# Patient Record
Sex: FEMALE | Race: OTHER
[De-identification: names, ages, dates, MRNs, and addresses within clinical notes are randomized per-mention and may not be internally consistent; named-entity substitution may affect disease eponyms.]

---

## 2019-03-02 ENCOUNTER — HOSPITAL ENCOUNTER (INPATIENT)
Dept: HOSPITAL 72 - EMR | Age: 74
LOS: 7 days | Discharge: HOME | DRG: 871 | End: 2019-03-09
Payer: MEDICARE

## 2019-03-02 VITALS — SYSTOLIC BLOOD PRESSURE: 94 MMHG | DIASTOLIC BLOOD PRESSURE: 65 MMHG

## 2019-03-02 VITALS — DIASTOLIC BLOOD PRESSURE: 89 MMHG | SYSTOLIC BLOOD PRESSURE: 111 MMHG

## 2019-03-02 VITALS — BODY MASS INDEX: 16.58 KG/M2 | WEIGHT: 90.13 LBS | HEIGHT: 62 IN

## 2019-03-02 VITALS — DIASTOLIC BLOOD PRESSURE: 48 MMHG | SYSTOLIC BLOOD PRESSURE: 95 MMHG

## 2019-03-02 VITALS — SYSTOLIC BLOOD PRESSURE: 94 MMHG | DIASTOLIC BLOOD PRESSURE: 46 MMHG

## 2019-03-02 VITALS — SYSTOLIC BLOOD PRESSURE: 138 MMHG | DIASTOLIC BLOOD PRESSURE: 80 MMHG

## 2019-03-02 DIAGNOSIS — G62.9: ICD-10-CM

## 2019-03-02 DIAGNOSIS — J11.00: ICD-10-CM

## 2019-03-02 DIAGNOSIS — J96.00: ICD-10-CM

## 2019-03-02 DIAGNOSIS — E46: ICD-10-CM

## 2019-03-02 DIAGNOSIS — Z79.82: ICD-10-CM

## 2019-03-02 DIAGNOSIS — A41.9: Primary | ICD-10-CM

## 2019-03-02 DIAGNOSIS — M81.0: ICD-10-CM

## 2019-03-02 DIAGNOSIS — M06.9: ICD-10-CM

## 2019-03-02 DIAGNOSIS — N39.0: ICD-10-CM

## 2019-03-02 DIAGNOSIS — D63.8: ICD-10-CM

## 2019-03-02 LAB
ADD MANUAL DIFF: NO
ALBUMIN SERPL-MCNC: 2.8 G/DL (ref 3.4–5)
ALBUMIN/GLOB SERPL: 0.4 {RATIO} (ref 1–2.7)
ALP SERPL-CCNC: 82 U/L (ref 46–116)
ALT SERPL-CCNC: 15 U/L (ref 12–78)
ANION GAP SERPL CALC-SCNC: 10 MMOL/L (ref 5–15)
APPEARANCE UR: CLEAR
APTT PPP: (no result) S
AST SERPL-CCNC: 40 U/L (ref 15–37)
BASOPHILS NFR BLD AUTO: 0.4 % (ref 0–2)
BILIRUB SERPL-MCNC: 0.3 MG/DL (ref 0.2–1)
BUN SERPL-MCNC: 29 MG/DL (ref 7–18)
CALCIUM SERPL-MCNC: 8.5 MG/DL (ref 8.5–10.1)
CHLORIDE SERPL-SCNC: 100 MMOL/L (ref 98–107)
CK MB SERPL-MCNC: 1.6 NG/ML (ref 0–3.6)
CK SERPL-CCNC: 106 U/L (ref 26–308)
CO2 SERPL-SCNC: 22 MMOL/L (ref 21–32)
CREAT SERPL-MCNC: 1.4 MG/DL (ref 0.55–1.3)
EOSINOPHIL NFR BLD AUTO: 0.1 % (ref 0–3)
ERYTHROCYTE [DISTWIDTH] IN BLOOD BY AUTOMATED COUNT: 13 % (ref 11.6–14.8)
GLOBULIN SER-MCNC: 7 G/DL
GLUCOSE UR STRIP-MCNC: NEGATIVE MG/DL
HCT VFR BLD CALC: 35.2 % (ref 37–47)
HGB BLD-MCNC: 11.4 G/DL (ref 12–16)
KETONES UR QL STRIP: NEGATIVE
LEUKOCYTE ESTERASE UR QL STRIP: (no result)
LYMPHOCYTES NFR BLD AUTO: 17.4 % (ref 20–45)
MCV RBC AUTO: 94 FL (ref 80–99)
MONOCYTES NFR BLD AUTO: 5.8 % (ref 1–10)
NEUTROPHILS NFR BLD AUTO: 76.3 % (ref 45–75)
NITRITE UR QL STRIP: NEGATIVE
PH UR STRIP: 6 [PH] (ref 4.5–8)
PHOSPHATE SERPL-MCNC: 2.7 MG/DL (ref 2.5–4.9)
PLATELET # BLD: 189 K/UL (ref 150–450)
POTASSIUM SERPL-SCNC: 4.4 MMOL/L (ref 3.5–5.1)
PROT UR QL STRIP: (no result)
RBC # BLD AUTO: 3.75 M/UL (ref 4.2–5.4)
SODIUM SERPL-SCNC: 131 MMOL/L (ref 136–145)
SP GR UR STRIP: 1.01 (ref 1–1.03)
UROBILINOGEN UR-MCNC: NORMAL MG/DL (ref 0–1)
WBC # BLD AUTO: 5.2 K/UL (ref 4.8–10.8)

## 2019-03-02 PROCEDURE — 93970 EXTREMITY STUDY: CPT

## 2019-03-02 PROCEDURE — 96365 THER/PROPH/DIAG IV INF INIT: CPT

## 2019-03-02 PROCEDURE — 87070 CULTURE OTHR SPECIMN AEROBIC: CPT

## 2019-03-02 PROCEDURE — 83540 ASSAY OF IRON: CPT

## 2019-03-02 PROCEDURE — 81003 URINALYSIS AUTO W/O SCOPE: CPT

## 2019-03-02 PROCEDURE — 85730 THROMBOPLASTIN TIME PARTIAL: CPT

## 2019-03-02 PROCEDURE — 83550 IRON BINDING TEST: CPT

## 2019-03-02 PROCEDURE — 86710 INFLUENZA VIRUS ANTIBODY: CPT

## 2019-03-02 PROCEDURE — 80053 COMPREHEN METABOLIC PANEL: CPT

## 2019-03-02 PROCEDURE — 80202 ASSAY OF VANCOMYCIN: CPT

## 2019-03-02 PROCEDURE — 84100 ASSAY OF PHOSPHORUS: CPT

## 2019-03-02 PROCEDURE — 85610 PROTHROMBIN TIME: CPT

## 2019-03-02 PROCEDURE — 83690 ASSAY OF LIPASE: CPT

## 2019-03-02 PROCEDURE — 82728 ASSAY OF FERRITIN: CPT

## 2019-03-02 PROCEDURE — 94640 AIRWAY INHALATION TREATMENT: CPT

## 2019-03-02 PROCEDURE — 83735 ASSAY OF MAGNESIUM: CPT

## 2019-03-02 PROCEDURE — 80069 RENAL FUNCTION PANEL: CPT

## 2019-03-02 PROCEDURE — 84484 ASSAY OF TROPONIN QUANT: CPT

## 2019-03-02 PROCEDURE — 87040 BLOOD CULTURE FOR BACTERIA: CPT

## 2019-03-02 PROCEDURE — 83615 LACTATE (LD) (LDH) ENZYME: CPT

## 2019-03-02 PROCEDURE — 36415 COLL VENOUS BLD VENIPUNCTURE: CPT

## 2019-03-02 PROCEDURE — 82550 ASSAY OF CK (CPK): CPT

## 2019-03-02 PROCEDURE — 82270 OCCULT BLOOD FECES: CPT

## 2019-03-02 PROCEDURE — 82044 UR ALBUMIN SEMIQUANTITATIVE: CPT

## 2019-03-02 PROCEDURE — 85044 MANUAL RETICULOCYTE COUNT: CPT

## 2019-03-02 PROCEDURE — 82607 VITAMIN B-12: CPT

## 2019-03-02 PROCEDURE — 84165 PROTEIN E-PHORESIS SERUM: CPT

## 2019-03-02 PROCEDURE — 82784 ASSAY IGA/IGD/IGG/IGM EACH: CPT

## 2019-03-02 PROCEDURE — 85025 COMPLETE CBC W/AUTO DIFF WBC: CPT

## 2019-03-02 PROCEDURE — 85060 BLOOD SMEAR INTERPRETATION: CPT

## 2019-03-02 PROCEDURE — 80048 BASIC METABOLIC PNL TOTAL CA: CPT

## 2019-03-02 PROCEDURE — 86334 IMMUNOFIX E-PHORESIS SERUM: CPT

## 2019-03-02 PROCEDURE — 87205 SMEAR GRAM STAIN: CPT

## 2019-03-02 PROCEDURE — 99285 EMERGENCY DEPT VISIT HI MDM: CPT

## 2019-03-02 PROCEDURE — 83605 ASSAY OF LACTIC ACID: CPT

## 2019-03-02 PROCEDURE — 82378 CARCINOEMBRYONIC ANTIGEN: CPT

## 2019-03-02 PROCEDURE — 82746 ASSAY OF FOLIC ACID SERUM: CPT

## 2019-03-02 PROCEDURE — 82164 ANGIOTENSIN I ENZYME TEST: CPT

## 2019-03-02 PROCEDURE — 93005 ELECTROCARDIOGRAM TRACING: CPT

## 2019-03-02 PROCEDURE — 83880 ASSAY OF NATRIURETIC PEPTIDE: CPT

## 2019-03-02 PROCEDURE — 82553 CREATINE MB FRACTION: CPT

## 2019-03-02 PROCEDURE — 85651 RBC SED RATE NONAUTOMATED: CPT

## 2019-03-02 PROCEDURE — 94664 DEMO&/EVAL PT USE INHALER: CPT

## 2019-03-02 PROCEDURE — 96367 TX/PROPH/DG ADDL SEQ IV INF: CPT

## 2019-03-02 PROCEDURE — 85007 BL SMEAR W/DIFF WBC COUNT: CPT

## 2019-03-02 PROCEDURE — 71045 X-RAY EXAM CHEST 1 VIEW: CPT

## 2019-03-02 RX ADMIN — HEPARIN SODIUM SCH UNITS: 5000 INJECTION INTRAVENOUS; SUBCUTANEOUS at 21:58

## 2019-03-02 NOTE — NUR
ED Nurse Note:



BROUGHT IN BY PT' SON FROM HOME DUE TO SOB AND WEAKNESS X 3 DAY. ORAL TEMP OF 
100.1F. A/OX4. DENIES CP. PER PT' SON, PT BECAME WEAK AFTER GETTING 
CHILLS/COUGH COUPLE WEEKS AGO.

## 2019-03-02 NOTE — DIAGNOSTIC IMAGING REPORT
EXAM:

  XR Chest, 1 View

 

CLINICAL HISTORY:

  Shortness of breath

 

TECHNIQUE:

  Frontal view of the chest.

 

COMPARISON:

  Chest x-rays dated 3/29/17

 

FINDINGS:

  Lungs:  Diffuse bilateral patchy interstitial and alveolar opacities.

  Pleural space:  Unremarkable.  The costophrenic angles are sharp.  No 

visible pneumothorax.

  Heart:  Unremarkable.  No cardiomegaly.

  Mediastinum:  Unremarkable.

  Bones/joints:  Unremarkable.

  Vasculature:  Atherosclerotic calcifications within the aortic arch.

 

IMPRESSION:     

  Diffuse bilateral patchy interstitial and alveolar opacities, which may 

represent pneumonia versus pulmonary edema.

## 2019-03-02 NOTE — NUR
ED Nurse Note:



PT IS CALM RESTING IN BED, VITAL SIGNS ARE STABLE AT THE MOMENT, ERMD IS AWARE 
PT BP IS LOW. PER PT THAT IS HER NORMAL BP. PT DENIES PAIN. PT SON IS AT 
[place], PT IS AWAITING TO BE TRANSFERRED. PT SKIN INTACT, ON ROOM AIR, PT IS 
AOX4. PT IS [place] SPEAKING ONLY.

## 2019-03-02 NOTE — EMERGENCY ROOM REPORT
History of Present Illness


General


Chief Complaint:  Dyspnea/Respdistress


Source:  Patient, Family Member





Present Illness


HPI


Patient is a 73-year-old female presented after increased fever and congestion 

and difficulty breathing.  Patient gradual onset of symptoms over the past 2 

days.  She reports having subjective fever as well as nonproductive cough.  

Patient did not get a flu vaccine this year.  She is followed at the [facility].  Patient had increased generalized weakness.  Patient had been 

maintaining her weight.  She is never been a smoker.


Allergies:  


Coded Allergies:  


     No Known Allergies (Unverified , 16)





Patient History


Past Medical History:  see triage record


:  5


Reviewed Nursing Documentation:  PMH: Agreed; PSxH: Agreed





Nursing Documentation-PMH


Hx Cardiac Problems:  No


Hx Cancer:  No


Hx Gastrointestinal Problems:  No


Hx Neurological Problems:  Yes - OSTEOPOROSIS LOWER BACK, RA





Review of Systems


All Other Systems:  negative except mentioned in HPI





Physical Exam





Vital Signs








  Date Time  Temp Pulse Resp B/P (MAP) Pulse Ox O2 Delivery O2 Flow Rate FiO2


 


3/2/19 14:18 100.0 94 26 120/64 89 Room Air  








Sp02 EP Interpretation:  reviewed, normal


General Appearance:  normal inspection, alert, GCS 15, mild distress, 

Chronically Ill


Head:  atraumatic


Eyes:  bilateral eye PERRL


ENT:  normal ENT inspection, hearing grossly normal, normal voice


Neck:  normal inspection, supple, no bony tend, limited range of motion


Respiratory:  normal inspection, no retraction, wheezing


Cardiovascular #1:  regular rate, rhythm, no edema


Gastrointestinal:  normal inspection, normal bowel sounds, non tender, soft, no 

guarding, no hernia


Genitourinary:  no CVA tenderness


Musculoskeletal:  normal inspection, back normal, normal range of motion


Neurologic:  normal inspection, alert, oriented x3, responsive, CNs III-XII nml 

as tested, speech normal


Psychiatric:  normal inspection, judgement/insight normal, mood/affect normal


Skin:  normal inspection, normal color, no rash





Medical Decision Making


Diagnostic Impression:  


 Primary Impression:  


 Pneumonitis


 Additional Impression:  


 Rheumatoid arthritis


ER Course


Patient presented for shortness of breath and generalized weakness.  

Differential diagnosis include was not limited to pneumonia, influenza, 

electrolyte abnormality, anemia among others.  Because of complexity of patient'

s case laboratory testing and imaging studies were ordered.Chest x-ray 1 view 

read by radiology showed bilateral patchy infiltrates with normal cardiac size.

  Patient was given IV fluids as well as IV antibiotics after blood cultures 

were obtained.  Patient was noted to have a normal white blood count.  Patient 

stated she felt better after some breathing treatments as well as IV 

fluids.Have some continued tachypnea.Dr. Flaco Stephens was contacted for 

inpatient management due to panel physician.





Labs








Test


  3/2/19


14:40


 


White Blood Count


  5.2 K/UL


(4.8-10.8)


 


Red Blood Count


  3.75 M/UL


(4.20-5.40)


 


Hemoglobin


  11.4 G/DL


(12.0-16.0)


 


Hematocrit


  35.2 %


(37.0-47.0)


 


Mean Corpuscular Volume 94 FL (80-99) 


 


Mean Corpuscular Hemoglobin


  30.5 PG


(27.0-31.0)


 


Mean Corpuscular Hemoglobin


Concent 32.4 G/DL


(32.0-36.0)


 


Red Cell Distribution Width


  13.0 %


(11.6-14.8)


 


Platelet Count


  189 K/UL


(150-450)


 


Mean Platelet Volume


  6.5 FL


(6.5-10.1)


 


Neutrophils (%) (Auto)


  76.3 %


(45.0-75.0)


 


Lymphocytes (%) (Auto)


  17.4 %


(20.0-45.0)


 


Monocytes (%) (Auto)


  5.8 %


(1.0-10.0)


 


Eosinophils (%) (Auto)


  0.1 %


(0.0-3.0)


 


Basophils (%) (Auto)


  0.4 %


(0.0-2.0)


 


Urine Color Pale yellow 


 


Urine Appearance Clear 


 


Urine pH 6 (4.5-8.0) 


 


Urine Specific Gravity


  1.015


(1.005-1.035)


 


Urine Protein 3+ (NEGATIVE) 


 


Urine Glucose (UA)


  Negative


(NEGATIVE)


 


Urine Ketones


  Negative


(NEGATIVE)


 


Urine Blood 5+ (NEGATIVE) 


 


Urine Nitrite


  Negative


(NEGATIVE)


 


Urine Bilirubin


  Negative


(NEGATIVE)


 


Urine Urobilinogen


  Normal MG/DL


(0.0-1.0)


 


Urine Leukocyte Esterase 1+ (NEGATIVE) 


 


Urine RBC


  30-40 /HPF (0


- 2)


 


Urine WBC


  0-2 /HPF (0 -


2)


 


Urine Squamous Epithelial


Cells Occasional


/LPF


 


Urine Bacteria


  Occasional


/HPF (NONE)


 


Sodium Level


  131 MMOL/L


(136-145)


 


Potassium Level


  4.4 MMOL/L


(3.5-5.1)


 


Chloride Level


  100 MMOL/L


()


 


Carbon Dioxide Level


  22 MMOL/L


(21-32)


 


Anion Gap


  10 mmol/L


(5-15)


 


Blood Urea Nitrogen


  29 mg/dL


(7-18)


 


Creatinine


  1.4 MG/DL


(0.55-1.30)


 


Estimat Glomerular Filtration


Rate  mL/min (>60) 


 


 


Glucose Level


  111 MG/DL


()


 


Lactic Acid Level


  1.50 mmol/L


(0.4-2.0)


 


Calcium Level


  8.5 MG/DL


(8.5-10.1)


 


Phosphorus Level


  2.7 MG/DL


(2.5-4.9)


 


Magnesium Level


  1.9 MG/DL


(1.8-2.4)


 


Total Bilirubin


  0.3 MG/DL


(0.2-1.0)


 


Aspartate Amino Transf


(AST/SGOT) 40 U/L (15-37) 


 


 


Alanine Aminotransferase


(ALT/SGPT) 15 U/L (12-78) 


 


 


Alkaline Phosphatase


  82 U/L


()


 


Total Creatine Kinase


  106 U/L


()


 


Creatine Kinase MB


  1.6 NG/ML


(0.0-3.6)


 


Creatine Kinase MB Relative


Index 1.5 


 


 


Troponin I


  0.000 ng/mL


(0.000-0.056)


 


Total Protein


  9.8 G/DL


(6.4-8.2)


 


Albumin


  2.8 G/DL


(3.4-5.0)


 


Globulin 7.0 g/dL 


 


Albumin/Globulin Ratio 0.4 (1.0-2.7) 


 


Lipase


  197 U/L


()








EKG Diagnostic Results


Rate:  normal - 89


Rhythm:  NSR


ST Segments:  no acute changes





Rhythm Strip Diag. Results


EP Interpretation:  yes


Rhythm:  NSR - 82, no PVC's, no ectopy





Last Vital Signs








  Date Time  Temp Pulse Resp B/P (MAP) Pulse Ox O2 Delivery O2 Flow Rate FiO2


 


3/2/19 14:18 100.0 94 26 120/64 89 Room Air  








Status:  improved


Disposition:  ADMITTED AS INPATIENT


Condition:  Stable











Tian Saenz MD Mar 2, 2019 14:34

## 2019-03-02 NOTE — NUR
NURSE NOTES:

pt transferred from ER to telemetry unit pt arrived to unit in stable condition. no s/s of 
sob, no acute distress during transfer, pt not c/o pain at this time. will continue to 
monitor

## 2019-03-02 NOTE — NUR
HAND-OFF: 

Report given to LANA Gayle. Pt remain stable. Pt's son at the bedside. No s/s 
of distress.

## 2019-03-02 NOTE — NUR
ED Nurse Note:



pt was transferred to TELE, with Nalini RN and Kenan EMT. pt wa accompanied 
with cardiac monitor pt vital signs are stable, pt is on room air, no signs of 
distress, pt denies pain, pt skin is intact. All belongings have been given to 
patient. Son: Kike took pt wallet home.

## 2019-03-03 VITALS — DIASTOLIC BLOOD PRESSURE: 61 MMHG | SYSTOLIC BLOOD PRESSURE: 113 MMHG

## 2019-03-03 VITALS — DIASTOLIC BLOOD PRESSURE: 71 MMHG | SYSTOLIC BLOOD PRESSURE: 134 MMHG

## 2019-03-03 VITALS — SYSTOLIC BLOOD PRESSURE: 106 MMHG | DIASTOLIC BLOOD PRESSURE: 58 MMHG

## 2019-03-03 VITALS — SYSTOLIC BLOOD PRESSURE: 97 MMHG | DIASTOLIC BLOOD PRESSURE: 67 MMHG

## 2019-03-03 VITALS — DIASTOLIC BLOOD PRESSURE: 57 MMHG | SYSTOLIC BLOOD PRESSURE: 110 MMHG

## 2019-03-03 VITALS — DIASTOLIC BLOOD PRESSURE: 66 MMHG | SYSTOLIC BLOOD PRESSURE: 113 MMHG

## 2019-03-03 VITALS — SYSTOLIC BLOOD PRESSURE: 99 MMHG | DIASTOLIC BLOOD PRESSURE: 62 MMHG

## 2019-03-03 LAB
ADD MANUAL DIFF: YES
ALBUMIN SERPL-MCNC: 2.1 G/DL (ref 3.4–5)
ANION GAP SERPL CALC-SCNC: 7 MMOL/L (ref 5–15)
BUN SERPL-MCNC: 20 MG/DL (ref 7–18)
CALCIUM SERPL-MCNC: 8.1 MG/DL (ref 8.5–10.1)
CHLORIDE SERPL-SCNC: 105 MMOL/L (ref 98–107)
CO2 SERPL-SCNC: 23 MMOL/L (ref 21–32)
CREAT SERPL-MCNC: 1.2 MG/DL (ref 0.55–1.3)
ERYTHROCYTE [DISTWIDTH] IN BLOOD BY AUTOMATED COUNT: 13.1 % (ref 11.6–14.8)
HCT VFR BLD CALC: 29.2 % (ref 37–47)
HGB BLD-MCNC: 9.6 G/DL (ref 12–16)
MCV RBC AUTO: 94 FL (ref 80–99)
PHOSPHATE SERPL-MCNC: 2.9 MG/DL (ref 2.5–4.9)
PLATELET # BLD: 173 K/UL (ref 150–450)
POTASSIUM SERPL-SCNC: 4.3 MMOL/L (ref 3.5–5.1)
RBC # BLD AUTO: 3.11 M/UL (ref 4.2–5.4)
SODIUM SERPL-SCNC: 135 MMOL/L (ref 136–145)
WBC # BLD AUTO: 2.8 K/UL (ref 4.8–10.8)

## 2019-03-03 RX ADMIN — HEPARIN SODIUM SCH UNITS: 5000 INJECTION INTRAVENOUS; SUBCUTANEOUS at 20:43

## 2019-03-03 RX ADMIN — THEOPHYLLINE ANHYDROUS SCH MG: 100 CAPSULE, EXTENDED RELEASE ORAL at 20:42

## 2019-03-03 RX ADMIN — HEPARIN SODIUM SCH UNITS: 5000 INJECTION INTRAVENOUS; SUBCUTANEOUS at 08:34

## 2019-03-03 NOTE — NUR
NURSE NOTES:

Received Patient from [place] from tele. Patient is alert and oriented x4. Denies any pain or 
discomfort @ this time. V/S stable. Temp is 99.3 oral.Skin intact, IV on right forearm 
intact, no s/s of infiltration. Belonging was checked.All belongings accounted for. 
Re-orientation given about the unit. Call light within reach, bed is in lowest position and 
locked. Will continue plan of care.

## 2019-03-03 NOTE — NUR
NURSE NOTES:

Received patient on bed awake, no s/s of any distress, denies any pain, IV line patent and 
intact, bed in low position and locked, call light within reach, will continue to monitor.

## 2019-03-03 NOTE — NUR
NURSE NOTES:



Called Dr. Mustafa regarding diet and possible venous duplex order for patient. Awaiting 
callback.

## 2019-03-03 NOTE — NUR
NURSE NOTES:

pt awake and talking with the son, no acute distress during my shift, all needs met during 
my shift will endorse pt to incoming nurse

## 2019-03-03 NOTE — NUR
NURSE NOTES:

Received report from LANA Cevallos. Patient in bed resting, AOx4, patient on oxygen 2L via NC. 
No active s/s cardiac, respiratory distress noticed at this time, denies pain at this time. 
Endorsed need of sputum culture, fever of 100.6 and one dose of Tylenol given. Temperature 
98.2 at this time. IV on Left FA 20G, asymptomatic, patent, intact. Awaiting for Dr. Mustafa 
for diet order. Bed in lowest position, side rails upx2, call light within reach. Will 
continue to monitor.

## 2019-03-03 NOTE — NUR
TRANSFER TO FLOOR:

Patient transferred to 4E , per Dr. Mustafa.   

Report given to LANA Johnson.  

Belongings and medications given to patient. 

Family and or S/O informed of transfer.

## 2019-03-03 NOTE — NUR
CASE MANAGEMENT: INITIAL REVIEW 



74 YO F PRESENTED TO OUR ED  FROM HOME 



CC: DYSPNEA 



PMHx: OSTEOPOROSIS LOWER BACK. 



SI:PNEUMONITIS/RA

T 100 HR 94 RR 26 B/P 120/64 SATS 89% ON RA 

 BUN 29 CR 1.4  AST 40 



IS: NS BOLUS X2

DUONEB HHN X1 

TYLENOL PO X1

AZITHROMYCIN IV X1 





*****PATIENT ADMITTED TO TELE 3/2/2019 @ 2350*****



DCP: PATIENT TO BE DISCHARGED TO HOME ONCE MEDICALLY CLEARED. 



PLAN OF CARE: 

ID CONSULT 

SPUTUM CX 


-------------------------------------------------------------------------------

Addendum: 03/04/19 at 1744 by Merissa Chery CM

-------------------------------------------------------------------------------

INTERQUAL MET

## 2019-03-03 NOTE — CONSULTATION
History of Present Illness


General


Date patient seen:  Mar 3, 2019


Chief Complaint:  Dyspnea/Respdistress





Present Illness


HPI


73-year-old female with hx of Rheumatic arthritis  presented  to ER with CC of  

fever and congestion and difficulty breathing with gradual onset of symptoms 

over the past 2 days.  She reports having subjective fever as well as 

nonproductive cough.    She was febrile in ER and admitted for further 

management.


Allergies:  


Coded Allergies:  


     No Known Allergies (Unverified , 2/14/16)





Medication History


Scheduled


Alendronate Sodium* (Fosamax*), Unknown Dose ORAL DAILY, (Reported)


Aspirin* (Aspir 81*), 81 MG ORAL DAILY, (Reported)





Miscellaneous Medications


Calcium Carbonate (Calcium), 500 MG PO, (Reported)


Ibuprofen* (Advil*), Unknown Dose ORAL, (Reported)





Discontinued Medications


Alendronate Sodium* (Fosamax*), 10 MG ORAL DAILY, (Reported)


   Discontinued Reason: Pt stopped taking med


Prednisone* (Prednisone*), 20 MG ORAL DAILY, (Reported)


   Discontinued Reason: Pt stopped taking med





Patient History


Healthcare decision maker





Resuscitation status


Full Code


Advanced Directive on File


No





Past Medical/Surgical History


Past Medical/Surgical History:  


(1) Rheumatoid arthritis





Review of Systems


Respiratory:  Reports: cough, shortness of breath


All Other Systems:  negative except mentioned in HPI





Physical Exam


General Appearance:  WD/WN


Lines, tubes and drains:  peripheral


HEENT:  normocephalic, atraumatic


Neck:  non-tender, normal alignment


Respiratory/Chest:  chest wall non-tender, lungs clear


Breasts:  no masses


Cardiovascular/Chest:  normal peripheral pulses


Abdomen:  normal bowel sounds


Genitourinary/Rectal:  normal genital exam





Last 24 Hour Vital Signs








  Date Time  Temp Pulse Resp B/P (MAP) Pulse Ox O2 Delivery O2 Flow Rate FiO2


 


3/3/19 09:00      Nasal Cannula 2.0 





      Nasal Cannula 2.0 


 


3/3/19 08:00  75      


 


3/3/19 08:00 98.2 71 18 113/61 (78) 98   


 


3/3/19 06:51  82 16   Nasal Cannula 2.0 28


 


3/3/19 04:00 98.4 85 20 97/67 (77) 98   


 


3/3/19 04:00  75      


 


3/3/19 02:21 99.3       


 


3/3/19 00:00  94      


 


3/3/19 00:00 100.6 97 20 110/57 (74) 99   


 


3/2/19 23:37      Nasal Cannula 2.0 





      Nasal Cannula 2.0 


 


3/2/19 22:04  94 18  99 Nasal Cannula 2.0 28


 


3/2/19 21:54  78 18  98 Nasal Cannula 2.0 28


 


3/2/19 21:54  78 18   Nasal Cannula 2.0 28


 


3/2/19 21:41  84      


 


3/2/19 21:00 98.9 78 21 94/46 97 Room Air  21


 


3/2/19 20:45 98.9 78 21 94/46 97 Room Air  


 


3/2/19 19:05 99.5 77 20 95/48 95 Room Air  


 


3/2/19 18:22  80 24 111/89 94 Room Air  


 


3/2/19 16:10 99.5 98 22 94/65 94 Room Air  21


 


3/2/19 15:19 99.5       


 


3/2/19 14:55  92 23  99 Room Air  21


 


3/2/19 14:47        21


 


3/2/19 14:47  90 25  99 Room Air  21


 


3/2/19 14:47  90 25   Room Air  21


 


3/2/19 14:25  96 25   Room Air  


 


3/2/19 14:25 100.1 96 25 138/80 100 Room Air  


 


3/2/19 14:18 100.0 94 26 120/64 89 Room Air  

















Intake and Output  


 


 3/2/19 3/3/19





 19:00 07:00


 


Intake Total 1275 ml 495 ml


 


Balance 1275 ml 495 ml


 


  


 


IV Total 1275 ml 495 ml


 


# Voids 1 2











Laboratory Tests








Test


  3/2/19


14:40 3/3/19


05:10


 


White Blood Count


  5.2 K/UL


(4.8-10.8) 2.8 K/UL


(4.8-10.8)  L


 


Red Blood Count


  3.75 M/UL


(4.20-5.40)  L 3.11 M/UL


(4.20-5.40)  L


 


Hemoglobin


  11.4 G/DL


(12.0-16.0)  L 9.6 G/DL


(12.0-16.0)  L


 


Hematocrit


  35.2 %


(37.0-47.0)  L 29.2 %


(37.0-47.0)  L


 


Mean Corpuscular Volume 94 FL (80-99)   94 FL (80-99)  


 


Mean Corpuscular Hemoglobin


  30.5 PG


(27.0-31.0) 30.8 PG


(27.0-31.0)


 


Mean Corpuscular Hemoglobin


Concent 32.4 G/DL


(32.0-36.0) 32.8 G/DL


(32.0-36.0)


 


Red Cell Distribution Width


  13.0 %


(11.6-14.8) 13.1 %


(11.6-14.8)


 


Platelet Count


  189 K/UL


(150-450) 173 K/UL


(150-450)


 


Mean Platelet Volume


  6.5 FL


(6.5-10.1) 6.1 FL


(6.5-10.1)  L


 


Neutrophils (%) (Auto)


  76.3 %


(45.0-75.0)  H % (45.0-75.0)


 


 


Lymphocytes (%) (Auto)


  17.4 %


(20.0-45.0)  L % (20.0-45.0)


 


 


Monocytes (%) (Auto)


  5.8 %


(1.0-10.0)  % (1.0-10.0)  


 


 


Eosinophils (%) (Auto)


  0.1 %


(0.0-3.0)  % (0.0-3.0)  


 


 


Basophils (%) (Auto)


  0.4 %


(0.0-2.0)  % (0.0-2.0)  


 


 


Urine Color Pale yellow   


 


Urine Appearance Clear   


 


Urine pH 6 (4.5-8.0)   


 


Urine Specific Gravity


  1.015


(1.005-1.035) 


 


 


Urine Protein


  3+ (NEGATIVE)


H 


 


 


Urine Glucose (UA)


  Negative


(NEGATIVE) 


 


 


Urine Ketones


  Negative


(NEGATIVE) 


 


 


Urine Blood


  5+ (NEGATIVE)


H 


 


 


Urine Nitrite


  Negative


(NEGATIVE) 


 


 


Urine Bilirubin


  Negative


(NEGATIVE) 


 


 


Urine Urobilinogen


  Normal MG/DL


(0.0-1.0) 


 


 


Urine Leukocyte Esterase


  1+ (NEGATIVE)


H 


 


 


Urine RBC


  30-40 /HPF (0


- 2)  H 


 


 


Urine WBC


  0-2 /HPF (0 -


2) 


 


 


Urine Squamous Epithelial


Cells Occasional


/LPF 


 


 


Urine Bacteria


  Occasional


/HPF (NONE) 


 


 


Sodium Level


  131 MMOL/L


(136-145)  L 135 MMOL/L


(136-145)  L


 


Potassium Level


  4.4 MMOL/L


(3.5-5.1) 4.3 MMOL/L


(3.5-5.1)


 


Chloride Level


  100 MMOL/L


() 105 MMOL/L


()


 


Carbon Dioxide Level


  22 MMOL/L


(21-32) 23 MMOL/L


(21-32)


 


Anion Gap


  10 mmol/L


(5-15) 7 mmol/L


(5-15)


 


Blood Urea Nitrogen


  29 mg/dL


(7-18)  H 20 mg/dL


(7-18)  H


 


Creatinine


  1.4 MG/DL


(0.55-1.30)  H 1.2 MG/DL


(0.55-1.30)


 


Estimat Glomerular Filtration


Rate  mL/min (>60)  


   mL/min (>60)  


 


 


Glucose Level


  111 MG/DL


()  H 90 MG/DL


()


 


Lactic Acid Level


  1.50 mmol/L


(0.4-2.0) 


 


 


Calcium Level


  8.5 MG/DL


(8.5-10.1) 8.1 MG/DL


(8.5-10.1)  L


 


Phosphorus Level


  2.7 MG/DL


(2.5-4.9) 2.9 MG/DL


(2.5-4.9)


 


Magnesium Level


  1.9 MG/DL


(1.8-2.4) 


 


 


Total Bilirubin


  0.3 MG/DL


(0.2-1.0) 


 


 


Aspartate Amino Transf


(AST/SGOT) 40 U/L (15-37)


H 


 


 


Alanine Aminotransferase


(ALT/SGPT) 15 U/L (12-78)


  


 


 


Alkaline Phosphatase


  82 U/L


() 


 


 


Total Creatine Kinase


  106 U/L


() 


 


 


Creatine Kinase MB


  1.6 NG/ML


(0.0-3.6) 


 


 


Creatine Kinase MB Relative


Index 1.5  


  


 


 


Troponin I


  0.000 ng/mL


(0.000-0.056) 


 


 


Total Protein


  9.8 G/DL


(6.4-8.2)  H 


 


 


Albumin


  2.8 G/DL


(3.4-5.0)  L 2.1 G/DL


(3.4-5.0)  L


 


Globulin 7.0 g/dL   


 


Albumin/Globulin Ratio


  0.4 (1.0-2.7)


L 


 


 


Lipase


  197 U/L


() 


 


 


Differential Total Cells


Counted 


  100  


 


 


Neutrophils % (Manual)  73 % (45-75)  


 


Lymphocytes % (Manual)  22 % (20-45)  


 


Monocytes % (Manual)  5 % (1-10)  


 


Eosinophils % (Manual)  0 % (0-3)  


 


Basophils % (Manual)  0 % (0-2)  


 


Band Neutrophils  0 % (0-8)  


 


Platelet Estimate  Adequate  


 


Platelet Morphology  Normal  


 


Red Blood Cell Morphology  Normal  











Microbiology








 Date/Time


Source Procedure


Growth Status


 


 


 3/2/19 15:00


Nasal Nares Influenza Types A,B Antigen (SINDHU) - Final Complete








Height (Feet):  5


Height (Inches):  2.00


Weight (Pounds):  93


Medications





Current Medications








 Medications


  (Trade)  Dose


 Ordered  Sig/Naman


 Route


 PRN Reason  Start Time


 Stop Time Status Last Admin


Dose Admin


 


 Acetaminophen


  (Tylenol)  650 mg  Q4H  PRN


 ORAL


 FEVER  3/2/19 18:45


 4/1/19 18:44  3/2/19 23:54


 


 


 Albuterol/


 Ipratropium


  (Albuterol/


 Ipratropium)  3 ml  Q4H  PRN


 HHN


 Shortness of Breath  3/2/19 18:45


 3/7/19 18:44  3/2/19 21:54


 


 


 Cefepime HCl 1 gm/


 Dextrose  55 ml @ 


 110 mls/hr  Q24H


 IVPB


   3/3/19 10:00


 3/10/19 09:59  3/3/19 10:44


 


 


 Dextrose


  (Dextrose 50%)  25 ml  Q30M  PRN


 IV


 Hypoglycemia  3/2/19 18:45


 4/1/19 18:44   


 


 


 Dextrose


  (Dextrose 50%)  50 ml  Q30M  PRN


 IV


 Hypoglycemia  3/2/19 18:45


 4/1/19 18:44   


 


 


 Heparin Sodium


  (Porcine)


  (Heparin 5000


 units/ml)  5,000 units  EVERY 12  HOURS


 SUBQ


   3/2/19 21:00


 4/1/19 20:59  3/3/19 08:34


 


 


 Morphine Sulfate


  (Morphine


 Sulfate)  2 mg  Q4H  PRN


 IVP


 Severe Pain (Pain Scale 7-10)  3/2/19 18:45


 3/9/19 18:44   


 


 


 Ondansetron HCl


  (Zofran)  4 mg  Q6H  PRN


 IVP


 Nausea & Vomiting  3/2/19 18:45


 4/1/19 18:44   


 


 


 Polyethylene


 Glycol


  (Miralax)  17 gm  DAILYPRN  PRN


 ORAL


 Constipation  3/2/19 18:45


 4/1/19 18:44   


 


 


 Vancomycin HCl


  (Vanco rx to


 dose)  1 ea  DAILY  PRN


 MISC


 Per rx protocol  3/2/19 21:15


 4/1/19 21:14   


 











Assessment/Plan


Problem List:  


(1) Acute respiratory failure


ICD Codes:  J96.00 - Acute respiratory failure, unspecified whether with 

hypoxia or hypercapnia


SNOMED:  31681438


(2) Pneumonia


ICD Codes:  J18.9 - Pneumonia, unspecified organism


SNOMED:  496204196


(3) Severe anemia


ICD Codes:  D64.9 - Anemia, unspecified


SNOMED:  585638211


(4) Rheumatoid arthritis


ICD Codes:  M06.9 - Rheumatoid arthritis, unspecified


SNOMED:  53733284


Assessment/Plan


check sputum


iv abx


respiratory treatment


anemia w/u


chest pt


dvt prophylaxis.











Geovany Mustafa MD Mar 3, 2019 12:55

## 2019-03-03 NOTE — NUR
NURSE NOTES:

Given tylenol 650mg due to elevated temp 100.6 Cooling measure done. Will continue to 
monitor. Offered liquid.

## 2019-03-04 VITALS — DIASTOLIC BLOOD PRESSURE: 69 MMHG | SYSTOLIC BLOOD PRESSURE: 111 MMHG

## 2019-03-04 VITALS — SYSTOLIC BLOOD PRESSURE: 118 MMHG | DIASTOLIC BLOOD PRESSURE: 67 MMHG

## 2019-03-04 VITALS — SYSTOLIC BLOOD PRESSURE: 106 MMHG | DIASTOLIC BLOOD PRESSURE: 65 MMHG

## 2019-03-04 VITALS — DIASTOLIC BLOOD PRESSURE: 68 MMHG | SYSTOLIC BLOOD PRESSURE: 114 MMHG

## 2019-03-04 VITALS — DIASTOLIC BLOOD PRESSURE: 51 MMHG | SYSTOLIC BLOOD PRESSURE: 101 MMHG

## 2019-03-04 VITALS — SYSTOLIC BLOOD PRESSURE: 93 MMHG | DIASTOLIC BLOOD PRESSURE: 52 MMHG

## 2019-03-04 LAB
% IRON SATURATION: 10 % (ref 15–50)
ADD MANUAL DIFF: NO
ANION GAP SERPL CALC-SCNC: 9 MMOL/L (ref 5–15)
APTT BLD: 38 SEC (ref 23–33)
BASOPHILS NFR BLD AUTO: 0.4 % (ref 0–2)
BUN SERPL-MCNC: 16 MG/DL (ref 7–18)
CALCIUM SERPL-MCNC: 8.9 MG/DL (ref 8.5–10.1)
CHLORIDE SERPL-SCNC: 106 MMOL/L (ref 98–107)
CO2 SERPL-SCNC: 24 MMOL/L (ref 21–32)
CREAT SERPL-MCNC: 1.2 MG/DL (ref 0.55–1.3)
EOSINOPHIL NFR BLD AUTO: 1 % (ref 0–3)
ERYTHROCYTE [DISTWIDTH] IN BLOOD BY AUTOMATED COUNT: 13.2 % (ref 11.6–14.8)
HCT VFR BLD CALC: 31 % (ref 37–47)
HGB BLD-MCNC: 10.2 G/DL (ref 12–16)
INR PPP: 1 (ref 0.9–1.1)
IRON SERPL-MCNC: 19 UG/DL (ref 50–175)
LDH SERPL L TO P-CCNC: 244 U/L (ref 81–234)
LYMPHOCYTES NFR BLD AUTO: 41.9 % (ref 20–45)
MCV RBC AUTO: 93 FL (ref 80–99)
MONOCYTES NFR BLD AUTO: 16.2 % (ref 1–10)
NEUTROPHILS NFR BLD AUTO: 40.5 % (ref 45–75)
PLATELET # BLD: 214 K/UL (ref 150–450)
POTASSIUM SERPL-SCNC: 3.9 MMOL/L (ref 3.5–5.1)
RBC # BLD AUTO: 3.34 M/UL (ref 4.2–5.4)
SODIUM SERPL-SCNC: 138 MMOL/L (ref 136–145)
TIBC SERPL-MCNC: 192 UG/DL (ref 250–450)
UNSATURATED IRON BINDING: 173 UG/DL (ref 112–346)
WBC # BLD AUTO: 3.7 K/UL (ref 4.8–10.8)

## 2019-03-04 RX ADMIN — HEPARIN SODIUM SCH UNITS: 5000 INJECTION INTRAVENOUS; SUBCUTANEOUS at 21:20

## 2019-03-04 RX ADMIN — VANCOMYCIN HYDROCHLORIDE SCH MLS/HR: 500 INJECTION, POWDER, LYOPHILIZED, FOR SOLUTION INTRAVENOUS at 09:11

## 2019-03-04 RX ADMIN — THEOPHYLLINE ANHYDROUS SCH MG: 100 CAPSULE, EXTENDED RELEASE ORAL at 09:11

## 2019-03-04 RX ADMIN — HEPARIN SODIUM SCH UNITS: 5000 INJECTION INTRAVENOUS; SUBCUTANEOUS at 09:13

## 2019-03-04 RX ADMIN — SODIUM CHLORIDE SCH MLS/HR: 0.9 INJECTION INTRAVENOUS at 10:35

## 2019-03-04 RX ADMIN — THEOPHYLLINE ANHYDROUS SCH MG: 100 CAPSULE, EXTENDED RELEASE ORAL at 21:20

## 2019-03-04 RX ADMIN — AZITHROMYCIN DIHYDRATE SCH MG: 250 TABLET, FILM COATED ORAL at 14:30

## 2019-03-04 NOTE — PULMONOLOGY PROGRESS NOTE
Assessment/Plan


Problems:  


(1) Acute respiratory failure


(2) Pneumonia


(3) Severe anemia


(4) Rheumatoid arthritis


Assessment/Plan


respiratory treatment


check sputum


iv abx


chest pt


check electrolytes


titrate fio2 to sat of 92%





Subjective


ROS Limited/Unobtainable:  No


Interval Events:  still coughing


Constitutional:  Reports: no symptoms


HEENT:  Repors: no symptoms


Respiratory:  Reports: no symptoms


Allergies:  


Coded Allergies:  


     No Known Allergies (Unverified , 2/14/16)





Objective





Last 24 Hour Vital Signs








  Date Time  Temp Pulse Resp B/P (MAP) Pulse Ox O2 Delivery O2 Flow Rate FiO2


 


3/4/19 08:00 98.1 97 18 93/52 (66) 93   


 


3/4/19 07:22  77 16   Room Air  21


 


3/4/19 04:00 98.0 80 18 101/51 (68) 96   


 


3/4/19 00:00 98.7 84 18 106/65 (79) 95   


 


3/3/19 21:13  84 18   Room Air  21


 


3/3/19 21:00      Nasal Cannula 2.0 





      Nasal Cannula 2.0 


 


3/3/19 20:00 99.1 89 18 134/71 (92) 94   


 


3/3/19 17:27 100.4       


 


3/3/19 16:00 100.6 86 18 106/58 (74) 92   

















Intake and Output  


 


 3/3/19 3/4/19





 18:59 06:59


 


Intake Total 730 ml 


 


Balance 730 ml 


 


  


 


Intake Oral 730 ml 


 


# Voids 2 








General Appearance:  cachetic


HEENT:  normocephalic, atraumatic


Respiratory/Chest:  chest wall non-tender, lungs clear


Breasts:  no masses


Cardiovascular:  normal peripheral pulses, normal rate


Abdomen:  normal bowel sounds, soft, non tender


Genitourinary:  normal external genitalia


Extremities:  no cyanosis


Neurologic/Psychiatric:  CNs II-XII grossly normal


Lymphatic:  no neck adenopathy





Microbiology








 Date/Time


Source Procedure


Growth Status


 


 


 3/2/19 14:40


Blood Blood Culture - Preliminary


NO GROWTH AFTER 24 HOURS Resulted


 


 3/2/19 14:30


Blood Blood Culture - Preliminary


NO GROWTH AFTER 24 HOURS Resulted





 3/3/19 14:30


Sputum Gram Stain - Final Resulted


 


 3/3/19 14:30


Sputum Sputum Culture


Pending Resulted


 


 3/2/19 15:00


Nasal Nares Influenza Types A,B Antigen (SINDHU) - Final Complete








Laboratory Tests


3/3/19 14:30: 


Urine Random Total Protein 49H, Urine Total Protein [Pending], Urine Albumin (%

) [Pending], Urine Alpha-1-Globulins (%) [Pending], Urine Alpha-2-Globulins (%) 

[Pending], Urine Beta-Globulin (%) [Pending], Urine Gamma Globulin (%) [Pending]

, Ur Protein Electrophoresis M-Fidel [Pending], Urine Protein Electrophoresis 

Intrp [Pending]


3/4/19 06:20: 


White Blood Count 3.7L, Red Blood Count 3.34L, Hemoglobin 10.2L, Hematocrit 

31.0L, Mean Corpuscular Volume 93, Mean Corpuscular Hemoglobin 30.6, Mean 

Corpuscular Hemoglobin Concent 33.0, Red Cell Distribution Width 13.2, Platelet 

Count 214, Mean Platelet Volume 6.0L, Neutrophils (%) (Auto) 40.5L, Lymphocytes 

(%) (Auto) 41.9, Monocytes (%) (Auto) 16.2H, Eosinophils (%) (Auto) 1.0, 

Basophils (%) (Auto) 0.4, Differential Total Cells Counted 100, Neutrophils % (

Manual) 34L, Lymphocytes % (Manual) 52H, Monocytes % (Manual) 8, Eosinophils % (

Manual) 1, Basophils % (Manual) 0, Band Neutrophils 5, Reactive Lymphocytes 

Occasional, Platelet Estimate Adequate, Platelet Morphology Normal, Red Blood 

Cell Morphology Normal, Rouleau , Erythrocyte Sedimentation Rate 117H, 

Reticulocyte Count 0.4, Prothrombin Time 10.8, Prothromb Time International 

Ratio 1.0, Activated Partial Thromboplast Time 38H, Sodium Level 138, Potassium 

Level 3.9, Chloride Level 106, Carbon Dioxide Level 24, Anion Gap 9, Blood Urea 

Nitrogen 16, Creatinine 1.2, Estimat Glomerular Filtration Rate , Glucose Level 

98, Calcium Level 8.9, Iron Level 19L, Total Iron Binding Capacity 192L, 

Percent Iron Saturation 10L, Unsaturated Iron Binding 173, Lactate 

Dehydrogenase 244H, Carcinoembryonic Antigen [Pending], Vitamin B12 Level 716, 

Folate 12.6, Random Vancomycin Level 4.4


3/4/19 06:55: Stool Occult Blood Negative





Current Medications








 Medications


  (Trade)  Dose


 Ordered  Sig/Naman


 Route


 PRN Reason  Start Time


 Stop Time Status Last Admin


Dose Admin


 


 Acetaminophen


  (Tylenol)  650 mg  Q4H  PRN


 ORAL


 T>100.5  3/3/19 14:45


 4/1/19 18:44  3/3/19 16:57


 


 


 Albuterol/


 Ipratropium


  (Albuterol/


 Ipratropium)  3 ml  Q4H  PRN


 HHN


 Shortness of Breath  3/3/19 14:45


 3/7/19 18:44   


 


 


 Azithromycin


  (Zithromax)  500 mg  DAILY


 ORAL


   3/4/19 13:00


 3/11/19 12:59   


 


 


 Cefepime HCl 1 gm/


 Dextrose  55 ml @ 


 110 mls/hr  Q24H


 IVPB


   3/4/19 10:00


 3/10/19 09:59  3/4/19 10:35


 


 


 Dextrose


  (Dextrose 50%)  25 ml  Q30M  PRN


 IV


 Hypoglycemia  3/3/19 14:45


 4/1/19 18:44   


 


 


 Dextrose


  (Dextrose 50%)  50 ml  Q30M  PRN


 IV


 Hypoglycemia  3/3/19 14:45


 4/1/19 18:44   


 


 


 Heparin Sodium


  (Porcine)


  (Heparin 5000


 units/ml)  5,000 units  EVERY 12  HOURS


 SUBQ


   3/3/19 21:00


 4/1/19 20:59  3/4/19 09:13


 


 


 Morphine Sulfate


  (Morphine


 Sulfate)  2 mg  Q4H  PRN


 IVP


 Severe Pain (Pain Scale 7-10)  3/3/19 14:45


 3/9/19 18:44   


 


 


 Ondansetron HCl


  (Zofran)  4 mg  Q6H  PRN


 IVP


 Nausea & Vomiting  3/3/19 14:30


 4/1/19 14:29   


 


 


 Oseltamivir


 Phosphate


  (Tamiflu)  30 mg  DAILY


 ORAL


   3/4/19 13:00


 3/9/19 12:59   


 


 


 Polyethylene


 Glycol


  (Miralax)  17 gm  DAILYPRN  PRN


 ORAL


 Constipation  3/3/19 14:30


 4/1/19 14:29   


 


 


 Promethazine HCl/


 Codeine


  (Phenergan with


 Codeine)  5 ml  Q4H  PRN


 ORAL


 For Cough  3/3/19 14:30


 4/2/19 14:29   


 


 


 Theophylline


  (Booker-Dur)  100 mg  EVERY 12  HOURS


 ORAL


   3/3/19 21:00


 4/2/19 20:59  3/4/19 09:11


 


 


 Vancomycin HCl


  (Vanco rx to


 dose)  1 ea  DAILY  PRN


 MISC


 Per rx protocol  3/3/19 09:00


 4/2/19 08:59   


 


 


 Vancomycin HCl


 500 mg/Dextrose  110 ml @ 


 110 mls/hr  DAILY


 IVPB


   3/4/19 09:00


 3/9/19 08:59  3/4/19 09:11


 

















Geovany Mustafa MD Mar 4, 2019 14:27

## 2019-03-04 NOTE — NUR
RD ASSESSMENT & RECOMMENDATIONS

SEE CARE ACTIVITY FOR COMPLETE ASSESSMENT



DAILY ESTIMATED NEEDS:

Needs based on Underweightl/ 42kg 

30-35  kcals/kg 

9143-5107  total kcals

1-1.2  g protein/kg

42-50  g total protein 

25-30  mL/kg

4570-9193  total fluid mLs



NUTRITION DIAGNOSIS:

(1) Increased kcal/pro needs R/T underweight status as evidenced by

pt @ 93% IBW, w/ moderate temporal wasting, low BMI per guidelines.





CURRENT DIET:REGULAR, soft easy chew    





PO DIET RECOMMENDATIONS:

REGULAR/ texture as tolerated 



 



ADDITIONAL RECOMMENDATIONS:

* Calibrated bedscale wt, weekly weights given underweight status 

* Monitor PO intake closely 

* Snacks BID

## 2019-03-04 NOTE — GENERAL PROGRESS NOTE
Assessment/Plan


Problem List:  


(1) Malnutrition


ICD Codes:  E46 - Unspecified protein-calorie malnutrition


SNOMED:  97155043


(2) Fever


ICD Codes:  R50.9 - Fever, unspecified


SNOMED:  831070200


(3) SOB (shortness of breath)


ICD Codes:  R06.02 - Shortness of breath


SNOMED:  236609898


(4) Anemia


ICD Codes:  D64.9 - Anemia, unspecified


SNOMED:  451522009


(5) Neuropathy


ICD Codes:  G62.9 - Polyneuropathy, unspecified


SNOMED:  820720815


(6) Rheumatoid arthritis


ICD Codes:  M06.9 - Rheumatoid arthritis, unspecified


SNOMED:  41180792


(7) Pneumonia


ICD Codes:  J18.9 - Pneumonia, unspecified organism


SNOMED:  195028149


Status:  unchanged


Assessment/Plan


o2 pulm tx abx pt diet cbc bmp am





Subjective


Constitutional:  Reports: weakness


Allergies:  


Coded Allergies:  


     No Known Allergies (Unverified , 2/14/16)


All Systems:  reviewed and negative except above


Subjective


o2nc calm sl sob





Objective





Last 24 Hour Vital Signs








  Date Time  Temp Pulse Resp B/P (MAP) Pulse Ox O2 Delivery O2 Flow Rate FiO2


 


3/4/19 08:00 98.1 97 18 93/52 (66) 93   


 


3/4/19 07:22  77 16   Room Air  21


 


3/4/19 04:00 98.0 80 18 101/51 (68) 96   


 


3/4/19 00:00 98.7 84 18 106/65 (79) 95   


 


3/3/19 21:13  84 18   Room Air  21


 


3/3/19 21:00      Nasal Cannula 2.0 





      Nasal Cannula 2.0 


 


3/3/19 20:00 99.1 89 18 134/71 (92) 94   


 


3/3/19 17:27 100.4       


 


3/3/19 16:00 100.6 86 18 106/58 (74) 92   


 


3/3/19 14:20 99.3 88 18 113/66 (82) 97   

















Intake and Output  


 


 3/3/19 3/4/19





 18:59 06:59


 


Intake Total 730 ml 


 


Balance 730 ml 


 


  


 


Intake Oral 730 ml 


 


# Voids 2 








Laboratory Tests


3/3/19 14:30: 


Urine Random Total Protein 49H, Urine Total Protein [Pending], Urine Albumin (%

) [Pending], Urine Alpha-1-Globulins (%) [Pending], Urine Alpha-2-Globulins (%) 

[Pending], Urine Beta-Globulin (%) [Pending], Urine Gamma Globulin (%) [Pending]

, Ur Protein Electrophoresis M-Fidel [Pending], Urine Protein Electrophoresis 

Intrp [Pending]


3/4/19 06:20: 


White Blood Count 3.7L, Red Blood Count 3.34L, Hemoglobin 10.2L, Hematocrit 

31.0L, Mean Corpuscular Volume 93, Mean Corpuscular Hemoglobin 30.6, Mean 

Corpuscular Hemoglobin Concent 33.0, Red Cell Distribution Width 13.2, Platelet 

Count 214, Mean Platelet Volume 6.0L, Neutrophils (%) (Auto) 40.5L, Lymphocytes 

(%) (Auto) 41.9, Monocytes (%) (Auto) 16.2H, Eosinophils (%) (Auto) 1.0, 

Basophils (%) (Auto) 0.4, Differential Total Cells Counted 100, Neutrophils % (

Manual) 34L, Lymphocytes % (Manual) 52H, Monocytes % (Manual) 8, Eosinophils % (

Manual) 1, Basophils % (Manual) 0, Band Neutrophils 5, Reactive Lymphocytes 

Occasional, Platelet Estimate Adequate, Platelet Morphology Normal, Red Blood 

Cell Morphology Normal, Rouleau , Erythrocyte Sedimentation Rate 117H, 

Reticulocyte Count [Pending], Prothrombin Time 10.8, Prothromb Time 

International Ratio 1.0, Activated Partial Thromboplast Time 38H, Sodium Level 

138, Potassium Level 3.9, Chloride Level 106, Carbon Dioxide Level 24, Anion 

Gap 9, Blood Urea Nitrogen 16, Creatinine 1.2, Estimat Glomerular Filtration 

Rate , Glucose Level 98, Calcium Level 8.9, Iron Level 19L, Total Iron Binding 

Capacity 192L, Percent Iron Saturation 10L, Unsaturated Iron Binding 173, 

Lactate Dehydrogenase 244H, Carcinoembryonic Antigen [Pending], Vitamin B12 

Level 716, Folate 12.6, Random Vancomycin Level 4.4


3/4/19 06:55: Stool Occult Blood Negative


Height (Feet):  5


Height (Inches):  2.00


Weight (Pounds):  93


General Appearance:  lethargic


EENT:  normal ENT inspection


Neck:  normal alignment


Cardiovascular:  normal peripheral pulses, normal rate, regular rhythm


Respiratory/Chest:  chest wall non-tender, decreased breath sounds


Abdomen:  normal bowel sounds, non tender, soft


Extremities:  normal inspection


Edema:  no edema noted Arm (L), no edema noted Arm (R), no edema noted Leg (L), 

no edema noted Leg (R), no edema noted Pedal (L), no edema noted Pedal (R), no 

edema noted Generalized


Neurologic:  motor weakness


Skin:  normal pigmentation, warm/dry











Flaco Stephens DO Mar 4, 2019 13:15

## 2019-03-04 NOTE — NUR
NURSE NOTES:

Patient is alert and oriented,respirations are unlabored,patient ate breakfast,no complaints 
at this time,call light within reach.

## 2019-03-04 NOTE — NUR
NURSE NOTES:

Patient in bed awake, alert, verbally responsive. No s/s of any distress, denies any pain, 
IV access in place. Bed in low position and locked, call light within reach, will continue 
to monitor.

## 2019-03-04 NOTE — CONSULTATION
History of Present Illness


General


Date patient seen:  Mar 4, 2019


Chief Complaint:  Dyspnea/Respdistress





Present Illness


HPI


73 y /o F with hx of RA, osteoporosis presents to ED on 3/2 with fever, 

congestion, generalized weakness and gradual onset of difficulty breathing over 

2 days. +Subjective fevers as well as non productive cough. Did not got Flu 

vaccine this year.





Denied CP, SOB, n/v/d


Allergies:  


Coded Allergies:  


     No Known Allergies (Unverified , 2/14/16)





Medication History


Scheduled


Alendronate Sodium* (Fosamax*), Unknown Dose ORAL DAILY, (Reported)


Aspirin* (Aspir 81*), 81 MG ORAL DAILY, (Reported)





Miscellaneous Medications


Calcium Carbonate (Calcium), 500 MG PO, (Reported)


Ibuprofen* (Advil*), Unknown Dose ORAL, (Reported)





Discontinued Medications


Alendronate Sodium* (Fosamax*), 10 MG ORAL DAILY, (Reported)


   Discontinued Reason: Pt stopped taking med


Prednisone* (Prednisone*), 20 MG ORAL DAILY, (Reported)


   Discontinued Reason: Pt stopped taking med





Patient History


Healthcare decision maker





Resuscitation status


Full Code


Advanced Directive on File


No


Patient History Narrative


Pmhx: as above





Shx: No smoking.  No alcohol.  No intravenous drug


abuse.





Fhx: non contributory





Review of Systems


All Other Systems:  negative except mentioned in HPI





Physical Exam


Physical Exam Narrative








GENERAL:  Calm in bed, oriented x3, slight short of breath.


CARDIOVASCULAR:  No murmur.


LUNGS:  Poor air exchange.


ABDOMEN:  Bowel sounds distant.


EXTREMITIES:  No cyanosis or edema.


NEUROLOGIC:  The patient moves all extremities, slightly weak.





Last 24 Hour Vital Signs








  Date Time  Temp Pulse Resp B/P (MAP) Pulse Ox O2 Delivery O2 Flow Rate FiO2


 


3/4/19 08:00 98.1 97 18 93/52 (66) 93   


 


3/4/19 07:22  77 16   Room Air  21


 


3/4/19 04:00 98.0 80 18 101/51 (68) 96   


 


3/4/19 00:00 98.7 84 18 106/65 (79) 95   


 


3/3/19 21:13  84 18   Room Air  21


 


3/3/19 21:00      Nasal Cannula 2.0 





      Nasal Cannula 2.0 


 


3/3/19 20:00 99.1 89 18 134/71 (92) 94   


 


3/3/19 17:27 100.4       


 


3/3/19 16:00 100.6 86 18 106/58 (74) 92   


 


3/3/19 14:20 99.3 88 18 113/66 (82) 97   


 


3/3/19 12:00 98.3 83 18 99/62 (74) 99   


 


3/3/19 12:00  83      

















Intake and Output  


 


 3/3/19 3/4/19





 18:59 06:59


 


Intake Total 730 ml 


 


Balance 730 ml 


 


  


 


Intake Oral 730 ml 


 


# Voids 2 











Laboratory Tests








Test


  3/3/19


14:30 3/4/19


06:20 3/4/19


06:55


 


Urine Random Total Protein


  49 MG/DL (<


11.9)  H 


  


 


 


Urine Total Protein Pending    


 


Urine Albumin (%) Pending    


 


Urine Alpha-1-Globulins (%) Pending    


 


Urine Alpha-2-Globulins (%) Pending    


 


Urine Beta-Globulin (%) Pending    


 


Urine Gamma Globulin (%) Pending    


 


Ur Protein Electrophoresis


M-Fidel Pending  


  


  


 


 


Urine Protein Electrophoresis


Intrp Pending  


  


  


 


 


White Blood Count


  


  3.7 K/UL


(4.8-10.8)  L 


 


 


Red Blood Count


  


  3.34 M/UL


(4.20-5.40)  L 


 


 


Hemoglobin


  


  10.2 G/DL


(12.0-16.0)  L 


 


 


Hematocrit


  


  31.0 %


(37.0-47.0)  L 


 


 


Mean Corpuscular Volume  93 FL (80-99)   


 


Mean Corpuscular Hemoglobin


  


  30.6 PG


(27.0-31.0) 


 


 


Mean Corpuscular Hemoglobin


Concent 


  33.0 G/DL


(32.0-36.0) 


 


 


Red Cell Distribution Width


  


  13.2 %


(11.6-14.8) 


 


 


Platelet Count


  


  214 K/UL


(150-450) 


 


 


Mean Platelet Volume


  


  6.0 FL


(6.5-10.1)  L 


 


 


Neutrophils (%) (Auto)


  


  40.5 %


(45.0-75.0)  L 


 


 


Lymphocytes (%) (Auto)


  


  41.9 %


(20.0-45.0) 


 


 


Monocytes (%) (Auto)


  


  16.2 %


(1.0-10.0)  H 


 


 


Eosinophils (%) (Auto)


  


  1.0 %


(0.0-3.0) 


 


 


Basophils (%) (Auto)


  


  0.4 %


(0.0-2.0) 


 


 


Differential Total Cells


Counted 


  100  


  


 


 


Neutrophils % (Manual)  34 % (45-75)  L 


 


Lymphocytes % (Manual)  52 % (20-45)  H 


 


Monocytes % (Manual)  8 % (1-10)   


 


Eosinophils % (Manual)  1 % (0-3)   


 


Basophils % (Manual)  0 % (0-2)   


 


Band Neutrophils  5 % (0-8)   


 


Reactive Lymphocytes  Occasional   


 


Platelet Estimate  Adequate   


 


Platelet Morphology  Normal   


 


Red Blood Cell Morphology  Normal   


 


Rouleau     


 


Erythrocyte Sedimentation Rate


  


  117 MM/HR


(0-30)  H 


 


 


Reticulocyte Count  Pending   


 


Prothrombin Time


  


  10.8 SEC


(9.30-11.50) 


 


 


Prothromb Time International


Ratio 


  1.0 (0.9-1.1)  


  


 


 


Activated Partial


Thromboplast Time 


  38 SEC (23-33)


H 


 


 


Sodium Level


  


  138 MMOL/L


(136-145) 


 


 


Potassium Level


  


  3.9 MMOL/L


(3.5-5.1) 


 


 


Chloride Level


  


  106 MMOL/L


() 


 


 


Carbon Dioxide Level


  


  24 MMOL/L


(21-32) 


 


 


Anion Gap


  


  9 mmol/L


(5-15) 


 


 


Blood Urea Nitrogen


  


  16 mg/dL


(7-18) 


 


 


Creatinine


  


  1.2 MG/DL


(0.55-1.30) 


 


 


Estimat Glomerular Filtration


Rate 


   mL/min (>60)  


  


 


 


Glucose Level


  


  98 MG/DL


() 


 


 


Calcium Level


  


  8.9 MG/DL


(8.5-10.1) 


 


 


Iron Level


  


  19 ug/dL


()  L 


 


 


Total Iron Binding Capacity


  


  192 ug/dL


(250-450)  L 


 


 


Percent Iron Saturation  10 % (15-50)  L 


 


Unsaturated Iron Binding


  


  173 ug/dL


(112-346) 


 


 


Lactate Dehydrogenase


  


  244 U/L


()  H 


 


 


Carcinoembryonic Antigen  Pending   


 


Vitamin B12 Level


  


  716 PG/ML


(193-986) 


 


 


Folate


  


  12.6 NG/ML


(8.6-58.9) 


 


 


Random Vancomycin Level  4.4 ug/mL   


 


Stool Occult Blood


  


  


  Negative


(NEGATIVE)








Height (Feet):  5


Height (Inches):  2.00


Weight (Pounds):  93


Medications





Current Medications








 Medications


  (Trade)  Dose


 Ordered  Sig/Naman


 Route


 PRN Reason  Start Time


 Stop Time Status Last Admin


Dose Admin


 


 Acetaminophen


  (Tylenol)  650 mg  Q4H  PRN


 ORAL


 T>100.5  3/3/19 14:45


 4/1/19 18:44  3/3/19 16:57


 


 


 Albuterol/


 Ipratropium


  (Albuterol/


 Ipratropium)  3 ml  Q4H  PRN


 HHN


 Shortness of Breath  3/3/19 14:45


 3/7/19 18:44   


 


 


 Cefepime HCl 1 gm/


 Dextrose  55 ml @ 


 110 mls/hr  Q24H


 IVPB


   3/4/19 10:00


 3/10/19 09:59  3/4/19 10:35


 


 


 Dextrose


  (Dextrose 50%)  25 ml  Q30M  PRN


 IV


 Hypoglycemia  3/3/19 14:45


 4/1/19 18:44   


 


 


 Dextrose


  (Dextrose 50%)  50 ml  Q30M  PRN


 IV


 Hypoglycemia  3/3/19 14:45


 4/1/19 18:44   


 


 


 Heparin Sodium


  (Porcine)


  (Heparin 5000


 units/ml)  5,000 units  EVERY 12  HOURS


 SUBQ


   3/3/19 21:00


 4/1/19 20:59  3/4/19 09:13


 


 


 Morphine Sulfate


  (Morphine


 Sulfate)  2 mg  Q4H  PRN


 IVP


 Severe Pain (Pain Scale 7-10)  3/3/19 14:45


 3/9/19 18:44   


 


 


 Ondansetron HCl


  (Zofran)  4 mg  Q6H  PRN


 IVP


 Nausea & Vomiting  3/3/19 14:30


 4/1/19 14:29   


 


 


 Polyethylene


 Glycol


  (Miralax)  17 gm  DAILYPRN  PRN


 ORAL


 Constipation  3/3/19 14:30


 4/1/19 14:29   


 


 


 Promethazine HCl/


 Codeine


  (Phenergan with


 Codeine)  5 ml  Q4H  PRN


 ORAL


 For Cough  3/3/19 14:30


 4/2/19 14:29   


 


 


 Theophylline


  (Booker-Dur)  100 mg  EVERY 12  HOURS


 ORAL


   3/3/19 21:00


 4/2/19 20:59  3/4/19 09:11


 


 


 Vancomycin HCl


  (Vanco rx to


 dose)  1 ea  DAILY  PRN


 MISC


 Per rx protocol  3/3/19 09:00


 4/2/19 08:59   


 


 


 Vancomycin HCl


 500 mg/Dextrose  110 ml @ 


 110 mls/hr  DAILY


 IVPB


   3/4/19 09:00


 3/9/19 08:59  3/4/19 09:11


 











Assessment/Plan


Assessment/Plan





Abx:


Ceftriaxone x1 3/2


Azithromycin x1 3/2


IV Vancomycin 3/2-


Cefepime 3/2-





Assessment:


Sepsis


PNA, probable Flu (despite neg screen test)


  -CXR:   Diffuse bilateral patchy interstitial and alveolar opacities, which 

may represent pneumonia versus pulmonary edema.


  -inlfuenza sc neg


  -sp cx p





Low grade fever


Leukopenia





Anemia








 RA


osteoporosis





Plan:


-Continue empiric IV Vancomycin and Cefepime #3 for PNA pending cultures


-Add Tamiflu and Azithromycin empiric for Flu and aytpicals, respectively


-f/u cx


-Monitor CBC/CMP, temperatures


-legionella ag urine


-aspiration precautions





Thank you for this consultation. Will continue to follow along with  you.





Discussed with LANA.











Prachi Díaz M.D. Mar 4, 2019 12:04

## 2019-03-05 VITALS — SYSTOLIC BLOOD PRESSURE: 112 MMHG | DIASTOLIC BLOOD PRESSURE: 68 MMHG

## 2019-03-05 VITALS — DIASTOLIC BLOOD PRESSURE: 69 MMHG | SYSTOLIC BLOOD PRESSURE: 117 MMHG

## 2019-03-05 VITALS — SYSTOLIC BLOOD PRESSURE: 121 MMHG | DIASTOLIC BLOOD PRESSURE: 69 MMHG

## 2019-03-05 VITALS — DIASTOLIC BLOOD PRESSURE: 57 MMHG | SYSTOLIC BLOOD PRESSURE: 92 MMHG

## 2019-03-05 VITALS — DIASTOLIC BLOOD PRESSURE: 60 MMHG | SYSTOLIC BLOOD PRESSURE: 108 MMHG

## 2019-03-05 VITALS — SYSTOLIC BLOOD PRESSURE: 104 MMHG | DIASTOLIC BLOOD PRESSURE: 58 MMHG

## 2019-03-05 LAB
ADD MANUAL DIFF: NO
ANION GAP SERPL CALC-SCNC: 7 MMOL/L (ref 5–15)
BASOPHILS NFR BLD AUTO: 0.4 % (ref 0–2)
BUN SERPL-MCNC: 16 MG/DL (ref 7–18)
CALCIUM SERPL-MCNC: 8.8 MG/DL (ref 8.5–10.1)
CHLORIDE SERPL-SCNC: 105 MMOL/L (ref 98–107)
CO2 SERPL-SCNC: 26 MMOL/L (ref 21–32)
CREAT SERPL-MCNC: 1.2 MG/DL (ref 0.55–1.3)
EOSINOPHIL NFR BLD AUTO: 1.4 % (ref 0–3)
ERYTHROCYTE [DISTWIDTH] IN BLOOD BY AUTOMATED COUNT: 13.1 % (ref 11.6–14.8)
HCT VFR BLD CALC: 29.7 % (ref 37–47)
HGB BLD-MCNC: 9.7 G/DL (ref 12–16)
LYMPHOCYTES NFR BLD AUTO: 39.3 % (ref 20–45)
MCV RBC AUTO: 93 FL (ref 80–99)
MONOCYTES NFR BLD AUTO: 19.2 % (ref 1–10)
NEUTROPHILS NFR BLD AUTO: 39.6 % (ref 45–75)
PLATELET # BLD: 239 K/UL (ref 150–450)
POTASSIUM SERPL-SCNC: 3.8 MMOL/L (ref 3.5–5.1)
RBC # BLD AUTO: 3.18 M/UL (ref 4.2–5.4)
SODIUM SERPL-SCNC: 138 MMOL/L (ref 136–145)
WBC # BLD AUTO: 3.9 K/UL (ref 4.8–10.8)

## 2019-03-05 RX ADMIN — AZITHROMYCIN DIHYDRATE SCH MG: 250 TABLET, FILM COATED ORAL at 09:30

## 2019-03-05 RX ADMIN — SODIUM CHLORIDE SCH MLS/HR: 0.9 INJECTION INTRAVENOUS at 14:41

## 2019-03-05 RX ADMIN — HEPARIN SODIUM SCH UNITS: 5000 INJECTION INTRAVENOUS; SUBCUTANEOUS at 09:33

## 2019-03-05 RX ADMIN — THEOPHYLLINE ANHYDROUS SCH MG: 100 CAPSULE, EXTENDED RELEASE ORAL at 22:20

## 2019-03-05 RX ADMIN — VANCOMYCIN HYDROCHLORIDE SCH MLS/HR: 500 INJECTION, POWDER, LYOPHILIZED, FOR SOLUTION INTRAVENOUS at 09:30

## 2019-03-05 RX ADMIN — SODIUM CHLORIDE SCH MLS/HR: 0.9 INJECTION INTRAVENOUS at 10:36

## 2019-03-05 RX ADMIN — THEOPHYLLINE ANHYDROUS SCH MG: 100 CAPSULE, EXTENDED RELEASE ORAL at 09:30

## 2019-03-05 RX ADMIN — HEPARIN SODIUM SCH UNITS: 5000 INJECTION INTRAVENOUS; SUBCUTANEOUS at 22:21

## 2019-03-05 NOTE — NUR
NURSE NOTES:

Received a report from LANA Ferrara. Pt is in stable condition. AAOX4. Able to make needs 
known. No respiratory distress. On room air. No c/o pain/discomfort. IV site is patent and 
intact. Bed in lowest position. Call light within reach. Will continue to monitor.

## 2019-03-05 NOTE — NUR
NURSE NOTES:

Patient is awake and alert,respirations  unlabored,patient ate breakfast,no complaints at 
this time,call light within reach.

## 2019-03-05 NOTE — PULMONOLOGY PROGRESS NOTE
Assessment/Plan


Problems:  


(1) Pneumonia


(2) Acute respiratory failure


(3) Severe anemia


(4) Rheumatoid arthritis


Assessment/Plan


improving slowly


respiratory treatment


check sputum


iv abx


chest pt


check electrolytes


titrate fio2 to sat of 92%





Subjective


ROS Limited/Unobtainable:  No


Constitutional:  Reports: no symptoms


HEENT:  Repors: no symptoms


Respiratory:  Reports: no symptoms


Allergies:  


Coded Allergies:  


     No Known Allergies (Unverified , 2/14/16)





Objective





Last 24 Hour Vital Signs








  Date Time  Temp Pulse Resp B/P (MAP) Pulse Ox O2 Delivery O2 Flow Rate FiO2


 


3/5/19 12:00 98.1 86 18 104/58 (73) 96   


 


3/5/19 09:16 98.2 95 18 92/57 (69) 95   


 


3/5/19 09:15 98.2 95 18 92/57 (69) 94   


 


3/5/19 09:00      Nasal Cannula 2.0 


 


3/5/19 07:06  86 16   Room Air  21


 


3/5/19 04:00 98.6 71 18 121/69 (86) 95   


 


3/5/19 00:00 98.5 76 18 108/61 (77) 98   


 


3/4/19 23:46      Nasal Cannula 2.0 


 


3/4/19 20:00 99.7 76 19 118/67 (84) 93   


 


3/4/19 19:35  86 16   Room Air  21


 


3/4/19 16:00 97.7 75 20 114/68 (83) 94   

















Intake and Output  


 


 3/4/19 3/5/19





 18:59 06:59


 


Intake Total  540 ml


 


Output Total  300 ml


 


Balance  240 ml


 


  


 


Intake Oral  540 ml


 


Output Urine Total  300 ml


 


# Voids 3 2








General Appearance:  cachetic


HEENT:  normocephalic, atraumatic


Respiratory/Chest:  lungs clear, normal breath sounds


Breasts:  no masses


Cardiovascular:  regularly irregular


Abdomen:  normal bowel sounds, soft, non tender





Microbiology








 Date/Time


Source Procedure


Growth Status


 


 


 3/3/19 14:30


Sputum Gram Stain - Final Complete


 


 3/3/19 14:30


Sputum Sputum Culture - Final


NORMAL UPPER RESPIRATORY MARSHALL PRESENT Complete


 


 3/2/19 15:00


Nasal Nares Influenza Types A,B Antigen (SINDHU) - Final Complete








Laboratory Tests


3/4/19 23:05: Urine Legionella Antigen [Pending]


3/5/19 05:40: 


White Blood Count 3.9L, Red Blood Count 3.18L, Hemoglobin 9.7L, Hematocrit 29.7L

, Mean Corpuscular Volume 93, Mean Corpuscular Hemoglobin 30.4, Mean 

Corpuscular Hemoglobin Concent 32.6, Red Cell Distribution Width 13.1, Platelet 

Count 239, Mean Platelet Volume 6.0L, Neutrophils (%) (Auto) 39.6L, Lymphocytes 

(%) (Auto) 39.3, Monocytes (%) (Auto) 19.2H, Eosinophils (%) (Auto) 1.4, 

Basophils (%) (Auto) 0.4, Sodium Level 138, Potassium Level 3.8, Chloride Level 

105, Carbon Dioxide Level 26, Anion Gap 7, Blood Urea Nitrogen 16, Creatinine 

1.2, Estimat Glomerular Filtration Rate , Glucose Level 86, Calcium Level 8.8





Current Medications








 Medications


  (Trade)  Dose


 Ordered  Sig/Naman


 Route


 PRN Reason  Start Time


 Stop Time Status Last Admin


Dose Admin


 


 Acetaminophen


  (Tylenol)  650 mg  Q4H  PRN


 ORAL


 T>100.5  3/3/19 14:45


 4/1/19 18:44  3/3/19 16:57


 


 


 Albuterol/


 Ipratropium


  (Albuterol/


 Ipratropium)  3 ml  Q4H  PRN


 HHN


 Shortness of Breath  3/3/19 14:45


 3/7/19 18:44   


 


 


 Azithromycin


  (Zithromax)  500 mg  DAILY


 ORAL


   3/4/19 13:00


 3/11/19 12:59  3/5/19 09:30


 


 


 Ceftriaxone


 Sodium 1 gm/


 Dextrose  55 ml @ 


 110 mls/hr  Q24H


 IVPB


   3/5/19 14:00


 3/12/19 13:59   


 


 


 Dextrose


  (Dextrose 50%)  25 ml  Q30M  PRN


 IV


 Hypoglycemia  3/3/19 14:45


 4/1/19 18:44   


 


 


 Dextrose


  (Dextrose 50%)  50 ml  Q30M  PRN


 IV


 Hypoglycemia  3/3/19 14:45


 4/1/19 18:44   


 


 


 Heparin Sodium


  (Porcine)


  (Heparin 5000


 units/ml)  5,000 units  EVERY 12  HOURS


 SUBQ


   3/3/19 21:00


 4/1/19 20:59  3/5/19 09:33


 


 


 Magnesium


 Hydroxide


  (Mom)  15 ml  Q6H  PRN


 ORAL


 Constipation  3/5/19 13:30


 4/4/19 13:29   


 


 


 Morphine Sulfate


  (Morphine


 Sulfate)  2 mg  Q4H  PRN


 IVP


 Severe Pain (Pain Scale 7-10)  3/3/19 14:45


 3/9/19 18:44   


 


 


 Ondansetron HCl


  (Zofran)  4 mg  Q6H  PRN


 IVP


 Nausea & Vomiting  3/3/19 14:30


 4/1/19 14:29   


 


 


 Oseltamivir


 Phosphate


  (Tamiflu)  30 mg  DAILY


 ORAL


   3/4/19 13:00


 3/9/19 12:59  3/5/19 09:30


 


 


 Polyethylene


 Glycol


  (Miralax)  17 gm  DAILYPRN  PRN


 ORAL


 Constipation  3/5/19 13:30


 4/1/19 14:29   


 


 


 Promethazine HCl/


 Codeine


  (Phenergan with


 Codeine)  5 ml  Q4H  PRN


 ORAL


 For Cough  3/3/19 14:30


 4/2/19 14:29   


 


 


 Theophylline


  (Booker-Dur)  100 mg  EVERY 12  HOURS


 ORAL


   3/3/19 21:00


 4/2/19 20:59  3/5/19 09:30


 

















Geovany Mustafa MD Mar 5, 2019 14:41

## 2019-03-05 NOTE — INFECTIOUS DISEASES PROG NOTE
Assessment/Plan


Assessment/Plan





Assessment:


Sepsis;improving


PNA, probable Flu (despite neg screen test)


  -CXR:   Diffuse bilateral patchy interstitial and alveolar opacities, which 

may represent pneumonia versus pulmonary edema.


  -inlfuenza sc neg


  -sp cx normal fresp he





Low grade fever; improving


Leukopenia; improving





Anemia








 RA


osteoporosis





Plan:


-D/c empiric IV Vancomycin #4 


-Switch Cefepime #4/7 to Ceftriaxone for PNA given no isolation of resistant 

organisms


-Continue Tamiflu #2/5 and Azithromycin #2/4 empiric for Flu and aytpicals, 

respectively


    -3/2 SP Ceftriaxone and azithromycin x1





-f/u cx


-Monitor CBC/CMP, temperatures


-f/u legionella ag urine


-aspiration precautions





Thank you for this consultation. Will continue to follow along with  you.





Discussed with RN.





Subjective


Allergies:  


Coded Allergies:  


     No Known Allergies (Unverified , 2/14/16)


Subjective


afebrile in >36hrs


leukopenia improving


sp cx normal resp he


at 2l NC





Objective


Vital Signs





Last 24 Hour Vital Signs








  Date Time  Temp Pulse Resp B/P (MAP) Pulse Ox O2 Delivery O2 Flow Rate FiO2


 


3/5/19 09:16 98.2 95 18 92/57 (69) 95   


 


3/5/19 09:15 98.2 95 18 92/57 (69) 94   


 


3/5/19 09:00      Nasal Cannula 2.0 


 


3/5/19 07:06  86 16   Room Air  21


 


3/5/19 04:00 98.6 71 18 121/69 (86) 95   


 


3/5/19 00:00 98.5 76 18 108/61 (77) 98   


 


3/4/19 23:46      Nasal Cannula 2.0 


 


3/4/19 20:00 99.7 76 19 118/67 (84) 93   


 


3/4/19 19:35  86 16   Room Air  21


 


3/4/19 16:00 97.7 75 20 114/68 (83) 94   


 


3/4/19 12:00 98.8 84 20 111/69 (83) 98   








Height (Feet):  5


Height (Inches):  2.00


Weight (Pounds):  93


Objective


GENERAL:  Calm in bed, oriented x3, slight short of breath.


CARDIOVASCULAR:  No murmur.


LUNGS:  Poor air exchange.


ABDOMEN:  Bowel sounds distant.


EXTREMITIES:  No cyanosis or edema.


NEUROLOGIC:  The patient moves all extremities, slightly weak.





Microbiology








 Date/Time


Source Procedure


Growth Status


 


 


 3/2/19 14:40


Blood Blood Culture - Preliminary


NO GROWTH AFTER 48 HOURS Resulted


 


 3/2/19 14:30


Blood Blood Culture - Preliminary


NO GROWTH AFTER 48 HOURS Resulted





 3/3/19 14:30


Sputum Gram Stain - Final Complete


 


 3/3/19 14:30


Sputum Sputum Culture - Final


NORMAL UPPER RESPIRATORY HE PRESENT Complete


 


 3/2/19 15:00


Nasal Nares Influenza Types A,B Antigen (SINDHU) - Final Complete











Laboratory Tests








Test


  3/4/19


23:05 3/5/19


05:40


 


Urine Legionella Antigen Pending   


 


White Blood Count


  


  3.9 K/UL


(4.8-10.8)  L


 


Red Blood Count


  


  3.18 M/UL


(4.20-5.40)  L


 


Hemoglobin


  


  9.7 G/DL


(12.0-16.0)  L


 


Hematocrit


  


  29.7 %


(37.0-47.0)  L


 


Mean Corpuscular Volume  93 FL (80-99)  


 


Mean Corpuscular Hemoglobin


  


  30.4 PG


(27.0-31.0)


 


Mean Corpuscular Hemoglobin


Concent 


  32.6 G/DL


(32.0-36.0)


 


Red Cell Distribution Width


  


  13.1 %


(11.6-14.8)


 


Platelet Count


  


  239 K/UL


(150-450)


 


Mean Platelet Volume


  


  6.0 FL


(6.5-10.1)  L


 


Neutrophils (%) (Auto)


  


  39.6 %


(45.0-75.0)  L


 


Lymphocytes (%) (Auto)


  


  39.3 %


(20.0-45.0)


 


Monocytes (%) (Auto)


  


  19.2 %


(1.0-10.0)  H


 


Eosinophils (%) (Auto)


  


  1.4 %


(0.0-3.0)


 


Basophils (%) (Auto)


  


  0.4 %


(0.0-2.0)


 


Sodium Level


  


  138 MMOL/L


(136-145)


 


Potassium Level


  


  3.8 MMOL/L


(3.5-5.1)


 


Chloride Level


  


  105 MMOL/L


()


 


Carbon Dioxide Level


  


  26 MMOL/L


(21-32)


 


Anion Gap


  


  7 mmol/L


(5-15)


 


Blood Urea Nitrogen


  


  16 mg/dL


(7-18)


 


Creatinine


  


  1.2 MG/DL


(0.55-1.30)


 


Estimat Glomerular Filtration


Rate 


   mL/min (>60)  


 


 


Glucose Level


  


  86 MG/DL


()


 


Calcium Level


  


  8.8 MG/DL


(8.5-10.1)











Current Medications








 Medications


  (Trade)  Dose


 Ordered  Sig/Naman


 Route


 PRN Reason  Start Time


 Stop Time Status Last Admin


Dose Admin


 


 Acetaminophen


  (Tylenol)  650 mg  Q4H  PRN


 ORAL


 T>100.5  3/3/19 14:45


 4/1/19 18:44  3/3/19 16:57


 


 


 Albuterol/


 Ipratropium


  (Albuterol/


 Ipratropium)  3 ml  Q4H  PRN


 HHN


 Shortness of Breath  3/3/19 14:45


 3/7/19 18:44   


 


 


 Azithromycin


  (Zithromax)  500 mg  DAILY


 ORAL


   3/4/19 13:00


 3/11/19 12:59  3/5/19 09:30


 


 


 Cefepime HCl 1 gm/


 Dextrose  55 ml @ 


 110 mls/hr  Q24H


 IVPB


   3/4/19 10:00


 3/10/19 09:59  3/5/19 10:36


 


 


 Dextrose


  (Dextrose 50%)  25 ml  Q30M  PRN


 IV


 Hypoglycemia  3/3/19 14:45


 4/1/19 18:44   


 


 


 Dextrose


  (Dextrose 50%)  50 ml  Q30M  PRN


 IV


 Hypoglycemia  3/3/19 14:45


 4/1/19 18:44   


 


 


 Heparin Sodium


  (Porcine)


  (Heparin 5000


 units/ml)  5,000 units  EVERY 12  HOURS


 SUBQ


   3/3/19 21:00


 4/1/19 20:59  3/5/19 09:33


 


 


 Morphine Sulfate


  (Morphine


 Sulfate)  2 mg  Q4H  PRN


 IVP


 Severe Pain (Pain Scale 7-10)  3/3/19 14:45


 3/9/19 18:44   


 


 


 Ondansetron HCl


  (Zofran)  4 mg  Q6H  PRN


 IVP


 Nausea & Vomiting  3/3/19 14:30


 4/1/19 14:29   


 


 


 Oseltamivir


 Phosphate


  (Tamiflu)  30 mg  DAILY


 ORAL


   3/4/19 13:00


 3/9/19 12:59  3/5/19 09:30


 


 


 Polyethylene


 Glycol


  (Miralax)  17 gm  DAILYPRN  PRN


 ORAL


 Constipation  3/3/19 14:30


 4/1/19 14:29  3/4/19 21:21


 


 


 Promethazine HCl/


 Codeine


  (Phenergan with


 Codeine)  5 ml  Q4H  PRN


 ORAL


 For Cough  3/3/19 14:30


 4/2/19 14:29   


 


 


 Theophylline


  (Booker-Dur)  100 mg  EVERY 12  HOURS


 ORAL


   3/3/19 21:00


 4/2/19 20:59  3/5/19 09:30


 


 


 Vancomycin HCl


  (Vanco rx to


 dose)  1 ea  DAILY  PRN


 MISC


 Per rx protocol  3/3/19 09:00


 4/2/19 08:59   


 


 


 Vancomycin HCl


 500 mg/Dextrose  110 ml @ 


 110 mls/hr  DAILY


 IVPB


   3/4/19 09:00


 3/9/19 08:59  3/5/19 09:30


 

















Prachi Díaz M.D. Mar 5, 2019 11:15

## 2019-03-05 NOTE — GENERAL PROGRESS NOTE
Assessment/Plan


Problem List:  


(1) Malnutrition


ICD Codes:  E46 - Unspecified protein-calorie malnutrition


SNOMED:  50730671


(2) Fever


ICD Codes:  R50.9 - Fever, unspecified


SNOMED:  624018065


(3) SOB (shortness of breath)


ICD Codes:  R06.02 - Shortness of breath


SNOMED:  408832475


(4) Anemia


ICD Codes:  D64.9 - Anemia, unspecified


SNOMED:  141451450


(5) Neuropathy


ICD Codes:  G62.9 - Polyneuropathy, unspecified


SNOMED:  611122523


(6) Rheumatoid arthritis


ICD Codes:  M06.9 - Rheumatoid arthritis, unspecified


SNOMED:  41044249


(7) Pneumonia


ICD Codes:  J18.9 - Pneumonia, unspecified organism


SNOMED:  812594843


Status:  unchanged


Assessment/Plan


o2 pulm tx abx pt diet cbc bmp am dc plan





Subjective


Constitutional:  Reports: weakness


Allergies:  


Coded Allergies:  


     No Known Allergies (Unverified , 2/14/16)


All Systems:  reviewed and negative except above


Subjective


 calm sl sob





Objective





Last 24 Hour Vital Signs








  Date Time  Temp Pulse Resp B/P (MAP) Pulse Ox O2 Delivery O2 Flow Rate FiO2


 


3/5/19 12:00 98.1 86 18 104/58 (73) 96   


 


3/5/19 09:16 98.2 95 18 92/57 (69) 95   


 


3/5/19 09:15 98.2 95 18 92/57 (69) 94   


 


3/5/19 09:00      Nasal Cannula 2.0 


 


3/5/19 07:06  86 16   Room Air  21


 


3/5/19 04:00 98.6 71 18 121/69 (86) 95   


 


3/5/19 00:00 98.5 76 18 108/61 (77) 98   


 


3/4/19 23:46      Nasal Cannula 2.0 


 


3/4/19 20:00 99.7 76 19 118/67 (84) 93   


 


3/4/19 19:35  86 16   Room Air  21


 


3/4/19 16:00 97.7 75 20 114/68 (83) 94   

















Intake and Output  


 


 3/4/19 3/5/19





 18:59 06:59


 


Intake Total  540 ml


 


Output Total  300 ml


 


Balance  240 ml


 


  


 


Intake Oral  540 ml


 


Output Urine Total  300 ml


 


# Voids 3 2








Laboratory Tests


3/4/19 23:05: Urine Legionella Antigen [Pending]


3/5/19 05:40: 


White Blood Count 3.9L, Red Blood Count 3.18L, Hemoglobin 9.7L, Hematocrit 29.7L

, Mean Corpuscular Volume 93, Mean Corpuscular Hemoglobin 30.4, Mean 

Corpuscular Hemoglobin Concent 32.6, Red Cell Distribution Width 13.1, Platelet 

Count 239, Mean Platelet Volume 6.0L, Neutrophils (%) (Auto) 39.6L, Lymphocytes 

(%) (Auto) 39.3, Monocytes (%) (Auto) 19.2H, Eosinophils (%) (Auto) 1.4, 

Basophils (%) (Auto) 0.4, Sodium Level 138, Potassium Level 3.8, Chloride Level 

105, Carbon Dioxide Level 26, Anion Gap 7, Blood Urea Nitrogen 16, Creatinine 

1.2, Estimat Glomerular Filtration Rate , Glucose Level 86, Calcium Level 8.8


Height (Feet):  5


Height (Inches):  2.00


Weight (Pounds):  93


General Appearance:  lethargic


EENT:  normal ENT inspection


Neck:  normal alignment


Cardiovascular:  normal peripheral pulses, normal rate, regular rhythm


Respiratory/Chest:  chest wall non-tender, lungs clear, normal breath sounds


Abdomen:  normal bowel sounds, non tender, soft


Extremities:  normal inspection


Edema:  no edema noted Arm (L), no edema noted Arm (R), no edema noted Leg (L), 

no edema noted Leg (R), no edema noted Pedal (L), no edema noted Pedal (R), no 

edema noted Generalized


Neurologic:  motor weakness


Skin:  normal pigmentation, warm/dry











Flaco Stephens DO Mar 5, 2019 13:53

## 2019-03-06 VITALS — DIASTOLIC BLOOD PRESSURE: 67 MMHG | SYSTOLIC BLOOD PRESSURE: 107 MMHG

## 2019-03-06 VITALS — DIASTOLIC BLOOD PRESSURE: 63 MMHG | SYSTOLIC BLOOD PRESSURE: 103 MMHG

## 2019-03-06 VITALS — SYSTOLIC BLOOD PRESSURE: 112 MMHG | DIASTOLIC BLOOD PRESSURE: 75 MMHG

## 2019-03-06 VITALS — DIASTOLIC BLOOD PRESSURE: 67 MMHG | SYSTOLIC BLOOD PRESSURE: 117 MMHG

## 2019-03-06 VITALS — SYSTOLIC BLOOD PRESSURE: 125 MMHG | DIASTOLIC BLOOD PRESSURE: 80 MMHG

## 2019-03-06 VITALS — SYSTOLIC BLOOD PRESSURE: 114 MMHG | DIASTOLIC BLOOD PRESSURE: 67 MMHG

## 2019-03-06 LAB
ADD MANUAL DIFF: YES
ALBUMIN SERPL-MCNC: 2.4 G/DL (ref 3.4–5)
ALBUMIN/GLOB SERPL: 0.4 {RATIO} (ref 1–2.7)
ALP SERPL-CCNC: 75 U/L (ref 46–116)
ALT SERPL-CCNC: 29 U/L (ref 12–78)
ANION GAP SERPL CALC-SCNC: 6 MMOL/L (ref 5–15)
AST SERPL-CCNC: 56 U/L (ref 15–37)
BILIRUB SERPL-MCNC: 0.2 MG/DL (ref 0.2–1)
BUN SERPL-MCNC: 18 MG/DL (ref 7–18)
CALCIUM SERPL-MCNC: 8.9 MG/DL (ref 8.5–10.1)
CHLORIDE SERPL-SCNC: 104 MMOL/L (ref 98–107)
CO2 SERPL-SCNC: 27 MMOL/L (ref 21–32)
CREAT SERPL-MCNC: 1.2 MG/DL (ref 0.55–1.3)
ERYTHROCYTE [DISTWIDTH] IN BLOOD BY AUTOMATED COUNT: 13.1 % (ref 11.6–14.8)
GLOBULIN SER-MCNC: 6.1 G/DL
HCT VFR BLD CALC: 28.8 % (ref 37–47)
HGB BLD-MCNC: 9.4 G/DL (ref 12–16)
MCV RBC AUTO: 93 FL (ref 80–99)
PLATELET # BLD: 253 K/UL (ref 150–450)
POTASSIUM SERPL-SCNC: 4.3 MMOL/L (ref 3.5–5.1)
RBC # BLD AUTO: 3.08 M/UL (ref 4.2–5.4)
SODIUM SERPL-SCNC: 137 MMOL/L (ref 136–145)
WBC # BLD AUTO: 3.1 K/UL (ref 4.8–10.8)

## 2019-03-06 RX ADMIN — AZITHROMYCIN DIHYDRATE SCH MG: 250 TABLET, FILM COATED ORAL at 09:07

## 2019-03-06 RX ADMIN — HEPARIN SODIUM SCH UNITS: 5000 INJECTION INTRAVENOUS; SUBCUTANEOUS at 20:52

## 2019-03-06 RX ADMIN — THEOPHYLLINE ANHYDROUS SCH MG: 100 CAPSULE, EXTENDED RELEASE ORAL at 20:51

## 2019-03-06 RX ADMIN — METHYLPREDNISOLONE SODIUM SUCCINATE SCH MG: 125 INJECTION, POWDER, FOR SOLUTION INTRAMUSCULAR; INTRAVENOUS at 18:06

## 2019-03-06 RX ADMIN — THEOPHYLLINE ANHYDROUS SCH MG: 100 CAPSULE, EXTENDED RELEASE ORAL at 09:07

## 2019-03-06 RX ADMIN — HEPARIN SODIUM SCH UNITS: 5000 INJECTION INTRAVENOUS; SUBCUTANEOUS at 09:11

## 2019-03-06 RX ADMIN — SODIUM CHLORIDE SCH MLS/HR: 0.9 INJECTION INTRAVENOUS at 14:03

## 2019-03-06 NOTE — GENERAL PROGRESS NOTE
Assessment/Plan


Problem List:  


(1) Malnutrition


ICD Codes:  E46 - Unspecified protein-calorie malnutrition


SNOMED:  72072374


(2) Fever


ICD Codes:  R50.9 - Fever, unspecified


SNOMED:  718246827


(3) SOB (shortness of breath)


ICD Codes:  R06.02 - Shortness of breath


SNOMED:  098607373


(4) Anemia


ICD Codes:  D64.9 - Anemia, unspecified


SNOMED:  495578856


(5) Neuropathy


ICD Codes:  G62.9 - Polyneuropathy, unspecified


SNOMED:  920149693


(6) Rheumatoid arthritis


ICD Codes:  M06.9 - Rheumatoid arthritis, unspecified


SNOMED:  58039941


(7) Pneumonia


ICD Codes:  J18.9 - Pneumonia, unspecified organism


SNOMED:  176986723


Status:  unchanged


Assessment/Plan


o2 pulm tx abx pt diet cbc bmp am heme eval dc plan





Subjective


Constitutional:  Reports: weakness


Allergies:  


Coded Allergies:  


     No Known Allergies (Unverified , 2/14/16)


All Systems:  reviewed and negative except above


Subjective


 calm sl sob





Objective





Last 24 Hour Vital Signs








  Date Time  Temp Pulse Resp B/P (MAP) Pulse Ox O2 Delivery O2 Flow Rate FiO2


 


3/6/19 09:00      Room Air  


 


3/6/19 08:00 98.2 96 20 117/67 (84) 98   


 


3/6/19 07:45  95 18   Room Air  21


 


3/6/19 04:00 97.3 72 18 114/67 (83) 96   


 


3/6/19 00:00 97.3 74 17 125/80 (95) 95   


 


3/5/19 21:00      Room Air  


 


3/5/19 20:35  97 18   Room Air  21


 


3/5/19 20:00 98.0 83 18 112/68 (83) 95   


 


3/5/19 16:41 98.8  18 117/69 (85)    

















Intake and Output  


 


 3/5/19 3/6/19





 18:59 06:59


 


Intake Total 1005 ml 120 ml


 


Balance 1005 ml 120 ml


 


  


 


Intake Oral 840 ml 120 ml


 


IV Total 165 ml 


 


# Voids 3 3








Laboratory Tests


3/6/19 06:00: 


White Blood Count 3.1L, Red Blood Count 3.08L, Hemoglobin 9.4L, Hematocrit 28.8L

, Mean Corpuscular Volume 93, Mean Corpuscular Hemoglobin 30.6, Mean 

Corpuscular Hemoglobin Concent 32.7, Red Cell Distribution Width 13.1, Platelet 

Count 253, Mean Platelet Volume 5.6L, Neutrophils (%) (Auto) , Lymphocytes (%) (

Auto) , Monocytes (%) (Auto) , Eosinophils (%) (Auto) , Basophils (%) (Auto) , 

Differential Total Cells Counted 100, Neutrophils % (Manual) 47, Lymphocytes % (

Manual) 43, Monocytes % (Manual) 10, Eosinophils % (Manual) 0, Basophils % (

Manual) 0, Band Neutrophils 0, Platelet Estimate Adequate, Platelet Morphology 

Normal, Red Blood Cell Morphology Normal, Sodium Level 137, Potassium Level 4.3

, Chloride Level 104, Carbon Dioxide Level 27, Anion Gap 6, Blood Urea Nitrogen 

18, Creatinine 1.2, Estimat Glomerular Filtration Rate , Glucose Level 86, 

Calcium Level 8.9, Total Bilirubin 0.2, Aspartate Amino Transf (AST/SGOT) 56H, 

Alanine Aminotransferase (ALT/SGPT) 29, Alkaline Phosphatase 75, Pro-B-Type 

Natriuretic Peptide 457H, Total Protein 8.5H, Albumin 2.4L, Globulin 6.1, 

Albumin/Globulin Ratio 0.4L


Height (Feet):  5


Height (Inches):  2.00


Weight (Pounds):  90


General Appearance:  lethargic


EENT:  normal ENT inspection


Neck:  normal alignment


Cardiovascular:  normal peripheral pulses, normal rate, regular rhythm


Respiratory/Chest:  chest wall non-tender, lungs clear, normal breath sounds


Abdomen:  normal bowel sounds, non tender, soft


Extremities:  normal inspection


Edema:  no edema noted Arm (L), no edema noted Arm (R), no edema noted Leg (L), 

no edema noted Leg (R), no edema noted Pedal (L), no edema noted Pedal (R), no 

edema noted Generalized


Neurologic:  motor weakness


Skin:  normal pigmentation, warm/dry











Flaco Stephens DO Mar 6, 2019 15:13

## 2019-03-06 NOTE — NUR
NURSE NOTES:

Pt Azeri speaker able to verbalize known needs. Call light well in reach , current plan  
of care will be followed

## 2019-03-06 NOTE — NUR
*-* DISCHARGE PLANNED *-*



PATIENT HAS BEEN REFERRED TO:



[facility]

[email or web address]

[phone]   Work

[phone]   Work Fax

[phone]   Work Fax

-------------------------------------------------------------------------------

Addendum: 03/06/19 at 1511 by SONIA SCHMITT CM

-------------------------------------------------------------------------------

SPOKE TO [facility] AND THEY HAVE AGREED TO FOLLOW PATIENT.

## 2019-03-06 NOTE — PULMONOLOGY PROGRESS NOTE
Assessment/Plan


Problems:  


(1) Pneumonia


(2) Acute respiratory failure


(3) Severe anemia


(4) Rheumatoid arthritis


Assessment/Plan


improving slowly


respiratory treatment


check sputum


iv abx


chest pt


check electrolytes


titrate fio2 to sat of 92%


add short course of solumedrol





Subjective


ROS Limited/Unobtainable:  No


Interval Events:  less cough


Allergies:  


Coded Allergies:  


     No Known Allergies (Unverified , 2/14/16)





Objective





Last 24 Hour Vital Signs








  Date Time  Temp Pulse Resp B/P (MAP) Pulse Ox O2 Delivery O2 Flow Rate FiO2


 


3/6/19 09:00      Room Air  


 


3/6/19 08:00 98.2 96 20 117/67 (84) 98   


 


3/6/19 07:45  95 18   Room Air  21


 


3/6/19 04:00 97.3 72 18 114/67 (83) 96   


 


3/6/19 00:00 97.3 74 17 125/80 (95) 95   


 


3/5/19 21:00      Room Air  


 


3/5/19 20:35  97 18   Room Air  21


 


3/5/19 20:00 98.0 83 18 112/68 (83) 95   


 


3/5/19 16:41 98.8  18 117/69 (85)    

















Intake and Output  


 


 3/5/19 3/6/19





 19:00 07:00


 


Intake Total 1005 ml 120 ml


 


Balance 1005 ml 120 ml


 


  


 


Intake Oral 840 ml 120 ml


 


IV Total 165 ml 


 


# Voids 3 3








HEENT:  normocephalic, atraumatic


Respiratory/Chest:  chest wall non-tender, normal breath sounds


Cardiovascular:  normal peripheral pulses, normal rate


Abdomen:  soft, non tender


Genitourinary:  normal external genitalia


Extremities:  no clubbing


Skin:  no rash





Microbiology








 Date/Time


Source Procedure


Growth Status


 


 


 3/3/19 14:30


Sputum Gram Stain - Final Complete


 


 3/3/19 14:30


Sputum Sputum Culture - Final


NORMAL UPPER RESPIRATORY MARSHALL PRESENT Complete








Laboratory Tests


3/6/19 06:00: 


White Blood Count 3.1L, Red Blood Count 3.08L, Hemoglobin 9.4L, Hematocrit 28.8L

, Mean Corpuscular Volume 93, Mean Corpuscular Hemoglobin 30.6, Mean 

Corpuscular Hemoglobin Concent 32.7, Red Cell Distribution Width 13.1, Platelet 

Count 253, Mean Platelet Volume 5.6L, Neutrophils (%) (Auto) , Lymphocytes (%) (

Auto) , Monocytes (%) (Auto) , Eosinophils (%) (Auto) , Basophils (%) (Auto) , 

Differential Total Cells Counted 100, Neutrophils % (Manual) 47, Lymphocytes % (

Manual) 43, Monocytes % (Manual) 10, Eosinophils % (Manual) 0, Basophils % (

Manual) 0, Band Neutrophils 0, Platelet Estimate Adequate, Platelet Morphology 

Normal, Red Blood Cell Morphology Normal, Sodium Level 137, Potassium Level 4.3

, Chloride Level 104, Carbon Dioxide Level 27, Anion Gap 6, Blood Urea Nitrogen 

18, Creatinine 1.2, Estimat Glomerular Filtration Rate , Glucose Level 86, 

Calcium Level 8.9, Total Bilirubin 0.2, Aspartate Amino Transf (AST/SGOT) 56H, 

Alanine Aminotransferase (ALT/SGPT) 29, Alkaline Phosphatase 75, Pro-B-Type 

Natriuretic Peptide 457H, Total Protein 8.5H, Albumin 2.4L, Globulin 6.1, 

Albumin/Globulin Ratio 0.4L





Current Medications








 Medications


  (Trade)  Dose


 Ordered  Sig/Naman


 Route


 PRN Reason  Start Time


 Stop Time Status Last Admin


Dose Admin


 


 Acetaminophen


  (Tylenol)  650 mg  Q4H  PRN


 ORAL


 T>100.5  3/3/19 14:45


 4/1/19 18:44  3/3/19 16:57


 


 


 Albuterol/


 Ipratropium


  (Albuterol/


 Ipratropium)  3 ml  Q4H  PRN


 HHN


 Shortness of Breath  3/3/19 14:45


 3/7/19 18:44   


 


 


 Azithromycin


  (Zithromax)  500 mg  DAILY


 ORAL


   3/4/19 13:00


 3/11/19 12:59  3/6/19 09:07


 


 


 Ceftriaxone


 Sodium 1 gm/


 Dextrose  55 ml @ 


 110 mls/hr  Q24H


 IVPB


   3/5/19 14:00


 3/12/19 13:59  3/5/19 14:41


 


 


 Dextrose


  (Dextrose 50%)  25 ml  Q30M  PRN


 IV


 Hypoglycemia  3/3/19 14:45


 4/1/19 18:44   


 


 


 Dextrose


  (Dextrose 50%)  50 ml  Q30M  PRN


 IV


 Hypoglycemia  3/3/19 14:45


 4/1/19 18:44   


 


 


 Heparin Sodium


  (Porcine)


  (Heparin 5000


 units/ml)  5,000 units  EVERY 12  HOURS


 SUBQ


   3/3/19 21:00


 4/1/19 20:59  3/6/19 09:11


 


 


 Magnesium


 Hydroxide


  (Mom)  15 ml  Q6H  PRN


 ORAL


 Constipation  3/5/19 13:30


 4/4/19 13:29  3/5/19 14:40


 


 


 Morphine Sulfate


  (Morphine


 Sulfate)  2 mg  Q4H  PRN


 IVP


 Severe Pain (Pain Scale 7-10)  3/3/19 14:45


 3/9/19 18:44   


 


 


 Ondansetron HCl


  (Zofran)  4 mg  Q6H  PRN


 IVP


 Nausea & Vomiting  3/3/19 14:30


 4/1/19 14:29   


 


 


 Oseltamivir


 Phosphate


  (Tamiflu)  30 mg  DAILY


 ORAL


   3/4/19 13:00


 3/9/19 12:59  3/6/19 09:09


 


 


 Polyethylene


 Glycol


  (Miralax)  17 gm  DAILYPRN  PRN


 ORAL


 Constipation  3/5/19 13:30


 4/1/19 14:29   


 


 


 Promethazine HCl/


 Codeine


  (Phenergan with


 Codeine)  5 ml  Q4H  PRN


 ORAL


 For Cough  3/3/19 14:30


 4/2/19 14:29   


 


 


 Theophylline


  (Booker-Dur)  100 mg  EVERY 12  HOURS


 ORAL


   3/3/19 21:00


 4/2/19 20:59  3/6/19 09:07


 

















Geovany Mustafa MD Mar 6, 2019 13:58

## 2019-03-06 NOTE — INFECTIOUS DISEASES PROG NOTE
Assessment/Plan


Assessment/Plan





Assessment:


Sepsis;improving


PNA, probable Flu (despite neg screen test)


  -CXR:   Diffuse bilateral patchy interstitial and alveolar opacities, which 

may represent pneumonia versus pulmonary edema.


  -inlfuenza sc neg


  -sp cx normal fresp he





Low grade fever; improving


Leukopenia; improving





Anemia








 RA


osteoporosis





Plan:


-Continue Ceftriaxone #2 (abx d#5/7) for PNA given no isolation of resistant 

organisms


-Continue Tamiflu #3/5 and Azithromycin #3/4 empiric for Flu and aytpicals, 

respectively


     -upon discharge can be transition to PO Levaquin and Tamiflu for 2 more 

days








    -3/5 SP  IV Vancomycin #4 , Cefepime #4


    -3/2 SP Ceftriaxone and azithromycin x1





-f/u cx


-Monitor CBC/CMP, temperatures


-f/u legionella ag urine


-aspiration precautions





Thank you for this consultation. Will continue to follow along with  you.





Discussed with RN.





Subjective


Allergies:  


Coded Allergies:  


     No Known Allergies (Unverified , 2/14/16)


Subjective


afebrile in >48hrs


leukopenia 


at RA


feeling better





Objective


Vital Signs





Last 24 Hour Vital Signs








  Date Time  Temp Pulse Resp B/P (MAP) Pulse Ox O2 Delivery O2 Flow Rate FiO2


 


3/6/19 09:00      Room Air  


 


3/6/19 08:00 98.2 96 20 117/67 (84) 98   


 


3/6/19 07:45  95 18   Room Air  21


 


3/6/19 04:00 97.3 72 18 114/67 (83) 96   


 


3/6/19 00:00 97.3 74 17 125/80 (95) 95   


 


3/5/19 21:00      Room Air  


 


3/5/19 20:35  97 18   Room Air  21


 


3/5/19 20:00 98.0 83 18 112/68 (83) 95   


 


3/5/19 16:41 98.8  18 117/69 (85)    








Height (Feet):  5


Height (Inches):  2.00


Weight (Pounds):  90


Objective


GENERAL:  Calm in bed, oriented x3, slight short of breath.


CARDIOVASCULAR:  No murmur.


LUNGS:  Poor air exchange.


ABDOMEN:  Bowel sounds distant.


EXTREMITIES:  No cyanosis or edema.


NEUROLOGIC:  The patient moves all extremities, slightly weak.





Microbiology








 Date/Time


Source Procedure


Growth Status


 


 


 3/3/19 14:30


Sputum Gram Stain - Final Complete


 


 3/3/19 14:30


Sputum Sputum Culture - Final


NORMAL UPPER RESPIRATORY HE PRESENT Complete











Laboratory Tests








Test


  3/6/19


06:00


 


White Blood Count


  3.1 K/UL


(4.8-10.8)  L


 


Red Blood Count


  3.08 M/UL


(4.20-5.40)  L


 


Hemoglobin


  9.4 G/DL


(12.0-16.0)  L


 


Hematocrit


  28.8 %


(37.0-47.0)  L


 


Mean Corpuscular Volume 93 FL (80-99)  


 


Mean Corpuscular Hemoglobin


  30.6 PG


(27.0-31.0)


 


Mean Corpuscular Hemoglobin


Concent 32.7 G/DL


(32.0-36.0)


 


Red Cell Distribution Width


  13.1 %


(11.6-14.8)


 


Platelet Count


  253 K/UL


(150-450)


 


Mean Platelet Volume


  5.6 FL


(6.5-10.1)  L


 


Neutrophils (%) (Auto)


  % (45.0-75.0)


 


 


Lymphocytes (%) (Auto)


  % (20.0-45.0)


 


 


Monocytes (%) (Auto)  % (1.0-10.0)  


 


Eosinophils (%) (Auto)  % (0.0-3.0)  


 


Basophils (%) (Auto)  % (0.0-2.0)  


 


Differential Total Cells


Counted 100  


 


 


Neutrophils % (Manual) 47 % (45-75)  


 


Lymphocytes % (Manual) 43 % (20-45)  


 


Monocytes % (Manual) 10 % (1-10)  


 


Eosinophils % (Manual) 0 % (0-3)  


 


Basophils % (Manual) 0 % (0-2)  


 


Band Neutrophils 0 % (0-8)  


 


Platelet Estimate Adequate  


 


Platelet Morphology Normal  


 


Red Blood Cell Morphology Normal  


 


Sodium Level


  137 MMOL/L


(136-145)


 


Potassium Level


  4.3 MMOL/L


(3.5-5.1)


 


Chloride Level


  104 MMOL/L


()


 


Carbon Dioxide Level


  27 MMOL/L


(21-32)


 


Anion Gap


  6 mmol/L


(5-15)


 


Blood Urea Nitrogen


  18 mg/dL


(7-18)


 


Creatinine


  1.2 MG/DL


(0.55-1.30)


 


Estimat Glomerular Filtration


Rate  mL/min (>60)  


 


 


Glucose Level


  86 MG/DL


()


 


Calcium Level


  8.9 MG/DL


(8.5-10.1)


 


Total Bilirubin


  0.2 MG/DL


(0.2-1.0)


 


Aspartate Amino Transf


(AST/SGOT) 56 U/L (15-37)


H


 


Alanine Aminotransferase


(ALT/SGPT) 29 U/L (12-78)


 


 


Alkaline Phosphatase


  75 U/L


()


 


Pro-B-Type Natriuretic Peptide


  457 pg/mL


(0-125)  H


 


Total Protein


  8.5 G/DL


(6.4-8.2)  H


 


Albumin


  2.4 G/DL


(3.4-5.0)  L


 


Globulin 6.1 g/dL  


 


Albumin/Globulin Ratio


  0.4 (1.0-2.7)


L











Current Medications








 Medications


  (Trade)  Dose


 Ordered  Sig/Naman


 Route


 PRN Reason  Start Time


 Stop Time Status Last Admin


Dose Admin


 


 Acetaminophen


  (Tylenol)  650 mg  Q4H  PRN


 ORAL


 T>100.5  3/3/19 14:45


 4/1/19 18:44  3/3/19 16:57


 


 


 Albuterol/


 Ipratropium


  (Albuterol/


 Ipratropium)  3 ml  Q4H  PRN


 HHN


 Shortness of Breath  3/3/19 14:45


 3/7/19 18:44   


 


 


 Azithromycin


  (Zithromax)  500 mg  DAILY


 ORAL


   3/4/19 13:00


 3/11/19 12:59  3/6/19 09:07


 


 


 Ceftriaxone


 Sodium 1 gm/


 Dextrose  55 ml @ 


 110 mls/hr  Q24H


 IVPB


   3/5/19 14:00


 3/12/19 13:59  3/5/19 14:41


 


 


 Dextrose


  (Dextrose 50%)  25 ml  Q30M  PRN


 IV


 Hypoglycemia  3/3/19 14:45


 4/1/19 18:44   


 


 


 Dextrose


  (Dextrose 50%)  50 ml  Q30M  PRN


 IV


 Hypoglycemia  3/3/19 14:45


 4/1/19 18:44   


 


 


 Heparin Sodium


  (Porcine)


  (Heparin 5000


 units/ml)  5,000 units  EVERY 12  HOURS


 SUBQ


   3/3/19 21:00


 4/1/19 20:59  3/6/19 09:11


 


 


 Magnesium


 Hydroxide


  (Mom)  15 ml  Q6H  PRN


 ORAL


 Constipation  3/5/19 13:30


 4/4/19 13:29  3/5/19 14:40


 


 


 Morphine Sulfate


  (Morphine


 Sulfate)  2 mg  Q4H  PRN


 IVP


 Severe Pain (Pain Scale 7-10)  3/3/19 14:45


 3/9/19 18:44   


 


 


 Ondansetron HCl


  (Zofran)  4 mg  Q6H  PRN


 IVP


 Nausea & Vomiting  3/3/19 14:30


 4/1/19 14:29   


 


 


 Oseltamivir


 Phosphate


  (Tamiflu)  30 mg  DAILY


 ORAL


   3/4/19 13:00


 3/9/19 12:59  3/6/19 09:09


 


 


 Polyethylene


 Glycol


  (Miralax)  17 gm  DAILYPRN  PRN


 ORAL


 Constipation  3/5/19 13:30


 4/1/19 14:29   


 


 


 Promethazine HCl/


 Codeine


  (Phenergan with


 Codeine)  5 ml  Q4H  PRN


 ORAL


 For Cough  3/3/19 14:30


 4/2/19 14:29   


 


 


 Theophylline


  (Booker-Dur)  100 mg  EVERY 12  HOURS


 ORAL


   3/3/19 21:00


 4/2/19 20:59  3/6/19 09:07


 

















Prachi Díaz M.D. Mar 6, 2019 13:14

## 2019-03-06 NOTE — DIAGNOSTIC IMAGING REPORT
Indication: Shortness of breath

 

Technique: One view of the chest

 

Comparison: 3/2/2019

 

Findings: Bilateral diffuse interstitial and airspace (predominantly the former)

disease persists, unchanged. Pleural spaces remain clear. Heart size is upper limits

normal

 

Impression: Bilateral infiltrates versus edema, unchanged over 4 days

## 2019-03-06 NOTE — NUR
NURSE NOTES:

Received patient awake in bed, no s/s of acute distress. Family at the bedside. IV site 
asymptomatic, on saline lock. Bed on lowest position, 2 side rails up, call light and 
belongings within reach.

## 2019-03-06 NOTE — NUR
NURSE NOTES:

Pt able to verbalize known needs. Provided with assistance for adls, Only set up required 
for meal time. Cooperated with medication regimen. Denies n/v possible side effects of 
antibiotic use. Pt denies pain . Educated on safety measures gait is steady ambulating to 
restroom .

## 2019-03-06 NOTE — CARDIOLOGY REPORT
--------------- APPROVED REPORT --------------





EKG Measurement

Heart [id number]

NM [id number]

[id number]

[id number]

[id number]





Normal sinus rhythm

Normal ECG

## 2019-03-06 NOTE — NUR
NURSE NOTES:

Pt currently sleeping, provided with iv antibiotics informed of possible side effects to 
report to writer. Pt hydrates self . Current plan of care will be followed . Call light is 
in reach

## 2019-03-06 NOTE — NUR
CASE MANAGEMENT: REVIEW





SI: PNA 

T 98.3 HR 85 RR 16 /75 SAT 94% ROOM AIR

WBC 3.1 H/H 9.4/28.8  





IS: SOLU MEDROL IV Q6HR

CEFTRIAXONE IV Q24HR

TAMIFLU PO QD

THEOPHYLLINE PO Q12HR





***MED/SURG STATUS***

DCP: PATIENT IS FROM HOME

## 2019-03-07 VITALS — SYSTOLIC BLOOD PRESSURE: 119 MMHG | DIASTOLIC BLOOD PRESSURE: 70 MMHG

## 2019-03-07 VITALS — SYSTOLIC BLOOD PRESSURE: 112 MMHG | DIASTOLIC BLOOD PRESSURE: 64 MMHG

## 2019-03-07 VITALS — DIASTOLIC BLOOD PRESSURE: 67 MMHG | SYSTOLIC BLOOD PRESSURE: 115 MMHG

## 2019-03-07 VITALS — SYSTOLIC BLOOD PRESSURE: 114 MMHG | DIASTOLIC BLOOD PRESSURE: 68 MMHG

## 2019-03-07 VITALS — DIASTOLIC BLOOD PRESSURE: 76 MMHG | SYSTOLIC BLOOD PRESSURE: 111 MMHG

## 2019-03-07 VITALS — SYSTOLIC BLOOD PRESSURE: 106 MMHG | DIASTOLIC BLOOD PRESSURE: 62 MMHG

## 2019-03-07 LAB
ADD MANUAL DIFF: YES
ANION GAP SERPL CALC-SCNC: 7 MMOL/L (ref 5–15)
BUN SERPL-MCNC: 22 MG/DL (ref 7–18)
CALCIUM SERPL-MCNC: 9 MG/DL (ref 8.5–10.1)
CHLORIDE SERPL-SCNC: 101 MMOL/L (ref 98–107)
CO2 SERPL-SCNC: 25 MMOL/L (ref 21–32)
CREAT SERPL-MCNC: 1.2 MG/DL (ref 0.55–1.3)
ERYTHROCYTE [DISTWIDTH] IN BLOOD BY AUTOMATED COUNT: 13 % (ref 11.6–14.8)
HCT VFR BLD CALC: 32 % (ref 37–47)
HGB BLD-MCNC: 10.4 G/DL (ref 12–16)
MCV RBC AUTO: 94 FL (ref 80–99)
PLATELET # BLD: 324 K/UL (ref 150–450)
POTASSIUM SERPL-SCNC: 4 MMOL/L (ref 3.5–5.1)
RBC # BLD AUTO: 3.41 M/UL (ref 4.2–5.4)
SODIUM SERPL-SCNC: 133 MMOL/L (ref 136–145)
WBC # BLD AUTO: 2.5 K/UL (ref 4.8–10.8)

## 2019-03-07 RX ADMIN — AZITHROMYCIN DIHYDRATE SCH MG: 250 TABLET, FILM COATED ORAL at 09:39

## 2019-03-07 RX ADMIN — SODIUM CHLORIDE SCH MLS/HR: 0.9 INJECTION INTRAVENOUS at 16:42

## 2019-03-07 RX ADMIN — HEPARIN SODIUM SCH UNITS: 5000 INJECTION INTRAVENOUS; SUBCUTANEOUS at 20:59

## 2019-03-07 RX ADMIN — METHYLPREDNISOLONE SODIUM SUCCINATE SCH MG: 125 INJECTION, POWDER, FOR SOLUTION INTRAMUSCULAR; INTRAVENOUS at 00:27

## 2019-03-07 RX ADMIN — THEOPHYLLINE ANHYDROUS SCH MG: 100 CAPSULE, EXTENDED RELEASE ORAL at 20:56

## 2019-03-07 RX ADMIN — METHYLPREDNISOLONE SODIUM SUCCINATE SCH MG: 125 INJECTION, POWDER, FOR SOLUTION INTRAMUSCULAR; INTRAVENOUS at 13:29

## 2019-03-07 RX ADMIN — METHYLPREDNISOLONE SODIUM SUCCINATE SCH MG: 125 INJECTION, POWDER, FOR SOLUTION INTRAMUSCULAR; INTRAVENOUS at 00:00

## 2019-03-07 RX ADMIN — METHYLPREDNISOLONE SODIUM SUCCINATE SCH MG: 125 INJECTION, POWDER, FOR SOLUTION INTRAMUSCULAR; INTRAVENOUS at 05:34

## 2019-03-07 RX ADMIN — METHYLPREDNISOLONE SODIUM SUCCINATE SCH MG: 125 INJECTION, POWDER, FOR SOLUTION INTRAMUSCULAR; INTRAVENOUS at 23:28

## 2019-03-07 RX ADMIN — METHYLPREDNISOLONE SODIUM SUCCINATE SCH MG: 125 INJECTION, POWDER, FOR SOLUTION INTRAMUSCULAR; INTRAVENOUS at 18:09

## 2019-03-07 RX ADMIN — THEOPHYLLINE ANHYDROUS SCH MG: 100 CAPSULE, EXTENDED RELEASE ORAL at 09:38

## 2019-03-07 RX ADMIN — HEPARIN SODIUM SCH UNITS: 5000 INJECTION INTRAVENOUS; SUBCUTANEOUS at 09:44

## 2019-03-07 NOTE — DIAGNOSTIC IMAGING REPORT
--------------- APPROVED REPORT --------------





CPT Code: 00371



Present Symptoms

Shortness of breath 





BILATERAL: Imaging reveals a patent deep venous system bilaterally. There is no evidence 

of thrombus within the common femoral, superficial femoral, popliteal or tibial segments. 

The greater saphenous veins are within normal limits. Doppler indicates normal 

spontaneous flow within these segments.

## 2019-03-07 NOTE — INFECTIOUS DISEASES PROG NOTE
Assessment/Plan


Assessment/Plan





Assessment:


Sepsis;improving


PNA, probable Flu (despite neg screen test)


  -CXR:   Diffuse bilateral patchy interstitial and alveolar opacities, which 

may represent pneumonia versus pulmonary edema.


  -inlfuenza sc neg


  -sp cx normal fresp he





Low grade fever; SP


Leukopenia; improving





Anemia








 RA


osteoporosis





Plan:


-Continue Ceftriaxone #3 (abx d#6/7) for PNA given no isolation of resistant 

organisms


-Continue Tamiflu #4/5 and Azithromycin #4/4 empiric for Flu and aytpicals, 

respectively


     -upon discharge can be transition to PO Levaquin and Tamiflu for 1 more 

days








    -3/5 SP  IV Vancomycin #4 , Cefepime #4


    -3/2 SP Ceftriaxone and azithromycin x1





-f/u cx


-Monitor CBC/CMP, temperatures


-f/u legionella ag urine


-aspiration precautions





Thank you for this consultation. Will continue to follow along with  you.





Discussed with RN.





Subjective


Allergies:  


Coded Allergies:  


     No Known Allergies (Unverified , 2/14/16)


Subjective


afebrile in >72hrs


leukopenia 


at RA


feeling better





Objective


Vital Signs





Last 24 Hour Vital Signs








  Date Time  Temp Pulse Resp B/P (MAP) Pulse Ox O2 Delivery O2 Flow Rate FiO2


 


3/7/19 07:25  71 19   Room Air  21


 


3/7/19 04:00 97.0 62 16 106/62 (77) 97   


 


3/7/19 00:00 97.9 69 18 119/70 (86) 98   


 


3/6/19 21:00      Room Air  


 


3/6/19 20:28  88 18   Room Air  21


 


3/6/19 20:00 97.8 79 17 107/67 (80) 98   


 


3/6/19 16:00 98.3 85 16 112/75 (87) 97   








Height (Feet):  5


Height (Inches):  2.00


Weight (Pounds):  90


Objective


GENERAL:  Calm in bed, oriented x3, slight short of breath.


CARDIOVASCULAR:  No murmur.


LUNGS:  Poor air exchange.


ABDOMEN:  Bowel sounds distant.


EXTREMITIES:  No cyanosis or edema.


NEUROLOGIC:  The patient moves all extremities, slightly weak.





Laboratory Tests








Test


  3/7/19


06:39


 


White Blood Count


  2.5 K/UL


(4.8-10.8)  L


 


Red Blood Count


  3.41 M/UL


(4.20-5.40)  L


 


Hemoglobin


  10.4 G/DL


(12.0-16.0)  L


 


Hematocrit


  32.0 %


(37.0-47.0)  L


 


Mean Corpuscular Volume 94 FL (80-99)  


 


Mean Corpuscular Hemoglobin


  30.5 PG


(27.0-31.0)


 


Mean Corpuscular Hemoglobin


Concent 32.5 G/DL


(32.0-36.0)


 


Red Cell Distribution Width


  13.0 %


(11.6-14.8)


 


Platelet Count


  324 K/UL


(150-450)


 


Mean Platelet Volume


  5.3 FL


(6.5-10.1)  L


 


Neutrophils (%) (Auto)


  % (45.0-75.0)


 


 


Lymphocytes (%) (Auto)


  % (20.0-45.0)


 


 


Monocytes (%) (Auto)  % (1.0-10.0)  


 


Eosinophils (%) (Auto)  % (0.0-3.0)  


 


Basophils (%) (Auto)  % (0.0-2.0)  


 


Differential Total Cells


Counted 100  


 


 


Neutrophils % (Manual) 73 % (45-75)  


 


Lymphocytes % (Manual) 26 % (20-45)  


 


Monocytes % (Manual) 1 % (1-10)  


 


Eosinophils % (Manual) 0 % (0-3)  


 


Basophils % (Manual) 0 % (0-2)  


 


Band Neutrophils 0 % (0-8)  


 


Platelet Estimate Adequate  


 


Platelet Morphology Normal  


 


Red Blood Cell Morphology Normal  


 


Sodium Level


  133 MMOL/L


(136-145)  L


 


Potassium Level


  4.0 MMOL/L


(3.5-5.1)


 


Chloride Level


  101 MMOL/L


()


 


Carbon Dioxide Level


  25 MMOL/L


(21-32)


 


Anion Gap


  7 mmol/L


(5-15)


 


Blood Urea Nitrogen


  22 mg/dL


(7-18)  H


 


Creatinine


  1.2 MG/DL


(0.55-1.30)


 


Estimat Glomerular Filtration


Rate  mL/min (>60)  


 


 


Glucose Level


  143 MG/DL


()  H


 


Calcium Level


  9.0 MG/DL


(8.5-10.1)











Current Medications








 Medications


  (Trade)  Dose


 Ordered  Sig/Naman


 Route


 PRN Reason  Start Time


 Stop Time Status Last Admin


Dose Admin


 


 Acetaminophen


  (Tylenol)  650 mg  Q4H  PRN


 ORAL


 T>100.5  3/3/19 14:45


 4/1/19 18:44  3/3/19 16:57


 


 


 Albuterol/


 Ipratropium


  (Albuterol/


 Ipratropium)  3 ml  Q4H  PRN


 HHN


 Shortness of Breath  3/3/19 14:45


 3/7/19 18:44   


 


 


 Azithromycin


  (Zithromax)  500 mg  DAILY


 ORAL


   3/4/19 13:00


 3/11/19 12:59  3/7/19 09:39


 


 


 Ceftriaxone


 Sodium 1 gm/


 Dextrose  55 ml @ 


 110 mls/hr  Q24H


 IVPB


   3/5/19 14:00


 3/12/19 13:59  3/6/19 14:03


 


 


 Dextrose


  (Dextrose 50%)  25 ml  Q30M  PRN


 IV


 Hypoglycemia  3/3/19 14:45


 4/1/19 18:44   


 


 


 Dextrose


  (Dextrose 50%)  50 ml  Q30M  PRN


 IV


 Hypoglycemia  3/3/19 14:45


 4/1/19 18:44   


 


 


 Heparin Sodium


  (Porcine)


  (Heparin 5000


 units/ml)  5,000 units  EVERY 12  HOURS


 SUBQ


   3/3/19 21:00


 4/1/19 20:59  3/7/19 09:44


 


 


 Magnesium


 Hydroxide


  (Mom)  15 ml  Q6H  PRN


 ORAL


 Constipation  3/5/19 13:30


 4/4/19 13:29  3/5/19 14:40


 


 


 Methylprednisolone


 Sodium Succinate


  (Solu-MEDROL)  60 mg  EVERY 6  HOURS


 IV


   3/6/19 18:00


 4/5/19 17:59  3/7/19 13:29


 


 


 Morphine Sulfate


  (Morphine


 Sulfate)  2 mg  Q4H  PRN


 IVP


 Severe Pain (Pain Scale 7-10)  3/3/19 14:45


 3/9/19 18:44   


 


 


 Ondansetron HCl


  (Zofran)  4 mg  Q6H  PRN


 IVP


 Nausea & Vomiting  3/3/19 14:30


 4/1/19 14:29   


 


 


 Oseltamivir


 Phosphate


  (Tamiflu)  30 mg  DAILY


 ORAL


   3/4/19 13:00


 3/9/19 12:59  3/7/19 09:42


 


 


 Polyethylene


 Glycol


  (Miralax)  17 gm  DAILYPRN  PRN


 ORAL


 Constipation  3/5/19 13:30


 4/1/19 14:29   


 


 


 Promethazine HCl/


 Codeine


  (Phenergan with


 Codeine)  5 ml  Q4H  PRN


 ORAL


 For Cough  3/3/19 14:30


 4/2/19 14:29   


 


 


 Theophylline


  (Booker-Dur)  100 mg  EVERY 12  HOURS


 ORAL


   3/3/19 21:00


 4/2/19 20:59  3/7/19 09:38


 

















Prachi Díaz M.D. Mar 7, 2019 13:40

## 2019-03-07 NOTE — NUR
NURSE NOTES:

Pt mild manner cooperated with all plan of care. Ambulates freely , gait is slow and steady. 
Provided with IV antibiotics no signs of possible side effects related to antibiotic use. 
Current plan of care will be followed

## 2019-03-07 NOTE — CONSULTATION
DATE OF CONSULTATION:  03/05/2019

NOTE:  POOR AUDIO.



PSYCHOTHERAPY CONSULTATION PROGRESS NOTE



HISTORY OF PRESENT ILLNESS:  The patient is a 73-year-old female patient.

She was brought into the hospital.  At that time, she was living at home,

complaining of fever and shortness of breath for two days, and this has

been worsening.  For these reasons, she was brought into the hospital.

She had been slightly anxious and was referred to psychotherapeutic

services.  This clinician assessed this patient.  The patient is awake and

alert.  The patient states that she is _____ medical condition and stated

that she does not like to get sick.  However, at this time, she denies any

significant distress and has no suicidal or homicidal thoughts of

ideation.  Denies any auditory or visual hallucinations.  She states that

she has intermittent anxiety; however, this is not overwhelmingly

significant.  Assessed this patient.  The patient remains anxious because

of her medical condition during the hospital setting; however, at this

time, she states that she is not experiencing any significant stress.  She

is alert and oriented to person, place, and time.  Her mood is anxious.

Her affect is congruent.  Thought process is slightly disorganized.  She

has fair attention and concentration, fair insight, judgment, and impulse

control.  This clinician assessed this patient.



PAST MEDICAL HISTORY:  Includes a history of rheumatoid arthritis.



ALLERGIES:  The patient has no known drug allergies.



SUBSTANCE ABUSE HISTORY:  The patient denies history of alcohol use or

illicit substance use.



PSYCHIATRIC HISTORY:  The patient denies a history of mental

illness.



SOCIAL HISTORY:  Apparently, the patient is a 73-year-old female patient.

The patient lives independently at home.  Financially sustained through

[employer].



MENTAL STATUS EXAMINATION:  The patient is alert and oriented to person,

place, and time.  Mood is anxious.  Affect is blunted.  Thought process,

slightly disorganized.  She has fair attention and concentration.  Fair

insight, judgment, and impulse control.



DIAGNOSIS:  Rule out generalized anxiety disorder.



PLAN:

1. This clinician assessed this patient _____ the patient's son _____

reality orientation _____ individual _____ place, time, and situation.

2. Provide her supportive psychotherapy, _____ identifying thoughts

_____ thoughts and feelings of anxiety and depression.

3. Cognitive behavioral therapy _____ anxiety and helplessness,

redirecting and discussing positive coping skills including positive _____

positive interactions with others and positive _____ symptoms.  Plan is to

maintain medication compliance _____ positive coping skills _____ for this

patient.  This clinician has reviewed the patient's chart.  Discussed

treatment with treatment team.









  ______________________________________________

  Kassandra Delong PsyD.





DR:  KELLEY

D:  03/06/2019 16:14

T:  03/07/2019 00:58

JOB#:  [id number]

CC:

## 2019-03-07 NOTE — CONSULTATION
History of Present Illness


General


Chief Complaint:  Dyspnea/Respdistress





Present Illness


Allergies:  


Coded Allergies:  


     No Known Allergies (Unverified , 2/14/16)





Medication History


Scheduled


Alendronate Sodium* (Fosamax*), Unknown Dose ORAL DAILY, (Reported)


Aspirin* (Aspir 81*), 81 MG ORAL DAILY, (Reported)





Miscellaneous Medications


Calcium Carbonate (Calcium), 500 MG PO, (Reported)


Ibuprofen* (Advil*), Unknown Dose ORAL, (Reported)





Discontinued Medications


Alendronate Sodium* (Fosamax*), 10 MG ORAL DAILY, (Reported)


   Discontinued Reason: Pt stopped taking med


Prednisone* (Prednisone*), 20 MG ORAL DAILY, (Reported)


   Discontinued Reason: Pt stopped taking med





Patient History


Healthcare decision maker





Resuscitation status


Full Code


Advanced Directive on File


No





Physical Exam





Last 24 Hour Vital Signs








  Date Time  Temp Pulse Resp B/P (MAP) Pulse Ox O2 Delivery O2 Flow Rate FiO2


 


3/7/19 20:00 98.0 69 17 112/64 (80) 96   


 


3/7/19 16:00 98.2 68 16 115/67 (83)    


 


3/7/19 12:00 98.2 66 16 111/76 (88)    





  66      


 


3/7/19 09:00      Room Air  


 


3/7/19 08:00 98.1 68 16 114/68 (83) 98   


 


3/7/19 07:25  71 19   Room Air  21


 


3/7/19 04:00 97.0 62 16 106/62 (77) 97   


 


3/7/19 00:00 97.9 69 18 119/70 (86) 98   

















Intake and Output  


 


 3/6/19 3/7/19





 18:59 06:59


 


Intake Total 55 ml 250 ml


 


Balance 55 ml 250 ml


 


  


 


Intake Oral  250 ml


 


IV Total 55 ml 


 


# Voids  2











Laboratory Tests








Test


  3/7/19


06:39


 


White Blood Count


  2.5 K/UL


(4.8-10.8)  L


 


Red Blood Count


  3.41 M/UL


(4.20-5.40)  L


 


Hemoglobin


  10.4 G/DL


(12.0-16.0)  L


 


Hematocrit


  32.0 %


(37.0-47.0)  L


 


Mean Corpuscular Volume 94 FL (80-99)  


 


Mean Corpuscular Hemoglobin


  30.5 PG


(27.0-31.0)


 


Mean Corpuscular Hemoglobin


Concent 32.5 G/DL


(32.0-36.0)


 


Red Cell Distribution Width


  13.0 %


(11.6-14.8)


 


Platelet Count


  324 K/UL


(150-450)


 


Mean Platelet Volume


  5.3 FL


(6.5-10.1)  L


 


Neutrophils (%) (Auto)


  % (45.0-75.0)


 


 


Lymphocytes (%) (Auto)


  % (20.0-45.0)


 


 


Monocytes (%) (Auto)  % (1.0-10.0)  


 


Eosinophils (%) (Auto)  % (0.0-3.0)  


 


Basophils (%) (Auto)  % (0.0-2.0)  


 


Differential Total Cells


Counted 100  


 


 


Neutrophils % (Manual) 73 % (45-75)  


 


Lymphocytes % (Manual) 26 % (20-45)  


 


Monocytes % (Manual) 1 % (1-10)  


 


Eosinophils % (Manual) 0 % (0-3)  


 


Basophils % (Manual) 0 % (0-2)  


 


Band Neutrophils 0 % (0-8)  


 


Platelet Estimate Adequate  


 


Platelet Morphology Normal  


 


Red Blood Cell Morphology Normal  


 


Sodium Level


  133 MMOL/L


(136-145)  L


 


Potassium Level


  4.0 MMOL/L


(3.5-5.1)


 


Chloride Level


  101 MMOL/L


()


 


Carbon Dioxide Level


  25 MMOL/L


(21-32)


 


Anion Gap


  7 mmol/L


(5-15)


 


Blood Urea Nitrogen


  22 mg/dL


(7-18)  H


 


Creatinine


  1.2 MG/DL


(0.55-1.30)


 


Estimat Glomerular Filtration


Rate  mL/min (>60)  


 


 


Glucose Level


  143 MG/DL


()  H


 


Calcium Level


  9.0 MG/DL


(8.5-10.1)








Height (Feet):  5


Height (Inches):  2.00


Weight (Pounds):  90


Medications





Current Medications








 Medications


  (Trade)  Dose


 Ordered  Sig/Naman


 Route


 PRN Reason  Start Time


 Stop Time Status Last Admin


Dose Admin


 


 Acetaminophen


  (Tylenol)  650 mg  Q4H  PRN


 ORAL


 T>100.5  3/3/19 14:45


 4/1/19 18:44  3/3/19 16:57


 


 


 Ceftriaxone


 Sodium 1 gm/


 Dextrose  55 ml @ 


 110 mls/hr  Q24H


 IVPB


   3/5/19 14:00


 3/12/19 13:59  3/7/19 16:42


 


 


 Dextrose


  (Dextrose 50%)  25 ml  Q30M  PRN


 IV


 Hypoglycemia  3/3/19 14:45


 4/1/19 18:44   


 


 


 Dextrose


  (Dextrose 50%)  50 ml  Q30M  PRN


 IV


 Hypoglycemia  3/3/19 14:45


 4/1/19 18:44   


 


 


 Heparin Sodium


  (Porcine)


  (Heparin 5000


 units/ml)  5,000 units  EVERY 12  HOURS


 SUBQ


   3/3/19 21:00


 4/1/19 20:59  3/7/19 20:59


 


 


 Magnesium


 Hydroxide


  (Mom)  15 ml  Q6H  PRN


 ORAL


 Constipation  3/5/19 13:30


 4/4/19 13:29  3/5/19 14:40


 


 


 Methylprednisolone


 Sodium Succinate


  (Solu-MEDROL)  60 mg  EVERY 6  HOURS


 IV


   3/6/19 18:00


 4/5/19 17:59  3/7/19 18:09


 


 


 Morphine Sulfate


  (Morphine


 Sulfate)  2 mg  Q4H  PRN


 IVP


 Severe Pain (Pain Scale 7-10)  3/3/19 14:45


 3/9/19 18:44   


 


 


 Ondansetron HCl


  (Zofran)  4 mg  Q6H  PRN


 IVP


 Nausea & Vomiting  3/3/19 14:30


 4/1/19 14:29   


 


 


 Oseltamivir


 Phosphate


  (Tamiflu)  30 mg  DAILY


 ORAL


   3/4/19 13:00


 3/9/19 12:59  3/7/19 09:42


 


 


 Polyethylene


 Glycol


  (Miralax)  17 gm  DAILYPRN  PRN


 ORAL


 Constipation  3/5/19 13:30


 4/1/19 14:29   


 


 


 Promethazine HCl/


 Codeine


  (Phenergan with


 Codeine)  5 ml  Q4H  PRN


 ORAL


 For Cough  3/3/19 14:30


 4/2/19 14:29   


 


 


 Theophylline


  (Booker-Dur)  100 mg  EVERY 12  HOURS


 ORAL


   3/3/19 21:00


 4/2/19 20:59  3/7/19 20:56


 











Assessment/Plan


Assessment/Plan





Hematology/Oncology Consultation   


   


Requesting MD: Flaco Stephens   


Date of Service: 3/7/19   


Reason for consultation: Leukopenia and Anemia





HPI: This is a 73-year-old female, who lives at home, complaining of fever and 

shortness of breath getting worse, had a cold prior to that. The patient came 

to [facility] diagnosed with pneumonia and shortness of breath and admitted to 

telemetry for further care. Hematology/Oncology was consulted for Leukopenia 

and Anemia. Hgb 10.4, wbc 2.5.





PAST MEDICAL HISTORY:  Includes rheumatoid arthritis and neuropathy.





PAST SURGICAL HISTORY:  None.





MEDICATIONS:  Include cefepime, vancomycin, heparin, Zofran, morphine, Tylenol, 

and albuterol.





ALLERGIES:  Denies.





SOCIAL HISTORY:  No smoking.  No alcohol.  No intravenous drug abuse.





FAMILY HISTORY:  Noncontributory.





PHYSICAL EXAMINATION:


GENERAL:  Calm in bed, oriented x3, slight short of breath.


VITAL SIGNS:  Temperature is 98 degrees, pulse 82, respirations 20, and blood 

pressure 97/67.


CARDIOVASCULAR:  No murmur.


LUNGS:  Poor air exchange.


ABDOMEN:  Bowel sounds distant.


EXTREMITIES:  No cyanosis or edema.


NEUROLOGIC:  The patient moves all extremities, slightly weak.





LABORATORY AND DIAGNOSTIC DATA:  Labs at this time show white count 2.8, 

hemoglobin and hematocrit 9.6/29, and platelets 173.  





ASSESSMENT/Recs


# Anemia of chronic disease (or of iron deficiency) due to underlying chronic 

medical issues, multifactorial 


--> Anemia workup has been ordered


--> No evidence of hemolysis is noted, peripheral smear has been reviewed


--> Hgb goal >7. Transfuse prn.


--> Epogen or iron at this time is not particularly indicated


--> Medications have been reviewed


# Decreased white blood cell count,  (leukopenia) - wbc under 4k, could also be 

related to infection (pna)


--> continue antibiotics with ID service, appreciate recs


--> medications have been reviewed


--> hepatitis and hiv have been ordered


--> flow cytometry ordered for atypical lymphocytes/immature on perupheral smear


--> IgA, IgG, IgM ordered, immunofixation of serum and urine ordered


# UTI, Antibiotics per Infectious Disease.


--> on abx


#  Pneumonia,  O2 and pulmonary treatment.


#  Fever, continue to monitor temp


#  Shortness of breath








   


The timing of this note does not necessarily reflect the time of the patient 

was seen.   


   


Greatly appreciate consultation!











Agustin Giordano MD Mar 7, 2019 21:29

## 2019-03-07 NOTE — GENERAL PROGRESS NOTE
Assessment/Plan


Problem List:  


(1) Malnutrition


ICD Codes:  E46 - Unspecified protein-calorie malnutrition


SNOMED:  35879951


(2) Fever


ICD Codes:  R50.9 - Fever, unspecified


SNOMED:  940157830


(3) SOB (shortness of breath)


ICD Codes:  R06.02 - Shortness of breath


SNOMED:  093537978


(4) Anemia


ICD Codes:  D64.9 - Anemia, unspecified


SNOMED:  169790498


(5) Neuropathy


ICD Codes:  G62.9 - Polyneuropathy, unspecified


SNOMED:  521494313


(6) Rheumatoid arthritis


ICD Codes:  M06.9 - Rheumatoid arthritis, unspecified


SNOMED:  14837492


(7) Pneumonia


ICD Codes:  J18.9 - Pneumonia, unspecified organism


SNOMED:  639492817


Status:  stable, progressing


Assessment/Plan


o2 pulm tx abx pt diet cbc bmp am heme eval dc plan





Subjective


Constitutional:  Reports: weakness


Allergies:  


Coded Allergies:  


     No Known Allergies (Unverified , 2/14/16)


All Systems:  reviewed and negative except above


Subjective


 calm sl sob





Objective





Last 24 Hour Vital Signs








  Date Time  Temp Pulse Resp B/P (MAP) Pulse Ox O2 Delivery O2 Flow Rate FiO2


 


3/7/19 07:25  71 19   Room Air  21


 


3/7/19 04:00 97.0 62 16 106/62 (77) 97   


 


3/7/19 00:00 97.9 69 18 119/70 (86) 98   


 


3/6/19 21:00      Room Air  


 


3/6/19 20:28  88 18   Room Air  21


 


3/6/19 20:00 97.8 79 17 107/67 (80) 98   


 


3/6/19 16:00 98.3 85 16 112/75 (87) 97   

















Intake and Output  


 


 3/6/19 3/7/19





 19:00 07:00


 


Intake Total 55 ml 250 ml


 


Balance 55 ml 250 ml


 


  


 


Intake Oral  250 ml


 


IV Total 55 ml 


 


# Voids  2








Laboratory Tests


3/7/19 06:39: 


White Blood Count 2.5L, Red Blood Count 3.41L, Hemoglobin 10.4L, Hematocrit 

32.0L, Mean Corpuscular Volume 94, Mean Corpuscular Hemoglobin 30.5, Mean 

Corpuscular Hemoglobin Concent 32.5, Red Cell Distribution Width 13.0, Platelet 

Count 324, Mean Platelet Volume 5.3L, Neutrophils (%) (Auto) , Lymphocytes (%) (

Auto) , Monocytes (%) (Auto) , Eosinophils (%) (Auto) , Basophils (%) (Auto) , 

Differential Total Cells Counted 100, Neutrophils % (Manual) 73, Lymphocytes % (

Manual) 26, Monocytes % (Manual) 1, Eosinophils % (Manual) 0, Basophils % (

Manual) 0, Band Neutrophils 0, Platelet Estimate Adequate, Platelet Morphology 

Normal, Red Blood Cell Morphology Normal, Sodium Level 133L, Potassium Level 4.0

, Chloride Level 101, Carbon Dioxide Level 25, Anion Gap 7, Blood Urea Nitrogen 

22H, Creatinine 1.2, Estimat Glomerular Filtration Rate , Glucose Level 143H, 

Calcium Level 9.0


Height (Feet):  5


Height (Inches):  2.00


Weight (Pounds):  90


General Appearance:  lethargic


EENT:  normal ENT inspection


Neck:  normal alignment


Cardiovascular:  normal peripheral pulses, normal rate, regular rhythm


Respiratory/Chest:  chest wall non-tender, lungs clear, normal breath sounds


Abdomen:  normal bowel sounds, non tender, soft


Extremities:  normal inspection


Edema:  no edema noted Arm (L), no edema noted Arm (R), no edema noted Leg (L), 

no edema noted Leg (R), no edema noted Pedal (L), no edema noted Pedal (R), no 

edema noted Generalized


Neurologic:  motor weakness


Skin:  normal pigmentation, warm/dry











Flaco Stephens DO Mar 7, 2019 13:43

## 2019-03-07 NOTE — PULMONOLOGY PROGRESS NOTE
Assessment/Plan


Problems:  


(1) Pneumonia


(2) Acute respiratory failure


(3) Severe anemia


(4) Rheumatoid arthritis


Assessment/Plan


improving slowly


respiratory treatment


check sputum


iv abx


chest pt


check electrolytes


titrate fio2 to sat of 92%


add short course of Solumedrol, continue the same





hypergammaglobulinemia, urine protein electrophoresis pending





Subjective


ROS Limited/Unobtainable:  No


Interval Events:  less short of breath


Constitutional:  Reports: no symptoms


HEENT:  Repors: no symptoms


Respiratory:  Reports: no symptoms


Allergies:  


Coded Allergies:  


     No Known Allergies (Unverified , 2/14/16)





Objective





Last 24 Hour Vital Signs








  Date Time  Temp Pulse Resp B/P (MAP) Pulse Ox O2 Delivery O2 Flow Rate FiO2


 


3/7/19 07:25  71 19   Room Air  21


 


3/7/19 04:00 97.0 62 16 106/62 (77) 97   


 


3/7/19 00:00 97.9 69 18 119/70 (86) 98   


 


3/6/19 21:00      Room Air  


 


3/6/19 20:28  88 18   Room Air  21


 


3/6/19 20:00 97.8 79 17 107/67 (80) 98   


 


3/6/19 16:00 98.3 85 16 112/75 (87) 97   

















Intake and Output  


 


 3/6/19 3/7/19





 19:00 07:00


 


Intake Total 55 ml 250 ml


 


Balance 55 ml 250 ml


 


  


 


Intake Oral  250 ml


 


IV Total 55 ml 


 


# Voids  2








General Appearance:  WD/WN


HEENT:  normocephalic, atraumatic


Respiratory/Chest:  chest wall non-tender, lungs clear, normal breath sounds


Breasts:  no masses


Cardiovascular:  normal peripheral pulses


Abdomen:  normal bowel sounds, soft, non tender


Genitourinary:  normal external genitalia


Skin:  no rash, no lesions


Neurologic/Psychiatric:  CNs II-XII grossly normal


Laboratory Tests


3/7/19 06:39: 


White Blood Count 2.5L, Red Blood Count 3.41L, Hemoglobin 10.4L, Hematocrit 

32.0L, Mean Corpuscular Volume 94, Mean Corpuscular Hemoglobin 30.5, Mean 

Corpuscular Hemoglobin Concent 32.5, Red Cell Distribution Width 13.0, Platelet 

Count 324, Mean Platelet Volume 5.3L, Neutrophils (%) (Auto) , Lymphocytes (%) (

Auto) , Monocytes (%) (Auto) , Eosinophils (%) (Auto) , Basophils (%) (Auto) , 

Differential Total Cells Counted 100, Neutrophils % (Manual) 73, Lymphocytes % (

Manual) 26, Monocytes % (Manual) 1, Eosinophils % (Manual) 0, Basophils % (

Manual) 0, Band Neutrophils 0, Platelet Estimate Adequate, Platelet Morphology 

Normal, Red Blood Cell Morphology Normal, Sodium Level 133L, Potassium Level 4.0

, Chloride Level 101, Carbon Dioxide Level 25, Anion Gap 7, Blood Urea Nitrogen 

22H, Creatinine 1.2, Estimat Glomerular Filtration Rate , Glucose Level 143H, 

Calcium Level 9.0





Current Medications








 Medications


  (Trade)  Dose


 Ordered  Sig/Naman


 Route


 PRN Reason  Start Time


 Stop Time Status Last Admin


Dose Admin


 


 Acetaminophen


  (Tylenol)  650 mg  Q4H  PRN


 ORAL


 T>100.5  3/3/19 14:45


 4/1/19 18:44  3/3/19 16:57


 


 


 Albuterol/


 Ipratropium


  (Albuterol/


 Ipratropium)  3 ml  Q4H  PRN


 HHN


 Shortness of Breath  3/3/19 14:45


 3/7/19 18:44   


 


 


 Ceftriaxone


 Sodium 1 gm/


 Dextrose  55 ml @ 


 110 mls/hr  Q24H


 IVPB


   3/5/19 14:00


 3/12/19 13:59  3/6/19 14:03


 


 


 Dextrose


  (Dextrose 50%)  25 ml  Q30M  PRN


 IV


 Hypoglycemia  3/3/19 14:45


 4/1/19 18:44   


 


 


 Dextrose


  (Dextrose 50%)  50 ml  Q30M  PRN


 IV


 Hypoglycemia  3/3/19 14:45


 4/1/19 18:44   


 


 


 Heparin Sodium


  (Porcine)


  (Heparin 5000


 units/ml)  5,000 units  EVERY 12  HOURS


 SUBQ


   3/3/19 21:00


 4/1/19 20:59  3/7/19 09:44


 


 


 Magnesium


 Hydroxide


  (Mom)  15 ml  Q6H  PRN


 ORAL


 Constipation  3/5/19 13:30


 4/4/19 13:29  3/5/19 14:40


 


 


 Methylprednisolone


 Sodium Succinate


  (Solu-MEDROL)  60 mg  EVERY 6  HOURS


 IV


   3/6/19 18:00


 4/5/19 17:59  3/7/19 13:29


 


 


 Morphine Sulfate


  (Morphine


 Sulfate)  2 mg  Q4H  PRN


 IVP


 Severe Pain (Pain Scale 7-10)  3/3/19 14:45


 3/9/19 18:44   


 


 


 Ondansetron HCl


  (Zofran)  4 mg  Q6H  PRN


 IVP


 Nausea & Vomiting  3/3/19 14:30


 4/1/19 14:29   


 


 


 Oseltamivir


 Phosphate


  (Tamiflu)  30 mg  DAILY


 ORAL


   3/4/19 13:00


 3/9/19 12:59  3/7/19 09:42


 


 


 Polyethylene


 Glycol


  (Miralax)  17 gm  DAILYPRN  PRN


 ORAL


 Constipation  3/5/19 13:30


 4/1/19 14:29   


 


 


 Promethazine HCl/


 Codeine


  (Phenergan with


 Codeine)  5 ml  Q4H  PRN


 ORAL


 For Cough  3/3/19 14:30


 4/2/19 14:29   


 


 


 Theophylline


  (Booker-Dur)  100 mg  EVERY 12  HOURS


 ORAL


   3/3/19 21:00


 4/2/19 20:59  3/7/19 09:38


 

















Geovany Mustafa MD Mar 7, 2019 15:18

## 2019-03-07 NOTE — NUR
NURSE NOTES:

received awake eating able to verbalize known needs. Divehi speaker. Call light is in 
reach. Denies pain at this time. No signs of distress . Current plan of care will be 
followed

## 2019-03-07 NOTE — NUR
NURSE NOTES:Patient  A/A/OX4  family at bedside . patient denies any pain . no s/s of 
distress noted .patient Ambulated to bathroom with slowly with steady gait and voided freely 
to yellow urine  without difficulties . and back to bed . patient instructed to call staff 
or use call light when needed Patient verbalized with understanding.bed in low position bed 
alarm on. will continue to monitor.


-------------------------------------------------------------------------------

Addendum: 03/08/19 at 0112 by HAYLIE NOWAK LVN

-------------------------------------------------------------------------------

right forearm g#22 H/L Patent and intact .will continue to monitor.

## 2019-03-07 NOTE — NUR
NURSE NOTES:

Patient in bed awake and alert x4. Respiration even and unlabored. VS taken and stable. Bed 
in lowest position for safety. Call light within reach.

## 2019-03-08 VITALS — DIASTOLIC BLOOD PRESSURE: 65 MMHG | SYSTOLIC BLOOD PRESSURE: 111 MMHG

## 2019-03-08 VITALS — DIASTOLIC BLOOD PRESSURE: 59 MMHG | SYSTOLIC BLOOD PRESSURE: 106 MMHG

## 2019-03-08 VITALS — SYSTOLIC BLOOD PRESSURE: 107 MMHG | DIASTOLIC BLOOD PRESSURE: 62 MMHG

## 2019-03-08 VITALS — SYSTOLIC BLOOD PRESSURE: 117 MMHG | DIASTOLIC BLOOD PRESSURE: 69 MMHG

## 2019-03-08 VITALS — SYSTOLIC BLOOD PRESSURE: 99 MMHG | DIASTOLIC BLOOD PRESSURE: 56 MMHG

## 2019-03-08 LAB
ADD MANUAL DIFF: YES
ANION GAP SERPL CALC-SCNC: 11 MMOL/L (ref 5–15)
BUN SERPL-MCNC: 34 MG/DL (ref 7–18)
CALCIUM SERPL-MCNC: 9.1 MG/DL (ref 8.5–10.1)
CHLORIDE SERPL-SCNC: 101 MMOL/L (ref 98–107)
CO2 SERPL-SCNC: 24 MMOL/L (ref 21–32)
CREAT SERPL-MCNC: 1.3 MG/DL (ref 0.55–1.3)
ERYTHROCYTE [DISTWIDTH] IN BLOOD BY AUTOMATED COUNT: 12.6 % (ref 11.6–14.8)
HCT VFR BLD CALC: 30.9 % (ref 37–47)
HGB BLD-MCNC: 10.2 G/DL (ref 12–16)
MCV RBC AUTO: 92 FL (ref 80–99)
PLATELET # BLD: 328 K/UL (ref 150–450)
POTASSIUM SERPL-SCNC: 4.3 MMOL/L (ref 3.5–5.1)
RBC # BLD AUTO: 3.35 M/UL (ref 4.2–5.4)
SODIUM SERPL-SCNC: 136 MMOL/L (ref 136–145)
WBC # BLD AUTO: 8.8 K/UL (ref 4.8–10.8)

## 2019-03-08 RX ADMIN — THEOPHYLLINE ANHYDROUS SCH MG: 100 CAPSULE, EXTENDED RELEASE ORAL at 21:56

## 2019-03-08 RX ADMIN — HEPARIN SODIUM SCH UNITS: 5000 INJECTION INTRAVENOUS; SUBCUTANEOUS at 21:57

## 2019-03-08 RX ADMIN — METHYLPREDNISOLONE SODIUM SUCCINATE SCH MG: 125 INJECTION, POWDER, FOR SOLUTION INTRAMUSCULAR; INTRAVENOUS at 05:23

## 2019-03-08 RX ADMIN — SODIUM CHLORIDE SCH MLS/HR: 0.9 INJECTION INTRAVENOUS at 13:08

## 2019-03-08 RX ADMIN — THEOPHYLLINE ANHYDROUS SCH MG: 100 CAPSULE, EXTENDED RELEASE ORAL at 08:07

## 2019-03-08 RX ADMIN — METHYLPREDNISOLONE SODIUM SUCCINATE SCH MG: 125 INJECTION, POWDER, FOR SOLUTION INTRAMUSCULAR; INTRAVENOUS at 11:50

## 2019-03-08 RX ADMIN — HEPARIN SODIUM SCH UNITS: 5000 INJECTION INTRAVENOUS; SUBCUTANEOUS at 08:09

## 2019-03-08 NOTE — GENERAL PROGRESS NOTE
Assessment/Plan


Problem List:  


(1) Malnutrition


ICD Codes:  E46 - Unspecified protein-calorie malnutrition


SNOMED:  53693992


(2) Fever


ICD Codes:  R50.9 - Fever, unspecified


SNOMED:  521415436


(3) SOB (shortness of breath)


ICD Codes:  R06.02 - Shortness of breath


SNOMED:  315433509


(4) Anemia


ICD Codes:  D64.9 - Anemia, unspecified


SNOMED:  200069452


(5) Neuropathy


ICD Codes:  G62.9 - Polyneuropathy, unspecified


SNOMED:  930137969


(6) Rheumatoid arthritis


ICD Codes:  M06.9 - Rheumatoid arthritis, unspecified


SNOMED:  89699997


(7) Pneumonia


ICD Codes:  J18.9 - Pneumonia, unspecified organism


SNOMED:  162609145


Status:  stable, progressing


Assessment/Plan


o2 pulm tx abx pt diet dc w hh if clear





Subjective


Constitutional:  Reports: weakness


Allergies:  


Coded Allergies:  


     No Known Allergies (Unverified , 2/14/16)


All Systems:  reviewed and negative except above


Subjective


 calm sl sob





Objective





Last 24 Hour Vital Signs








  Date Time  Temp Pulse Resp B/P (MAP) Pulse Ox O2 Delivery O2 Flow Rate FiO2


 


3/8/19 09:00      Room Air  


 


3/8/19 08:00 97.7 95 18 99/56 (70)    


 


3/8/19 07:02  77 12   Room Air  21


 


3/8/19 04:00 97.0 67 20 117/69 (85) 98   


 


3/7/19 23:54 97.7 69 17 114/68 (83) 98   


 


3/7/19 21:00      Room Air  


 


3/7/19 20:09  66 18   Room Air  21


 


3/7/19 20:00 98.0 69 17 112/64 (80) 96   


 


3/7/19 16:00 98.2 68 16 115/67 (83)    

















Intake and Output  


 


 3/7/19 3/8/19





 19:00 07:00


 


Intake Total 1255 ml 1020 ml


 


Balance 1255 ml 1020 ml


 


  


 


Intake Oral 1200 ml 1020 ml


 


IV Total 55 ml 


 


# Voids  4


 


# Bowel Movements  1








Laboratory Tests


3/8/19 05:42: 


White Blood Count 8.8#, Red Blood Count 3.35L, Hemoglobin 10.2L, Hematocrit 

30.9L, Mean Corpuscular Volume 92, Mean Corpuscular Hemoglobin 30.4, Mean 

Corpuscular Hemoglobin Concent 33.0, Red Cell Distribution Width 12.6, Platelet 

Count 328, Mean Platelet Volume 5.0L, Neutrophils (%) (Auto) , Lymphocytes (%) (

Auto) , Monocytes (%) (Auto) , Eosinophils (%) (Auto) , Basophils (%) (Auto) , 

Differential Total Cells Counted 100, Neutrophils % (Manual) 89H, Lymphocytes % 

(Manual) 7L, Monocytes % (Manual) 4, Eosinophils % (Manual) 0, Basophils % (

Manual) 0, Band Neutrophils 0, Platelet Estimate Adequate, Platelet Morphology 

Normal, Red Blood Cell Morphology Normal, Sodium Level 136, Potassium Level 4.3

, Chloride Level 101, Carbon Dioxide Level 24, Anion Gap 11, Blood Urea 

Nitrogen 34H, Creatinine 1.3, Estimat Glomerular Filtration Rate , Glucose 

Level 139H, Calcium Level 9.1, Total Protein (PEP) [Pending], Albumin (PEP) [

Pending], Globulin (PEP) [Pending], Albumin/Globulin Ratio [Pending], Alpha-1-

Globulins [Pending], Alpha-2-Globulins [Pending], Beta Globulins [Pending], 

Beta Gamma Globulin [Pending], PEP Abnormal Protein Bands [Pending], Protein 

Electrophoresis Interpret [Pending], Immunoglobulin G [Pending], Immunoglobulin 

A [Pending], Immunoglobulin M [Pending], Immunofixation Screen [Pending]


3/8/19 05:43: Ferritin 220


3/8/19 10:40: Urine Immunofixation [Pending]


Height (Feet):  5


Height (Inches):  2.00


Weight (Pounds):  90


General Appearance:  lethargic


EENT:  normal ENT inspection


Neck:  normal alignment


Cardiovascular:  normal peripheral pulses, normal rate, regular rhythm


Respiratory/Chest:  chest wall non-tender, lungs clear, normal breath sounds


Abdomen:  normal bowel sounds, non tender, soft


Extremities:  normal inspection


Edema:  no edema noted Arm (L), no edema noted Arm (R), no edema noted Leg (L), 

no edema noted Leg (R), no edema noted Pedal (L), no edema noted Pedal (R), no 

edema noted Generalized


Neurologic:  motor weakness


Skin:  normal pigmentation, warm/dry











Flaco Stephens DO Mar 8, 2019 13:26

## 2019-03-08 NOTE — NUR
NURSE NOTES:

Patient received in stable condition, alert and oriented. Breathing unlabored on room air. 
Eating breakfast at this time, denies distress or pain. IV site on right arm patent and 
intact. Bed locked in lowest position. Call light placed within reach, will continue to 
monitor.

## 2019-03-08 NOTE — INFECTIOUS DISEASES PROG NOTE
Assessment/Plan


Assessment/Plan





Assessment:


Sepsis;SP


PNA, probable Flu (despite neg screen test)


  -CXR:   Diffuse bilateral patchy interstitial and alveolar opacities, which 

may represent pneumonia versus pulmonary edema.


  -inlfuenza sc , legionella ag urine neg


  -sp cx normal fresp he


  -Bcxneg





Low grade fever; SP


Leukopenia; SP


Anemia





Hx of pulmonary MAC 2017


 RA


osteoporosis





Plan:


-Continue Ceftriaxone #4 (abx d#7/7) for PNA given no isolation of resistant 

organisms


-Continue empiric Tamiflu #5/5 





    -3/7 SP Azithromycin #4


    -3/5 SP  IV Vancomycin #4 , Cefepime #4


    -3/2 SP Ceftriaxone and azithromycin x1





-f/u cx


-Monitor CBC/CMP, temperatures


-aspiration precautions


-steroids per pulm; favor short course given prior hx of pulmonary MAC





Thank you for this consultation. Will continue to follow along with  you.





Discussed with RN.





Subjective


Allergies:  


Coded Allergies:  


     No Known Allergies (Unverified , 2/14/16)


Subjective


afebrile


leukopenia resolved


at RA


feeling better


Bcx neg





Objective


Vital Signs





Last 24 Hour Vital Signs








  Date Time  Temp Pulse Resp B/P (MAP) Pulse Ox O2 Delivery O2 Flow Rate FiO2


 


3/8/19 09:00      Room Air  


 


3/8/19 08:00 97.7 95 18 99/56 (70)    


 


3/8/19 07:02  77 12   Room Air  21


 


3/8/19 04:00 97.0 67 20 117/69 (85) 98   


 


3/7/19 23:54 97.7 69 17 114/68 (83) 98   


 


3/7/19 21:00      Room Air  


 


3/7/19 20:09  66 18   Room Air  21


 


3/7/19 20:00 98.0 69 17 112/64 (80) 96   


 


3/7/19 16:00 98.2 68 16 115/67 (83)    








Height (Feet):  5


Height (Inches):  2.00


Weight (Pounds):  90


Objective


GENERAL:  Calm in bed, oriented x3, slight short of breath.


CARDIOVASCULAR:  No murmur.


LUNGS:  Poor air exchange.


ABDOMEN:  Bowel sounds distant.


EXTREMITIES:  No cyanosis or edema.


NEUROLOGIC:  The patient moves all extremities, slightly weak.





Laboratory Tests








Test


  3/8/19


05:42 3/8/19


05:43 3/8/19


10:40


 


White Blood Count


  8.8 K/UL


(4.8-10.8)  # 


  


 


 


Red Blood Count


  3.35 M/UL


(4.20-5.40)  L 


  


 


 


Hemoglobin


  10.2 G/DL


(12.0-16.0)  L 


  


 


 


Hematocrit


  30.9 %


(37.0-47.0)  L 


  


 


 


Mean Corpuscular Volume 92 FL (80-99)    


 


Mean Corpuscular Hemoglobin


  30.4 PG


(27.0-31.0) 


  


 


 


Mean Corpuscular Hemoglobin


Concent 33.0 G/DL


(32.0-36.0) 


  


 


 


Red Cell Distribution Width


  12.6 %


(11.6-14.8) 


  


 


 


Platelet Count


  328 K/UL


(150-450) 


  


 


 


Mean Platelet Volume


  5.0 FL


(6.5-10.1)  L 


  


 


 


Neutrophils (%) (Auto)


  % (45.0-75.0)


  


  


 


 


Lymphocytes (%) (Auto)


  % (20.0-45.0)


  


  


 


 


Monocytes (%) (Auto)  % (1.0-10.0)    


 


Eosinophils (%) (Auto)  % (0.0-3.0)    


 


Basophils (%) (Auto)  % (0.0-2.0)    


 


Differential Total Cells


Counted 100  


  


  


 


 


Neutrophils % (Manual) 89 % (45-75)  H  


 


Lymphocytes % (Manual) 7 % (20-45)  L  


 


Monocytes % (Manual) 4 % (1-10)    


 


Eosinophils % (Manual) 0 % (0-3)    


 


Basophils % (Manual) 0 % (0-2)    


 


Band Neutrophils 0 % (0-8)    


 


Platelet Estimate Adequate    


 


Platelet Morphology Normal    


 


Red Blood Cell Morphology Normal    


 


Sodium Level


  136 MMOL/L


(136-145) 


  


 


 


Potassium Level


  4.3 MMOL/L


(3.5-5.1) 


  


 


 


Chloride Level


  101 MMOL/L


() 


  


 


 


Carbon Dioxide Level


  24 MMOL/L


(21-32) 


  


 


 


Anion Gap


  11 mmol/L


(5-15) 


  


 


 


Blood Urea Nitrogen


  34 mg/dL


(7-18)  H 


  


 


 


Creatinine


  1.3 MG/DL


(0.55-1.30) 


  


 


 


Estimat Glomerular Filtration


Rate  mL/min (>60)  


  


  


 


 


Glucose Level


  139 MG/DL


()  H 


  


 


 


Calcium Level


  9.1 MG/DL


(8.5-10.1) 


  


 


 


Total Protein (PEP) Pending    


 


Albumin (PEP) Pending    


 


Globulin (PEP) Pending    


 


Albumin/Globulin Ratio Pending    


 


Alpha-1-Globulins Pending    


 


Alpha-2-Globulins Pending    


 


Beta Globulins Pending    


 


Beta Gamma Globulin Pending    


 


PEP Abnormal Protein Bands Pending    


 


Protein Electrophoresis


Interpret Pending  


  


  


 


 


Immunoglobulin G Pending    


 


Immunoglobulin A Pending    


 


Immunoglobulin M Pending    


 


Immunofixation Screen Pending    


 


Ferritin


  


  220 NG/ML


(8-388) 


 


 


Urine Immunofixation   Pending  











Current Medications








 Medications


  (Trade)  Dose


 Ordered  Sig/Naman


 Route


 PRN Reason  Start Time


 Stop Time Status Last Admin


Dose Admin


 


 Acetaminophen


  (Tylenol)  650 mg  Q4H  PRN


 ORAL


 T>100.5  3/3/19 14:45


 4/1/19 18:44  3/3/19 16:57


 


 


 Ceftriaxone


 Sodium 1 gm/


 Dextrose  55 ml @ 


 110 mls/hr  Q24H


 IVPB


   3/5/19 14:00


 3/12/19 13:59  3/7/19 16:42


 


 


 Dextrose


  (Dextrose 50%)  25 ml  Q30M  PRN


 IV


 Hypoglycemia  3/3/19 14:45


 4/1/19 18:44   


 


 


 Dextrose


  (Dextrose 50%)  50 ml  Q30M  PRN


 IV


 Hypoglycemia  3/3/19 14:45


 4/1/19 18:44   


 


 


 Heparin Sodium


  (Porcine)


  (Heparin 5000


 units/ml)  5,000 units  EVERY 12  HOURS


 SUBQ


   3/3/19 21:00


 4/1/19 20:59  3/8/19 08:09


 


 


 Magnesium


 Hydroxide


  (Mom)  15 ml  Q6H  PRN


 ORAL


 Constipation  3/5/19 13:30


 4/4/19 13:29  3/5/19 14:40


 


 


 Methylprednisolone


 Sodium Succinate


  (Solu-MEDROL)  60 mg  EVERY 6  HOURS


 IV


   3/6/19 18:00


 4/5/19 17:59  3/8/19 11:50


 


 


 Morphine Sulfate


  (Morphine


 Sulfate)  2 mg  Q4H  PRN


 IVP


 Severe Pain (Pain Scale 7-10)  3/3/19 14:45


 3/9/19 18:44   


 


 


 Ondansetron HCl


  (Zofran)  4 mg  Q6H  PRN


 IVP


 Nausea & Vomiting  3/3/19 14:30


 4/1/19 14:29   


 


 


 Oseltamivir


 Phosphate


  (Tamiflu)  30 mg  DAILY


 ORAL


   3/4/19 13:00


 3/9/19 12:59  3/8/19 08:07


 


 


 Polyethylene


 Glycol


  (Miralax)  17 gm  DAILYPRN  PRN


 ORAL


 Constipation  3/5/19 13:30


 4/1/19 14:29   


 


 


 Promethazine HCl/


 Codeine


  (Phenergan with


 Codeine)  5 ml  Q4H  PRN


 ORAL


 For Cough  3/3/19 14:30


 4/2/19 14:29   


 


 


 Theophylline


  (Booker-Dur)  100 mg  EVERY 12  HOURS


 ORAL


   3/3/19 21:00


 4/2/19 20:59  3/8/19 08:07


 

















Prachi Díaz M.D. Mar 8, 2019 12:53

## 2019-03-08 NOTE — GENERAL PROGRESS NOTE
Assessment/Plan


Assessment/Plan





ASSESSMENT/Recs


# Anemia of chronic disease (or of iron deficiency) due to underlying chronic 

medical issues, multifactorial 


--> Anemia workup has been ordered


--> No evidence of hemolysis is noted, peripheral smear has been reviewed


--> Hgb goal >7. Transfuse prn.


--> Epogen or iron at this time is not particularly indicated


--> Medications have been reviewed


# Decreased white blood cell count,  (leukopenia) - wbc under 4k, could also be 

related to infection (pna)


--> continue antibiotics with ID service, appreciate recs


--> medications have been reviewed


--> hepatitis and hiv have been ordered


--> abnormal m-sike was noted therefore --> flow cytometry ordered for atypical 

lymphocytes/immature on perupheral smear


--> IgA, IgG, IgM ordered, immunofixation of serum and urine ordered


--> f.u in outpatient setting with hematology


# UTI, Antibiotics per Infectious Disease.


--> on abx


#  Pneumonia,  O2 and pulmonary treatment.


#  Fever, continue to monitor temp


#  Shortness of breath








   


The timing of this note does not necessarily reflect the time of the patient 

was seen.   


   


Greatly appreciate consultation!





Subjective


Constitutional:  Denies: no symptoms, chills, diaphoresis, fever, malaise, 

weakness, other


HEENT:  Denies: no symptoms, eye pain, blurred vision, tearing, double vision, 

ear pain, ear discharge, nose pain, nose congestion, throat pain, throat 

swelling, mouth pain, mouth swelling, other


Cardiovascular:  Denies: no symptoms, chest pain, edema, irregular heart rate, 

lightheadedness, palpitations, syncope, other


Respiratory:  Denies: no symptoms, cough, orthopnea, shortness of breath, SOB 

with excertion, SOB at rest, sputum, stridor, wheezing, other


Gastrointestinal/Abdominal:  Denies: no symptoms, abdomen distended, abdominal 

pain, black stools, tarry stools, blood in stool, constipated, diarrhea, 

difficulty swallowing, nausea, poor appetite, poor fluid intake, rectal bleeding

, vomiting, other


Genitourinary:  Denies: no symptoms, burning, discharge, frequency, flank pain, 

hematuria, incontinence, pain, urgency, other


Neurologic/Psychiatric:  Denies: no symptoms, anxiety, depressed, emotional 

problems, headache, numbness, paresthesia, pre-existing deficit, seizure, 

tingling, tremors, weakness, other


Endocrine:  Denies: no symptoms, excessive sweating, flushing, intolerance to 

cold, intolerance to heat, increased hunger, increased thirst, increased urine, 

unexplained weight gain, unexplained weight loss, other


Allergies:  


Coded Allergies:  


     No Known Allergies (Unverified , 2/14/16)


Subjective


3/8: seen by bedside, awake, comfortable, no events reported.





Objective





Last 24 Hour Vital Signs








  Date Time  Temp Pulse Resp B/P (MAP) Pulse Ox O2 Delivery O2 Flow Rate FiO2


 


3/8/19 20:00 97.8 67 18 106/59 (75) 98   


 


3/8/19 16:00 97.9 64 18 111/65 (80) 97   


 


3/8/19 12:00 97.5 86 18 107/62 (77) 97   


 


3/8/19 09:00      Room Air  


 


3/8/19 08:00 97.7 95 18 99/56 (70)    


 


3/8/19 07:02  77 12   Room Air  21


 


3/8/19 04:00 97.0 67 20 117/69 (85) 98   


 


3/7/19 23:54 97.7 69 17 114/68 (83) 98   

















Intake and Output  


 


 3/7/19 3/8/19





 18:59 06:59


 


Intake Total 1255 ml 1020 ml


 


Balance 1255 ml 1020 ml


 


  


 


Intake Oral 1200 ml 1020 ml


 


IV Total 55 ml 


 


# Voids  4


 


# Bowel Movements  1








Laboratory Tests


3/8/19 05:42: 


White Blood Count 8.8#, Red Blood Count 3.35L, Hemoglobin 10.2L, Hematocrit 

30.9L, Mean Corpuscular Volume 92, Mean Corpuscular Hemoglobin 30.4, Mean 

Corpuscular Hemoglobin Concent 33.0, Red Cell Distribution Width 12.6, Platelet 

Count 328, Mean Platelet Volume 5.0L, Neutrophils (%) (Auto) , Lymphocytes (%) (

Auto) , Monocytes (%) (Auto) , Eosinophils (%) (Auto) , Basophils (%) (Auto) , 

Differential Total Cells Counted 100, Neutrophils % (Manual) 89H, Lymphocytes % 

(Manual) 7L, Monocytes % (Manual) 4, Eosinophils % (Manual) 0, Basophils % (

Manual) 0, Band Neutrophils 0, Platelet Estimate Adequate, Platelet Morphology 

Normal, Red Blood Cell Morphology Normal, Sodium Level 136, Potassium Level 4.3

, Chloride Level 101, Carbon Dioxide Level 24, Anion Gap 11, Blood Urea 

Nitrogen 34H, Creatinine 1.3, Estimat Glomerular Filtration Rate , Glucose 

Level 139H, Calcium Level 9.1, Total Protein (PEP) [Pending], Albumin (PEP) [

Pending], Globulin (PEP) [Pending], Albumin/Globulin Ratio [Pending], Alpha-1-

Globulins [Pending], Alpha-2-Globulins [Pending], Beta Globulins [Pending], 

Beta Gamma Globulin [Pending], PEP Abnormal Protein Bands [Pending], Protein 

Electrophoresis Interpret [Pending], Immunoglobulin G [Pending], Immunoglobulin 

A [Pending], Immunoglobulin M [Pending], Immunofixation Screen [Pending]


3/8/19 05:43: Ferritin 220


3/8/19 10:40: Urine Immunofixation [Pending]


Height (Feet):  5


Height (Inches):  2.00


Weight (Pounds):  90


Objective





PHYSICAL EXAMINATION:


GENERAL:  Calm in bed, oriented x3, slight short of breath.


VITAL SIGNS:  Temperature is 98 degrees, pulse 82, respirations 20, and blood 

pressure 97/67.


CARDIOVASCULAR:  No murmur.


LUNGS:  Poor air exchange.


ABDOMEN:  Bowel sounds distant.


EXTREMITIES:  No cyanosis or edema.


NEUROLOGIC:  The patient moves all extremities, slightly weak.











Agustin Giordano MD Mar 8, 2019 22:45

## 2019-03-08 NOTE — NUR
NURSE NOTES:

Received report from LANA Daley. Patient A&Ox4, Mongolian speaking. On room air, no signs 
of distress or labored breathing. IV intact, patent, and saline locked. Bed in lowest 
position with call light in reach. Will continue with plan of care.

## 2019-03-08 NOTE — NUR
RD ASSESSMENT & RECOMMENDATIONS

SEE CARE ACTIVITY FOR COMPLETE ASSESSMENT



DAILY ESTIMATED NEEDS:

Needs based on Underweight/ 42kg 

30-35  kcals/kg 

6500-3623  total kcals

1-1.2  g protein/kg

42-50  g total protein 

25-30  mL/kg

5341-2776  total fluid mLs





NUTRITION DIAGNOSIS:

(1) Increased kcal/pro needs R/T underweight status as evidenced by

pt @ 93% IBW, w/ moderate temporal wasting, low BMI per guidelines.





PO DIET RECOMMENDATIONS:

REGULAR/ texture as tolerated 





ADDITIONAL RECOMMENDATIONS:

* Calibrated bedscale wt, weekly weights given underweight status 

* Monitor PO intake closely-> noted good intake 

* Snacks BID 

* Monitor BG on solumedrol/ need for ssi

## 2019-03-08 NOTE — PULMONOLOGY PROGRESS NOTE
Assessment/Plan


Problems:  


(1) Pneumonia


(2) Acute respiratory failure


(3) Severe anemia


(4) Rheumatoid arthritis


Assessment/Plan


improving slowly


respiratory treatment


check sputum


iv abx


chest pt


check electrolytes


titrate fio2 to sat of 92%


add short course of Solumedrol, decrease to QD





hypergammaglobulinemia, urine protein electrophoresis pending





Subjective


ROS Limited/Unobtainable:  No


Constitutional:  Reports: no symptoms


HEENT:  Repors: no symptoms


Respiratory:  Reports: no symptoms


Allergies:  


Coded Allergies:  


     No Known Allergies (Unverified , 2/14/16)





Objective





Last 24 Hour Vital Signs








  Date Time  Temp Pulse Resp B/P (MAP) Pulse Ox O2 Delivery O2 Flow Rate FiO2


 


3/8/19 09:00      Room Air  


 


3/8/19 08:00 97.7 95 18 99/56 (70)    


 


3/8/19 07:02  77 12   Room Air  21


 


3/8/19 04:00 97.0 67 20 117/69 (85) 98   


 


3/7/19 23:54 97.7 69 17 114/68 (83) 98   


 


3/7/19 21:00      Room Air  


 


3/7/19 20:09  66 18   Room Air  21


 


3/7/19 20:00 98.0 69 17 112/64 (80) 96   


 


3/7/19 16:00 98.2 68 16 115/67 (83)    

















Intake and Output  


 


 3/7/19 3/8/19





 19:00 07:00


 


Intake Total 1255 ml 1020 ml


 


Balance 1255 ml 1020 ml


 


  


 


Intake Oral 1200 ml 1020 ml


 


IV Total 55 ml 


 


# Voids  4


 


# Bowel Movements  1








General Appearance:  WD/WN, no acute distress


HEENT:  normocephalic


Respiratory/Chest:  chest wall non-tender, crackles/rales


Breasts:  no masses


Cardiovascular:  normal peripheral pulses, normal rate


Abdomen:  normal bowel sounds, soft, non tender


Genitourinary:  normal external genitalia


Extremities:  no cyanosis


Skin:  no rash


Neurologic/Psychiatric:  CNs II-XII grossly normal


Lymphatic:  no neck adenopathy


Laboratory Tests


3/8/19 05:42: 


White Blood Count 8.8#, Red Blood Count 3.35L, Hemoglobin 10.2L, Hematocrit 

30.9L, Mean Corpuscular Volume 92, Mean Corpuscular Hemoglobin 30.4, Mean 

Corpuscular Hemoglobin Concent 33.0, Red Cell Distribution Width 12.6, Platelet 

Count 328, Mean Platelet Volume 5.0L, Neutrophils (%) (Auto) , Lymphocytes (%) (

Auto) , Monocytes (%) (Auto) , Eosinophils (%) (Auto) , Basophils (%) (Auto) , 

Differential Total Cells Counted 100, Neutrophils % (Manual) 89H, Lymphocytes % 

(Manual) 7L, Monocytes % (Manual) 4, Eosinophils % (Manual) 0, Basophils % (

Manual) 0, Band Neutrophils 0, Platelet Estimate Adequate, Platelet Morphology 

Normal, Red Blood Cell Morphology Normal, Sodium Level 136, Potassium Level 4.3

, Chloride Level 101, Carbon Dioxide Level 24, Anion Gap 11, Blood Urea 

Nitrogen 34H, Creatinine 1.3, Estimat Glomerular Filtration Rate , Glucose 

Level 139H, Calcium Level 9.1, Total Protein (PEP) [Pending], Albumin (PEP) [

Pending], Globulin (PEP) [Pending], Albumin/Globulin Ratio [Pending], Alpha-1-

Globulins [Pending], Alpha-2-Globulins [Pending], Beta Globulins [Pending], 

Beta Gamma Globulin [Pending], PEP Abnormal Protein Bands [Pending], Protein 

Electrophoresis Interpret [Pending], Immunoglobulin G [Pending], Immunoglobulin 

A [Pending], Immunoglobulin M [Pending], Immunofixation Screen [Pending]


3/8/19 05:43: Ferritin 220


3/8/19 10:40: Urine Immunofixation [Pending]





Current Medications








 Medications


  (Trade)  Dose


 Ordered  Sig/Naman


 Route


 PRN Reason  Start Time


 Stop Time Status Last Admin


Dose Admin


 


 Acetaminophen


  (Tylenol)  650 mg  Q4H  PRN


 ORAL


 T>100.5  3/3/19 14:45


 4/1/19 18:44  3/3/19 16:57


 


 


 Ceftriaxone


 Sodium 1 gm/


 Dextrose  55 ml @ 


 110 mls/hr  Q24H


 IVPB


   3/5/19 14:00


 3/12/19 13:59  3/8/19 13:08


 


 


 Dextrose


  (Dextrose 50%)  25 ml  Q30M  PRN


 IV


 Hypoglycemia  3/3/19 14:45


 4/1/19 18:44   


 


 


 Dextrose


  (Dextrose 50%)  50 ml  Q30M  PRN


 IV


 Hypoglycemia  3/3/19 14:45


 4/1/19 18:44   


 


 


 Heparin Sodium


  (Porcine)


  (Heparin 5000


 units/ml)  5,000 units  EVERY 12  HOURS


 SUBQ


   3/3/19 21:00


 4/1/19 20:59  3/8/19 08:09


 


 


 Magnesium


 Hydroxide


  (Mom)  15 ml  Q6H  PRN


 ORAL


 Constipation  3/5/19 13:30


 4/4/19 13:29  3/5/19 14:40


 


 


 Methylprednisolone


 Sodium Succinate


  (Solu-MEDROL)  60 mg  DAILY


 IV


   3/9/19 09:00


 4/5/19 17:59   


 


 


 Morphine Sulfate


  (Morphine


 Sulfate)  2 mg  Q4H  PRN


 IVP


 Severe Pain (Pain Scale 7-10)  3/3/19 14:45


 3/9/19 18:44   


 


 


 Ondansetron HCl


  (Zofran)  4 mg  Q6H  PRN


 IVP


 Nausea & Vomiting  3/3/19 14:30


 4/1/19 14:29   


 


 


 Polyethylene


 Glycol


  (Miralax)  17 gm  DAILYPRN  PRN


 ORAL


 Constipation  3/5/19 13:30


 4/1/19 14:29   


 


 


 Promethazine HCl/


 Codeine


  (Phenergan with


 Codeine)  5 ml  Q4H  PRN


 ORAL


 For Cough  3/3/19 14:30


 4/2/19 14:29   


 


 


 Theophylline


  (Booker-Dur)  100 mg  EVERY 12  HOURS


 ORAL


   3/3/19 21:00


 4/2/19 20:59  3/8/19 08:07


 

















Geovany Mustafa MD Mar 8, 2019 13:15

## 2019-03-09 VITALS — SYSTOLIC BLOOD PRESSURE: 100 MMHG | DIASTOLIC BLOOD PRESSURE: 80 MMHG

## 2019-03-09 VITALS — SYSTOLIC BLOOD PRESSURE: 113 MMHG | DIASTOLIC BLOOD PRESSURE: 69 MMHG

## 2019-03-09 VITALS — SYSTOLIC BLOOD PRESSURE: 110 MMHG | DIASTOLIC BLOOD PRESSURE: 62 MMHG

## 2019-03-09 RX ADMIN — HEPARIN SODIUM SCH UNITS: 5000 INJECTION INTRAVENOUS; SUBCUTANEOUS at 08:24

## 2019-03-09 RX ADMIN — THEOPHYLLINE ANHYDROUS SCH MG: 100 CAPSULE, EXTENDED RELEASE ORAL at 08:15

## 2019-03-09 NOTE — PULMONOLOGY PROGRESS NOTE
Assessment/Plan


Assessment/Plan


ASSESSMENT


Sepsis 


PNA 


probably flu 


RA 


anemia 


Hx of pulmonary MAC 


Leukopenia- resolved 


OP


SPEP abnormal (elevated beta globin) 





PLAN OF CARE


MS floor


sputum cx negative,  blood cx negative , influenza screen test negative 


completed empiric antibiotics and Tamiflu ( probably flu as per ID despite 

negative influenza  test  )  


suppl  O2 as needed , titrate to keep pulse ox above 90 %, pulmonary toilet prn 


trial of Theophylline 


short course of steroid and change to Medrol Dosepak upon discharge 


antitussives prn


aspiration precaution 


leukopenia resolved


Hgb and Hct  closely monitor with goal to keep hemoglobin above 7 , remained at

  baseline 


anemia workup c/w anemia of chronic disease, stable B12 and folate


CEA WNL, stool OB negative   


DVT prophylaxis care 


supportive care 


bowel regimen 


clear for discharge from pulmonary standpoint   





case discussed and evaluated by supervising physician





Subjective


Allergies:  


Coded Allergies:  


     No Known Allergies (Unverified , 2/14/16)


Subjective


afebrile, no leukocytosis,


denies chest pain, SOB 


pulse ox stable on RA 


completed abx





Objective





Last 24 Hour Vital Signs








  Date Time  Temp Pulse Resp B/P (MAP) Pulse Ox O2 Delivery O2 Flow Rate FiO2


 


3/9/19 04:00 97.8 64 18 113/69 (84) 95   


 


3/9/19 00:00 97.9 65 18 110/62 (78) 95   


 


3/8/19 23:52  73 16   Room Air  21


 


3/8/19 21:00      Room Air  


 


3/8/19 20:00 97.8 67 18 106/59 (75) 98   


 


3/8/19 16:00 97.9 64 18 111/65 (80) 97   


 


3/8/19 12:00 97.5 86 18 107/62 (77) 97   


 


3/8/19 09:00      Room Air  


 


3/8/19 08:00 97.7 95 18 99/56 (70)    

















Intake and Output  


 


 3/8/19 3/9/19





 19:00 07:00


 


Intake Total 920 ml 240 ml


 


Balance 920 ml 240 ml


 


  


 


Intake Oral 920 ml 240 ml


 


# Voids 4 2








General Appearance:  no acute distress, other - awake, alert, responsive thin 

female


HEENT:  normocephalic, atraumatic, anicteric, mucous membranes moist


Respiratory/Chest:  no respiratory distress, no accessory muscle use, 

expiratory wheezing - few isolated exp wheezes


Cardiovascular:  normal rate, no JVD


Abdomen:  normal bowel sounds, soft, non tender, non distended


Extremities:  no edema, pedal pulses normal, other - bilateral hands with 

rheumatoid changes


Neurologic/Psychiatric:  no motor/sensory deficits, alert, oriented x 3, 

responsive


Laboratory Tests


3/8/19 10:40: Urine Immunofixation [Pending]





Current Medications








 Medications


  (Trade)  Dose


 Ordered  Sig/Naman


 Route


 PRN Reason  Start Time


 Stop Time Status Last Admin


Dose Admin


 


 Acetaminophen


  (Tylenol)  650 mg  Q4H  PRN


 ORAL


 T>100.5  3/3/19 14:45


 4/1/19 18:44  3/3/19 16:57


 


 


 Ceftriaxone


 Sodium 1 gm/


 Dextrose  55 ml @ 


 110 mls/hr  Q24H


 IVPB


   3/5/19 14:00


 3/12/19 13:59  3/8/19 13:08


 


 


 Dextrose


  (Dextrose 50%)  25 ml  Q30M  PRN


 IV


 Hypoglycemia  3/3/19 14:45


 4/1/19 18:44   


 


 


 Dextrose


  (Dextrose 50%)  50 ml  Q30M  PRN


 IV


 Hypoglycemia  3/3/19 14:45


 4/1/19 18:44   


 


 


 Heparin Sodium


  (Porcine)


  (Heparin 5000


 units/ml)  5,000 units  EVERY 12  HOURS


 SUBQ


   3/3/19 21:00


 4/1/19 20:59  3/8/19 21:57


 


 


 Magnesium


 Hydroxide


  (Mom)  15 ml  Q6H  PRN


 ORAL


 Constipation  3/5/19 13:30


 4/4/19 13:29  3/5/19 14:40


 


 


 Methylprednisolone


 Sodium Succinate


  (Solu-MEDROL)  60 mg  DAILY


 IV


   3/9/19 09:00


 4/5/19 17:59   


 


 


 Morphine Sulfate


  (Morphine


 Sulfate)  2 mg  Q4H  PRN


 IVP


 Severe Pain (Pain Scale 7-10)  3/3/19 14:45


 3/9/19 18:44   


 


 


 Ondansetron HCl


  (Zofran)  4 mg  Q6H  PRN


 IVP


 Nausea & Vomiting  3/3/19 14:30


 4/1/19 14:29   


 


 


 Polyethylene


 Glycol


  (Miralax)  17 gm  DAILYPRN  PRN


 ORAL


 Constipation  3/5/19 13:30


 4/1/19 14:29   


 


 


 Promethazine HCl/


 Codeine


  (Phenergan with


 Codeine)  5 ml  Q4H  PRN


 ORAL


 For Cough  3/3/19 14:30


 4/2/19 14:29   


 


 


 Theophylline


  (Booker-Dur)  100 mg  EVERY 12  HOURS


 ORAL


   3/3/19 21:00


 4/2/19 20:59  3/8/19 21:56


 

















Minal Barnes NP Mar 9, 2019 07:33

## 2019-03-09 NOTE — NUR
CHARGE NURSE NOTES:

PATIENT REQUESTING DISCHARGE HOME; NOTIFIED DR. BOLANOS BECAUSE CASE MANAGEMENT CONSULT PUT IN 
FOR ARU CONSULT. CASSIA AZUL NOTIFIED FOR RX. WILL CONTINUE TO MONITOR.

## 2019-03-09 NOTE — NUR
DISCHARGE NURSE NOTES:

PT'S SON, ANDREW, PRESENT AT BEDSIDE. EDUCATED ON DISCHARGE MEDS AND PACKET. SON VERBALIZED 
UNDERSTANDING. PT AMBULATORY AND VSS. IV ACCESS DISCONTINUED AND ALL BELONGINGS ACCOUNTED. 
PT WAS DISCHARGED IN STABLE CONDITION.

## 2019-03-09 NOTE — INFECTIOUS DISEASES PROG NOTE
Assessment/Plan


Assessment/Plan


Sepsis;SP


PNA, probable Flu (despite neg screen test)


  -CXR:   Diffuse bilateral patchy interstitial and alveolar opacities, which 

may represent pneumonia versus pulmonary edema.


  -inlfuenza sc , legionella ag urine neg


  -sp cx normal fresp he


  -Bcxneg





Low grade fever; SP


Leukopenia; SP


Anemia





Hx of pulmonary MAC 2017


 RA


osteoporosis





Plan:





- Monitor off Abx





    -3/8 SP Tamiflu #5 and Ceftriaxone #5


    -3/7 SP Azithromycin #4


    -3/5 SP  IV Vancomycin #4 , Cefepime #4


    -3/2 SP Ceftriaxone and azithromycin x1





-f/u cx


-Monitor CBC/CMP, temperatures


-aspiration precautions


-steroids per pulm; favor short course given prior hx of pulmonary MAC





Will continue to follow along with  you.





Subjective


Allergies:  


Coded Allergies:  


     No Known Allergies (Unverified , 2/14/16)


Subjective


Afebrile on RA


WBCs increased to 8.8 today





Objective


Vital Signs





Last 24 Hour Vital Signs








  Date Time  Temp Pulse Resp B/P (MAP) Pulse Ox O2 Delivery O2 Flow Rate FiO2


 


3/9/19 08:00 98.4 65 16 100/80 (87) 97   


 


3/9/19 04:00 97.8 64 18 113/69 (84) 95   


 


3/9/19 00:00 97.9 65 18 110/62 (78) 95   


 


3/8/19 23:52  73 16   Room Air  21


 


3/8/19 21:00      Room Air  


 


3/8/19 20:00 97.8 67 18 106/59 (75) 98   


 


3/8/19 16:00 97.9 64 18 111/65 (80) 97   


 


3/8/19 12:00 97.5 86 18 107/62 (77) 97   








Height (Feet):  5


Height (Inches):  2.00


Weight (Pounds):  90


Objective





GENERAL:NAD, Awake and alert


CARDIOVASCULAR: RRR, No murmur.


LUNGS:  Soft crackles B/L, No W


ABDOMEN:  Soft, NT





Laboratory Tests








Test


  3/8/19


10:40


 


Urine Immunofixation Pending  











Current Medications








 Medications


  (Trade)  Dose


 Ordered  Sig/Naman


 Route


 PRN Reason  Start Time


 Stop Time Status Last Admin


Dose Admin


 


 Acetaminophen


  (Tylenol)  650 mg  Q4H  PRN


 ORAL


 T>100.5  3/3/19 14:45


 4/1/19 18:44  3/3/19 16:57


 


 


 Ceftriaxone


 Sodium 1 gm/


 Dextrose  55 ml @ 


 110 mls/hr  Q24H


 IVPB


   3/5/19 14:00


 3/12/19 13:59  3/8/19 13:08


 


 


 Dextrose


  (Dextrose 50%)  25 ml  Q30M  PRN


 IV


 Hypoglycemia  3/3/19 14:45


 4/1/19 18:44   


 


 


 Dextrose


  (Dextrose 50%)  50 ml  Q30M  PRN


 IV


 Hypoglycemia  3/3/19 14:45


 4/1/19 18:44   


 


 


 Heparin Sodium


  (Porcine)


  (Heparin 5000


 units/ml)  5,000 units  EVERY 12  HOURS


 SUBQ


   3/3/19 21:00


 4/1/19 20:59  3/9/19 08:24


 


 


 Magnesium


 Hydroxide


  (Mom)  15 ml  Q6H  PRN


 ORAL


 Constipation  3/5/19 13:30


 4/4/19 13:29  3/5/19 14:40


 


 


 Methylprednisolone


 Sodium Succinate


  (Solu-MEDROL)  60 mg  DAILY


 IV


   3/9/19 09:00


 4/5/19 17:59  3/9/19 08:15


 


 


 Morphine Sulfate


  (Morphine


 Sulfate)  2 mg  Q4H  PRN


 IVP


 Severe Pain (Pain Scale 7-10)  3/3/19 14:45


 3/9/19 18:44   


 


 


 Ondansetron HCl


  (Zofran)  4 mg  Q6H  PRN


 IVP


 Nausea & Vomiting  3/3/19 14:30


 4/1/19 14:29   


 


 


 Polyethylene


 Glycol


  (Miralax)  17 gm  DAILYPRN  PRN


 ORAL


 Constipation  3/5/19 13:30


 4/1/19 14:29   


 


 


 Promethazine HCl/


 Codeine


  (Phenergan with


 Codeine)  5 ml  Q4H  PRN


 ORAL


 For Cough  3/3/19 14:30


 4/2/19 14:29   


 


 


 Theophylline


  (Booker-Dur)  100 mg  EVERY 12  HOURS


 ORAL


   3/3/19 21:00


 4/2/19 20:59  3/9/19 08:15


 

















Van Song MD Mar 9, 2019 09:22

## 2019-03-09 NOTE — GENERAL PROGRESS NOTE
Assessment/Plan


Problem List:  


(1) Malnutrition


ICD Codes:  E46 - Unspecified protein-calorie malnutrition


SNOMED:  37640353


(2) Fever


ICD Codes:  R50.9 - Fever, unspecified


SNOMED:  807024429


(3) SOB (shortness of breath)


ICD Codes:  R06.02 - Shortness of breath


SNOMED:  873640031


(4) Anemia


ICD Codes:  D64.9 - Anemia, unspecified


SNOMED:  19447


(5) Neuropathy


ICD Codes:  G62.9 - Polyneuropathy, unspecified


SNOMED:  927963911


(6) Rheumatoid arthritis


ICD Codes:  M06.9 - Rheumatoid arthritis, unspecified


SNOMED:  25259664


(7) Pneumonia


ICD Codes:  J18.9 - Pneumonia, unspecified organism


SNOMED:  613023077


Status:  stable, progressing


Assessment/Plan


o2 pulm tx abx pt diet cbc bmp am brotman aru eval





Subjective


Constitutional:  Reports: weakness


Allergies:  


Coded Allergies:  


     No Known Allergies (Unverified , 2/14/16)


All Systems:  reviewed and negative except above


Subjective


 calm sl sob





Objective





Last 24 Hour Vital Signs








  Date Time  Temp Pulse Resp B/P (MAP) Pulse Ox O2 Delivery O2 Flow Rate FiO2


 


3/9/19 04:00 97.8 64 18 113/69 (84) 95   


 


3/9/19 00:00 97.9 65 18 110/62 (78) 95   


 


3/8/19 23:52  73 16   Room Air  21


 


3/8/19 21:00      Room Air  


 


3/8/19 20:00 97.8 67 18 106/59 (75) 98   


 


3/8/19 16:00 97.9 64 18 111/65 (80) 97   


 


3/8/19 12:00 97.5 86 18 107/62 (77) 97   


 


3/8/19 09:00      Room Air  

















Intake and Output  


 


 3/8/19 3/9/19





 19:00 07:00


 


Intake Total 920 ml 240 ml


 


Balance 920 ml 240 ml


 


  


 


Intake Oral 920 ml 240 ml


 


# Voids 4 2








Laboratory Tests


3/8/19 10:40: Urine Immunofixation [Pending]


Height (Feet):  5


Height (Inches):  2.00


Weight (Pounds):  90


General Appearance:  lethargic


EENT:  normal ENT inspection


Neck:  normal alignment


Cardiovascular:  normal peripheral pulses, normal rate, regular rhythm


Respiratory/Chest:  chest wall non-tender, lungs clear, normal breath sounds


Abdomen:  normal bowel sounds, non tender, soft


Extremities:  normal inspection


Edema:  no edema noted Arm (L), no edema noted Arm (R), no edema noted Leg (L), 

no edema noted Leg (R), no edema noted Pedal (L), no edema noted Pedal (R), no 

edema noted Generalized


Neurologic:  motor weakness


Skin:  normal pigmentation, warm/dry











Flaco Stephens DO Mar 9, 2019 08:23

## 2019-03-12 NOTE — DISCHARGE SUMMARY
Discharge Summary


Discharge Summary


_


DATE OF ADMISSION: 3/2/2019





DATE OF DISCHARGE: 3/9/2019





DISCHARGED BY: Dr Stephens








REASON FOR ADMISSION: 


73 years old female with past medical history of rheumatoid arthritis, 

osteoporosis, history of pulmonary MAC,  presented to emergency department with 

fever ,congestion and difficulty breathing.  


Symptoms were ongoing for the past 2 days with gradual onset.  


Patient reported subjective fever and nonproductive cough.  


Patient denied getting flu vaccine this season.  


Upon evaluation patient had low-grade fever,  was tachypneic , pulse oximetry 

was 89% on room air.  


Laboratory workup revealed no leukocytosis, mild anemia with hemoglobin 11.4 ,

hematocrit 35.2.  


Urinalysis revealed +3 protein but no evidence of UTI


BUN 29 creatinine 1.4.


Lactic acid 1.5 9.


Troponin negative.EKG revealed normal sinus rhythm, no acute ischemic changes.


Stable LFT.


Chest x-ray demonstrated diffuse bilateral patchy interstitial and alveolar 

opacities.


In emergency department patient received IV fluid, pancultured and started on 

IV antibiotics.


Patient admitted for further management.





CONSULTANTS:


pulmonary Dr. Mustafa


ID specialist IV Dr. Domingo


hematologist/oncologist Dr. Giordano


 


[facility] COURSE: 


Patient admitted to medical surgical floor and continued on IV fluids and 

empiric antibiotic.


ID consult was requested.


Blood cultures were negative.


Influenza screen test was negative.


Sputum culture was negative.


Urine Legionella antigen was negative.


Antibiotic regimen optimized as per ID recommendation.


Patient exhibited leukopenia, which resolved prior to discharge.


Per hematologist, leukopenia was likely related to infection.


Patient completed course of antibiotics and Tamiflu despite negative influenza 

screen test as per ID recommendation.


Low-grade fever resolved.  Leukopenia resolved.


Patient started on steroids due to history of pulmonary MAC and discharged on  

short course of Medrol Dosepak .


Supplemental oxygen provided as needed to keep pulse oximetry above 92%.


Pulmonary toilet provided around-the-clock and as needed.


Trial of theophylline initiated.


Antitussive provided as needed.   


Aspiration precaution were  maintained.


Hemoglobin and  hematocrit were closely monitored with goal to keep hemoglobin 

above 7, remained at baseline.


Stool for occult blood was negative.  CEA was within normal limits.


DVT prophylaxis provided.


Supportive care provided.


Pain management address as needed


Bowel regimen instituted.


Renal parameters and electrolytes were closely monitored,,  creatinine from 1.4 

down to 1.3.  


Patient also noted to have abnormal serum protein electrophoresis with elevated 

beta gammaglobulin and abnormal protein bands.


Patient will need outpatient follow-up with hematologist for further workup.  


Patient clinically stabilized and was ready for transfer for discharge home.





FINAL DIAGNOSES: 


Sepsis


Pneumonia


Probably flu


History of pulmonary MAC


Rheumatoid arthritis


Anemia of chronic disease


Osteoporosis


Leukopenia-resolved


Abnormal serum protein electrophoresis





DISCHARGE MEDICATIONS:


See Medication Reconciliation list.





DISCHARGE INSTRUCTIONS:


Patient was discharged home .  


Follow up with primary care provider in one week.











Minal Barnes NP Mar 12, 2019 07:36

## 2019-09-03 ENCOUNTER — HOSPITAL ENCOUNTER (INPATIENT)
Dept: HOSPITAL 72 - EMR | Age: 74
LOS: 4 days | Discharge: HOME HEALTH | DRG: 193 | End: 2019-09-07
Payer: MEDICARE

## 2019-09-03 VITALS — DIASTOLIC BLOOD PRESSURE: 63 MMHG | SYSTOLIC BLOOD PRESSURE: 116 MMHG

## 2019-09-03 VITALS — SYSTOLIC BLOOD PRESSURE: 102 MMHG | DIASTOLIC BLOOD PRESSURE: 70 MMHG

## 2019-09-03 VITALS — WEIGHT: 92 LBS | HEIGHT: 61 IN | BODY MASS INDEX: 17.37 KG/M2

## 2019-09-03 VITALS — SYSTOLIC BLOOD PRESSURE: 124 MMHG | DIASTOLIC BLOOD PRESSURE: 70 MMHG

## 2019-09-03 DIAGNOSIS — D63.8: ICD-10-CM

## 2019-09-03 DIAGNOSIS — J41.1: ICD-10-CM

## 2019-09-03 DIAGNOSIS — N18.9: ICD-10-CM

## 2019-09-03 DIAGNOSIS — E43: ICD-10-CM

## 2019-09-03 DIAGNOSIS — E86.0: ICD-10-CM

## 2019-09-03 DIAGNOSIS — M94.0: ICD-10-CM

## 2019-09-03 DIAGNOSIS — N17.9: ICD-10-CM

## 2019-09-03 DIAGNOSIS — M81.0: ICD-10-CM

## 2019-09-03 DIAGNOSIS — I50.9: ICD-10-CM

## 2019-09-03 DIAGNOSIS — E87.5: ICD-10-CM

## 2019-09-03 DIAGNOSIS — J43.9: ICD-10-CM

## 2019-09-03 DIAGNOSIS — J18.9: Primary | ICD-10-CM

## 2019-09-03 DIAGNOSIS — Z87.01: ICD-10-CM

## 2019-09-03 DIAGNOSIS — N39.0: ICD-10-CM

## 2019-09-03 LAB
ADD MANUAL DIFF: NO
ALBUMIN SERPL-MCNC: 2.7 G/DL (ref 3.4–5)
ALBUMIN/GLOB SERPL: 0.5 {RATIO} (ref 1–2.7)
ALP SERPL-CCNC: 79 U/L (ref 46–116)
ALT SERPL-CCNC: 12 U/L (ref 12–78)
ANION GAP SERPL CALC-SCNC: 9 MMOL/L (ref 5–15)
APPEARANCE UR: CLEAR
APTT PPP: (no result) S
AST SERPL-CCNC: 23 U/L (ref 15–37)
BASOPHILS NFR BLD AUTO: 1 % (ref 0–2)
BILIRUB SERPL-MCNC: 0.4 MG/DL (ref 0.2–1)
BUN SERPL-MCNC: 49 MG/DL (ref 7–18)
CALCIUM SERPL-MCNC: 8.6 MG/DL (ref 8.5–10.1)
CHLORIDE SERPL-SCNC: 107 MMOL/L (ref 98–107)
CO2 SERPL-SCNC: 23 MMOL/L (ref 21–32)
CREAT SERPL-MCNC: 2.1 MG/DL (ref 0.55–1.3)
EOSINOPHIL NFR BLD AUTO: 0.6 % (ref 0–3)
ERYTHROCYTE [DISTWIDTH] IN BLOOD BY AUTOMATED COUNT: 11.3 % (ref 11.6–14.8)
GLOBULIN SER-MCNC: 5.7 G/DL
GLUCOSE UR STRIP-MCNC: NEGATIVE MG/DL
HCT VFR BLD CALC: 26.9 % (ref 37–47)
HGB BLD-MCNC: 9.5 G/DL (ref 12–16)
KETONES UR QL STRIP: NEGATIVE
LEUKOCYTE ESTERASE UR QL STRIP: (no result)
LYMPHOCYTES NFR BLD AUTO: 13.3 % (ref 20–45)
MCV RBC AUTO: 88 FL (ref 80–99)
MONOCYTES NFR BLD AUTO: 8.1 % (ref 1–10)
NEUTROPHILS NFR BLD AUTO: 77 % (ref 45–75)
NITRITE UR QL STRIP: NEGATIVE
PH UR STRIP: 6 [PH] (ref 4.5–8)
PLATELET # BLD: 304 K/UL (ref 150–450)
POTASSIUM SERPL-SCNC: 5.9 MMOL/L (ref 3.5–5.1)
PROT UR QL STRIP: (no result)
RBC # BLD AUTO: 3.04 M/UL (ref 4.2–5.4)
SODIUM SERPL-SCNC: 139 MMOL/L (ref 136–145)
SP GR UR STRIP: 1.01 (ref 1–1.03)
UROBILINOGEN UR-MCNC: NORMAL MG/DL (ref 0–1)
WBC # BLD AUTO: 5 K/UL (ref 4.8–10.8)

## 2019-09-03 PROCEDURE — 82378 CARCINOEMBRYONIC ANTIGEN: CPT

## 2019-09-03 PROCEDURE — 84439 ASSAY OF FREE THYROXINE: CPT

## 2019-09-03 PROCEDURE — 82977 ASSAY OF GGT: CPT

## 2019-09-03 PROCEDURE — 82607 VITAMIN B-12: CPT

## 2019-09-03 PROCEDURE — 85044 MANUAL RETICULOCYTE COUNT: CPT

## 2019-09-03 PROCEDURE — 84481 FREE ASSAY (FT-3): CPT

## 2019-09-03 PROCEDURE — 99291 CRITICAL CARE FIRST HOUR: CPT

## 2019-09-03 PROCEDURE — 87040 BLOOD CULTURE FOR BACTERIA: CPT

## 2019-09-03 PROCEDURE — 93005 ELECTROCARDIOGRAM TRACING: CPT

## 2019-09-03 PROCEDURE — 83880 ASSAY OF NATRIURETIC PEPTIDE: CPT

## 2019-09-03 PROCEDURE — 97803 MED NUTRITION INDIV SUBSEQ: CPT

## 2019-09-03 PROCEDURE — 86431 RHEUMATOID FACTOR QUANT: CPT

## 2019-09-03 PROCEDURE — 85025 COMPLETE CBC W/AUTO DIFF WBC: CPT

## 2019-09-03 PROCEDURE — 83615 LACTATE (LD) (LDH) ENZYME: CPT

## 2019-09-03 PROCEDURE — 85651 RBC SED RATE NONAUTOMATED: CPT

## 2019-09-03 PROCEDURE — 84550 ASSAY OF BLOOD/URIC ACID: CPT

## 2019-09-03 PROCEDURE — 84443 ASSAY THYROID STIM HORMONE: CPT

## 2019-09-03 PROCEDURE — 83540 ASSAY OF IRON: CPT

## 2019-09-03 PROCEDURE — 84100 ASSAY OF PHOSPHORUS: CPT

## 2019-09-03 PROCEDURE — 80202 ASSAY OF VANCOMYCIN: CPT

## 2019-09-03 PROCEDURE — 81003 URINALYSIS AUTO W/O SCOPE: CPT

## 2019-09-03 PROCEDURE — 83036 HEMOGLOBIN GLYCOSYLATED A1C: CPT

## 2019-09-03 PROCEDURE — 80061 LIPID PANEL: CPT

## 2019-09-03 PROCEDURE — 85007 BL SMEAR W/DIFF WBC COUNT: CPT

## 2019-09-03 PROCEDURE — 82270 OCCULT BLOOD FECES: CPT

## 2019-09-03 PROCEDURE — 83550 IRON BINDING TEST: CPT

## 2019-09-03 PROCEDURE — 81001 URINALYSIS AUTO W/SCOPE: CPT

## 2019-09-03 PROCEDURE — 82746 ASSAY OF FOLIC ACID SERUM: CPT

## 2019-09-03 PROCEDURE — 87086 URINE CULTURE/COLONY COUNT: CPT

## 2019-09-03 PROCEDURE — 71045 X-RAY EXAM CHEST 1 VIEW: CPT

## 2019-09-03 PROCEDURE — 83735 ASSAY OF MAGNESIUM: CPT

## 2019-09-03 PROCEDURE — 85730 THROMBOPLASTIN TIME PARTIAL: CPT

## 2019-09-03 PROCEDURE — 96374 THER/PROPH/DIAG INJ IV PUSH: CPT

## 2019-09-03 PROCEDURE — 84300 ASSAY OF URINE SODIUM: CPT

## 2019-09-03 PROCEDURE — 93970 EXTREMITY STUDY: CPT

## 2019-09-03 PROCEDURE — 86140 C-REACTIVE PROTEIN: CPT

## 2019-09-03 PROCEDURE — 82728 ASSAY OF FERRITIN: CPT

## 2019-09-03 PROCEDURE — 96375 TX/PRO/DX INJ NEW DRUG ADDON: CPT

## 2019-09-03 PROCEDURE — 85610 PROTHROMBIN TIME: CPT

## 2019-09-03 PROCEDURE — 84484 ASSAY OF TROPONIN QUANT: CPT

## 2019-09-03 PROCEDURE — 80053 COMPREHEN METABOLIC PANEL: CPT

## 2019-09-03 PROCEDURE — 80048 BASIC METABOLIC PNL TOTAL CA: CPT

## 2019-09-03 PROCEDURE — 87181 SC STD AGAR DILUTION PER AGT: CPT

## 2019-09-03 PROCEDURE — 85060 BLOOD SMEAR INTERPRETATION: CPT

## 2019-09-03 PROCEDURE — 36415 COLL VENOUS BLD VENIPUNCTURE: CPT

## 2019-09-03 NOTE — NUR
ED Nurse Note:



PT WALKED IN TO ER TODAY FROM HOME. AOX4. PT C/O NONPRODUCTIVE COUGH AND NASAL 
CONGESTION X 1 WEEK AGO. PT ALSO C/O RIGHT-SIDED CHEST PAIN WITH COUGH X 2 DAYS 
AGO. PT DENIES PAIN AT REST. LUNG SOUNDS CLEAR IN ALL LOBES. NO SIGNS OF 
RESPIRATORY DISTRESS, RETRACTIONS, OR ACCESSORY MUSCLE USE NOTED. PT ABLE TO 
SPEAK IN FULL SENTENCES. RR24, O2 SAT 97% ON RA.

## 2019-09-03 NOTE — NUR
NURSE NOTES:

Received pt from [facility]S [facility]. Pt is awake and alert X4, with family at bedside. VSS, no acute 
distress with right forearm 20g IV intact and patent. No skin issued noted and the pt is 
ambulatory with assistance.

-------------------------------------------------------------------------------

Addendum: 09/04/19 at 0232 by Juancarlos Conner RN

-------------------------------------------------------------------------------

Bed at its lowest position, call light in reach, and X2 bed rails are up.

## 2019-09-03 NOTE — NUR
NURSE NOTES:

Received pt from LANA Meyers via hospital bed, in stable condition w/ no cardiopulmonary 
distress noted. Pt is AAOx4 on RA accompanied by daughter. Pt hooked to cardiac monitor and 
VS taken and recorded. Belongings list reviewed and signed w/ ER RN. RFA 20g IV noted. No 
skin alterations noted. Bed is in lowest position w/ alarm on, call light within reach, will 
hand off to PM RN.

## 2019-09-03 NOTE — EMERGENCY ROOM REPORT
History of Present Illness


General


Chief Complaint:  Dyspnea/Respdistress


Source:  Patient





Present Illness


HPI


Disclaimer: Please note that this report is being documented using DRAGON 

technology. This can lead to erroneous entry secondary to incorrect 

interpretation by the dictating instrument.





HPI: 73-year-old female with a history of rheumatoid arthritis and former 

pulmonary MAC as well as multiple pneumonias presents for evaluation of 

shortness of breath and cough.  Symptoms have been present for approximately 1 

week.  She notes a productive cough without fevers or chills.  Yesterday she 

began to experience some right upper chest wall pain with coughing as well as 

bending and twisting motions.  He denies any squeezing chest pain.  Denies 

headaches, vomiting, diarrhea, abdominal pain, dysuria, hematuria.  She does 

note some sinus congestion and sore throat.


 


PMH: Mycobacterium avium complex infection, pneumonia, RA


 


PSH: See chart


 


Allergies: Denies


 


Social Hx: Denies tobacco, drug or alcohol use


Allergies:  


Coded Allergies:  


     No Known Allergies (Unverified , 2/14/16)





Nursing Documentation-PMH


Past Medical History:  No History, Except For


Hx Cardiac Problems:  No


Hx Cancer:  No


Hx Gastrointestinal Problems:  No


Hx Neurological Problems:  Yes - OSTEOPOROSIS LOWER BACK, RA





Review of Systems


All Other Systems:  negative except mentioned in HPI





Physical Exam





Vital Signs








  Date Time  Temp Pulse Resp B/P (MAP) Pulse Ox O2 Delivery O2 Flow Rate FiO2


 


9/3/19 14:54 98.4 89 20 100/65 (77) 90 Room Air  





 





General: Awake and alert, no acute distress


HEENT: NC/AT. EOMI. 


Chest Wall: Tenderness over the sternum and chest wall bilaterally without 

deformity or crepitus.


Cardiovascular: RRR.  S1 and S2 normal.  No murmur appreciated


Resp: Slight increase in work of breathing.  Coughing during my exam.  

Bilateral crackles in all lung fields.  No wheezing.


Abdomen: Abdomen is soft, nondistended.  Nontender


Skin: Intact.  No abrasions, laceration or rash over the exposed skin


MSK: Normal tone and bulk. Moving all extremities.  No obvious deformity.


Neuro: Awake and alert.  Mentating appropriately.





Procedures


Critical Care Time


Critical Care Time


Total critical care time: Approximately 31 minutes





Due to a high probability of clinically significant, life threatening 

deterioration, the patient required the highest level of preparedness to 

intervene emergently and I personally spent this critical care time directly 

and personally managing the patient. This critical care time included obtaining 

a history, examining the patient, pulse oximetry, ordering and reviewing studies

, ordering treatments, evaluating response to treatment and updating management 

plan as needed, frequent reassessment and discussion with other providers as 

well as arranging for ultimate disposition.  This critical to care time was 

performed to assess and manage the high probability of life-threatening 

deterioration that could result in multiorgan failure.  This critical care time 

is separate from the separately billable procedures and treating other patients.





Medical Decision Making


Diagnostic Impression:  


 Primary Impression:  


 MIKA (acute kidney injury)


 Additional Impressions:  


 Hyperkalemia


 Anemia


 CHF (congestive heart failure)


ER Course


73-year-old female presents for evaluation of cough.  Differential includes but 

is not limited to pneumonia, bronchitis, URI, pneumonitis, allergic reaction, 

costochondritis.  Will obtain EKG, chest x-ray and broad metabolic and 

infectious work-up.  Patient is currently stable saturating 95% on room air.





Laboratory Tests








Test


  9/3/19


15:20 9/3/19


17:02


 


White Blood Count


  5.0 K/UL


(4.8-10.8) 


 


 


Red Blood Count


  3.04 M/UL


(4.20-5.40)  L 


 


 


Hemoglobin


  9.5 G/DL


(12.0-16.0)  L 


 


 


Hematocrit


  26.9 %


(37.0-47.0)  L 


 


 


Mean Corpuscular Volume 88 FL (80-99)   


 


Mean Corpuscular Hemoglobin


  31.1 PG


(27.0-31.0)  H 


 


 


Mean Corpuscular Hemoglobin


Concent 35.2 G/DL


(32.0-36.0) 


 


 


Red Cell Distribution Width


  11.3 %


(11.6-14.8)  L 


 


 


Platelet Count


  304 K/UL


(150-450) 


 


 


Mean Platelet Volume


  5.2 FL


(6.5-10.1)  L 


 


 


Neutrophils (%) (Auto)


  77.0 %


(45.0-75.0)  H 


 


 


Lymphocytes (%) (Auto)


  13.3 %


(20.0-45.0)  L 


 


 


Monocytes (%) (Auto)


  8.1 %


(1.0-10.0) 


 


 


Eosinophils (%) (Auto)


  0.6 %


(0.0-3.0) 


 


 


Basophils (%) (Auto)


  1.0 %


(0.0-2.0) 


 


 


Sodium Level


  139 MMOL/L


(136-145) 


 


 


Potassium Level


  5.9 MMOL/L


(3.5-5.1)  H 


 


 


Chloride Level


  107 MMOL/L


() 


 


 


Carbon Dioxide Level


  23 MMOL/L


(21-32) 


 


 


Anion Gap


  9 mmol/L


(5-15) 


 


 


Blood Urea Nitrogen


  49 mg/dL


(7-18)  H 


 


 


Creatinine


  2.1 MG/DL


(0.55-1.30)  H 


 


 


Estimate Glomerular


Filtration Rate  mL/min (>60)  


  


 


 


Glucose Level


  112 MG/DL


()  H 


 


 


Calcium Level


  8.6 MG/DL


(8.5-10.1) 


 


 


Total Bilirubin


  0.4 MG/DL


(0.2-1.0) 


 


 


Aspartate Amino Transferase


(AST) 23 U/L (15-37)


  


 


 


Alanine Aminotransferase (ALT)


  12 U/L (12-78)


  


 


 


Alkaline Phosphatase


  79 U/L


() 


 


 


Troponin I


  0.000 ng/mL


(0.000-0.056) 


 


 


Pro-B-Type Natriuretic Peptide


  1738 pg/mL


(0-125)  H 


 


 


Total Protein


  8.4 G/DL


(6.4-8.2)  H 


 


 


Albumin


  2.7 G/DL


(3.4-5.0)  L 


 


 


Globulin 5.7 g/dL   


 


Albumin/Globulin Ratio


  0.5 (1.0-2.7)


L 


 


 


Urine Color  Pale yellow  


 


Urine Appearance  Clear  


 


Urine pH  6 (4.5-8.0)  


 


Urine Specific Gravity


  


  1.010


(1.005-1.035)


 


Urine Protein


  


  3+ (NEGATIVE)


H


 


Urine Glucose (UA)


  


  Negative


(NEGATIVE)


 


Urine Ketones


  


  Negative


(NEGATIVE)


 


Urine Blood


  


  5+ (NEGATIVE)


H


 


Urine Nitrite


  


  Negative


(NEGATIVE)


 


Urine Bilirubin


  


  Negative


(NEGATIVE)


 


Urine Urobilinogen


  


  Normal MG/DL


(0.0-1.0)


 


Urine Leukocyte Esterase


  


  1+ (NEGATIVE)


H


 


Urine RBC


  


  Tntc /HPF (0 -


2)  H


 


Urine WBC


  


  2-4 /HPF (0 -


2)


 


Urine Squamous Epithelial


Cells 


  None /LPF


(NONE/OCC)


 


Urine Bacteria


  


  Few /HPF


(NONE)








EKG Diagnostic Results


EKG Time:  18:57


Rate:  normal


Rhythm:  NSR


Other Impression


Sinus rhythm, normal axis, normal intervals.  Hyperacute T waves in the 

precordial and inferior leads.  No acute ST changes.





Rhythm Strip Diag. Results


Rhythm Strip Time:  18:57


EP Interpretation:  yes


Rate:  80s


Rhythm:  NSR, no PVC's, no ectopy





Chest X-Ray Diagnostic Results


Chest X-Ray Diagnostic Results :  


   Chest X-Ray Ordered:  Yes


   # of Views/Limited/Complete:  1 View


   Indication:  Shortness of Breath


   EP Interpretation:  Yes


   Interpretation:  no consolidation, no pneumothorax, other - Bilateral 

congestion, no infiltrate, no pneumothorax


   Impression:  Other - Bilateral vascular congestion


Reevaluation Time:  16:23





Last Vital Signs








  Date Time  Temp Pulse Resp B/P (MAP) Pulse Ox O2 Delivery O2 Flow Rate FiO2


 


9/3/19 15:07 98.2 74 24 102/70 97 Room Air  








Status:  unchanged


Reevaluation Impression


EKG shows hyperacute T waves and labs confirm hyperkalemia.  The patient was 

treated with IV calcium, insulin and dextrose.  Chest x-ray shows bilateral 

vascular congestion without infiltrate or pneumothorax.  Peptide is elevated 

consistent with CHF.  Did not give diuretics given the decreased kidney 

function.  Patient was admitted to telemetry for further treatment of 

hyperkalemia, MIKA, CHF.


Disposition:  ADMITTED AS INPATIENT


Condition:  Serious











Ga Mccord MD Sep 3, 2019 15:14

## 2019-09-03 NOTE — NUR
ED Nurse Note:



SDU CALLED FOR PT REPORT. REPORT GIVEN TO LANA PINTO. PT TAKEN UP TO SDU VIA 
GURNEY ON CARDIAC MONITOR WITH ALL BELONGINGS ACCOMPANIED BY PRIMARY RN AND 
EMT.

## 2019-09-04 VITALS — DIASTOLIC BLOOD PRESSURE: 56 MMHG | SYSTOLIC BLOOD PRESSURE: 101 MMHG

## 2019-09-04 VITALS — SYSTOLIC BLOOD PRESSURE: 111 MMHG | DIASTOLIC BLOOD PRESSURE: 57 MMHG

## 2019-09-04 VITALS — DIASTOLIC BLOOD PRESSURE: 56 MMHG | SYSTOLIC BLOOD PRESSURE: 102 MMHG

## 2019-09-04 VITALS — DIASTOLIC BLOOD PRESSURE: 65 MMHG | SYSTOLIC BLOOD PRESSURE: 122 MMHG

## 2019-09-04 VITALS — DIASTOLIC BLOOD PRESSURE: 59 MMHG | SYSTOLIC BLOOD PRESSURE: 102 MMHG

## 2019-09-04 VITALS — DIASTOLIC BLOOD PRESSURE: 64 MMHG | SYSTOLIC BLOOD PRESSURE: 117 MMHG

## 2019-09-04 LAB
% IRON SATURATION: 23 % (ref 15–50)
ADD MANUAL DIFF: NO
ALBUMIN SERPL-MCNC: 2.3 G/DL (ref 3.4–5)
ALBUMIN/GLOB SERPL: 0.4 {RATIO} (ref 1–2.7)
ALP SERPL-CCNC: 69 U/L (ref 46–116)
ALT SERPL-CCNC: 9 U/L (ref 12–78)
ANION GAP SERPL CALC-SCNC: 10 MMOL/L (ref 5–15)
APTT BLD: 29 SEC (ref 23–33)
AST SERPL-CCNC: 21 U/L (ref 15–37)
BASOPHILS NFR BLD AUTO: 0.5 % (ref 0–2)
BILIRUB SERPL-MCNC: 0.4 MG/DL (ref 0.2–1)
BUN SERPL-MCNC: 48 MG/DL (ref 7–18)
CALCIUM SERPL-MCNC: 8.8 MG/DL (ref 8.5–10.1)
CHLORIDE SERPL-SCNC: 108 MMOL/L (ref 98–107)
CHOLEST SERPL-MCNC: 95 MG/DL (ref ?–200)
CO2 SERPL-SCNC: 22 MMOL/L (ref 21–32)
CREAT SERPL-MCNC: 1.9 MG/DL (ref 0.55–1.3)
EOSINOPHIL NFR BLD AUTO: 0.6 % (ref 0–3)
ERYTHROCYTE [DISTWIDTH] IN BLOOD BY AUTOMATED COUNT: 12 % (ref 11.6–14.8)
GLOBULIN SER-MCNC: 5.5 G/DL
HCT VFR BLD CALC: 29.5 % (ref 37–47)
HDLC SERPL-MCNC: 28 MG/DL (ref 40–60)
HGB BLD-MCNC: 9.8 G/DL (ref 12–16)
INR PPP: 1 (ref 0.9–1.1)
IRON SERPL-MCNC: 40 UG/DL (ref 50–175)
LDH SERPL L TO P-CCNC: 161 U/L (ref 81–234)
LYMPHOCYTES NFR BLD AUTO: 19.8 % (ref 20–45)
MCV RBC AUTO: 93 FL (ref 80–99)
MONOCYTES NFR BLD AUTO: 7.9 % (ref 1–10)
NEUTROPHILS NFR BLD AUTO: 71.2 % (ref 45–75)
PLATELET # BLD: 315 K/UL (ref 150–450)
POTASSIUM SERPL-SCNC: 5.4 MMOL/L (ref 3.5–5.1)
RBC # BLD AUTO: 3.18 M/UL (ref 4.2–5.4)
SODIUM SERPL-SCNC: 140 MMOL/L (ref 136–145)
TIBC SERPL-MCNC: 174 UG/DL (ref 250–450)
TRIGL SERPL-MCNC: 66 MG/DL (ref 30–150)
UNSATURATED IRON BINDING: 134 UG/DL (ref 112–346)
WBC # BLD AUTO: 4.3 K/UL (ref 4.8–10.8)

## 2019-09-04 NOTE — NUR
NURSE NOTES:

Pt report received from Lulu SANTORO RN RICHARD. pt is alert and oriented times 4, able to follow 
commands. pt appears to be resting comfortable in bed with no distress noted. pt cardiac 
monitor showing NSR with no abnormalities noted. pt is on room air, able to sat at 99%, no 
distress noted. bed low, locked, armed, bed rails up times 3, call light within reach. will 
continue plan of care.

## 2019-09-04 NOTE — NUR
RD ASSESSMENT & RECOMMENDATIONS

SEE CARE ACTIVITY FOR COMPLETE ASSESSMENT



DAILY ESTIMATED NEEDS:

Needs based on Underweight/ 42kg 

30-35  kcals/kg 

3669-0008  total kcals

1-1.2  g protein/kg

42-50  g total protein 

25-30  mL/kg

2808-2039  total fluid mLs



NUTRITION DIAGNOSIS:

(1) Increased kcal/pro needs R/T underweight status as evidenced by

pt @ 93% IBW, underweight BMI per guidelines.

(2) Altered nutrition related lab values r/t acute kidney injury as

evidenced by elev K (5.4) w/ elev BUN and creat.



CURRENT DIET:REGULAR    





PO DIET RECOMMENDATIONS:

LOW NA DIET (texture as tolerated) 





ADDITIONAL RECOMMENDATIONS:

* Calibrated bedscale wt, weekly weights given underweight status 

* Monitor PO intake closely-> noted good intake 

    need for snacks or supplement 

.* Trend K, need for Low K diet

## 2019-09-04 NOTE — CONSULTATION
History of Present Illness


General


Date patient seen:  Sep 4, 2019


Chief Complaint:  Dyspnea/Respdistress





Present Illness


HPI


 73-year-old female with a hx of RA,  pulmonary MAC, emphysema presented to ER 

with CC of  of shortness of breath and cough for approximately 1 week.  She 

notes a productive cough without fevers or chills.  Yesterday she began to 

experience some right upper chest wall pain with coughing as well as bending 

and twisting motions.  She was diagnosed to have purulent bronchitis and 

hyperkalemia and admitted for further management.


Allergies:  


Coded Allergies:  


     No Known Allergies (Unverified , 2/14/16)





Medication History


Scheduled


Alendronate Sodium* (Fosamax*), Unknown Dose ORAL DAILY, (Reported)


Aspirin* (Aspir 81*), 81 MG ORAL DAILY, (Reported)


Methylprednisolone (Methylprednisolone*), 4 MG ORAL AS DIRECTED, (Reported)





Miscellaneous Medications


Calcium Carbonate (Calcium), 500 MG PO, (Reported)


Hydroxychloroquine Sulfate (Hydroxychloroquine Sulfate), 200 MG PO, (Reported)


Ibuprofen* (Advil*), Unknown Dose ORAL, (Reported)





Patient History


Healthcare decision maker


N


Resuscitation status


Full Code


Advanced Directive on File








Past Medical/Surgical History


Past Medical/Surgical History:  


(1) LESLY (mycobacterium avium-intracellulare)


(2) Emphysema lung


(3) Severe protein-calorie malnutrition


(4) Costochondritis





Review of Systems


All Other Systems:  negative except mentioned in HPI





Physical Exam


General Appearance:  cachetic, thin


Lines, tubes and drains:  peripheral


HEENT:  normocephalic, atraumatic


Neck:  non-tender, normal alignment, normal inspection, abnormal alignment


Respiratory/Chest:  chest wall non-tender, normal breath sounds


Breasts:  no masses


Cardiovascular/Chest:  normal peripheral pulses


Genitourinary/Rectal:  normal genital exam, normal rectal exam


Extremities:  normal range of motion, non-tender, normal inspection


Neurologic:  CNs II-XII grossly normal





Last 24 Hour Vital Signs








  Date Time  Temp Pulse Resp B/P (MAP) Pulse Ox O2 Delivery O2 Flow Rate FiO2


 


9/4/19 09:00      Room Air  


 


9/4/19 08:00  88      


 


9/4/19 08:00 98.3 63 18 101/56 (71) 94   


 


9/4/19 04:00 97.6 63 18 117/64 (81) 95   


 


9/4/19 03:32  92      


 


9/4/19 00:00 97.7 63 18 111/57 (75) 95   


 


9/3/19 23:34  64      


 


9/3/19 21:00      Room Air  


 


9/3/19 19:41      Room Air  


 


9/3/19 19:31  65      


 


9/3/19 19:17  74      


 


9/3/19 19:17  74      


 


9/3/19 19:05 98.6 87 18 124/70 (88) 97   


 


9/3/19 18:45 98.3 108 27 118/64 97 Room Air  


 


9/3/19 18:34 98.4 112 24 116/63 98 Room Air  


 


9/3/19 15:07 98.2 74 24 102/70 97 Room Air  


 


9/3/19 15:07  74 24   Room Air  


 


9/3/19 14:54 98.4 89 20 100/65 (77) 90 Room Air  











Laboratory Tests








Test


  9/3/19


15:20 9/3/19


17:02 9/4/19


03:30


 


White Blood Count


  5.0 K/UL


(4.8-10.8) 


  4.3 K/UL


(4.8-10.8)  L


 


Red Blood Count


  3.04 M/UL


(4.20-5.40)  L 


  3.18 M/UL


(4.20-5.40)  L


 


Hemoglobin


  9.5 G/DL


(12.0-16.0)  L 


  9.8 G/DL


(12.0-16.0)  L


 


Hematocrit


  26.9 %


(37.0-47.0)  L 


  29.5 %


(37.0-47.0)  L


 


Mean Corpuscular Volume 88 FL (80-99)    93 FL (80-99)  


 


Mean Corpuscular Hemoglobin


  31.1 PG


(27.0-31.0)  H 


  30.8 PG


(27.0-31.0)


 


Mean Corpuscular Hemoglobin


Concent 35.2 G/DL


(32.0-36.0) 


  33.2 G/DL


(32.0-36.0)


 


Red Cell Distribution Width


  11.3 %


(11.6-14.8)  L 


  12.0 %


(11.6-14.8)


 


Platelet Count


  304 K/UL


(150-450) 


  315 K/UL


(150-450)


 


Mean Platelet Volume


  5.2 FL


(6.5-10.1)  L 


  5.3 FL


(6.5-10.1)  L


 


Neutrophils (%) (Auto)


  77.0 %


(45.0-75.0)  H 


  71.2 %


(45.0-75.0)


 


Lymphocytes (%) (Auto)


  13.3 %


(20.0-45.0)  L 


  19.8 %


(20.0-45.0)  L


 


Monocytes (%) (Auto)


  8.1 %


(1.0-10.0) 


  7.9 %


(1.0-10.0)


 


Eosinophils (%) (Auto)


  0.6 %


(0.0-3.0) 


  0.6 %


(0.0-3.0)


 


Basophils (%) (Auto)


  1.0 %


(0.0-2.0) 


  0.5 %


(0.0-2.0)


 


Sodium Level


  139 MMOL/L


(136-145) 


  140 MMOL/L


(136-145)


 


Potassium Level


  5.9 MMOL/L


(3.5-5.1)  H 


  5.4 MMOL/L


(3.5-5.1)  H


 


Chloride Level


  107 MMOL/L


() 


  108 MMOL/L


()  H


 


Carbon Dioxide Level


  23 MMOL/L


(21-32) 


  22 MMOL/L


(21-32)


 


Anion Gap


  9 mmol/L


(5-15) 


  10 mmol/L


(5-15)


 


Blood Urea Nitrogen


  49 mg/dL


(7-18)  H 


  48 mg/dL


(7-18)  H


 


Creatinine


  2.1 MG/DL


(0.55-1.30)  H 


  1.9 MG/DL


(0.55-1.30)  H


 


Estimat Glomerular Filtration


Rate  mL/min (>60)  


  


   mL/min (>60)  


 


 


Glucose Level


  112 MG/DL


()  H 


  87 MG/DL


()


 


Calcium Level


  8.6 MG/DL


(8.5-10.1) 


  8.8 MG/DL


(8.5-10.1)


 


Total Bilirubin


  0.4 MG/DL


(0.2-1.0) 


  0.4 MG/DL


(0.2-1.0)


 


Aspartate Amino Transf


(AST/SGOT) 23 U/L (15-37)


  


  21 U/L (15-37)


 


 


Alanine Aminotransferase


(ALT/SGPT) 12 U/L (12-78)


  


  9 U/L (12-78)


L


 


Alkaline Phosphatase


  79 U/L


() 


  69 U/L


()


 


Troponin I


  0.000 ng/mL


(0.000-0.056) 


  


 


 


Pro-B-Type Natriuretic Peptide


  1738 pg/mL


(0-125)  H 


  


 


 


Total Protein


  8.4 G/DL


(6.4-8.2)  H 


  7.8 G/DL


(6.4-8.2)


 


Albumin


  2.7 G/DL


(3.4-5.0)  L 


  2.3 G/DL


(3.4-5.0)  L


 


Globulin 5.7 g/dL    5.5 g/dL  


 


Albumin/Globulin Ratio


  0.5 (1.0-2.7)


L 


  0.4 (1.0-2.7)


L


 


Urine Color  Pale yellow   


 


Urine Appearance  Clear   


 


Urine pH  6 (4.5-8.0)   


 


Urine Specific Gravity


  


  1.010


(1.005-1.035) 


 


 


Urine Protein


  


  3+ (NEGATIVE)


H 


 


 


Urine Glucose (UA)


  


  Negative


(NEGATIVE) 


 


 


Urine Ketones


  


  Negative


(NEGATIVE) 


 


 


Urine Blood


  


  5+ (NEGATIVE)


H 


 


 


Urine Nitrite


  


  Negative


(NEGATIVE) 


 


 


Urine Bilirubin


  


  Negative


(NEGATIVE) 


 


 


Urine Urobilinogen


  


  Normal MG/DL


(0.0-1.0) 


 


 


Urine Leukocyte Esterase


  


  1+ (NEGATIVE)


H 


 


 


Urine RBC


  


  Tntc /HPF (0 -


2)  H 


 


 


Urine WBC


  


  2-4 /HPF (0 -


2) 


 


 


Urine Squamous Epithelial


Cells 


  None /LPF


(NONE/OCC) 


 


 


Urine Bacteria


  


  Few /HPF


(NONE) 


 


 


Triglycerides Level


  


  


  66 MG/DL


()


 


Cholesterol Level


  


  


  95 MG/DL (<


200)


 


LDL Cholesterol


  


  


  57 mg/dL


(<100)


 


HDL Cholesterol


  


  


  28 MG/DL


(40-60)  L


 


Cholesterol/HDL Ratio   3.4 (3.3-4.4)  


 


Thyroid Stimulating Hormone


(TSH) 


  


  1.775 uiU/mL


(0.358-3.740)








Height (Feet):  5


Height (Inches):  1.00


Weight (Pounds):  92


Medications





Current Medications








 Medications


  (Trade)  Dose


 Ordered  Sig/Naman


 Route


 PRN Reason  Start Time


 Stop Time Status Last Admin


Dose Admin


 


 Acetaminophen


  (Tylenol)  650 mg  Q4H  PRN


 ORAL


 T>100.5  9/3/19 20:30


 10/3/19 20:29   


 


 


 Ceftriaxone


 Sodium 1 gm/


 Dextrose  55 ml @ 


 110 mls/hr  Q24H


 IVPB


   9/4/19 11:00


 9/11/19 10:59   


 


 


 Dextrose


  (Dextrose 50%)  25 ml  Q30M  PRN


 IV


 Hypoglycemia  9/3/19 20:45


 10/3/19 20:35   


 


 


 Dextrose


  (Dextrose 50%)  50 ml  Q30M  PRN


 IV


 hypoglycemia  9/3/19 20:45


 10/3/19 20:44   


 


 


 Hydroxychloroquine


 Sulfate


  (Plaquenil)  200 mg  DAILY


 ORAL


   9/4/19 09:00


 10/4/19 08:59  9/4/19 08:26


 


 


 Lorazepam


  (Ativan 2mg/ml


 1ml)  0.5 mg  Q4H  PRN


 IV


 For Anxiety  9/3/19 20:30


 9/10/19 20:29   


 


 


 Morphine Sulfate


  (Morphine


 Sulfate)  1 mg  Q4H  PRN


 IVP


 PAIN 4-10  9/3/19 20:30


 9/10/19 20:29   


 


 


 Ondansetron HCl


  (Zofran)  4 mg  Q6H  PRN


 IVP


 Nausea & Vomiting  9/3/19 20:30


 10/3/19 20:29   


 


 


 Polyethylene


 Glycol


  (Miralax)  17 gm  HSPRN  PRN


 ORAL


 Constipation  9/3/19 20:30


 10/3/19 20:29   


 


 


 Zolpidem Tartrate


  (Ambien)  5 mg  HSPRN  PRN


 ORAL


 Insomnia  9/3/19 21:00


 9/10/19 20:59   


 











Assessment/Plan


Problem List:  


(1) Purulent bronchitis


ICD Codes:  J41.1 - Mucopurulent chronic bronchitis


SNOMED:  85547297


(2) Emphysema lung


ICD Codes:  J43.9 - Emphysema, unspecified


SNOMED:  47054259


(3) Costochondritis


ICD Codes:  M94.0 - Chondrocostal junction syndrome [[place]ietze]


SNOMED:  81629318


(4) Severe protein-calorie malnutrition


ICD Codes:  E43 - Unspecified severe protein-calorie malnutrition


SNOMED:  097586856, 544817494, 094386774


Assessment/Plan:


check sputum


iv abx


respiratory treatment


antitussives


titrate fio2 to sat of 92%











Geovany Mustafa MD Sep 4, 2019 10:54

## 2019-09-04 NOTE — HISTORY AND PHYSICAL REPORT
DATE OF ADMISSION:  09/03/2019

DATE AND TIME SEEN:  09/04/2019 at 10:00 a.m.



CONSULTANTS:

1. Geovany Mustafa M.D.

2. Demar Mayo M.D.



CHIEF COMPLAINT:  Cough, shortness of breath.



BRIEF HISTORY:  This 73-year-old female, who lives at home, complaint of

cough and shortness of breath x1 week getting worse, came to [place],

diagnosed as above, admitted to [facility] for further care.  Currently,

_____, slightly coughing, no complaint.



REVIEW OF SYSTEMS:  No chest pain.  Slight short of breath.  No nausea,

vomiting, or diarrhea.



PAST MEDICAL HISTORY:  CHF, MIKA.



PAST SURGICAL HISTORY:  None.



ALLERGIES:  Denies.



MEDICATIONS:  Include hydrocodone, zolpidem, Tylenol, morphine,

polyethylene glycol, Zofran, lorazepam, insulin.



SOCIAL HISTORY:  No smoking.  No alcohol.  No intravenous drug

abuse.



FAMILY HISTORY:  Noncontributory.



PHYSICAL EXAMINATION:

GENERAL:  Calm in bed, slight coughing, oriented x3, in no acute

distress.

VITAL SIGNS:  Show temperature is 98 degrees, pulse 63, respirations 18,

blood pressure 101/56.

CARDIOVASCULAR:  No murmur.

LUNGS:  Distant and clear.

ABDOMEN:  Bowel sounds positive.

EXTREMITIES:  No cyanosis, clubbing, or edema.

NEUROLOGIC:  The patient moves all extremities, slightly weak.



LABORATORY AND DIAGNOSTIC DATA:  Labs at this time show white count 4.3, H

and H 9.0 and 29, otherwise CBC is normal.  BMP shows potassium 5.4,

chloride 108, BUN/creatinine 40/1.9.  Otherwise glucose is 87.  Urinalysis

show 1+ leukocyte esterase.



ASSESSMENT:

1. Cough.

2. Shortness of breath.

3. Hyperkalemia.

4. Anemia.

5. CHF.

6. MIKA.

7. Urinary tract infection.



PLAN:

1. O2 and pulmonary treatment.

2. Antibiotics will be ordered.

3. Blood pressure control.

4. Dietary followup.

5. CBC, BMP in the morning.









  ______________________________________________

  Flaco Stephens D.O. DR:  KRISTEN

D:  09/04/2019 10:26

T:  09/04/2019 19:20

JOB#:  [id number]

CC:

## 2019-09-04 NOTE — NUR
NURSE NOTES:

Pt bed scale is malfunctioning. Charge nurse is aware. No weekly weight can be obtained 
until bed is fixed.

## 2019-09-04 NOTE — NUR
NURSE NOTES:

called and left a message to MD Mustafa in regards to pt lab values, such as Potassium 5.4, 
WBC 4.3, BUN 48, and creat 1.9. awaiting new orders.

## 2019-09-04 NOTE — NUR
NURSE NOTES:

Report received from Nick SCHWARTZ.Pt awake,alert sitting up on bed eating breakfast,noted 
no resp distress denies any Sob or coughing during the night ,no c./o chest pain,SR on the 
monitor,skin warm and dry ,IV sitesx2,RFA/LFA  both intact,SR up x2 HOB elevated,bed lock in 
lowest position,will continue with plans of care.

## 2019-09-04 NOTE — NUR
NURSE NOTES:

Messaged MD Granado in regards to pt lab values, such as Potassium 5.4, WBC 4.3, BUN 48, and 
creat 1.9. awaiting new orders.

## 2019-09-05 VITALS — DIASTOLIC BLOOD PRESSURE: 65 MMHG | SYSTOLIC BLOOD PRESSURE: 109 MMHG

## 2019-09-05 VITALS — DIASTOLIC BLOOD PRESSURE: 69 MMHG | SYSTOLIC BLOOD PRESSURE: 112 MMHG

## 2019-09-05 VITALS — DIASTOLIC BLOOD PRESSURE: 60 MMHG | SYSTOLIC BLOOD PRESSURE: 102 MMHG

## 2019-09-05 VITALS — SYSTOLIC BLOOD PRESSURE: 112 MMHG | DIASTOLIC BLOOD PRESSURE: 67 MMHG

## 2019-09-05 VITALS — DIASTOLIC BLOOD PRESSURE: 61 MMHG | SYSTOLIC BLOOD PRESSURE: 107 MMHG

## 2019-09-05 VITALS — SYSTOLIC BLOOD PRESSURE: 107 MMHG | DIASTOLIC BLOOD PRESSURE: 62 MMHG

## 2019-09-05 VITALS — DIASTOLIC BLOOD PRESSURE: 68 MMHG | SYSTOLIC BLOOD PRESSURE: 115 MMHG

## 2019-09-05 LAB
ADD MANUAL DIFF: NO
ANION GAP SERPL CALC-SCNC: 9 MMOL/L (ref 5–15)
APPEARANCE UR: CLEAR
APTT PPP: (no result) S
BASOPHILS NFR BLD AUTO: 0.6 % (ref 0–2)
BUN SERPL-MCNC: 40 MG/DL (ref 7–18)
CALCIUM SERPL-MCNC: 8.8 MG/DL (ref 8.5–10.1)
CHLORIDE SERPL-SCNC: 108 MMOL/L (ref 98–107)
CO2 SERPL-SCNC: 23 MMOL/L (ref 21–32)
CREAT SERPL-MCNC: 1.8 MG/DL (ref 0.55–1.3)
EOSINOPHIL NFR BLD AUTO: 0.8 % (ref 0–3)
ERYTHROCYTE [DISTWIDTH] IN BLOOD BY AUTOMATED COUNT: 11.8 % (ref 11.6–14.8)
GLUCOSE UR STRIP-MCNC: NEGATIVE MG/DL
HCT VFR BLD CALC: 30.7 % (ref 37–47)
HGB BLD-MCNC: 10.2 G/DL (ref 12–16)
KETONES UR QL STRIP: NEGATIVE
LEUKOCYTE ESTERASE UR QL STRIP: (no result)
LYMPHOCYTES NFR BLD AUTO: 18.6 % (ref 20–45)
MCV RBC AUTO: 93 FL (ref 80–99)
MONOCYTES NFR BLD AUTO: 9.8 % (ref 1–10)
NEUTROPHILS NFR BLD AUTO: 70.1 % (ref 45–75)
NITRITE UR QL STRIP: NEGATIVE
PH UR STRIP: 7 [PH] (ref 4.5–8)
PLATELET # BLD: 318 K/UL (ref 150–450)
POTASSIUM SERPL-SCNC: 4.9 MMOL/L (ref 3.5–5.1)
PROT UR QL STRIP: (no result)
RBC # BLD AUTO: 3.31 M/UL (ref 4.2–5.4)
SODIUM SERPL-SCNC: 140 MMOL/L (ref 136–145)
SP GR UR STRIP: 1 (ref 1–1.03)
UROBILINOGEN UR-MCNC: NORMAL MG/DL (ref 0–1)
WBC # BLD AUTO: 5.3 K/UL (ref 4.8–10.8)

## 2019-09-05 RX ADMIN — DEXTROSE AND SODIUM CHLORIDE SCH MLS/HR: 5; .45 INJECTION, SOLUTION INTRAVENOUS at 15:18

## 2019-09-05 RX ADMIN — DEXTROSE AND SODIUM CHLORIDE SCH MLS/HR: 5; .45 INJECTION, SOLUTION INTRAVENOUS at 14:30

## 2019-09-05 RX ADMIN — DOCUSATE SODIUM SCH MG: 100 CAPSULE, LIQUID FILLED ORAL at 17:50

## 2019-09-05 NOTE — NUR
HAND-OFF: 

Report given to Alistair SCHWARTZ Tele. pt remains in stable condition. pt transfered with all 
belongings.

## 2019-09-05 NOTE — NUR
NURSE NOTES:

Received report from LANA Sainz. Pt is laying in bed sleeping. No distress noted. Bed is in 
lowest position, side rails up X2, and call light is within reach. Will continue to monitor.

## 2019-09-05 NOTE — NUR
PT EVALUATION NOTE

Patient seen for initial evaluation, see complete evaluation for details. Patient is 
independent/supervised with all functional mobility without an assistive device. Skilled 
inpatient PT intervention not indicated, patient discharged from PT. Patient is cleared to 
ambulate with nursing supervision, Lupe SCHWARTZ notified. 


-------------------------------------------------------------------------------

Addendum: 09/05/19 at 1246 by MELISSA FERNÁNDEZ PT

-------------------------------------------------------------------------------

Amended: Links added.

## 2019-09-05 NOTE — CONSULTATION
Consult Note


Consult Note





Dr Stephens asked me to eval this patient for elevated SCr and high K





patient Tamazight speaker


examined


data reviewed








ER:





HPI: 73-year-old female with a history of rheumatoid arthritis and former 

pulmonary MAC as well as multiple pneumonias presents for evaluation of 

shortness of breath and cough.  Symptoms have been present for approximately 1 

week.  She notes a productive cough without fevers or chills.  Yesterday she 

began to experience some right upper chest wall pain with coughing as well as 

bending and twisting motions.  He denies any squeezing chest pain.  Denies 

headaches, vomiting, diarrhea, abdominal pain, dysuria, hematuria.  She does 

note some sinus congestion and sore throat.


 


PMH: Mycobacterium avium complex infection, pneumonia, RA


 


Allergies: Denies








Past Medical History:  No History, Except For





Hx Neurological Problems:  Yes - OSTEOPOROSIS LOWER BACK, RA


Assessment/Plan





Patient presents with respiratory Sxs: Bronchitis, 


h/o Emphysema





Renal failure Cr 2.1 down to 1.8 , still up for her body mass


High K resolving


Sever malnutrition - HypoAlbuminemia


dehydration


h/o RA








Hydrate


gastric support


monitor renal parameters


urine studies


urine c/s











Demar Mayo MD Sep 5, 2019 10:22

## 2019-09-05 NOTE — DIAGNOSTIC IMAGING REPORT
--------------- APPROVED REPORT --------------





CPT Code: 17026



Present Symptoms

Comments: Back Pain





BILATERAL: Imaging reveals a patent deep venous system bilaterally. There is no evidence 

of thrombus within the common femoral, superficial femoral, popliteal or tibial segments. 

The greater saphenous veins are within normal limits. Doppler indicates normal 

spontaneous flow within these segments.

## 2019-09-05 NOTE — CARDIOLOGY REPORT
--------------- APPROVED REPORT --------------





EKG Measurement

Heart [id number]

FL 130P66

[id number]

[id number]

[id number]





Normal sinus rhythm

Normal ECG

## 2019-09-05 NOTE — NUR
TRANSFER TO FLOOR:

Patient transferred to 3E, per Dr. Mustafa.   Report given to Kathrin Bush and 
cierra for. Pt stable at time of transfer

## 2019-09-05 NOTE — GENERAL PROGRESS NOTE
Assessment/Plan


Problem List:  


(1) SOB (shortness of breath)


ICD Codes:  R06.02 - Shortness of breath


SNOMED:  622367963


(2) Cough


ICD Codes:  R05 - Cough


SNOMED:  63078758


(3) Anemia


ICD Codes:  D64.9 - Anemia, unspecified


SNOMED:  223478366


(4) CHF (congestive heart failure)


ICD Codes:  I50.9 - Heart failure, unspecified


SNOMED:  91911739


(5) MIKA (acute kidney injury)


ICD Codes:  N17.9 - Acute kidney failure, unspecified


SNOMED:  9651805, 00558623


Status:  stable, progressing


Assessment/Plan:


o2 pulm tx abx pt diet cbc bmp am





Subjective


Constitutional:  Reports: weakness


Allergies:  


Coded Allergies:  


     No Known Allergies (Unverified , 2/14/16)


All Systems:  reviewed and negative except above


Subjective


sl cough in bed





Objective





Last 24 Hour Vital Signs








  Date Time  Temp Pulse Resp B/P (MAP) Pulse Ox O2 Delivery O2 Flow Rate FiO2


 


9/5/19 12:00 97.8 70 21 115/68 (84) 95   


 


9/5/19 12:00  67      


 


9/5/19 09:00      Room Air  


 


9/5/19 08:00 97.9 71 21 107/61 (76) 92   


 


9/5/19 08:00  65      


 


9/5/19 04:00 98.5 68 18 107/62 (77) 99   


 


9/5/19 03:24  63      


 


9/5/19 00:00 98.7 71 18 112/67 (82) 96   


 


9/5/19 00:00  66      


 


9/4/19 21:00      Room Air  


 


9/4/19 20:00  79      


 


9/4/19 20:00 98.5 69 18 122/65 (84) 94   


 


9/4/19 16:00 98.8 74 18 102/56 (71) 94   


 


9/4/19 16:00  76      

















Intake and Output  


 


 9/4/19 9/5/19





 19:00 07:00


 


Intake Total 600 ml 


 


Balance 600 ml 


 


  


 


Intake Oral 600 ml 


 


# Voids 3 


 


# Bowel Movements 2 








Laboratory Tests


9/5/19 04:35: 


White Blood Count 5.3, Red Blood Count 3.31L, Hemoglobin 10.2L, Hematocrit 30.7L

, Mean Corpuscular Volume 93, Mean Corpuscular Hemoglobin 30.7, Mean 

Corpuscular Hemoglobin Concent 33.1, Red Cell Distribution Width 11.8, Platelet 

Count 318, Mean Platelet Volume 5.4L, Neutrophils (%) (Auto) 70.1, Lymphocytes (

%) (Auto) 18.6L, Monocytes (%) (Auto) 9.8, Eosinophils (%) (Auto) 0.8, 

Basophils (%) (Auto) 0.6, Sodium Level 140, Potassium Level 4.9, Chloride Level 

108H, Carbon Dioxide Level 23, Anion Gap 9, Blood Urea Nitrogen 40H, Creatinine 

1.8H, Estimat Glomerular Filtration Rate , Glucose Level 101, Calcium Level 8.8

, Ferritin 116


Height (Feet):  5


Height (Inches):  1.00


Weight (Pounds):  92


General Appearance:  lethargic


EENT:  normal ENT inspection


Neck:  normal alignment


Cardiovascular:  normal peripheral pulses, normal rate, regular rhythm


Respiratory/Chest:  chest wall non-tender, lungs clear, normal breath sounds


Abdomen:  normal bowel sounds, non tender, soft


Extremities:  normal inspection


Edema:  no edema noted Arm (L), no edema noted Arm (R), no edema noted Leg (L), 

no edema noted Leg (R), no edema noted Pedal (L), no edema noted Pedal (R), no 

edema noted Generalized


Neurologic:  responsive, motor weakness


Skin:  normal pigmentation, warm/dry











Flaco Stephens DO Sep 5, 2019 14:15

## 2019-09-05 NOTE — NUR
CASE MANAGEMENT: INITIAL REVIEW 



74 YO M PRESENTED FROM HOME 



CC: DYSPNEA. 



PMHx: RA. PNA.



SI:MIKA. HYPERKALEMIA. 

T 98.4 HR 89 RR 20 B/P 100/65 SATS 90% ON RA 

K 5.9 BUN 49 CR 2.1  BNP 1738 



IS: TORADOL IV X1 



****PATIENT ADMITTED TO TELE 9/3/2019 @ 5968****



DCP: PATIENT TO BE DISCHARGED TO HOME ONCE MEDICALLY CLEARED. 



PLAN OF CARE: 

SPUTUM CX 

IV ANTIBx 


-------------------------------------------------------------------------------

Addendum: 09/05/19 at 1112 by Merissa Chery CM

-------------------------------------------------------------------------------

INTERQUAL MET

## 2019-09-05 NOTE — NUR
NURSE NOTES:



REC'D FROM 2E A 73 YR OLD FEMALE WITH DX OF ACUTE KIDNEY INJURY. AWAKE/ALERT. Tamazight 
SPEAKING. NO C/O PAIN. IN NO DISTRESS.

## 2019-09-05 NOTE — PULMONOLOGY PROGRESS NOTE
Assessment/Plan


Problems:  


(1) Purulent bronchitis


(2) Anemia


(3) Emphysema lung


(4) Costochondritis


(5) Severe protein-calorie malnutrition


(6) MIKA (acute kidney injury)


Assessment/Plan


respiratory treatment


check sputum


continue abx


titrate fio2 to sat of 92%


renal w/u


dvt prophylaxis


symptomatic treatment


might go to med/surg.





Subjective


ROS Limited/Unobtainable:  No


Constitutional:  Reports: no symptoms


HEENT:  Repors: no symptoms


Allergies:  


Coded Allergies:  


     No Known Allergies (Unverified , 2/14/16)





Objective





Last 24 Hour Vital Signs








  Date Time  Temp Pulse Resp B/P (MAP) Pulse Ox O2 Delivery O2 Flow Rate FiO2


 


9/5/19 09:00      Room Air  


 


9/5/19 08:00 97.9 71 21 107/61 (76) 92   


 


9/5/19 04:00 98.5 68 18 107/62 (77) 99   


 


9/5/19 03:24  63      


 


9/5/19 00:00 98.7 71 18 112/67 (82) 96   


 


9/5/19 00:00  66      


 


9/4/19 21:00      Room Air  


 


9/4/19 20:00  79      


 


9/4/19 20:00 98.5 69 18 122/65 (84) 94   


 


9/4/19 16:00 98.8 74 18 102/56 (71) 94   


 


9/4/19 16:00  76      


 


9/4/19 12:00 98.4 72 18 102/59 (73) 94   


 


9/4/19 12:00  68      

















Intake and Output  


 


 9/4/19 9/5/19





 18:59 06:59


 


Intake Total 600 ml 


 


Balance 600 ml 


 


  


 


Intake Oral 600 ml 


 


# Voids 3 


 


# Bowel Movements 2 








General Appearance:  cachetic


HEENT:  normocephalic, atraumatic


Respiratory/Chest:  chest wall non-tender, lungs clear


Breasts:  no masses


Cardiovascular:  normal peripheral pulses, normal rate


Abdomen:  normal bowel sounds, soft, non tender





Microbiology








 Date/Time


Source Procedure


Growth Status


 


 


 9/3/19 15:25


Blood Blood Culture - Preliminary


NO GROWTH AFTER 24 HOURS Resulted


 


 9/3/19 15:10


Blood Blood Culture - Preliminary


NO GROWTH AFTER 24 HOURS Resulted








Laboratory Tests


9/5/19 04:35: 


White Blood Count 5.3, Red Blood Count 3.31L, Hemoglobin 10.2L, Hematocrit 30.7L

, Mean Corpuscular Volume 93, Mean Corpuscular Hemoglobin 30.7, Mean 

Corpuscular Hemoglobin Concent 33.1, Red Cell Distribution Width 11.8, Platelet 

Count 318, Mean Platelet Volume 5.4L, Neutrophils (%) (Auto) 70.1, Lymphocytes (

%) (Auto) 18.6L, Monocytes (%) (Auto) 9.8, Eosinophils (%) (Auto) 0.8, 

Basophils (%) (Auto) 0.6, Sodium Level 140, Potassium Level 4.9, Chloride Level 

108H, Carbon Dioxide Level 23, Anion Gap 9, Blood Urea Nitrogen 40H, Creatinine 

1.8H, Estimat Glomerular Filtration Rate , Glucose Level 101, Calcium Level 8.8

, Ferritin [Pending]





Current Medications








 Medications


  (Trade)  Dose


 Ordered  Sig/Naman


 Route


 PRN Reason  Start Time


 Stop Time Status Last Admin


Dose Admin


 


 Acetaminophen


  (Tylenol)  650 mg  Q4H  PRN


 ORAL


 T>100.5  9/5/19 06:51


 10/3/19 06:50   


 


 


 Ceftriaxone


 Sodium 1 gm/


 Dextrose  55 ml @ 


 110 mls/hr  Q24H


 IVPB


   9/5/19 11:00


 9/11/19 10:59  9/5/19 10:48


 


 


 Dextrose


  (Dextrose 50%)  25 ml  Q30M  PRN


 IV


 Hypoglycemia  9/5/19 06:53


 10/3/19 06:52   


 


 


 Dextrose


  (Dextrose 50%)  50 ml  Q30M  PRN


 IV


 hypoglycemia  9/5/19 06:53


 10/3/19 06:52   


 


 


 Dextrose/Sodium


 Chloride  1,000 ml @ 


 100 mls/hr  Q10H


 IV


   9/5/19 10:30


 9/5/19 20:29  9/5/19 10:48


 


 


 Docusate Sodium


  (Colace)  100 mg  TWICE A  DAY


 ORAL


   9/5/19 18:00


 10/5/19 17:59   


 


 


 Hydroxychloroquine


 Sulfate


  (Plaquenil)  200 mg  DAILY


 ORAL


   9/5/19 09:00


 10/4/19 08:59  9/5/19 09:20


 


 


 Lorazepam


  (Ativan 2mg/ml


 1ml)  0.5 mg  Q4H  PRN


 IV


 For Anxiety  9/5/19 06:56


 9/10/19 06:55   


 


 


 Morphine Sulfate


  (Morphine


 Sulfate)  1 mg  Q4H  PRN


 IVP


 PAIN 4-10  9/5/19 06:56


 9/10/19 06:55   


 


 


 Ondansetron HCl


  (Zofran)  4 mg  Q6H  PRN


 IVP


 Nausea & Vomiting  9/5/19 06:57


 10/3/19 06:56   


 


 


 Pantoprazole


  (Protonix)  40 mg  DAILY


 ORAL


   9/5/19 10:30


 10/5/19 10:29  9/5/19 10:47


 


 


 Polyethylene


 Glycol


  (Miralax)  17 gm  HSPRN  PRN


 ORAL


 Constipation  9/5/19 06:58


 10/3/19 06:57   


 


 


 Zolpidem Tartrate


  (Ambien)  5 mg  HSPRN  PRN


 ORAL


 Insomnia  9/5/19 06:58


 9/10/19 06:57   


 

















Geovany Mustafa MD Sep 5, 2019 11:02

## 2019-09-06 VITALS — DIASTOLIC BLOOD PRESSURE: 60 MMHG | SYSTOLIC BLOOD PRESSURE: 101 MMHG

## 2019-09-06 VITALS — SYSTOLIC BLOOD PRESSURE: 106 MMHG | DIASTOLIC BLOOD PRESSURE: 80 MMHG

## 2019-09-06 VITALS — SYSTOLIC BLOOD PRESSURE: 108 MMHG | DIASTOLIC BLOOD PRESSURE: 57 MMHG

## 2019-09-06 VITALS — SYSTOLIC BLOOD PRESSURE: 109 MMHG | DIASTOLIC BLOOD PRESSURE: 61 MMHG

## 2019-09-06 VITALS — DIASTOLIC BLOOD PRESSURE: 63 MMHG | SYSTOLIC BLOOD PRESSURE: 106 MMHG

## 2019-09-06 VITALS — SYSTOLIC BLOOD PRESSURE: 108 MMHG | DIASTOLIC BLOOD PRESSURE: 62 MMHG

## 2019-09-06 LAB
ADD MANUAL DIFF: NO
ALBUMIN SERPL-MCNC: 2.4 G/DL (ref 3.4–5)
ALBUMIN/GLOB SERPL: 0.4 {RATIO} (ref 1–2.7)
ALP SERPL-CCNC: 69 U/L (ref 46–116)
ALT SERPL-CCNC: 11 U/L (ref 12–78)
ANION GAP SERPL CALC-SCNC: 11 MMOL/L (ref 5–15)
AST SERPL-CCNC: 19 U/L (ref 15–37)
BASOPHILS NFR BLD AUTO: 0.7 % (ref 0–2)
BILIRUB SERPL-MCNC: 0.3 MG/DL (ref 0.2–1)
BUN SERPL-MCNC: 34 MG/DL (ref 7–18)
CALCIUM SERPL-MCNC: 8.7 MG/DL (ref 8.5–10.1)
CHLORIDE SERPL-SCNC: 108 MMOL/L (ref 98–107)
CHOLEST SERPL-MCNC: 114 MG/DL (ref ?–200)
CO2 SERPL-SCNC: 22 MMOL/L (ref 21–32)
CREAT SERPL-MCNC: 1.8 MG/DL (ref 0.55–1.3)
EOSINOPHIL NFR BLD AUTO: 1.5 % (ref 0–3)
ERYTHROCYTE [DISTWIDTH] IN BLOOD BY AUTOMATED COUNT: 11.7 % (ref 11.6–14.8)
GAMMA GLUTAMYL TRANSPEPTIDASE: 11 U/L (ref 5–85)
GLOBULIN SER-MCNC: 5.8 G/DL
HCT VFR BLD CALC: 30.5 % (ref 37–47)
HDLC SERPL-MCNC: 29 MG/DL (ref 40–60)
HGB BLD-MCNC: 10.1 G/DL (ref 12–16)
LYMPHOCYTES NFR BLD AUTO: 20.7 % (ref 20–45)
MCV RBC AUTO: 93 FL (ref 80–99)
MONOCYTES NFR BLD AUTO: 8.9 % (ref 1–10)
NEUTROPHILS NFR BLD AUTO: 68.2 % (ref 45–75)
PHOSPHATE SERPL-MCNC: 3.8 MG/DL (ref 2.5–4.9)
PLATELET # BLD: 330 K/UL (ref 150–450)
POTASSIUM SERPL-SCNC: 4.5 MMOL/L (ref 3.5–5.1)
RBC # BLD AUTO: 3.27 M/UL (ref 4.2–5.4)
SODIUM SERPL-SCNC: 141 MMOL/L (ref 136–145)
TRIGL SERPL-MCNC: 108 MG/DL (ref 30–150)
WBC # BLD AUTO: 5.3 K/UL (ref 4.8–10.8)

## 2019-09-06 RX ADMIN — DOCUSATE SODIUM SCH MG: 100 CAPSULE, LIQUID FILLED ORAL at 17:55

## 2019-09-06 RX ADMIN — DOCUSATE SODIUM SCH MG: 100 CAPSULE, LIQUID FILLED ORAL at 08:27

## 2019-09-06 RX ADMIN — SODIUM CHLORIDE SCH MLS/HR: 0.9 INJECTION INTRAVENOUS at 11:47

## 2019-09-06 NOTE — NUR
NURSE NOTES:





Received report from Leann Plaza RN. Patient is resting comfortably in bed with right 
forearm IV 22g intact and saline locked. No c/o of pain or SOB. Family at bedside.

## 2019-09-06 NOTE — NUR
DISCHARGE PLANNING



***Discharge order noted**



Formerly [facility], 

[address] [address], [place] [id number]

[phone]



***Await Acceptance ***

## 2019-09-06 NOTE — CONSULTATION
History of Present Illness


General


Date patient seen:  Sep 6, 2019


Chief Complaint:  Dyspnea/Respdistress





Present Illness


HPI


72 y/o F with hx of RA, pulmonary MAC, emphysema, osteoporosis, multiple 

episodes of pneumonia presented to ED on 9/3 with 1week of SOB and productive 

cough and 1 day of R upper chest wall pain with coughing and movement . Upon 

admission, she was found to have MIKA and hyperkalemia





Denied f/c, HA, n/v/d, abd pain, urinary symptoms


Allergies:  


Coded Allergies:  


     No Known Allergies (Unverified , 2/14/16)





Medication History


Scheduled


Alendronate Sodium* (Fosamax*), Unknown Dose ORAL DAILY, (Reported)


Aspirin* (Aspir 81*), 81 MG ORAL DAILY, (Reported)


Methylprednisolone (Methylprednisolone*), 4 MG ORAL AS DIRECTED, (Reported)





Miscellaneous Medications


Calcium Carbonate (Calcium), 500 MG PO, (Reported)


Hydroxychloroquine Sulfate (Hydroxychloroquine Sulfate), 200 MG PO, (Reported)


Ibuprofen* (Advil*), Unknown Dose ORAL, (Reported)





Patient History


Healthcare decision maker


N


Resuscitation status


Full Code


Advanced Directive on File





Patient History Narrative








Pmhx: as above





Shx: Denies tobacco, drug or alcohol use





Fhx: non contributory





Review of Systems


All Other Systems:  negative except mentioned in HPI





Physical Exam


Physical Exam Narrative


GENERAL:  Calm in bed, slight coughing, oriented x3, in no acute


distress.


CARDIOVASCULAR:  No murmur.


LUNGS:  Distant and clear.


ABDOMEN:  Bowel sounds positive.


EXTREMITIES:  No cyanosis, clubbing, or edema.


NEUROLOGIC:  The patient moves all extremities, slightly weak.





Last 24 Hour Vital Signs








  Date Time  Temp Pulse Resp B/P (MAP) Pulse Ox O2 Delivery O2 Flow Rate FiO2


 


9/6/19 09:00      Room Air  


 


9/6/19 08:00 98.9 84 18 106/63 (77) 95   


 


9/6/19 04:00 99.0 79 18 109/61 (77) 94   


 


9/6/19 00:00 99.0 81 18 101/60 (74) 95   


 


9/5/19 21:00      Room Air  


 


9/5/19 20:00 99.5 83 18 109/65 (80) 95   


 


9/5/19 16:00 99.3 73 20 112/69 (83) 93   


 


9/5/19 14:45 98.8 81 20 102/60 (74) 93   


 


9/5/19 12:00 97.8 70 21 115/68 (84) 95   


 


9/5/19 12:00  67      

















Intake and Output  


 


 9/5/19 9/6/19





 18:59 06:59


 


Intake Total 600 ml 940 ml


 


Balance 600 ml 940 ml


 


  


 


Intake Oral 300 ml 240 ml


 


IV Total 300 ml 700 ml


 


# Voids 1 2


 


# Bowel Movements 1 











Laboratory Tests








Test


  9/5/19


13:30 9/5/19


13:44 9/6/19


05:10


 


Stool Occult Blood


  Negative


(NEGATIVE) 


  


 


 


Urine Color  Pale yellow   


 


Urine Appearance  Clear   


 


Urine pH  7 (4.5-8.0)   


 


Urine Specific Gravity


  


  1.005


(1.005-1.035) 


 


 


Urine Protein


  


  1+ (NEGATIVE)


H 


 


 


Urine Glucose (UA)


  


  Negative


(NEGATIVE) 


 


 


Urine Ketones


  


  Negative


(NEGATIVE) 


 


 


Urine Blood


  


  5+ (NEGATIVE)


H 


 


 


Urine Nitrite


  


  Negative


(NEGATIVE) 


 


 


Urine Bilirubin


  


  Negative


(NEGATIVE) 


 


 


Urine Urobilinogen


  


  Normal MG/DL


(0.0-1.0) 


 


 


Urine Leukocyte Esterase


  


  1+ (NEGATIVE)


H 


 


 


Urine RBC


  


  5-10 /HPF (0 -


2)  H 


 


 


Urine WBC


  


  2-4 /HPF (0 -


2) 


 


 


Urine Squamous Epithelial


Cells 


  Occasional


/LPF 


 


 


Urine Bacteria


  


  Occasional


/HPF (NONE) 


 


 


Urine Random Sodium


  


  44 mmol/L


() 


 


 


White Blood Count


  


  


  5.3 K/UL


(4.8-10.8)


 


Red Blood Count


  


  


  3.27 M/UL


(4.20-5.40)  L


 


Hemoglobin


  


  


  10.1 G/DL


(12.0-16.0)  L


 


Hematocrit


  


  


  30.5 %


(37.0-47.0)  L


 


Mean Corpuscular Volume   93 FL (80-99)  


 


Mean Corpuscular Hemoglobin


  


  


  30.8 PG


(27.0-31.0)


 


Mean Corpuscular Hemoglobin


Concent 


  


  33.0 G/DL


(32.0-36.0)


 


Red Cell Distribution Width


  


  


  11.7 %


(11.6-14.8)


 


Platelet Count


  


  


  330 K/UL


(150-450)


 


Mean Platelet Volume


  


  


  5.4 FL


(6.5-10.1)  L


 


Neutrophils (%) (Auto)


  


  


  68.2 %


(45.0-75.0)


 


Lymphocytes (%) (Auto)


  


  


  20.7 %


(20.0-45.0)


 


Monocytes (%) (Auto)


  


  


  8.9 %


(1.0-10.0)


 


Eosinophils (%) (Auto)


  


  


  1.5 %


(0.0-3.0)


 


Basophils (%) (Auto)


  


  


  0.7 %


(0.0-2.0)


 


Erythrocyte Sedimentation Rate


  


  


  103 MM/HR


(0-30)  H


 


Sodium Level


  


  


  141 MMOL/L


(136-145)


 


Potassium Level


  


  


  4.5 MMOL/L


(3.5-5.1)


 


Chloride Level


  


  


  108 MMOL/L


()  H


 


Carbon Dioxide Level


  


  


  22 MMOL/L


(21-32)


 


Anion Gap


  


  


  11 mmol/L


(5-15)


 


Blood Urea Nitrogen


  


  


  34 mg/dL


(7-18)  H


 


Creatinine


  


  


  1.8 MG/DL


(0.55-1.30)  H


 


Estimat Glomerular Filtration


Rate 


  


   mL/min (>60)  


 


 


Glucose Level


  


  


  91 MG/DL


()


 


Hemoglobin A1c


  


  


  6.2 %


(4.3-6.0)  H


 


Uric Acid


  


  


  6.3 MG/DL


(2.6-7.2)


 


Calcium Level


  


  


  8.7 MG/DL


(8.5-10.1)


 


Phosphorus Level


  


  


  3.8 MG/DL


(2.5-4.9)


 


Magnesium Level


  


  


  2.0 MG/DL


(1.8-2.4)


 


Total Bilirubin


  


  


  0.3 MG/DL


(0.2-1.0)


 


Gamma Glutamyl Transpeptidase   11 U/L (5-85)  


 


Aspartate Amino Transf


(AST/SGOT) 


  


  19 U/L (15-37)


 


 


Alanine Aminotransferase


(ALT/SGPT) 


  


  11 U/L (12-78)


L


 


Alkaline Phosphatase


  


  


  69 U/L


()


 


C-Reactive Protein,


Quantitative 


  


  7.2 mg/dL


(0.00-0.90)  H


 


Pro-B-Type Natriuretic Peptide


  


  


  780 pg/mL


(0-125)  H


 


Total Protein


  


  


  8.2 G/DL


(6.4-8.2)


 


Albumin


  


  


  2.4 G/DL


(3.4-5.0)  L


 


Globulin   5.8 g/dL  


 


Albumin/Globulin Ratio


  


  


  0.4 (1.0-2.7)


L


 


Triglycerides Level


  


  


  108 MG/DL


()


 


Cholesterol Level


  


  


  114 MG/DL (<


200)


 


LDL Cholesterol


  


  


  70 mg/dL


(<100)


 


HDL Cholesterol


  


  


  29 MG/DL


(40-60)  L


 


Cholesterol/HDL Ratio   3.9 (3.3-4.4)  


 


Free Thyroxine


  


  


  1.24 NG/DL


(0.76-1.46)


 


Free Triiodothyronine


  


  


  1.7 pg/mL


(2.3-4.2)  L


 


Rheumatoid Factor Screen   Pending  











Microbiology








 Date/Time


Source Procedure


Growth Status


 


 


 9/5/19 13:44


Urine,Clean Catch Urine Culture - Preliminary


NO GROWTH Resulted








Height (Feet):  5


Height (Inches):  1.00


Weight (Pounds):  92


Medications





Current Medications








 Medications


  (Trade)  Dose


 Ordered  Sig/Naman


 Route


 PRN Reason  Start Time


 Stop Time Status Last Admin


Dose Admin


 


 Acetaminophen


  (Tylenol)  650 mg  Q4H  PRN


 ORAL


 T>100.5  9/5/19 14:25


 10/5/19 14:24   


 


 


 Ceftriaxone


 Sodium 1 gm/


 Dextrose  55 ml @ 


 110 mls/hr  Q24H


 IVPB


   9/6/19 11:00


 9/11/19 10:59   


 


 


 Dextrose


  (Dextrose 50%)  25 ml  Q30M  PRN


 IV


 Hypoglycemia  9/5/19 14:30


 10/3/19 06:52   


 


 


 Dextrose


  (Dextrose 50%)  50 ml  Q30M  PRN


 IV


 hypoglycemia  9/5/19 14:30


 10/3/19 06:52   


 


 


 Dextrose/Sodium


 Chloride  1,000 ml @ 


 100 mls/hr  Q10H


 IV


   9/6/19 08:00


 9/6/19 17:59  9/6/19 08:28


 


 


 Docusate Sodium


  (Colace)  100 mg  TWICE A  DAY


 ORAL


   9/5/19 18:00


 10/5/19 17:59  9/6/19 08:27


 


 


 Hydroxychloroquine


 Sulfate


  (Plaquenil)  200 mg  DAILY


 ORAL


   9/6/19 09:00


 10/4/19 08:59  9/6/19 08:27


 


 


 Lorazepam


  (Ativan 2mg/ml


 1ml)  0.5 mg  Q4H  PRN


 IV


 For Anxiety  9/5/19 14:26


 9/12/19 14:25   


 


 


 Morphine Sulfate


  (Morphine


 Sulfate)  1 mg  Q4H  PRN


 IVP


 PAIN 4-10  9/5/19 14:26


 9/12/19 14:25   


 


 


 Ondansetron HCl


  (Zofran)  4 mg  Q6H  PRN


 IVP


 Nausea & Vomiting  9/5/19 14:26


 10/5/19 14:25   


 


 


 Pantoprazole


  (Protonix)  40 mg  DAILY


 ORAL


   9/6/19 09:00


 10/5/19 10:29  9/6/19 08:27


 


 


 Polyethylene


 Glycol


  (Miralax)  17 gm  HSPRN  PRN


 ORAL


 Constipation  9/5/19 14:26


 10/5/19 14:25   


 


 


 Vancomycin HCl


  (Vanco rx to


 dose)  1 ea  DAILY  PRN


 MISC


 Per rx protocol  9/6/19 11:30


 10/6/19 11:29   


 


 


 Vancomycin HCl


 750 mg/Sodium


 Chloride  275 ml @ 


 183.333


 mls/hr  NOW  ONCE


 IVPB


   9/6/19 12:00


 9/6/19 13:29   


 


 


 Zolpidem Tartrate


  (Ambien)  5 mg  HSPRN  PRN


 ORAL


 Insomnia  9/5/19 14:27


 9/12/19 14:26   


 











Assessment/Plan


Assessment/Plan:


Abx:


Ceftriaxone 9/4-





Assessment:


Probable CAP


 -CXR:  Bilateral mostly interstitial disease. This likely represents acute 

interstitial infiltrates versus edema





Gram positive bacteremia- ? contaminant vs real


  -9/3 Bcx 1/4 GPC clusters





Afebrile


No leukocytosis


  -u/a no pyuria





MIKA, improving


Hyperkalemia, SP





RA


hx of pulmonary MAC


 emphysema


osteoporosis


hx of multiple episodes of pneumonia 





Plan:


-Continue empiric Ceftriaxone #3 and add IV Vancomycin for gram positive 

bacteremia


-f/u cx


-Monitor CBC/CMP, temperatures


-BCx x2, Sp cx





Thank you for this consultation. Will continue to follow along with you.





Discussed with Prachi Alonzo M.D. Sep 6, 2019 11:51

## 2019-09-06 NOTE — GENERAL PROGRESS NOTE
Assessment/Plan


Problem List:  


(1) SOB (shortness of breath)


ICD Codes:  R06.02 - Shortness of breath


SNOMED:  038692317


(2) Cough


ICD Codes:  R05 - Cough


SNOMED:  11940921


(3) Anemia


ICD Codes:  D64.9 - Anemia, unspecified


SNOMED:  345360031


(4) CHF (congestive heart failure)


ICD Codes:  I50.9 - Heart failure, unspecified


SNOMED:  26968453


(5) MIKA (acute kidney injury)


ICD Codes:  N17.9 - Acute kidney failure, unspecified


SNOMED:  0895709, 25259423


Status:  stable, progressing


Assessment/Plan:


o2 pulm tx abx pt diet dc w hh if clear





Subjective


Constitutional:  Reports: weakness


Allergies:  


Coded Allergies:  


     No Known Allergies (Unverified , 2/14/16)


All Systems:  reviewed and negative except above


Subjective


sl cough in bed





Objective





Last 24 Hour Vital Signs








  Date Time  Temp Pulse Resp B/P (MAP) Pulse Ox O2 Delivery O2 Flow Rate FiO2


 


9/6/19 04:00 99.0 79 18 109/61 (77) 94   


 


9/6/19 00:00 99.0 81 18 101/60 (74) 95   


 


9/5/19 21:00      Room Air  


 


9/5/19 20:00 99.5 83 18 109/65 (80) 95   


 


9/5/19 16:00 99.3 73 20 112/69 (83) 93   


 


9/5/19 14:45 98.8 81 20 102/60 (74) 93   


 


9/5/19 12:00 97.8 70 21 115/68 (84) 95   


 


9/5/19 12:00  67      

















Intake and Output  


 


 9/5/19 9/6/19





 19:00 07:00


 


Intake Total 700 ml 840 ml


 


Balance 700 ml 840 ml


 


  


 


Intake Oral 300 ml 240 ml


 


IV Total 400 ml 600 ml


 


# Voids 1 2


 


# Bowel Movements 1 








Laboratory Tests


9/5/19 13:30: Stool Occult Blood [Pending]


9/5/19 13:44: 


Urine Color Pale yellow, Urine Appearance Clear, Urine pH 7, Urine Specific 

Gravity 1.005, Urine Protein 1+H, Urine Glucose (UA) Negative, Urine Ketones 

Negative, Urine Blood 5+H, Urine Nitrite Negative, Urine Bilirubin Negative, 

Urine Urobilinogen Normal, Urine Leukocyte Esterase 1+H, Urine RBC 5-10H, Urine 

WBC 2-4, Urine Squamous Epithelial Cells Occasional, Urine Bacteria Occasional, 

Urine Random Sodium 44


9/6/19 05:10: 


White Blood Count 5.3, Red Blood Count 3.27L, Hemoglobin 10.1L, Hematocrit 30.5L

, Mean Corpuscular Volume 93, Mean Corpuscular Hemoglobin 30.8, Mean 

Corpuscular Hemoglobin Concent 33.0, Red Cell Distribution Width 11.7, Platelet 

Count 330, Mean Platelet Volume 5.4L, Neutrophils (%) (Auto) 68.2, Lymphocytes (

%) (Auto) 20.7, Monocytes (%) (Auto) 8.9, Eosinophils (%) (Auto) 1.5, Basophils 

(%) (Auto) 0.7, Erythrocyte Sedimentation Rate 103H, Sodium Level 141, 

Potassium Level 4.5, Chloride Level 108H, Carbon Dioxide Level 22, Anion Gap 11

, Blood Urea Nitrogen 34H, Creatinine 1.8H, Estimat Glomerular Filtration Rate 

, Glucose Level 91, Hemoglobin A1c 6.2H, Uric Acid 6.3, Calcium Level 8.7, 

Phosphorus Level 3.8, Magnesium Level 2.0, Total Bilirubin 0.3, Gamma Glutamyl 

Transpeptidase 11, Aspartate Amino Transf (AST/SGOT) 19, Alanine 

Aminotransferase (ALT/SGPT) 11L, Alkaline Phosphatase 69, C-Reactive Protein, 

Quantitative 7.2H, Pro-B-Type Natriuretic Peptide 780H, Total Protein 8.2, 

Albumin 2.4L, Globulin 5.8, Albumin/Globulin Ratio 0.4L, Triglycerides Level 108

, Cholesterol Level 114, LDL Cholesterol 70, HDL Cholesterol 29L, Cholesterol/

HDL Ratio 3.9, Free Thyroxine 1.24, Free Triiodothyronine 1.7L, Rheumatoid 

Factor Screen [Pending]


Height (Feet):  5


Height (Inches):  1.00


Weight (Pounds):  92


General Appearance:  lethargic


EENT:  normal ENT inspection


Neck:  normal alignment


Cardiovascular:  normal peripheral pulses, normal rate, regular rhythm


Respiratory/Chest:  chest wall non-tender, lungs clear, normal breath sounds


Abdomen:  normal bowel sounds, non tender, soft


Extremities:  normal inspection


Edema:  no edema noted Arm (L), no edema noted Arm (R), no edema noted Leg (L), 

no edema noted Leg (R), no edema noted Pedal (L), no edema noted Pedal (R), no 

edema noted Generalized


Neurologic:  responsive, motor weakness


Skin:  normal pigmentation, warm/dry











Flaco Stephens DO Sep 6, 2019 09:37

## 2019-09-06 NOTE — PULMONOLOGY PROGRESS NOTE
Assessment/Plan


Problems:  


(1) Purulent bronchitis


(2) Anemia


(3) Emphysema lung


(4) Costochondritis


(5) Severe protein-calorie malnutrition


(6) MIKA (acute kidney injury)


Assessment/Plan


respiratory treatment


check sputum


continue abx,  as per ID


titrate fio2 to sat of 92%


renal w/u


dvt prophylaxis


symptomatic treatment


might go to med/surg.





Subjective


ROS Limited/Unobtainable:  No


Constitutional:  Reports: no symptoms


HEENT:  Repors: no symptoms


Allergies:  


Coded Allergies:  


     No Known Allergies (Unverified , 2/14/16)





Objective





Last 24 Hour Vital Signs








  Date Time  Temp Pulse Resp B/P (MAP) Pulse Ox O2 Delivery O2 Flow Rate FiO2


 


9/6/19 12:00 98.7 77 17 108/57 (74) 95   


 


9/6/19 09:00      Room Air  


 


9/6/19 08:00 98.9 84 18 106/63 (77) 95   


 


9/6/19 04:00 99.0 79 18 109/61 (77) 94   


 


9/6/19 00:00 99.0 81 18 101/60 (74) 95   


 


9/5/19 21:00      Room Air  


 


9/5/19 20:00 99.5 83 18 109/65 (80) 95   


 


9/5/19 16:00 99.3 73 20 112/69 (83) 93   


 


9/5/19 14:45 98.8 81 20 102/60 (74) 93   

















Intake and Output  


 


 9/5/19 9/6/19





 18:59 06:59


 


Intake Total 600 ml 940 ml


 


Balance 600 ml 940 ml


 


  


 


Intake Oral 300 ml 240 ml


 


IV Total 300 ml 700 ml


 


# Voids 1 2


 


# Bowel Movements 1 








General Appearance:  WD/WN


Respiratory/Chest:  chest wall non-tender, lungs clear


Breasts:  no masses


Cardiovascular:  normal rate


Abdomen:  normal bowel sounds, no organomegaly


Genitourinary:  normal external genitalia


Extremities:  no clubbing


Skin:  no rash





Microbiology








 Date/Time


Source Procedure


Growth Status


 


 


 9/3/19 15:25


Blood Blood Culture - Preliminary Resulted


 


 9/3/19 15:10


Blood Blood Culture - Preliminary


NO GROWTH AFTER 48 HOURS Resulted


 


 9/5/19 13:44


Urine,Clean Catch Urine Culture - Preliminary


NO GROWTH Resulted








Laboratory Tests


9/5/19 13:30: Stool Occult Blood Negative


9/5/19 13:44: 


Urine Color Pale yellow, Urine Appearance Clear, Urine pH 7, Urine Specific 

Gravity 1.005, Urine Protein 1+H, Urine Glucose (UA) Negative, Urine Ketones 

Negative, Urine Blood 5+H, Urine Nitrite Negative, Urine Bilirubin Negative, 

Urine Urobilinogen Normal, Urine Leukocyte Esterase 1+H, Urine RBC 5-10H, Urine 

WBC 2-4, Urine Squamous Epithelial Cells Occasional, Urine Bacteria Occasional, 

Urine Random Sodium 44


9/6/19 05:10: 


White Blood Count 5.3, Red Blood Count 3.27L, Hemoglobin 10.1L, Hematocrit 30.5L

, Mean Corpuscular Volume 93, Mean Corpuscular Hemoglobin 30.8, Mean 

Corpuscular Hemoglobin Concent 33.0, Red Cell Distribution Width 11.7, Platelet 

Count 330, Mean Platelet Volume 5.4L, Neutrophils (%) (Auto) 68.2, Lymphocytes (

%) (Auto) 20.7, Monocytes (%) (Auto) 8.9, Eosinophils (%) (Auto) 1.5, Basophils 

(%) (Auto) 0.7, Erythrocyte Sedimentation Rate 103H, Sodium Level 141, 

Potassium Level 4.5, Chloride Level 108H, Carbon Dioxide Level 22, Anion Gap 11

, Blood Urea Nitrogen 34H, Creatinine 1.8H, Estimat Glomerular Filtration Rate 

, Glucose Level 91, Hemoglobin A1c 6.2H, Uric Acid 6.3, Calcium Level 8.7, 

Phosphorus Level 3.8, Magnesium Level 2.0, Total Bilirubin 0.3, Gamma Glutamyl 

Transpeptidase 11, Aspartate Amino Transf (AST/SGOT) 19, Alanine 

Aminotransferase (ALT/SGPT) 11L, Alkaline Phosphatase 69, C-Reactive Protein, 

Quantitative 7.2H, Pro-B-Type Natriuretic Peptide 780H, Total Protein 8.2, 

Albumin 2.4L, Globulin 5.8, Albumin/Globulin Ratio 0.4L, Triglycerides Level 108

, Cholesterol Level 114, LDL Cholesterol 70, HDL Cholesterol 29L, Cholesterol/

HDL Ratio 3.9, Free Thyroxine 1.24, Free Triiodothyronine 1.7L, Rheumatoid 

Factor Screen [Pending]





Current Medications








 Medications


  (Trade)  Dose


 Ordered  Sig/Naman


 Route


 PRN Reason  Start Time


 Stop Time Status Last Admin


Dose Admin


 


 Acetaminophen


  (Tylenol)  650 mg  Q4H  PRN


 ORAL


 T>100.5  9/5/19 14:25


 10/5/19 14:24   


 


 


 Ceftriaxone


 Sodium 1 gm/


 Dextrose  55 ml @ 


 110 mls/hr  Q24H


 IVPB


   9/6/19 11:00


 9/11/19 10:59  9/6/19 11:47


 


 


 Dextrose


  (Dextrose 50%)  25 ml  Q30M  PRN


 IV


 Hypoglycemia  9/5/19 14:30


 10/3/19 06:52   


 


 


 Dextrose


  (Dextrose 50%)  50 ml  Q30M  PRN


 IV


 hypoglycemia  9/5/19 14:30


 10/3/19 06:52   


 


 


 Dextrose/Sodium


 Chloride  1,000 ml @ 


 100 mls/hr  Q10H


 IV


   9/6/19 08:00


 9/6/19 17:59  9/6/19 08:28


 


 


 Docusate Sodium


  (Colace)  100 mg  TWICE A  DAY


 ORAL


   9/5/19 18:00


 10/5/19 17:59  9/6/19 08:27


 


 


 Hydroxychloroquine


 Sulfate


  (Plaquenil)  200 mg  DAILY


 ORAL


   9/6/19 09:00


 10/4/19 08:59  9/6/19 08:27


 


 


 Lorazepam


  (Ativan 2mg/ml


 1ml)  0.5 mg  Q4H  PRN


 IV


 For Anxiety  9/5/19 14:26


 9/12/19 14:25   


 


 


 Morphine Sulfate


  (Morphine


 Sulfate)  1 mg  Q4H  PRN


 IVP


 PAIN 4-10  9/5/19 14:26


 9/12/19 14:25   


 


 


 Ondansetron HCl


  (Zofran)  4 mg  Q6H  PRN


 IVP


 Nausea & Vomiting  9/5/19 14:26


 10/5/19 14:25   


 


 


 Pantoprazole


  (Protonix)  40 mg  DAILY


 ORAL


   9/6/19 09:00


 10/5/19 10:29  9/6/19 08:27


 


 


 Polyethylene


 Glycol


  (Miralax)  17 gm  HSPRN  PRN


 ORAL


 Constipation  9/5/19 14:26


 10/5/19 14:25   


 


 


 Vancomycin HCl


  (Vanco rx to


 dose)  1 ea  DAILY  PRN


 MISC


 Per rx protocol  9/6/19 11:30


 10/6/19 11:29   


 


 


 Vancomycin HCl


 750 mg/Sodium


 Chloride  275 ml @ 


 183.333


 mls/hr  NOW  ONCE


 IVPB


   9/6/19 12:00


 9/6/19 13:29   


 


 


 Zolpidem Tartrate


  (Ambien)  5 mg  HSPRN  PRN


 ORAL


 Insomnia  9/5/19 14:27


 9/12/19 14:26   


 

















Geovany Mustafa MD Sep 6, 2019 12:40

## 2019-09-06 NOTE — NEPHROLOGY PROGRESS NOTE
Assessment/Plan


Problem List:  


(1) Renal failure (ARF), acute on chronic


(2) Severe protein-calorie malnutrition


(3) Purulent bronchitis


(4) Dehydration


Assessment


Patient presents with respiratory Sxs: Bronchitis, 


h/o Emphysema





Renal failure Cr 2.1 down to 1.8 , still up for her body mass


High K resolving


Sever malnutrition - HypoAlbuminemia


dehydration


h/o RA


Plan





Hydrate


gastric support


monitor renal parameters


urine studies


urine c/s





Subjective


ROS Limited/Unobtainable:  No


Constitutional:  Reports: malaise





Objective


Objective





Last 24 Hour Vital Signs








  Date Time  Temp Pulse Resp B/P (MAP) Pulse Ox O2 Delivery O2 Flow Rate FiO2


 


9/6/19 04:00 99.0 79 18 109/61 (77) 94   


 


9/6/19 00:00 99.0 81 18 101/60 (74) 95   


 


9/5/19 21:00      Room Air  


 


9/5/19 20:00 99.5 83 18 109/65 (80) 95   


 


9/5/19 16:00 99.3 73 20 112/69 (83) 93   


 


9/5/19 14:45 98.8 81 20 102/60 (74) 93   


 


9/5/19 12:00 97.8 70 21 115/68 (84) 95   


 


9/5/19 12:00  67      


 


9/5/19 09:00      Room Air  


 


9/5/19 08:00 97.9 71 21 107/61 (76) 92   


 


9/5/19 08:00  65      

















Intake and Output  


 


 9/5/19 9/6/19





 19:00 07:00


 


Intake Total 700 ml 840 ml


 


Balance 700 ml 840 ml


 


  


 


Intake Oral 300 ml 240 ml


 


IV Total 400 ml 600 ml


 


# Voids 1 2


 


# Bowel Movements 1 











Current Medications








 Medications


  (Trade)  Dose


 Ordered  Sig/Naman


 Route


 PRN Reason  Start Time


 Stop Time Status Last Admin


Dose Admin


 


 Acetaminophen


  (Tylenol)  650 mg  Q4H  PRN


 ORAL


 T>100.5  9/5/19 14:25


 10/5/19 14:24   


 


 


 Ceftriaxone


 Sodium 1 gm/


 Dextrose  55 ml @ 


 110 mls/hr  Q24H


 IVPB


   9/6/19 11:00


 9/11/19 10:59   


 


 


 Dextrose


  (Dextrose 50%)  25 ml  Q30M  PRN


 IV


 Hypoglycemia  9/5/19 14:30


 10/3/19 06:52   


 


 


 Dextrose


  (Dextrose 50%)  50 ml  Q30M  PRN


 IV


 hypoglycemia  9/5/19 14:30


 10/3/19 06:52   


 


 


 Docusate Sodium


  (Colace)  100 mg  TWICE A  DAY


 ORAL


   9/5/19 18:00


 10/5/19 17:59   


 


 


 Hydroxychloroquine


 Sulfate


  (Plaquenil)  200 mg  DAILY


 ORAL


   9/6/19 09:00


 10/4/19 08:59   


 


 


 Lorazepam


  (Ativan 2mg/ml


 1ml)  0.5 mg  Q4H  PRN


 IV


 For Anxiety  9/5/19 14:26


 9/12/19 14:25   


 


 


 Morphine Sulfate


  (Morphine


 Sulfate)  1 mg  Q4H  PRN


 IVP


 PAIN 4-10  9/5/19 14:26


 9/12/19 14:25   


 


 


 Ondansetron HCl


  (Zofran)  4 mg  Q6H  PRN


 IVP


 Nausea & Vomiting  9/5/19 14:26


 10/5/19 14:25   


 


 


 Pantoprazole


  (Protonix)  40 mg  DAILY


 ORAL


   9/6/19 09:00


 10/5/19 10:29   


 


 


 Polyethylene


 Glycol


  (Miralax)  17 gm  HSPRN  PRN


 ORAL


 Constipation  9/5/19 14:26


 10/5/19 14:25   


 


 


 Zolpidem Tartrate


  (Ambien)  5 mg  HSPRN  PRN


 ORAL


 Insomnia  9/5/19 14:27


 9/12/19 14:26   


 





Laboratory Tests


9/5/19 13:30: Stool Occult Blood [Pending]


9/5/19 13:44: 


Urine Color Pale yellow, Urine Appearance Clear, Urine pH 7, Urine Specific 

Gravity 1.005, Urine Protein 1+H, Urine Glucose (UA) Negative, Urine Ketones 

Negative, Urine Blood 5+H, Urine Nitrite Negative, Urine Bilirubin Negative, 

Urine Urobilinogen Normal, Urine Leukocyte Esterase 1+H, Urine RBC 5-10H, Urine 

WBC 2-4, Urine Squamous Epithelial Cells Occasional, Urine Bacteria Occasional, 

Urine Random Sodium 44


9/6/19 05:10: 


White Blood Count 5.3, Red Blood Count 3.27L, Hemoglobin 10.1L, Hematocrit 30.5L

, Mean Corpuscular Volume 93, Mean Corpuscular Hemoglobin 30.8, Mean 

Corpuscular Hemoglobin Concent 33.0, Red Cell Distribution Width 11.7, Platelet 

Count 330, Mean Platelet Volume 5.4L, Neutrophils (%) (Auto) 68.2, Lymphocytes (

%) (Auto) 20.7, Monocytes (%) (Auto) 8.9, Eosinophils (%) (Auto) 1.5, Basophils 

(%) (Auto) 0.7, Erythrocyte Sedimentation Rate [Pending], Sodium Level 141, 

Potassium Level 4.5, Chloride Level 108H, Carbon Dioxide Level 22, Anion Gap 11

, Blood Urea Nitrogen 34H, Creatinine 1.8H, Estimat Glomerular Filtration Rate 

, Glucose Level 91, Hemoglobin A1c 6.2H, Uric Acid 6.3, Calcium Level 8.7, 

Phosphorus Level 3.8, Magnesium Level 2.0, Total Bilirubin 0.3, Gamma Glutamyl 

Transpeptidase 11, Aspartate Amino Transf (AST/SGOT) 19, Alanine 

Aminotransferase (ALT/SGPT) 11L, Alkaline Phosphatase 69, C-Reactive Protein, 

Quantitative 7.2H, Pro-B-Type Natriuretic Peptide 780H, Total Protein 8.2, 

Albumin 2.4L, Globulin 5.8, Albumin/Globulin Ratio 0.4L, Triglycerides Level 108

, Cholesterol Level 114, LDL Cholesterol 70, HDL Cholesterol 29L, Cholesterol/

HDL Ratio 3.9, Free Thyroxine 1.24, Free Triiodothyronine 1.7L, Rheumatoid 

Factor Screen [Pending]


Height (Feet):  5


Height (Inches):  1.00


Weight (Pounds):  92


General Appearance:  no apparent distress


Objective


no change











Demar Mayo MD Sep 6, 2019 07:51

## 2019-09-06 NOTE — NUR
NURSE NOTES:

Patient is in bed awake and able to verbalize needs. Stable. Denies pain or SOB. Patient 
encouraged to use call light for assistance, verbalized understanding. Patient is in bed in 
locked and lowest position with call light within reach. Will continue to monitor.

## 2019-09-07 VITALS — DIASTOLIC BLOOD PRESSURE: 56 MMHG | SYSTOLIC BLOOD PRESSURE: 103 MMHG

## 2019-09-07 VITALS — SYSTOLIC BLOOD PRESSURE: 106 MMHG | DIASTOLIC BLOOD PRESSURE: 62 MMHG

## 2019-09-07 VITALS — DIASTOLIC BLOOD PRESSURE: 57 MMHG | SYSTOLIC BLOOD PRESSURE: 100 MMHG

## 2019-09-07 VITALS — SYSTOLIC BLOOD PRESSURE: 103 MMHG | DIASTOLIC BLOOD PRESSURE: 64 MMHG

## 2019-09-07 LAB
ANION GAP SERPL CALC-SCNC: 9 MMOL/L (ref 5–15)
BUN SERPL-MCNC: 32 MG/DL (ref 7–18)
CALCIUM SERPL-MCNC: 8.8 MG/DL (ref 8.5–10.1)
CHLORIDE SERPL-SCNC: 111 MMOL/L (ref 98–107)
CO2 SERPL-SCNC: 24 MMOL/L (ref 21–32)
CREAT SERPL-MCNC: 1.7 MG/DL (ref 0.55–1.3)
POTASSIUM SERPL-SCNC: 4.7 MMOL/L (ref 3.5–5.1)
SODIUM SERPL-SCNC: 144 MMOL/L (ref 136–145)

## 2019-09-07 RX ADMIN — SODIUM CHLORIDE SCH MLS/HR: 0.9 INJECTION INTRAVENOUS at 11:09

## 2019-09-07 RX ADMIN — DOCUSATE SODIUM SCH MG: 100 CAPSULE, LIQUID FILLED ORAL at 08:20

## 2019-09-07 NOTE — NUR
NURSE NOTES:



DISCHARGED HOME PER WHEELCHAIR ACCPD BY SON IN STABLE CONDITION. DC INSTRUCTIONS GIVEN.

## 2019-09-07 NOTE — GENERAL PROGRESS NOTE
Assessment/Plan


Problem List:  


(1) SOB (shortness of breath)


ICD Codes:  R06.02 - Shortness of breath


SNOMED:  606345195


(2) Cough


ICD Codes:  R05 - Cough


SNOMED:  67030541


(3) Anemia


ICD Codes:  D64.9 - Anemia, unspecified


SNOMED:  165290833


(4) CHF (congestive heart failure)


ICD Codes:  I50.9 - Heart failure, unspecified


SNOMED:  57237328


(5) MIKA (acute kidney injury)


ICD Codes:  N17.9 - Acute kidney failure, unspecified


SNOMED:  2383256, [id number]


Status:  stable, progressing


Assessment/Plan:


o2 pulm tx abx pt diet dc w hh if clear





Subjective


Constitutional:  Reports: weakness


Allergies:  


Coded Allergies:  


     No Known Allergies (Unverified , 2/14/16)


All Systems:  reviewed and negative except above


Subjective


calm in bed





Objective





Last 24 Hour Vital Signs








  Date Time  Temp Pulse Resp B/P (MAP) Pulse Ox O2 Delivery O2 Flow Rate FiO2


 


9/7/19 09:05      Room Air  


 


9/7/19 08:00 98.5 81 19 100/57 (71) 57   


 


9/7/19 04:00 98.7 73 16 106/62 (77) 96   


 


9/7/19 00:00 98.6 83 16 103/56 (72) 98   


 


9/6/19 21:00      Room Air  


 


9/6/19 20:00 98.2 83 18 106/80 (89) 94   


 


9/6/19 16:00 98.1 72 19 108/62 (77) 97   


 


9/6/19 12:00 98.7 77 17 108/57 (74) 95   

















Intake and Output  


 


 9/6/19 9/7/19





 19:00 07:00


 


Intake Total 1480 ml 120 ml


 


Balance 1480 ml 120 ml


 


  


 


Intake Oral 480 ml 120 ml


 


IV Total 1000 ml 


 


# Voids 1 1








Laboratory Tests


9/7/19 05:30: Random Vancomycin Level 10.0


Height (Feet):  5


Height (Inches):  1.00


Weight (Pounds):  92


General Appearance:  alert


EENT:  normal ENT inspection


Neck:  normal alignment


Cardiovascular:  normal peripheral pulses, normal rate, regular rhythm


Respiratory/Chest:  chest wall non-tender, lungs clear, normal breath sounds


Abdomen:  normal bowel sounds, non tender, soft


Extremities:  normal inspection


Edema:  no edema noted Arm (L), no edema noted Arm (R), no edema noted Leg (L), 

no edema noted Leg (R), no edema noted Pedal (L), no edema noted Pedal (R), no 

edema noted Generalized


Neurologic:  responsive, motor weakness


Skin:  normal pigmentation, warm/dry











Flaco Stephens DO Sep 7, 2019 10:33

## 2019-09-07 NOTE — INFECTIOUS DISEASES PROG NOTE
Assessment/Plan


Assessment/Plan





Abx:


Ceftriaxone 9/4-





Assessment:


Probable CAP


 -CXR:  Bilateral mostly interstitial disease. This likely represents acute 

interstitial infiltrates versus edema





Gram positive bacteremia- Most likely contaminant


  -9/3 Bcx 1/4 CoNS





Afebrile


No leukocytosis


  -u/a no pyuria





MIKA, improving


Hyperkalemia, SP





RA


hx of pulmonary MAC


 emphysema


osteoporosis


hx of multiple episodes of pneumonia 





Plan:


-Continue empiric Ceftriaxone #4/7 and IV Vancomycin #2 for gram positive 

bacteremia


-f/u cx


-Monitor CBC/CMP, temperatures


- f/u Sp cx


- f/u Repeat BCx





Thank you for this consultation. Will continue to follow along with you.





Subjective


Allergies:  


Coded Allergies:  


     No Known Allergies (Unverified , 2/14/16)


Subjective


Afebrile


No leukocytosis





Objective


Vital Signs





Last 24 Hour Vital Signs








  Date Time  Temp Pulse Resp B/P (MAP) Pulse Ox O2 Delivery O2 Flow Rate FiO2


 


9/7/19 09:05      Room Air  


 


9/7/19 08:00 98.5 81 19 100/57 (71) 57   


 


9/7/19 04:00 98.7 73 16 106/62 (77) 96   


 


9/7/19 00:00 98.6 83 16 103/56 (72) 98   


 


9/6/19 21:00      Room Air  


 


9/6/19 20:00 98.2 83 18 106/80 (89) 94   


 


9/6/19 16:00 98.1 72 19 108/62 (77) 97   








Height (Feet):  5


Height (Inches):  1.00


Weight (Pounds):  92


Objective


GENERAL: NAD


HEENT: NCAT, MMM, EOMI


CARDIOVASCULAR:  RRR, S1, S2


LUNGS:  CTAB, Distant and clear.


ABDOMEN:  Bowel sounds positive. Soft, NT


NEUROLOGIC:  A/O X 4 The patient moves all extremities, slightly weak.





Microbiology








 Date/Time


Source Procedure


Growth Status


 


 


 9/5/19 13:44


Urine,Clean Catch Urine Culture - Final


NO GROWTH AFTER 48 HOURS Complete











Laboratory Tests








Test


  9/7/19


05:30


 


Random Vancomycin Level 10.0 ug/mL  











Current Medications








 Medications


  (Trade)  Dose


 Ordered  Sig/Naman


 Route


 PRN Reason  Start Time


 Stop Time Status Last Admin


Dose Admin


 


 Acetaminophen


  (Tylenol)  650 mg  Q4H  PRN


 ORAL


 T>100.5  9/5/19 14:25


 10/5/19 14:24   


 


 


 Ceftriaxone


 Sodium 1 gm/


 Dextrose  55 ml @ 


 110 mls/hr  Q24H


 IVPB


   9/6/19 11:00


 9/11/19 10:59  9/7/19 11:09


 


 


 Dextrose


  (Dextrose 50%)  25 ml  Q30M  PRN


 IV


 Hypoglycemia  9/5/19 14:30


 10/3/19 06:52   


 


 


 Dextrose


  (Dextrose 50%)  50 ml  Q30M  PRN


 IV


 hypoglycemia  9/5/19 14:30


 10/3/19 06:52   


 


 


 Docusate Sodium


  (Colace)  100 mg  TWICE A  DAY


 ORAL


   9/5/19 18:00


 10/5/19 17:59  9/7/19 08:20


 


 


 Hydroxychloroquine


 Sulfate


  (Plaquenil)  200 mg  DAILY


 ORAL


   9/6/19 09:00


 10/4/19 08:59  9/7/19 08:20


 


 


 Lorazepam


  (Ativan 2mg/ml


 1ml)  0.5 mg  Q4H  PRN


 IV


 For Anxiety  9/5/19 14:26


 9/12/19 14:25   


 


 


 Morphine Sulfate


  (Morphine


 Sulfate)  1 mg  Q4H  PRN


 IVP


 PAIN 4-10  9/5/19 14:26


 9/12/19 14:25   


 


 


 Ondansetron HCl


  (Zofran)  4 mg  Q6H  PRN


 IVP


 Nausea & Vomiting  9/5/19 14:26


 10/5/19 14:25   


 


 


 Pantoprazole


  (Protonix)  40 mg  DAILY


 ORAL


   9/6/19 09:00


 10/5/19 10:29  9/7/19 08:20


 


 


 Polyethylene


 Glycol


  (Miralax)  17 gm  HSPRN  PRN


 ORAL


 Constipation  9/5/19 14:26


 10/5/19 14:25   


 


 


 Vancomycin HCl


  (Vanco rx to


 dose)  1 ea  DAILY  PRN


 MISC


 Per rx protocol  9/6/19 11:30


 10/6/19 11:29   


 


 


 Zolpidem Tartrate


  (Ambien)  5 mg  HSPRN  PRN


 ORAL


 Insomnia  9/5/19 14:27


 9/12/19 14:26   


 

















Van Song MD Sep 7, 2019 12:07

## 2019-09-07 NOTE — NEPHROLOGY PROGRESS NOTE
Assessment/Plan


Status:  stable, progressing


Assessment/Plan:


A/P





1) Renal failure Cr 2.1 down to 1.8 


2) High K resolving


3) dehydration- resolved








patient OK for Dc from renal point if Cr <1.9 and K back in normal range. BMP 

results pending





Subjective


Date patient seen:  Sep 7, 2019


Time patient seen:  12:16


ROS Limited/Unobtainable:  No


Allergies:  


Coded Allergies:  


     No Known Allergies (Unverified , 2/14/16)


Subjective


Patient feeling better





Objective





Last 24 Hour Vital Signs








  Date Time  Temp Pulse Resp B/P (MAP) Pulse Ox O2 Delivery O2 Flow Rate FiO2


 


9/7/19 09:05      Room Air  


 


9/7/19 08:00 98.5 81 19 100/57 (71) 57   


 


9/7/19 04:00 98.7 73 16 106/62 (77) 96   


 


9/7/19 00:00 98.6 83 16 103/56 (72) 98   


 


9/6/19 21:00      Room Air  


 


9/6/19 20:00 98.2 83 18 106/80 (89) 94   


 


9/6/19 16:00 98.1 72 19 108/62 (77) 97   

















Intake and Output  


 


 9/6/19 9/7/19





 19:00 07:00


 


Intake Total 1480 ml 120 ml


 


Balance 1480 ml 120 ml


 


  


 


Intake Oral 480 ml 120 ml


 


IV Total 1000 ml 


 


# Voids 1 1








Laboratory Tests


9/7/19 05:30: Random Vancomycin Level 10.0


Height (Feet):  5


Height (Inches):  1.00


Weight (Pounds):  92


General Appearance:  no apparent distress, alert


EENT:  normal ENT inspection


Neck:  normal alignment, supple


Cardiovascular:  normal rate, regular rhythm


Respiratory/Chest:  lungs clear, normal breath sounds


Abdomen:  non tender, soft


Edema:  no edema noted Arm (L), no edema noted Arm (R), no edema noted Leg (L), 

no edema noted Leg (R), no edema noted Pedal (L), no edema noted Pedal (R), no 

edema noted Generalized











Brennan Nguyen MD Sep 7, 2019 12:18

## 2019-09-09 NOTE — DISCHARGE SUMMARY
Discharge Summary


Discharge Summary


_


DATE OF ADMISSION: 9/3/2019


DATE OF DISCHARGE: 9/7/2019








DISCHARGED BY: Dr. Stephens





REASON FOR ADMISSION: 


73 years old female with past medical history of osteoporosis, rheumatoid 

arthritis,   pulmonary MAC, multiple episodes of pneumonia, presented with 

shortness of breath and cough.


Patient reported symptoms for one week.


Patient reported productive cough without fevers or chills.


The day prior to presentation to ED patient started to experience right upper 

chest wall pain while coughing, along with bending and twisting motions.  


She denied any squeezing sensation.  She denied associated shortness of breath.

  


She denied headache.  


No nausea, vomiting, diarrhea, abdominal pain.  


No dysuria or hematuria.  


Patient also reported some sinus congestion and sore throat.  


Upon evaluation in the emergency department vital signs were stable.  


Pulse oximetry was 90% on room air.  


Laboratory work-up revealed no leukocytosis, hemoglobin 9.5 , hematocrit 26.9.


Potassium 5.9.


BUN 49, creatinine 2.1.  


Stable  LFT. 


Troponin negative.  pro BNP 1738.  


Urinalysis revealed +3 protein, +5 blood,  +1 leukocyte esterase,  minimal 

pyuria and few bacteria.  


EKG revealed sinus rhythm with peaked T waves.  


Chest x-ray revealed bilateral vascular congestion without infiltrate or 

pneumothorax.  


Hyperkalemia was treated with IV calcium, insulin and dextrose.


Patient was subsequently admitted to telemetry floor for further management.


  


CONSULTANTS:


pulmonary Dr. Mustafa


ID specialist Dr. Díaz  


nephrologist Dr. Mayo


 


 


[facility] COURSE: 


Patient admitted to telemetry floor. 


Supplemental oxygen provided as needed to keep pulse oximetry above 92%.  


Pulmonary toilet  with bronchodilator provided as needed.  


Patient started on empiric antibiotic.  


Antitussive provided as needed.  


Venous duplex bilateral lower extremity revealed no evidence of acute DVT.  





ID specialist followed.  


Urine cultures were negative.  


Blood culture initially revealed Staph coagulase  negative 1 out of 4, repeated 

blood cultures were negative.  


Patient experienced no leukocytosis ,no fevers.


Initial Staph coagulase negative  bacteremia with likely contamination. 





Nephrologist followed.  


Urine studies were done.  


Renal parameters and electrolytes were closely monitored.  


Electrolytes further corrected as needed.  


Prior to discharge potassium 4.7.


Creatinine from 2.1 down to 1.7 and  BUN from 49 down to 22 with IV hydration.  





Hemoglobin and  hematocrit were closely monitored with goal to keep hemoglobin 

above 7.  


Anemia work-up was consistent with anemia of chronic disease.  


Pathology review revealed normocytic normochromic anemia with mild leukopenia.  


Stool for occult blood was negative.  CEA within normal limits.  Prior to 

discharge hemoglobin 10.1 hematocrit 30.5.


Dietary supplements implemented in patient's diet as per registered dietician 

recommendation.





Pain management was addressed as needed.


Rheumatoid factor was negative.  Patient continued on Plaquenil.  


GI prophylaxis provided. 


Supportive care provided.  


Bowel regimen instituted.  


Patient clinically stabilized.  Prior to discharge pulse oximetry stable on 

room air .


Patient clinically improved and was ready for discharge home with home health 

services.








FINAL DIAGNOSES: 


Probable community-acquired pneumonia


Purulent bronchitis


Renal failure, acute on chronic


Dehydration 


Hyperkalemia-resolved 


History of pulmonary MAC


Emphysema


Osteoporosis


History of multiple episodes of pneumonia


Severe protein calorie malnutrition


Costochondritis





DISCHARGE MEDICATIONS:


See Medication Reconciliation list.





DISCHARGE INSTRUCTIONS:


Patient was discharged home with home health services.  


Follow up with primary care provider in one week.

















I have been assigned to dictate discharge summary for this account. 


I was not involved in the patient's management.











Minal Barnes NP Sep 9, 2019 10:13

## 2019-09-13 ENCOUNTER — HOSPITAL ENCOUNTER (INPATIENT)
Dept: HOSPITAL 72 - EMR | Age: 74
LOS: 5 days | Discharge: HOME HEALTH | DRG: 682 | End: 2019-09-18
Payer: MEDICARE

## 2019-09-13 VITALS — SYSTOLIC BLOOD PRESSURE: 119 MMHG | DIASTOLIC BLOOD PRESSURE: 68 MMHG

## 2019-09-13 VITALS — DIASTOLIC BLOOD PRESSURE: 52 MMHG | SYSTOLIC BLOOD PRESSURE: 111 MMHG

## 2019-09-13 VITALS — BODY MASS INDEX: 17.27 KG/M2 | WEIGHT: 91.5 LBS | HEIGHT: 61 IN

## 2019-09-13 VITALS — SYSTOLIC BLOOD PRESSURE: 102 MMHG | DIASTOLIC BLOOD PRESSURE: 52 MMHG

## 2019-09-13 DIAGNOSIS — J18.9: ICD-10-CM

## 2019-09-13 DIAGNOSIS — M06.9: ICD-10-CM

## 2019-09-13 DIAGNOSIS — E86.0: ICD-10-CM

## 2019-09-13 DIAGNOSIS — N18.9: ICD-10-CM

## 2019-09-13 DIAGNOSIS — J41.1: ICD-10-CM

## 2019-09-13 DIAGNOSIS — D63.8: ICD-10-CM

## 2019-09-13 DIAGNOSIS — M81.0: ICD-10-CM

## 2019-09-13 DIAGNOSIS — N17.9: Primary | ICD-10-CM

## 2019-09-13 DIAGNOSIS — R62.7: ICD-10-CM

## 2019-09-13 DIAGNOSIS — E46: ICD-10-CM

## 2019-09-13 DIAGNOSIS — I50.9: ICD-10-CM

## 2019-09-13 DIAGNOSIS — K52.9: ICD-10-CM

## 2019-09-13 LAB
ADD MANUAL DIFF: NO
ALBUMIN SERPL-MCNC: 2.9 G/DL (ref 3.4–5)
ALBUMIN/GLOB SERPL: 0.5 {RATIO} (ref 1–2.7)
ALP SERPL-CCNC: 84 U/L (ref 46–116)
ALT SERPL-CCNC: 11 U/L (ref 12–78)
ANION GAP SERPL CALC-SCNC: 10 MMOL/L (ref 5–15)
APPEARANCE UR: CLEAR
APTT PPP: (no result) S
AST SERPL-CCNC: 18 U/L (ref 15–37)
BASOPHILS NFR BLD AUTO: 0.6 % (ref 0–2)
BILIRUB SERPL-MCNC: 0.3 MG/DL (ref 0.2–1)
BUN SERPL-MCNC: 58 MG/DL (ref 7–18)
CALCIUM SERPL-MCNC: 8.8 MG/DL (ref 8.5–10.1)
CHLORIDE SERPL-SCNC: 105 MMOL/L (ref 98–107)
CO2 SERPL-SCNC: 22 MMOL/L (ref 21–32)
CREAT SERPL-MCNC: 2.2 MG/DL (ref 0.55–1.3)
EOSINOPHIL NFR BLD AUTO: 0.8 % (ref 0–3)
ERYTHROCYTE [DISTWIDTH] IN BLOOD BY AUTOMATED COUNT: 11.8 % (ref 11.6–14.8)
GLOBULIN SER-MCNC: 5.8 G/DL
GLUCOSE UR STRIP-MCNC: NEGATIVE MG/DL
HCT VFR BLD CALC: 30.5 % (ref 37–47)
HGB BLD-MCNC: 10.2 G/DL (ref 12–16)
KETONES UR QL STRIP: NEGATIVE
LEUKOCYTE ESTERASE UR QL STRIP: (no result)
LYMPHOCYTES NFR BLD AUTO: 15.4 % (ref 20–45)
MCV RBC AUTO: 93 FL (ref 80–99)
MONOCYTES NFR BLD AUTO: 7 % (ref 1–10)
NEUTROPHILS NFR BLD AUTO: 76.2 % (ref 45–75)
NITRITE UR QL STRIP: NEGATIVE
PH UR STRIP: 6 [PH] (ref 4.5–8)
PHOSPHATE SERPL-MCNC: 4.4 MG/DL (ref 2.5–4.9)
PLATELET # BLD: 369 K/UL (ref 150–450)
POTASSIUM SERPL-SCNC: 5.1 MMOL/L (ref 3.5–5.1)
PROT UR QL STRIP: (no result)
RBC # BLD AUTO: 3.3 M/UL (ref 4.2–5.4)
SODIUM SERPL-SCNC: 137 MMOL/L (ref 136–145)
SP GR UR STRIP: 1.01 (ref 1–1.03)
UROBILINOGEN UR-MCNC: NORMAL MG/DL (ref 0–1)
WBC # BLD AUTO: 6.2 K/UL (ref 4.8–10.8)

## 2019-09-13 PROCEDURE — 81003 URINALYSIS AUTO W/O SCOPE: CPT

## 2019-09-13 PROCEDURE — 97802 MEDICAL NUTRITION INDIV IN: CPT

## 2019-09-13 PROCEDURE — 83605 ASSAY OF LACTIC ACID: CPT

## 2019-09-13 PROCEDURE — 85007 BL SMEAR W/DIFF WBC COUNT: CPT

## 2019-09-13 PROCEDURE — 82607 VITAMIN B-12: CPT

## 2019-09-13 PROCEDURE — 83540 ASSAY OF IRON: CPT

## 2019-09-13 PROCEDURE — 74176 CT ABD & PELVIS W/O CONTRAST: CPT

## 2019-09-13 PROCEDURE — 84100 ASSAY OF PHOSPHORUS: CPT

## 2019-09-13 PROCEDURE — 84443 ASSAY THYROID STIM HORMONE: CPT

## 2019-09-13 PROCEDURE — 99285 EMERGENCY DEPT VISIT HI MDM: CPT

## 2019-09-13 PROCEDURE — 96361 HYDRATE IV INFUSION ADD-ON: CPT

## 2019-09-13 PROCEDURE — 82746 ASSAY OF FOLIC ACID SERUM: CPT

## 2019-09-13 PROCEDURE — 83880 ASSAY OF NATRIURETIC PEPTIDE: CPT

## 2019-09-13 PROCEDURE — 82306 VITAMIN D 25 HYDROXY: CPT

## 2019-09-13 PROCEDURE — 85025 COMPLETE CBC W/AUTO DIFF WBC: CPT

## 2019-09-13 PROCEDURE — 82378 CARCINOEMBRYONIC ANTIGEN: CPT

## 2019-09-13 PROCEDURE — 36415 COLL VENOUS BLD VENIPUNCTURE: CPT

## 2019-09-13 PROCEDURE — 84165 PROTEIN E-PHORESIS SERUM: CPT

## 2019-09-13 PROCEDURE — 82728 ASSAY OF FERRITIN: CPT

## 2019-09-13 PROCEDURE — 83550 IRON BINDING TEST: CPT

## 2019-09-13 PROCEDURE — 83735 ASSAY OF MAGNESIUM: CPT

## 2019-09-13 PROCEDURE — 93970 EXTREMITY STUDY: CPT

## 2019-09-13 PROCEDURE — 86334 IMMUNOFIX E-PHORESIS SERUM: CPT

## 2019-09-13 PROCEDURE — 87081 CULTURE SCREEN ONLY: CPT

## 2019-09-13 PROCEDURE — 82784 ASSAY IGA/IGD/IGG/IGM EACH: CPT

## 2019-09-13 PROCEDURE — 96374 THER/PROPH/DIAG INJ IV PUSH: CPT

## 2019-09-13 PROCEDURE — 80061 LIPID PANEL: CPT

## 2019-09-13 PROCEDURE — 80048 BASIC METABOLIC PNL TOTAL CA: CPT

## 2019-09-13 PROCEDURE — 84484 ASSAY OF TROPONIN QUANT: CPT

## 2019-09-13 PROCEDURE — 82270 OCCULT BLOOD FECES: CPT

## 2019-09-13 PROCEDURE — 80053 COMPREHEN METABOLIC PANEL: CPT

## 2019-09-13 NOTE — EMERGENCY ROOM REPORT
History of Present Illness


General


Chief Complaint:  Generalized Weakness


Source:  Patient





Present Illness


HPI


Disclaimer: Please note that this report is being documented using DRAGON 

technology. This can lead to erroneous entry secondary to incorrect 

interpretation by the dictating instrument.





HPI: 73-year-old female with a history of rheumatoid arthritis and former 

pulmonary MAC presents for evaluation of weakness and headache.  Patient was 

recently discharged from this hospital 5 days ago after an acute kidney injury 

was treated.  She has not been eating well since discharge things as she has no 

appetite.  He has been feeling weak over the past few days and today developed 

right-sided flank pain and right lower quadrant pain.  She denies pain with 

urination, hematuria, changes in bowel habits, diarrhea.  Denies any upper 

abdominal pain or discomfort.  No injury.  She notes a generalized headache 

that is a 3/10 intensity without visual changes, no photophobia, denies neck 

pain, denies rash, denies fevers.  Brought in for evaluation of lightheadedness 

and generalized fatigue


 


PMH: Mycobacterium avium complex infection, pneumonia, RA


 


PSH: See chart


 


Allergies: Denies


 


Social Hx: Denies tobacco, drug or alcohol use


Allergies:  


Coded Allergies:  


     No Known Allergies (Unverified , 2/14/16)





Patient History


Last Menstrual Period:  n/a





Nursing Documentation-PMH


Past Medical History:  No History, Except For


Hx Cardiac Problems:  No


Hx Cancer:  No


Hx Gastrointestinal Problems:  Yes


Hx Neurological Problems:  Yes - OSTEOPOROSIS LOWER BACK, RA





Review of Systems


All Other Systems:  negative except mentioned in HPI





Physical Exam





Vital Signs








  Date Time  Temp Pulse Resp B/P (MAP) Pulse Ox O2 Delivery O2 Flow Rate FiO2


 


9/13/19 15:54 98.6 95 18 103/57 (72) 95 Room Air  





 





General: Awake and alert, no acute distress


HEENT: NC/AT. EOMI. PERRLA.  Sunken eyes, dry mucous membranes


Cardiovascular: RRR, borderline tachycardia.  S1 and S2 normal.  No murmur 

appreciated


Resp: Normal work of breathing. No cough, wheezing or crackles appreciated


Abdomen: Abdomen is soft, nondistended.  Generally nontender.  Very mild 

tenderness in the right lower quadrant on very deep palpation.  No mass 

appreciated.  No rebound.


Skin: Intact.  No abrasions, laceration or rash over the exposed skin


MSK: Decreased bulk, frail appearing. Moving all extremities.  No obvious 

deformity.


Neuro: Awake and alert.  Mentating appropriately.  


Back/Spine: No midline tenderness in the cervical spine.  No limitation on 

range of motion flexion extension.  There is right-sided CVA tenderness.





Medical Decision Making


Diagnostic Impression:  


 Primary Impression:  


 MIKA (acute kidney injury)


 Additional Impressions:  


 Episode of generalized weakness


 Enteritis


ER Course


Is a 73-year-old female presenting for evaluation of weakness since her 

discharge from the hospital 5 days ago.  Differential includes but is not 

limited to dehydration, malnutrition, ACS, infection, sepsis, urinary tract 

infection, nephrolithiasis.  We will start broad metabolic and infectious work-

up.  She may require readmission





Laboratory Tests








Test


  9/13/19


16:25


 


White Blood Count


  6.2 K/UL


(4.8-10.8)


 


Red Blood Count


  3.30 M/UL


(4.20-5.40)  L


 


Hemoglobin


  10.2 G/DL


(12.0-16.0)  L


 


Hematocrit


  30.5 %


(37.0-47.0)  L


 


Mean Corpuscular Volume 93 FL (80-99)  


 


Mean Corpuscular Hemoglobin


  30.9 PG


(27.0-31.0)


 


Mean Corpuscular Hemoglobin


Concent 33.3 G/DL


(32.0-36.0)


 


Red Cell Distribution Width


  11.8 %


(11.6-14.8)


 


Platelet Count


  369 K/UL


(150-450)


 


Mean Platelet Volume


  4.8 FL


(6.5-10.1)  L


 


Neutrophils (%) (Auto)


  76.2 %


(45.0-75.0)  H


 


Lymphocytes (%) (Auto)


  15.4 %


(20.0-45.0)  L


 


Monocytes (%) (Auto)


  7.0 %


(1.0-10.0)


 


Eosinophils (%) (Auto)


  0.8 %


(0.0-3.0)


 


Basophils (%) (Auto)


  0.6 %


(0.0-2.0)


 


Urine Color Pale yellow  


 


Urine Appearance Clear  


 


Urine pH 6 (4.5-8.0)  


 


Urine Specific Gravity


  1.010


(1.005-1.035)


 


Urine Protein


  2+ (NEGATIVE)


H


 


Urine Glucose (UA)


  Negative


(NEGATIVE)


 


Urine Ketones


  Negative


(NEGATIVE)


 


Urine Blood


  5+ (NEGATIVE)


H


 


Urine Nitrite


  Negative


(NEGATIVE)


 


Urine Bilirubin


  Negative


(NEGATIVE)


 


Urine Urobilinogen


  Normal MG/DL


(0.0-1.0)


 


Urine Leukocyte Esterase


  1+ (NEGATIVE)


H


 


Urine RBC


  5-10 /HPF (0 -


2)  H


 


Urine WBC


  2-4 /HPF (0 -


2)


 


Urine Squamous Epithelial


Cells Few /LPF


(NONE/OCC)


 


Urine Bacteria


  Few /HPF


(NONE)


 


Sodium Level


  137 MMOL/L


(136-145)


 


Potassium Level


  5.1 MMOL/L


(3.5-5.1)


 


Chloride Level


  105 MMOL/L


()


 


Carbon Dioxide Level


  22 MMOL/L


(21-32)


 


Anion Gap


  10 mmol/L


(5-15)


 


Blood Urea Nitrogen


  58 mg/dL


(7-18)  H


 


Creatinine


  2.2 MG/DL


(0.55-1.30)  H


 


Estimate Glomerular


Filtration Rate  mL/min (>60)  


 


 


Glucose Level


  133 MG/DL


()  H


 


Calcium Level


  8.8 MG/DL


(8.5-10.1)


 


Phosphorus Level


  4.4 MG/DL


(2.5-4.9)


 


Magnesium Level


  2.3 MG/DL


(1.8-2.4)


 


Total Bilirubin


  0.3 MG/DL


(0.2-1.0)


 


Aspartate Amino Transferase


(AST) 18 U/L (15-37)


 


 


Alanine Aminotransferase (ALT)


  11 U/L (12-78)


L


 


Alkaline Phosphatase


  84 U/L


()


 


Troponin I


  0.000 ng/mL


(0.000-0.056)


 


Pro-B-Type Natriuretic Peptide


  1401 pg/mL


(0-125)  H


 


Total Protein


  8.7 G/DL


(6.4-8.2)  H


 


Albumin


  2.9 G/DL


(3.4-5.0)  L


 


Globulin 5.8 g/dL  


 


Albumin/Globulin Ratio


  0.5 (1.0-2.7)


L








Reevaluation Time:  19:20





Last Vital Signs








  Date Time  Temp Pulse Resp B/P (MAP) Pulse Ox O2 Delivery O2 Flow Rate FiO2


 


9/13/19 16:13 98.6 78 19 111/52 99 Room Air  








Reevaluation Impression


CT scan does not show any evidence of nephrolithiasis but does show possible 

enteritis versus ileus.  The patient's labs show an acute kidney injury again 

with an elevated creatinine of 2.2 and elevated BUN.  Patient will require 

readmission.  Stable for the medical floor.


Disposition:  ADMITTED AS INPATIENT


Condition:  Serious











Ga Mccord MD Sep 13, 2019 16:19

## 2019-09-13 NOTE — NUR
ED Nurse Note:



Recieved report from am nurse to resume care, pt in bed awake, alert and 
orientd x 4, pt daughter at bedside, on cardiac monitoring, saline lock intact 
and patent, pt waiting for room for admission, pt states no pain at rest only 
during urination, will resume care as ordered and continue to closely monitor.

## 2019-09-13 NOTE — NUR
NURSE NOTES:

Admission orders received from Dr Mustafa,orders acknowledged. Pt is calm in bed. No acute 
distress noted. Orders will be carried out.

## 2019-09-13 NOTE — NUR
NURSE NOTES:

Admitted 73 year old female patient from ER Dx with acute kidney injury, transported via 
gurney in stable condition under Dr Stephens.Report received from [facility]. Patient is 
alert, oriented, ambulatory and Brazilian speaking. No acute distress noted.Vitas stable. Pt's 
son by bedside. Denies pain at this moment.Patient is full code. IV on right upper arm 
patent and intact. Introduced patient to the unit. Admission assessment done. Skin is 
intact.Patient has no belongings. Patient's son to take the belongings to home. Board 
updated. Bed in low and locked position. Call light within reach.Bed alarm on. Dr. Stephens 
will be called for admitting orders. Pt will be monitored.

## 2019-09-13 NOTE — NUR
ED Nurse Note:



Pt being admitted to hospital, report called to floor nurse RusselRN, pt 
daughter at bedside, pt denies pain, iv site patent, nad noted during pt 
transport to floor bed for admisison via gurney with -tech.

## 2019-09-13 NOTE — NUR
ED Nurse Note:

Patient walked into ED from home accompanied by her daughter due to weakness 
that started around 1300 today. daughter reports patient has been losing 
appetite these days. patient is alert awake x4 ambulatory steady gait, 
breathing unlabored and even. Assisted patient into hospital gown and placed on 
a cardiac monitor. 



patient c/o no pain except slight headache at this time.

## 2019-09-13 NOTE — NUR
NURSE NOTES:

Pt is in bed, awake and alert. Vitals stable. No acute distress noted. Pt's son by bedside. 
No pain at this time. Admitting orders received from Dr. Mustafa. Orders will be carried 
out. Fall precaution in place. 



Trainee Tia Powers RN will be assisting in the care.

## 2019-09-13 NOTE — DIAGNOSTIC IMAGING REPORT
EXAM:

  CT Abdomen and Pelvis Without Intravenous Contrast

 

CLINICAL HISTORY:

  ABD PAIN

 

TECHNIQUE:

  Axial computed tomography images of the abdomen and pelvis without 

intravenous contrast.  CTDI is 9.54 mGy and DLP is 437.75 mGy-cm.  One or 

more of the following dose reduction techniques were used: automated 

exposure control, adjustment of the mA and or kV according to patient 

size, use of iterative reconstruction technique.

 

COMPARISON:

  None

 

FINDINGS:

  Evaluation of solid organs somewhat limited without IV contrast.

 

  Liver:  No focal lesion.  Punctate calcification in the right liver.

 

  Spleen: No focal lesion.

 

  Gallbladder: No stones or biliary dilatation.

 

  Pancreas: No acute inflammation. No mass.

 

  Adrenal glands:  No mass.

 

  Kidneys: Normal. No hydronephrosis or stone. No mass.

 

  Bowel: Fluid and gas-filled small bowel loops are nonspecific but may 

represent enteritis or ileus in the appropriate clinical setting.  Normal 

appendix. No bowel obstruction or inflammation.

 

  Urinary bladder:  No wall thickening or mass.

 

  Reproductive organs: Unremarkable.

 

  Muscles: No mass.

 

  Subcutaneous tissues: Small fat-containing ventral hernia.

 

  Peritoneal space: Very trace amount of fluid in the posterior pelvis.

 

  Lymph nodes:  No lymphadenopathy.

 

  Vessels: No aneurysm.

 

  Bones: Grade 1 anterolisthesis of L4 on L5 and L5 on S1.  Osteopenia. 

No acute fracture or bony lesion.

 

  Lung bases: Bronchial wall thickening is concerning for bronchitis.  

Patchy groundglass opacities and densities in the lower lungs is 

concerning for an infectious inflammatory process.

 

IMPRESSION:   

1.  Fluid and gas-filled small bowel loops are nonspecific but may 

represent enteritis or ileus in the appropriate clinical setting.

2.  Bronchial wall thickening is concerning for bronchitis.  Patchy 

groundglass opacities and densities in the lower lungs is concerning for 

an infectious inflammatory process.

## 2019-09-14 VITALS — SYSTOLIC BLOOD PRESSURE: 95 MMHG | DIASTOLIC BLOOD PRESSURE: 54 MMHG

## 2019-09-14 VITALS — SYSTOLIC BLOOD PRESSURE: 105 MMHG | DIASTOLIC BLOOD PRESSURE: 64 MMHG

## 2019-09-14 VITALS — SYSTOLIC BLOOD PRESSURE: 115 MMHG | DIASTOLIC BLOOD PRESSURE: 53 MMHG

## 2019-09-14 VITALS — SYSTOLIC BLOOD PRESSURE: 110 MMHG | DIASTOLIC BLOOD PRESSURE: 61 MMHG

## 2019-09-14 VITALS — DIASTOLIC BLOOD PRESSURE: 55 MMHG | SYSTOLIC BLOOD PRESSURE: 102 MMHG

## 2019-09-14 VITALS — SYSTOLIC BLOOD PRESSURE: 112 MMHG | DIASTOLIC BLOOD PRESSURE: 57 MMHG

## 2019-09-14 LAB
ADD MANUAL DIFF: YES
ALBUMIN SERPL-MCNC: 2.2 G/DL (ref 3.4–5)
ALBUMIN/GLOB SERPL: 0.5 {RATIO} (ref 1–2.7)
ALP SERPL-CCNC: 62 U/L (ref 46–116)
ALT SERPL-CCNC: 11 U/L (ref 12–78)
ANION GAP SERPL CALC-SCNC: 8 MMOL/L (ref 5–15)
AST SERPL-CCNC: 17 U/L (ref 15–37)
BILIRUB SERPL-MCNC: 0.3 MG/DL (ref 0.2–1)
BUN SERPL-MCNC: 49 MG/DL (ref 7–18)
CALCIUM SERPL-MCNC: 8 MG/DL (ref 8.5–10.1)
CHLORIDE SERPL-SCNC: 112 MMOL/L (ref 98–107)
CHOLEST SERPL-MCNC: 100 MG/DL (ref ?–200)
CO2 SERPL-SCNC: 22 MMOL/L (ref 21–32)
CREAT SERPL-MCNC: 1.9 MG/DL (ref 0.55–1.3)
ERYTHROCYTE [DISTWIDTH] IN BLOOD BY AUTOMATED COUNT: 11.8 % (ref 11.6–14.8)
GLOBULIN SER-MCNC: 4.8 G/DL
HCT VFR BLD CALC: 25.8 % (ref 37–47)
HDLC SERPL-MCNC: 27 MG/DL (ref 40–60)
HGB BLD-MCNC: 8.6 G/DL (ref 12–16)
MCV RBC AUTO: 93 FL (ref 80–99)
PLATELET # BLD: 340 K/UL (ref 150–450)
POTASSIUM SERPL-SCNC: 4.8 MMOL/L (ref 3.5–5.1)
RBC # BLD AUTO: 2.79 M/UL (ref 4.2–5.4)
SODIUM SERPL-SCNC: 142 MMOL/L (ref 136–145)
TRIGL SERPL-MCNC: 80 MG/DL (ref 30–150)
WBC # BLD AUTO: 3.3 K/UL (ref 4.8–10.8)

## 2019-09-14 NOTE — NUR
DISCHARGE PLANNING: NOTE 



CLINICALS FAXED TO [facility] 



T: [phone]

F: [phone]



PT EVAL PENDING

## 2019-09-14 NOTE — NUR
NURSE NOTES: Pt awake/alert in bed, breathing easily on room air, denies SOB and denies pain 
at this time. Vital signs stable. IV access left forearm flushed with 10 ml NS and locked. 
Son is rooming in. Bed left in low position, side rails up x 2 and call light left near p's 
hand.

## 2019-09-14 NOTE — CONSULTATION
History of Present Illness


General


Date patient seen:  Sep 14, 2019


Time patient seen:  09:00


Chief Complaint:  weakness


Referring physician:  Dr [email or web address]


Reason for Consultation:  hospitalist





Present Illness


HPI


73 years old female with PMH of rheumatoid arthritis, pulmonary MAC, multidrug-

resistant pneumonia, osteoporosis, recent hospitalization for acute kidney 

injury, was brought back by her family for evaluation.  


Patient apparently did not feel well,  had a headache,   right-sided flank pain

  and right lower quadrant  abdominal pain.


Patient reported intermittent dry cough no wheezing, no hemoptysis.


No fever no chills,.


No shortness of breath, no chest pain.


No dizziness, no blackouts. 


Patient reported poor appetite.  Patient admitted to nausea, but no vomiting, 

no diarrhea.  


Upon evaluation vital signs were stable.


Laboratory work-up revealed no leukocytosis, hemoglobin 10.2, hematocrit 30.5, 

platelet count 369.


Urinalysis revealed +2 protein, +1 leukocyte esterase.  


Stable electrolytes.


BUN 58, Creatinine 2.2.


Stable LFT.


Troponin negative.  pro BNP  142.


Albumin 2.9.


CT  scan of abdomen revealed evidence of fluid and gas filled small bowel loops 

, nonspecific , possibly representing enteritis or ileus.  


Bronchial wall thickening , concerning for bronchitis.  Patchy groundglass 

opacity  and density in the lower lobe , concerning for infectious inflammatory 

process.


Allergies:  


Coded Allergies:  


     No Known Allergies (Unverified , 2/14/16)





Medication History


Scheduled


Alendronate Sodium* (Fosamax*), Unknown Dose ORAL DAILY, (Reported)


Aspirin* (Aspir 81*), 81 MG ORAL DAILY, (Reported)





Miscellaneous Medications


Calcium Carbonate (Calcium), 500 MG PO, (Reported)


Hydroxychloroquine Sulfate (Hydroxychloroquine Sulfate), 200 MG PO, (Reported)


Ibuprofen* (Advil*), Unknown Dose ORAL, (Reported)





Discontinued Medications


Methylprednisolone (Methylprednisolone*), 4 MG ORAL AS DIRECTED, (Reported)


   Discontinued Reason: MD discontinued med





Patient History


Healthcare decision maker





Resuscitation status


Full Code


Advanced Directive on File








Past Medical/Surgical History


Past Medical/Surgical History:  


(1) Anemia


(2) CHF (congestive heart failure)


(3) Dehydration


(4) Purulent bronchitis


(5) LESLY (mycobacterium avium-intracellulare)


(6) Severe protein-calorie malnutrition


(7) MIKA (acute kidney injury)


(8) Emphysema lung





Review of Systems


Constitutional:  Reports: weakness


Eye:  Reports: no symptoms


ENT:  Reports: no symptoms


Respiratory:  Reports: cough - dry, no hemoptysis, no wheezing


Cardiovascular:  Reports: no symptoms


Gastrointestinal:  Reports: other - abd pain, nausea, no vomiting, no diarrhea


Genitourinary:  Reports: other - R flank pain


Skin:  Reports: other - hx of RA


Psychiatric:  Reports: no symptoms


Neurological:  Reports: no symptoms


Endocrine:  Reports: no symptoms


Hematologic/Lymphatic:  Reports: anemia





Physical Exam


General Appearance:  no apparent distress, cachetic, alert oriented x3


Lines, tubes and drains:  peripheral


HEENT:  normocephalic, atraumatic, anicteric, mucous membranes moist, PERRL


Neck:  non-tender, supple


Respiratory/Chest:  lungs clear - with moderate air exchange , no respiratory 

distress, no accessory muscle use


Cardiovascular/Chest:  normal rate, no JVD


Abdomen:  normal bowel sounds, soft


Extremities:  normal range of motion, non-tender, no calf tenderness, normal 

capillary refill


Skin Exam:  normal pigmentation, warm/dry


Neurologic:  no motor/sensory deficits, alert, oriented x 3, responsive


Musculoskeletal:  normal muscle bulk





Last 24 Hour Vital Signs








  Date Time  Temp Pulse Resp B/P (MAP) Pulse Ox O2 Delivery O2 Flow Rate FiO2


 


9/14/19 08:06      Room Air  


 


9/14/19 04:00 97.8 55 20 112/57 (75) 96   


 


9/14/19 00:32      Room Air  


 


9/14/19 00:00 98.0 68 16 105/64 (78) 96   


 


9/13/19 22:14 97.6 69 18 102/52 (69) 96   


 


9/13/19 21:20 98.6 92 20 119/68 97 Room Air  


 


9/13/19 19:07 98.6 92 20 119/68 97 Room Air  


 


9/13/19 18:07 98.6       


 


9/13/19 16:35  78 19   Room Air  


 


9/13/19 16:13 98.6 78 19 111/52 99 Room Air  


 


9/13/19 15:54 98.6 95 18 103/57 (72) 95 Room Air  











Laboratory Tests








Test


  9/13/19


16:25 9/14/19


06:00


 


White Blood Count


  6.2 K/UL


(4.8-10.8) 3.3 K/UL


(4.8-10.8)  L


 


Red Blood Count


  3.30 M/UL


(4.20-5.40)  L 2.79 M/UL


(4.20-5.40)  L


 


Hemoglobin


  10.2 G/DL


(12.0-16.0)  L 8.6 G/DL


(12.0-16.0)  L


 


Hematocrit


  30.5 %


(37.0-47.0)  L 25.8 %


(37.0-47.0)  L


 


Mean Corpuscular Volume 93 FL (80-99)   93 FL (80-99)  


 


Mean Corpuscular Hemoglobin


  30.9 PG


(27.0-31.0) 30.9 PG


(27.0-31.0)


 


Mean Corpuscular Hemoglobin


Concent 33.3 G/DL


(32.0-36.0) 33.4 G/DL


(32.0-36.0)


 


Red Cell Distribution Width


  11.8 %


(11.6-14.8) 11.8 %


(11.6-14.8)


 


Platelet Count


  369 K/UL


(150-450) 340 K/UL


(150-450)


 


Mean Platelet Volume


  4.8 FL


(6.5-10.1)  L 5.0 FL


(6.5-10.1)  L


 


Neutrophils (%) (Auto)


  76.2 %


(45.0-75.0)  H % (45.0-75.0)


 


 


Lymphocytes (%) (Auto)


  15.4 %


(20.0-45.0)  L % (20.0-45.0)


 


 


Monocytes (%) (Auto)


  7.0 %


(1.0-10.0)  % (1.0-10.0)  


 


 


Eosinophils (%) (Auto)


  0.8 %


(0.0-3.0)  % (0.0-3.0)  


 


 


Basophils (%) (Auto)


  0.6 %


(0.0-2.0)  % (0.0-2.0)  


 


 


Urine Color Pale yellow   


 


Urine Appearance Clear   


 


Urine pH 6 (4.5-8.0)   


 


Urine Specific Gravity


  1.010


(1.005-1.035) 


 


 


Urine Protein


  2+ (NEGATIVE)


H 


 


 


Urine Glucose (UA)


  Negative


(NEGATIVE) 


 


 


Urine Ketones


  Negative


(NEGATIVE) 


 


 


Urine Blood


  5+ (NEGATIVE)


H 


 


 


Urine Nitrite


  Negative


(NEGATIVE) 


 


 


Urine Bilirubin


  Negative


(NEGATIVE) 


 


 


Urine Urobilinogen


  Normal MG/DL


(0.0-1.0) 


 


 


Urine Leukocyte Esterase


  1+ (NEGATIVE)


H 


 


 


Urine RBC


  5-10 /HPF (0 -


2)  H 


 


 


Urine WBC


  2-4 /HPF (0 -


2) 


 


 


Urine Squamous Epithelial


Cells Few /LPF


(NONE/OCC) 


 


 


Urine Bacteria


  Few /HPF


(NONE) 


 


 


Sodium Level


  137 MMOL/L


(136-145) 142 MMOL/L


(136-145)


 


Potassium Level


  5.1 MMOL/L


(3.5-5.1) 4.8 MMOL/L


(3.5-5.1)


 


Chloride Level


  105 MMOL/L


() 112 MMOL/L


()  H


 


Carbon Dioxide Level


  22 MMOL/L


(21-32) 22 MMOL/L


(21-32)


 


Anion Gap


  10 mmol/L


(5-15) 8 mmol/L


(5-15)


 


Blood Urea Nitrogen


  58 mg/dL


(7-18)  H 49 mg/dL


(7-18)  H


 


Creatinine


  2.2 MG/DL


(0.55-1.30)  H 1.9 MG/DL


(0.55-1.30)  H


 


Estimat Glomerular Filtration


Rate  mL/min (>60)  


   mL/min (>60)  


 


 


Glucose Level


  133 MG/DL


()  H 89 MG/DL


()


 


Calcium Level


  8.8 MG/DL


(8.5-10.1) 8.0 MG/DL


(8.5-10.1)  L


 


Phosphorus Level


  4.4 MG/DL


(2.5-4.9) 


 


 


Magnesium Level


  2.3 MG/DL


(1.8-2.4) 


 


 


Total Bilirubin


  0.3 MG/DL


(0.2-1.0) 0.3 MG/DL


(0.2-1.0)


 


Aspartate Amino Transf


(AST/SGOT) 18 U/L (15-37)


  17 U/L (15-37)


 


 


Alanine Aminotransferase


(ALT/SGPT) 11 U/L (12-78)


L 11 U/L (12-78)


L


 


Alkaline Phosphatase


  84 U/L


() 62 U/L


()


 


Troponin I


  0.000 ng/mL


(0.000-0.056) 


 


 


Pro-B-Type Natriuretic Peptide


  1401 pg/mL


(0-125)  H 


 


 


Total Protein


  8.7 G/DL


(6.4-8.2)  H 7.0 G/DL


(6.4-8.2)


 


Albumin


  2.9 G/DL


(3.4-5.0)  L 2.2 G/DL


(3.4-5.0)  L


 


Globulin 5.8 g/dL   4.8 g/dL  


 


Albumin/Globulin Ratio


  0.5 (1.0-2.7)


L 0.5 (1.0-2.7)


L


 


Differential Total Cells


Counted 


  100  


 


 


Neutrophils % (Manual)  64 % (45-75)  


 


Lymphocytes % (Manual)  26 % (20-45)  


 


Monocytes % (Manual)  7 % (1-10)  


 


Eosinophils % (Manual)  3 % (0-3)  


 


Basophils % (Manual)  0 % (0-2)  


 


Band Neutrophils  0 % (0-8)  


 


Platelet Estimate  Adequate  


 


Platelet Morphology  Normal  


 


Red Blood Cell Morphology  Normal  


 


Triglycerides Level


  


  80 MG/DL


()


 


Cholesterol Level


  


  100 MG/DL (<


200)


 


LDL Cholesterol


  


  62 mg/dL


(<100)


 


HDL Cholesterol


  


  27 MG/DL


(40-60)  L


 


Cholesterol/HDL Ratio  3.7 (3.3-4.4)  


 


Thyroid Stimulating Hormone


(TSH) 


  3.266 uiU/mL


(0.358-3.740)











Microbiology








 Date/Time


Source Procedure


Growth Status


 


 


 9/13/19 19:05


Rectum  Received








Height (Feet):  5


Height (Inches):  1.00


Weight (Pounds):  85


Medications





Current Medications








 Medications


  (Trade)  Dose


 Ordered  Sig/Naman


 Route


 PRN Reason  Start Time


 Stop Time Status Last Admin


Dose Admin


 


 Acetaminophen


  (Tylenol)  650 mg  Q4H  PRN


 ORAL


 fever  9/13/19 23:15


 10/13/19 23:14   


 


 


 Dextrose


  (Dextrose 50%)  25 ml  Q30M  PRN


 IV


 Hypoglycemia  9/13/19 23:15


 10/13/19 23:14   


 


 


 Dextrose


  (Dextrose 50%)  50 ml  Q30M  PRN


 IV


 Hypoglycemia  9/13/19 23:15


 10/13/19 23:14   


 


 


 Lorazepam


  (Ativan 2mg/ml


 1ml)  0.5 mg  Q4H  PRN


 IV


 For Anxiety  9/13/19 23:15


 9/20/19 23:14   


 


 


 Morphine Sulfate


  (Morphine


 Sulfate)  1 mg  Q4H  PRN


 IVP


 For Pain  9/13/19 23:15


 9/20/19 23:14   


 


 


 Ondansetron HCl


  (Zofran)  4 mg  Q6H  PRN


 IVP


 Nausea & Vomiting  9/13/19 23:15


 10/13/19 23:14   


 


 


 Polyethylene


 Glycol


  (Miralax)  17 gm  HSPRN  PRN


 ORAL


 Constipation  9/13/19 23:15


 10/13/19 23:14   


 


 


 Zolpidem Tartrate


  (Ambien)  5 mg  HSPRN  PRN


 ORAL


 Insomnia  9/13/19 23:15


 9/20/19 23:14   


 











Assessment/Plan


Assessment/Plan:


ASSESSMENT


MIKA  


bronchitis


abdominal pain and nausea, possibly enteritis ( by CT scan) 


anemia 


RA 


protein calorie malnutrition 





PLAN OF CARE


IVF


monitor renal parameters, lytes, correct lytes prn, creat slowly trended down,


Venous Duplex 


O2 HHN prn  


a/tussive prn   


doubt PNA, giving no fever, no leukocytosis, no SOB, 


likely bronchitis superimposed on chronic lung disease


pain management 


symptomatic care  


bowel regimen  


anemia w/up


monitor HH with goal to keep Hgb abiove 7  


GI specialist follows


outpt colonoscopy/screening -never had one    


dietary eval 











case discussed and evaluated by supervising physician











Minal Barnes NP Sep 14, 2019 09:21

## 2019-09-14 NOTE — NUR
CASE MANAGEMENT: INITIAL REVIEW 



72 YO F PRESENTED TO OUR ED FROM HOME 



CC: GEN WEAKNESS. 



PMHx: PNA. RA. 



SI:ACUTE KIDNEY INJURY. 

T 98.6 HR 95 RR 18 B/P 103/57 SATS 95% ON RA 

BUN 58 CR 2.2  ALT 11 BNP 1401 



IS: NS BOLUS X2

TORADOL IV X1 



**PATIENT ADMITTED TO MED/SURG 9/13/2019 @ 2051****



DCP: PATIENT TO BE DISCHARGED TO HOME ONCE MEDICALLY CLEARED. 



PLAN OF CARE: 

GI CONSULT 

-------------------------------------------------------------------------------

Addendum: 09/14/19 at 1828 by Merissa Chery CM

-------------------------------------------------------------------------------

INTERQUAL MET

## 2019-09-14 NOTE — CONSULTATION
DATE OF CONSULTATION:  09/14/2019

CONSULTING PHYSICIAN:  Brennan Nguyen M.D.



REASON FOR CONSULTATION:

1. Acute kidney injury.

2. Dehydration.



HISTORY OF PRESENT ILLNESS:  The patient is a 73-year-old female admitted

overnight complaining of generalized weakness and not feeling well.  She

was found to have elevated creatinine 2.2.  The patient states she has not

been eating or drinking well with a poor appetite and feeling fatigued.

She was hydrated overnight.  Creatinine currently 1.9.



PAST MEDICAL HISTORY:

1. Mycobacterium avium complex infection.

2. Pneumonia.

3. RA.



ALLERGIES:  No known drug allergies.



PAST SURGICAL HISTORY:  Noncontributory.



SOCIAL HISTORY:  No tobacco, alcohol, or illicit drug use.



FAMILY HISTORY:  Positive for hypertension and diabetes.



REVIEW OF SYSTEMS:  NEUROLOGIC:  The patient denies headaches, change in

vision, syncope, or presyncopal episodes.  CARDIOVASCULAR:  No current

chest pain, palpitations, or angina.  PULMONARY:  No difficulty breathing,

productive cough, or sputum.  GASTROINTESTINAL/GENITOURINARY:  No nausea,

vomiting, or diarrhea.  ENDOCRINOLOGY:  No night sweats, fevers, or

chills.  MUSCULOSKELETAL:  The patient is feeling weak, tired, and

fatigued.



PHYSICAL EXAMINATION:

VITAL SIGNS:  Blood pressure 115/53, respiratory rate 18, pulse 59, and

temperature 97.8.  97% oxygen saturation on room air.

GENERAL:  The patient is awake and alert, not in distress.

HEENT:  Extraocular muscles intact.  No lymphadenopathy noted.

CARDIOVASCULAR:  S1, S2.  No rubs or gallops.

PULMONARY:  Clear to auscultation bilaterally.  No rales, rhonchi, or

wheezes.

ABDOMEN:  Nondistended and nontender.

EXTREMITIES:  No edema.



LABORATORY DATA:  Labs dated September 14, 2019 - sodium 142, potassium

4.8, creatinine 1.9.  Hemoglobin 8.6, white cell count 3.3, and platelet

count 340,000.



ASSESSMENT AND PLAN:

1. Acute kidney injury on CKD, stage 3B.  This time most likely

secondary to prerenal, intravascular volume depletion.  We will continue

IV fluids.  Avoid NSAIDs such as ketorolac.  Continue to monitor renal

function.

2. Generalized weakness.  Continue IV hydration and aggressive

nutrition.

3. Dehydration.  We will continue IV fluids.









  ______________________________________________

  Brennan Nguyen MD





DR:  HDP/VR

D:  09/14/2019 10:51

T:  09/14/2019 17:44

JOB#:  [id number]

CC:

## 2019-09-14 NOTE — CONSULTATION
DATE OF CONSULTATION:  09/14/2019

CONSULTING PHYSICIAN:  Dany Shepard M.D.



CHIEF COMPLAINT:  Abdominal pain.



HISTORY OF PRESENT ILLNESS:  This is a very pleasant 73-year-old female

with past medical history of osteoporosis, rheumatoid arthritis, pulmonary

MAC, multidrug-resistant pneumonia, recent discharge from the hospital

with acute kidney injury, who was brought back with her family because the

patient was not feeling well, felt weak, was not eating well, and was

brought back to the hospital.



PAST MEDICAL HISTORY:

1. History of osteoporosis.

2. Rheumatoid arthritis.

3. Pulmonary MAC.

4. Multidrug-resistant pneumonia.

5. Recent acute kidney injury.

6. Anemia.



ALLERGIES:  No known drug allergies.



MEDICATIONS:  Please see medication reconciliation list.



FAMILY HISTORY:  Noncontributory.



REVIEW OF SYSTEMS:  A 10-point review of systems was performed and

pertinent positives in HPI.



PHYSICAL EXAMINATION:

VITAL SIGNS:  Most recent vital signs, temperature is 97.8, pulse 65,

respirations 20, and blood pressure is 112/57.

HEENT:  Normocephalic and atraumatic.  Sclerae anicteric.

NECK:  Supple.  No evidence of obvious lymphadenopathy.

CARDIOVASCULAR:  Regular rate and rhythm.  Plus S1 and S2.  No obvious

murmur.

LUNGS:  Decreased breath sounds bilaterally at bases on the supine exam.

ABDOMEN:  Soft and nontender.  No rebound.  No guarding.  No peritoneal

sign.

EXTREMITIES:  No cyanosis.  No clubbing.  No edema.

NEUROLOGIC:  Nonfocal.



LABORATORY DATA:  White count of 6.2, hemoglobin 10, hematocrit 30,

platelet count is 369,000.  Sodium is 140, potassium 5.4, creatinine is

1.9, BUN is 48.  Iron saturation is 23.



IMAGING STUDIES:  The patient had a CT of the abdomen and pelvis done in

the emergency room without contrast, which showed fluid and gas filled

small bowel, nonspecific _____ enteritis or ileus, bronchial wall

thickening concerning for bronchitis.



ASSESSMENT AND PLAN:  This is a very pleasant 73-year-old female admitted

to the hospital with weakness, not eating well.  GI-wise, the patient does

not have any nausea, vomiting, or abdominal pain at this time, no

dysphagia, no odynophagia.  The patient actually was eating very well at

the bedside with the son and I examined her and I was talking to her.

According to the son, she had a bowel movement yesterday in the ER, no

blood.  The patient never had a colonoscopy before.  No significant weight

loss recently though.  Our plan will be to do anemia workup and send CEA,

iron panel, B12, folate, vitamin D.  I will send the stool for occult

blood.  Hold off her GI procedures at this time.  I gave the patient's son

my business card to call my office to follow as an outpatient for her

screening colonoscopy.  Meanwhile, the patient needs to be seen by

Nephrology for kidney issues.



I want to thank Dr. Flaco Stephens for this kind referral.









  ______________________________________________

  Dany Shepard M.D.





DR:  IMTIAZ

D:  09/14/2019 07:28

T:  09/14/2019 10:50

JOB#:  [id number]

CC:  Flaco Stephens D.O.

## 2019-09-14 NOTE — HISTORY AND PHYSICAL REPORT
DATE OF ADMISSION:  09/13/2019

TIME SEEN:  At 3 p.m.



CONSULTANTS:

1. Geovany Mustafa M.D.

2. Dany Shepard M.D.

3. Demar Mayo M.D.



CHIEF COMPLAINT:  Weakness, lethargy, abdominal pain, and cough.



BRIEF HISTORY:  This is a 73-year-old female, hospitalized about three days

ago with above-mentioned diagnosis, apparently fell worse, was weak,

lethargic, coughing, complaining of abdominal pain, came to [facility] again,

diagnosed with MIKA, cough, weakness, abdominal pain, failure to thrive,

and admitted to medical floor for further treatment.  Currently, sitting

calm and feeling a little better, no complaint.



REVIEW OF SYSTEMS:  No chest pain.  Slight short of breath.  Slight nausea.

No vomiting.  No diarrhea.



PAST MEDICAL HISTORY:  Includes respiratory failure, CHF, renal failure,

weakness, and malnutrition.



PAST SURGICAL HISTORY:  Unknown.



MEDICATIONS:  Morphine, Zofran, zolpidem, lorazepam, and ketorolac.



ALLERGIES:  Denies.



SOCIAL HISTORY:  No smoking.  No alcohol.  No intravenous drug

abuse.



FAMILY HISTORY:  Noncontributory.



PHYSICAL EXAMINATION:

GENERAL:  Sitting in chair, calm, slightly weak, and slight short of

breath.

VITAL SIGNS:  Temperature is 97 degrees, pulse is 90, respirations 17, and

blood pressure 110/61.

CARDIOVASCULAR:  No murmurs.

LUNGS:  Poor air exchange.

ABDOMEN:  Bowel sounds positive. Nontender.  Nondistended.

EXTREMITIES:  No cyanosis, clubbing, or edema.

NEUROLOGIC:  The patient is weak in bed, not talking much.



LABORATORY DATA:  White count 3.3, H and H 8.6/25, and platelets 340,000.

BMP shows chloride 112, BUN and creatinine 49 and 1.9, glucose 89, and

albumin 2.2.  Urinalysis, 1+ leukocyte esterase.



PLAN:

1. PT.

2. Dietary evaluation.

3. IV fluids.

4. Nephrology followup.

5. Antibiotics p.r.n.

6. We will continue to follow the patient.

7. Resume home medications.

8. CBC and BMP in the morning.









  ______________________________________________

  Flaco Stephens D.O.





DR:  HARRIS

D:  09/14/2019 15:22

T:  09/14/2019 21:33

JOB#:  [id number]

CC:

## 2019-09-14 NOTE — NUR
NURSE NOTES:

Received report from Marsha alejandre RN and LANA Salgado. Patient is in bed, awake and able to make needs 
known. Family is at bedside. On room air with no signs of distress or SOB. No c/o pain at 
this time. IV intact and running fluids. Bed locked and in lowest position. Call light 
within easy reach. Will continue to monitor the patient.

## 2019-09-15 VITALS — DIASTOLIC BLOOD PRESSURE: 71 MMHG | SYSTOLIC BLOOD PRESSURE: 132 MMHG

## 2019-09-15 VITALS — SYSTOLIC BLOOD PRESSURE: 114 MMHG | DIASTOLIC BLOOD PRESSURE: 49 MMHG

## 2019-09-15 VITALS — SYSTOLIC BLOOD PRESSURE: 103 MMHG | DIASTOLIC BLOOD PRESSURE: 51 MMHG

## 2019-09-15 VITALS — SYSTOLIC BLOOD PRESSURE: 138 MMHG | DIASTOLIC BLOOD PRESSURE: 78 MMHG

## 2019-09-15 VITALS — DIASTOLIC BLOOD PRESSURE: 67 MMHG | SYSTOLIC BLOOD PRESSURE: 125 MMHG

## 2019-09-15 VITALS — SYSTOLIC BLOOD PRESSURE: 127 MMHG | DIASTOLIC BLOOD PRESSURE: 65 MMHG

## 2019-09-15 LAB
ADD MANUAL DIFF: NO
ALBUMIN SERPL-MCNC: 2.2 G/DL (ref 3.4–5)
ALBUMIN/GLOB SERPL: 0.4 {RATIO} (ref 1–2.7)
ALP SERPL-CCNC: 67 U/L (ref 46–116)
ALT SERPL-CCNC: 12 U/L (ref 12–78)
ANION GAP SERPL CALC-SCNC: 9 MMOL/L (ref 5–15)
AST SERPL-CCNC: 18 U/L (ref 15–37)
BASOPHILS NFR BLD AUTO: 0.9 % (ref 0–2)
BILIRUB SERPL-MCNC: 0.2 MG/DL (ref 0.2–1)
BUN SERPL-MCNC: 45 MG/DL (ref 7–18)
CALCIUM SERPL-MCNC: 8.4 MG/DL (ref 8.5–10.1)
CHLORIDE SERPL-SCNC: 114 MMOL/L (ref 98–107)
CHOLEST SERPL-MCNC: 106 MG/DL (ref ?–200)
CO2 SERPL-SCNC: 21 MMOL/L (ref 21–32)
CREAT SERPL-MCNC: 1.7 MG/DL (ref 0.55–1.3)
EOSINOPHIL NFR BLD AUTO: 2.2 % (ref 0–3)
ERYTHROCYTE [DISTWIDTH] IN BLOOD BY AUTOMATED COUNT: 12.1 % (ref 11.6–14.8)
GLOBULIN SER-MCNC: 5 G/DL
HCT VFR BLD CALC: 26.3 % (ref 37–47)
HDLC SERPL-MCNC: 27 MG/DL (ref 40–60)
HGB BLD-MCNC: 8.7 G/DL (ref 12–16)
LYMPHOCYTES NFR BLD AUTO: 25.6 % (ref 20–45)
MCV RBC AUTO: 93 FL (ref 80–99)
MONOCYTES NFR BLD AUTO: 8.9 % (ref 1–10)
NEUTROPHILS NFR BLD AUTO: 62.3 % (ref 45–75)
PHOSPHATE SERPL-MCNC: 4 MG/DL (ref 2.5–4.9)
PLATELET # BLD: 348 K/UL (ref 150–450)
POTASSIUM SERPL-SCNC: 4.9 MMOL/L (ref 3.5–5.1)
RBC # BLD AUTO: 2.82 M/UL (ref 4.2–5.4)
SODIUM SERPL-SCNC: 144 MMOL/L (ref 136–145)
TRIGL SERPL-MCNC: 84 MG/DL (ref 30–150)
WBC # BLD AUTO: 3.6 K/UL (ref 4.8–10.8)

## 2019-09-15 RX ADMIN — SODIUM CHLORIDE SCH MLS/HR: 0.9 INJECTION INTRAVENOUS at 09:56

## 2019-09-15 NOTE — NUR
RD ASSESSMENT & RECOMMENDATIONS

SEE CARE ACTIVITY FOR COMPLETE ASSESSMENT



DAILY ESTIMATED NEEDS:

Needs based on Underweight,suspected wt loss/ 39kg 

30-40  kcals/kg 

4326-0631  total kcals

1-1.5  g protein/kg

39-59  g total protein 

25-30  mL/kg

975-1170  total fluid mLs



NUTRITION DIAGNOSIS:

(1) Increased kcal/pro needs R/T underweight status as evidenced by

pt @ 86% IBW, underweight BMI per guidelines, w/ suspected 7lbs wt loss x6

months.

(2) Altered nutrition related lab values r/t acute kidney injury as

evidenced by elev K (5.1) w/ elev BUN and creat.



CURRENT DIET:REGULAR    





PO DIET RECOMMENDATIONS:

LOW NA DIET/ soft easy chew  





ADDITIONAL RECOMMENDATIONS:

* Rec standing weight when ambulating halls for accurate CBW  

* Monitor PO intake closely-> noted good intake 

     -> send snacks in b/w meals  

* Trend K, need for Low K diet  

* Add Ensure 4oz daily / consider Nepro if K trends up

## 2019-09-15 NOTE — GENERAL PROGRESS NOTE
Assessment/Plan


Assessment/Plan:


1. History of osteoporosis.


2. Rheumatoid arthritis.


3. Pulmonary MAC.


4. Multidrug-resistant pneumonia.


5. Recent acute kidney injury.


6. Anemia





sig drop in H&H


d/w the patient and her son


plan EGD and colonoscopy for tomorrow





Subjective


ROS Limited/Unobtainable:  Yes


Allergies:  


Coded Allergies:  


     No Known Allergies (Unverified , 2/14/16)





Objective





Last 24 Hour Vital Signs








  Date Time  Temp Pulse Resp B/P (MAP) Pulse Ox O2 Delivery O2 Flow Rate FiO2


 


9/15/19 04:00 97.8 66 20 132/71 (91) 97   


 


9/15/19 00:00 97.6 70 18 114/49 (70) 95   


 


9/14/19 21:00      Room Air  


 


9/14/19 20:00 97.7 70 18 102/55 (71) 98   


 


9/14/19 16:00 98.1 67 18 95/54 (68) 98   


 


9/14/19 12:00 97.8 69 17 110/61 (77) 95   


 


9/14/19 08:06      Room Air  


 


9/14/19 08:00 97.8 59 18 115/53 (73) 97   





  59      

















Intake and Output  


 


 9/14/19 9/15/19





 19:00 07:00


 


Intake Total 1025 ml 750 ml


 


Balance 1025 ml 750 ml


 


  


 


Intake IV Total 225 ml 750 ml


 


Other 800 ml 


 


# Voids  1








Laboratory Tests


9/14/19 12:45: Stool Occult Blood [Pending]


Height (Feet):  5


Height (Inches):  1.00


Weight (Pounds):  85


General Appearance:  alert


EENT:  normal ENT inspection


Neck:  supple


Cardiovascular:  normal rate


Respiratory/Chest:  decreased breath sounds


Abdomen:  normal bowel sounds, non tender, soft


Extremities:  non-tender











Dany Shepard MD Sep 15, 2019 06:45

## 2019-09-15 NOTE — GENERAL PROGRESS NOTE
Assessment/Plan


Problem List:  


(1) Malnutrition


ICD Codes:  E46 - Unspecified protein-calorie malnutrition


SNOMED:  38372586


(2) UTI (urinary tract infection)


ICD Codes:  N39.0 - Urinary tract infection, site not specified


SNOMED:  08262347


(3) SOB (shortness of breath)


ICD Codes:  R06.02 - Shortness of breath


SNOMED:  409426441


(4) Cough


ICD Codes:  R05 - Cough


SNOMED:  04724552


(5) Renal failure (ARF), acute on chronic


ICD Codes:  N17.9 - Acute kidney failure, unspecified; N18.9 - Chronic kidney 

disease, unspecified


SNOMED:  920525287


(6) Episode of generalized weakness


ICD Codes:  R53.1 - Weakness


SNOMED:  87754056


(7) CHF (congestive heart failure)


ICD Codes:  I50.9 - Heart failure, unspecified


SNOMED:  21934194


Status:  stable, progressing


Assessment/Plan:


pt diet abx cbc bmp am aru eval





Subjective


Constitutional:  Reports: weakness


Allergies:  


Coded Allergies:  


     No Known Allergies (Unverified , 2/14/16)


All Systems:  reviewed and negative except above


Subjective


waling in hsieh sl weak





Objective





Last 24 Hour Vital Signs








  Date Time  Temp Pulse Resp B/P (MAP) Pulse Ox O2 Delivery O2 Flow Rate FiO2


 


9/15/19 04:00 97.8 66 20 132/71 (91) 97   


 


9/15/19 00:00 97.6 70 18 114/49 (70) 95   


 


9/14/19 21:00      Room Air  


 


9/14/19 20:00 97.7 70 18 102/55 (71) 98   


 


9/14/19 16:00 98.1 67 18 95/54 (68) 98   


 


9/14/19 12:00 97.8 69 17 110/61 (77) 95   

















Intake and Output  


 


 9/14/19 9/15/19





 19:00 07:00


 


Intake Total 1025 ml 750 ml


 


Balance 1025 ml 750 ml


 


  


 


Intake IV Total 225 ml 750 ml


 


Other 800 ml 


 


# Voids  1








Laboratory Tests


9/14/19 12:45: Stool Occult Blood Negative


9/15/19 06:40: 


White Blood Count 3.6L, Red Blood Count 2.82L, Hemoglobin 8.7L, Hematocrit 26.3L

, Mean Corpuscular Volume 93, Mean Corpuscular Hemoglobin 30.8, Mean 

Corpuscular Hemoglobin Concent 33.0, Red Cell Distribution Width 12.1, Platelet 

Count 348, Mean Platelet Volume 5.1L, Neutrophils (%) (Auto) 62.3, Lymphocytes (

%) (Auto) 25.6, Monocytes (%) (Auto) 8.9, Eosinophils (%) (Auto) 2.2, Basophils 

(%) (Auto) 0.9, Sodium Level 144, Potassium Level 4.9, Chloride Level 114H, 

Carbon Dioxide Level 21, Anion Gap 9, Blood Urea Nitrogen 45H, Creatinine 1.7H, 

Estimat Glomerular Filtration Rate , Glucose Level 87, Lactic Acid Level 0.70, 

Calcium Level 8.4L, Phosphorus Level 4.0, Magnesium Level 1.8, Total Bilirubin 

0.2, Aspartate Amino Transf (AST/SGOT) 18, Alanine Aminotransferase (ALT/SGPT) 

12, Alkaline Phosphatase 67, Total Protein 7.2, Albumin 2.2L, Globulin 5.0, 

Albumin/Globulin Ratio 0.4L, Triglycerides Level 84, Cholesterol Level 106, LDL 

Cholesterol 65, HDL Cholesterol 27L, Cholesterol/HDL Ratio 3.9, 

Carcinoembryonic Antigen [Pending], Vitamin B12 Level 583, Vitamin D 25-Hydroxy 

[Pending], 25-Hydroxy Vitamin D2 [Pending], 25-Hydroxy Vitamin D3 [Pending], 

Folate 11.4


Height (Feet):  5


Height (Inches):  1.00


Weight (Pounds):  85


General Appearance:  lethargic


EENT:  normal ENT inspection


Neck:  normal alignment


Cardiovascular:  normal peripheral pulses, normal rate, regular rhythm


Respiratory/Chest:  chest wall non-tender, lungs clear, normal breath sounds


Abdomen:  normal bowel sounds, non tender, soft


Extremities:  normal inspection


Edema:  no edema noted Arm (L), no edema noted Arm (R), no edema noted Leg (L), 

no edema noted Leg (R), no edema noted Pedal (L), no edema noted Pedal (R), no 

edema noted Generalized


Neurologic:  responsive, motor weakness


Skin:  normal pigmentation, warm/dry











Flaco Stephens DO Sep 15, 2019 09:42

## 2019-09-15 NOTE — NUR
PT Note

PT eval completed. Patient is independent in mobility and gait without any 

AD. No f/u PT treatments needed recommended.

## 2019-09-15 NOTE — PULMONOLOGY PROGRESS NOTE
Assessment/Plan


Assessment/Plan


ASSESSMENT


MIKA  


bronchitis


abdominal pain and nausea, possibly enteritis ( by CT scan) 


anemia 


RA 


protein calorie malnutrition 





PLAN OF CARE


IVF


monitor renal parameters, lytes, correct lytes prn, creat   trending  down,

continue hydration 


clinically improving 


Venous Duplex 


O2 HHN prn  


a/tussive prn   


doubt PNA, giving no fever, no leukocytosis, no SOB, 


likely bronchitis superimposed on chronic lung disease


pain management 


symptomatic care  


bowel regimen  


anemia w/up 


stool OB negative, CEA pending 


monitor HH with goal to keep Hgb above 7  


GI specialist follows


outpt colonoscopy/screening -never had one    


dietary eval 











case discussed and evaluated by supervising physician





Subjective


Allergies:  


Coded Allergies:  


     No Known Allergies (Unverified , 2/14/16)


Subjective


ambulated in hallway, feeling stronger


appetite better 


creat trending down to 1.7 


cough intermittent dry, no wheezing, no hemoptysis





Objective





Last 24 Hour Vital Signs








  Date Time  Temp Pulse Resp B/P (MAP) Pulse Ox O2 Delivery O2 Flow Rate FiO2


 


9/15/19 04:00 97.8 66 20 132/71 (91) 97   


 


9/15/19 00:00 97.6 70 18 114/49 (70) 95   


 


9/14/19 21:00      Room Air  


 


9/14/19 20:00 97.7 70 18 102/55 (71) 98   


 


9/14/19 16:00 98.1 67 18 95/54 (68) 98   


 


9/14/19 12:00 97.8 69 17 110/61 (77) 95   

















Intake and Output  


 


 9/14/19 9/15/19





 19:00 07:00


 


Intake Total 1025 ml 750 ml


 


Balance 1025 ml 750 ml


 


  


 


Intake IV Total 225 ml 750 ml


 


Other 800 ml 


 


# Voids  1








Objective


General Appearance:  no apparent distress, cachetic, alert oriented x3


Lines, tubes and drains:  peripheral


HEENT:  normocephalic, atraumatic, anicteric, mucous membranes moist, PERRL


Neck:  non-tender, supple


Respiratory/Chest:  lungs clear - with moderate air exchange , no respiratory 

distress, no accessory muscle use


Cardiovascular/Chest:  normal rate, no JVD


Abdomen:  normal bowel sounds, soft


Extremities:  normal range of motion, non-tender, no calf tenderness, normal 

capillary refill


Skin Exam:  normal pigmentation, warm/dry


Neurologic:  no motor/sensory deficits, alert, oriented x 3, responsive


Musculoskeletal:  normal muscle bulk





Microbiology








 Date/Time


Source Procedure


Growth Status


 


 


 9/13/19 19:05


Rectum  Received








Laboratory Tests


9/14/19 12:45: Stool Occult Blood Negative


9/15/19 06:40: 


White Blood Count 3.6L, Red Blood Count 2.82L, Hemoglobin 8.7L, Hematocrit 26.3L

, Mean Corpuscular Volume 93, Mean Corpuscular Hemoglobin 30.8, Mean 

Corpuscular Hemoglobin Concent 33.0, Red Cell Distribution Width 12.1, Platelet 

Count 348, Mean Platelet Volume 5.1L, Neutrophils (%) (Auto) 62.3, Lymphocytes (

%) (Auto) 25.6, Monocytes (%) (Auto) 8.9, Eosinophils (%) (Auto) 2.2, Basophils 

(%) (Auto) 0.9, Sodium Level 144, Potassium Level 4.9, Chloride Level 114H, 

Carbon Dioxide Level 21, Anion Gap 9, Blood Urea Nitrogen 45H, Creatinine 1.7H, 

Estimat Glomerular Filtration Rate , Glucose Level 87, Lactic Acid Level 0.70, 

Calcium Level 8.4L, Phosphorus Level 4.0, Magnesium Level 1.8, Total Bilirubin 

0.2, Aspartate Amino Transf (AST/SGOT) 18, Alanine Aminotransferase (ALT/SGPT) 

12, Alkaline Phosphatase 67, Total Protein 7.2, Albumin 2.2L, Globulin 5.0, 

Albumin/Globulin Ratio 0.4L, Triglycerides Level 84, Cholesterol Level 106, LDL 

Cholesterol 65, HDL Cholesterol 27L, Cholesterol/HDL Ratio 3.9, 

Carcinoembryonic Antigen [Pending], Vitamin B12 Level 583, Vitamin D 25-Hydroxy 

[Pending], 25-Hydroxy Vitamin D2 [Pending], 25-Hydroxy Vitamin D3 [Pending], 

Folate 11.4





Current Medications








 Medications


  (Trade)  Dose


 Ordered  Sig/Naman


 Route


 PRN Reason  Start Time


 Stop Time Status Last Admin


Dose Admin


 


 Acetaminophen


  (Tylenol)  650 mg  Q4H  PRN


 ORAL


 fever  9/13/19 23:15


 10/13/19 23:14   


 


 


 Ceftriaxone


 Sodium 1 gm/


 Dextrose  55 ml @ 


 110 mls/hr  DAILY


 IVPB


   9/15/19 09:00


 9/22/19 08:59  9/15/19 09:56


 


 


 Dextrose


  (Dextrose 50%)  25 ml  Q30M  PRN


 IV


 Hypoglycemia  9/13/19 23:15


 10/13/19 23:14   


 


 


 Dextrose


  (Dextrose 50%)  50 ml  Q30M  PRN


 IV


 Hypoglycemia  9/13/19 23:15


 10/13/19 23:14   


 


 


 Dextrose/


 Electrolytes  1,000 ml @ 


 75 mls/hr  [id number]


 IV


   9/15/19 16:00


 10/15/19 15:59   


 


 


 Lorazepam


  (Ativan 2mg/ml


 1ml)  0.5 mg  Q4H  PRN


 IV


 For Anxiety  9/13/19 23:15


 9/20/19 23:14   


 


 


 Morphine Sulfate


  (Morphine


 Sulfate)  1 mg  Q4H  PRN


 IVP


 For Pain  9/13/19 23:15


 9/20/19 23:14   


 


 


 Ondansetron HCl


  (Zofran)  4 mg  Q6H  PRN


 IVP


 Nausea & Vomiting  9/13/19 23:15


 10/13/19 23:14   


 


 


 Polyethylene


 Glycol


  (Miralax)  17 gm  HSPRN  PRN


 ORAL


 Constipation  9/13/19 23:15


 10/13/19 23:14   


 


 


 Polyethylene


 Glycol/


 Electrolytes


  (Nulytely)  4,000 ml  ONCE


 ORAL


   9/15/19 16:00


 9/15/19 21:00   


 


 


 Sodium Chloride  1,000 ml @ 


 75 mls/hr  [id number]


 IV


   9/14/19 11:00


 10/14/19 10:59  9/15/19 02:00


 


 


 Zolpidem Tartrate


  (Ambien)  5 mg  HSPRN  PRN


 ORAL


 Insomnia  9/13/19 23:15


 9/20/19 23:14   


 

















Minal Barnes NP Sep 15, 2019 10:21

## 2019-09-15 NOTE — NEPHROLOGY PROGRESS NOTE
Assessment/Plan


Status:  stable, progressing


Assessment/Plan:


A/P





1) MIKA- resolving, prerenal in nature


     - IVFs. Cr down to 1.7


     - Ok for DC from renal point


2) Generalized weakness.  Continue IV hydration and aggressive


nutrition.


3) Dehydration.  We will continue IV fluids.





Subjective


Date patient seen:  Sep 15, 2019


Time patient seen:  10:48


ROS Limited/Unobtainable:  No


Allergies:  


Coded Allergies:  


     No Known Allergies (Unverified , 2/14/16)


Subjective


Patient feel much better





Objective





Last 24 Hour Vital Signs








  Date Time  Temp Pulse Resp B/P (MAP) Pulse Ox O2 Delivery O2 Flow Rate FiO2


 


9/15/19 09:00      Room Air  


 


9/15/19 08:00 97.8 70 18 103/51 (68) 97   


 


9/15/19 04:00 97.8 66 20 132/71 (91) 97   


 


9/15/19 00:00 97.6 70 18 114/49 (70) 95   


 


9/14/19 21:00      Room Air  


 


9/14/19 20:00 97.7 70 18 102/55 (71) 98   


 


9/14/19 16:00 98.1 67 18 95/54 (68) 98   


 


9/14/19 12:00 97.8 69 17 110/61 (77) 95   

















Intake and Output  


 


 9/14/19 9/15/19





 19:00 07:00


 


Intake Total 1025 ml 750 ml


 


Balance 1025 ml 750 ml


 


  


 


Intake IV Total 225 ml 750 ml


 


Other 800 ml 


 


# Voids  1








Laboratory Tests


9/14/19 12:45: Stool Occult Blood Negative


9/15/19 06:40: 


White Blood Count 3.6L, Red Blood Count 2.82L, Hemoglobin 8.7L, Hematocrit 26.3L

, Mean Corpuscular Volume 93, Mean Corpuscular Hemoglobin 30.8, Mean 

Corpuscular Hemoglobin Concent 33.0, Red Cell Distribution Width 12.1, Platelet 

Count 348, Mean Platelet Volume 5.1L, Neutrophils (%) (Auto) 62.3, Lymphocytes (

%) (Auto) 25.6, Monocytes (%) (Auto) 8.9, Eosinophils (%) (Auto) 2.2, Basophils 

(%) (Auto) 0.9, Sodium Level 144, Potassium Level 4.9, Chloride Level 114H, 

Carbon Dioxide Level 21, Anion Gap 9, Blood Urea Nitrogen 45H, Creatinine 1.7H, 

Estimat Glomerular Filtration Rate , Glucose Level 87, Lactic Acid Level 0.70, 

Calcium Level 8.4L, Phosphorus Level 4.0, Magnesium Level 1.8, Ferritin [Pending

], Total Bilirubin 0.2, Aspartate Amino Transf (AST/SGOT) 18, Alanine 

Aminotransferase (ALT/SGPT) 12, Alkaline Phosphatase 67, Total Protein 7.2, 

Total Protein (PEP) [Pending], Albumin 2.2L, Albumin (PEP) [Pending], Globulin 

5.0, Globulin (PEP) [Pending], Albumin/Globulin Ratio [Pending], Alpha-1-

Globulins [Pending], Alpha-2-Globulins [Pending], Beta Globulins [Pending], 

Beta Gamma Globulin [Pending], PEP Abnormal Protein Bands [Pending], Protein 

Electrophoresis Interpret [Pending], Triglycerides Level 84, Cholesterol Level 

106, LDL Cholesterol 65, HDL Cholesterol 27L, Cholesterol/HDL Ratio 3.9, 

Carcinoembryonic Antigen [Pending], Vitamin B12 Level 583, Vitamin D 25-Hydroxy 

[Pending], 25-Hydroxy Vitamin D2 [Pending], 25-Hydroxy Vitamin D3 [Pending], 

Folate 11.4


Height (Feet):  5


Height (Inches):  1.00


Weight (Pounds):  85


General Appearance:  no apparent distress, alert


EENT:  normal ENT inspection


Neck:  normal alignment, supple


Cardiovascular:  normal rate, regular rhythm


Respiratory/Chest:  lungs clear, normal breath sounds


Abdomen:  non tender, soft


Edema:  no edema noted Arm (L), no edema noted Arm (R), no edema noted Leg (L), 

no edema noted Leg (R), no edema noted Pedal (L), no edema noted Pedal (R), no 

edema noted Generalized











Brennan Nguyen MD Sep 15, 2019 10:49

## 2019-09-15 NOTE — CONSULTATION
History of Present Illness


General


Chief Complaint:  Generalized Weakness


Referring physician:  Dr Stephens


Reason for Consultation:  hospitalist





Present Illness


Allergies:  


Coded Allergies:  


     No Known Allergies (Unverified , 2/14/16)





Medication History


Scheduled


Aspirin* (Aspir 81*), 81 MG ORAL DAILY, (Reported)


Calcium Carbonate (Calcium), 500 MG PO DAILY, (Reported)


Hydroxychloroquine Sulfate (Hydroxychloroquine Sulfate), 200 MG PO DAILY, (

Reported)





Scheduled PRN


Ibuprofen* (Advil*), Unknown Dose ORAL PRN PRN for Mild Pain/Temp > 100.5, (

Reported)





Discontinued Medications


Alendronate Sodium* (Fosamax*), Unknown Dose ORAL DAILY, (Reported)


   Discontinued Reason: Pt stopped taking med





Patient History


Healthcare decision maker





Resuscitation status


Full Code


Advanced Directive on File








Physical Exam





Last 24 Hour Vital Signs








  Date Time  Temp Pulse Resp B/P (MAP) Pulse Ox O2 Delivery O2 Flow Rate FiO2


 


9/15/19 09:00      Room Air  


 


9/15/19 08:00 97.8 70 18 103/51 (68) 97   


 


9/15/19 04:00 97.8 66 20 132/71 (91) 97   


 


9/15/19 00:00 97.6 70 18 114/49 (70) 95   


 


9/14/19 21:00      Room Air  


 


9/14/19 20:00 97.7 70 18 102/55 (71) 98   


 


9/14/19 16:00 98.1 67 18 95/54 (68) 98   


 


9/14/19 12:00 97.8 69 17 110/61 (77) 95   

















Intake and Output  


 


 9/14/19 9/15/19





 18:59 06:59


 


Intake Total 950 ml 825 ml


 


Balance 950 ml 825 ml


 


  


 


Intake IV Total 150 ml 825 ml


 


Other 800 ml 


 


# Voids  1











Laboratory Tests








Test


  9/14/19


12:45 9/15/19


06:40


 


Stool Occult Blood


  Negative


(NEGATIVE) 


 


 


White Blood Count


  


  3.6 K/UL


(4.8-10.8)  L


 


Red Blood Count


  


  2.82 M/UL


(4.20-5.40)  L


 


Hemoglobin


  


  8.7 G/DL


(12.0-16.0)  L


 


Hematocrit


  


  26.3 %


(37.0-47.0)  L


 


Mean Corpuscular Volume  93 FL (80-99)  


 


Mean Corpuscular Hemoglobin


  


  30.8 PG


(27.0-31.0)


 


Mean Corpuscular Hemoglobin


Concent 


  33.0 G/DL


(32.0-36.0)


 


Red Cell Distribution Width


  


  12.1 %


(11.6-14.8)


 


Platelet Count


  


  348 K/UL


(150-450)


 


Mean Platelet Volume


  


  5.1 FL


(6.5-10.1)  L


 


Neutrophils (%) (Auto)


  


  62.3 %


(45.0-75.0)


 


Lymphocytes (%) (Auto)


  


  25.6 %


(20.0-45.0)


 


Monocytes (%) (Auto)


  


  8.9 %


(1.0-10.0)


 


Eosinophils (%) (Auto)


  


  2.2 %


(0.0-3.0)


 


Basophils (%) (Auto)


  


  0.9 %


(0.0-2.0)


 


Sodium Level


  


  144 MMOL/L


(136-145)


 


Potassium Level


  


  4.9 MMOL/L


(3.5-5.1)


 


Chloride Level


  


  114 MMOL/L


()  H


 


Carbon Dioxide Level


  


  21 MMOL/L


(21-32)


 


Anion Gap


  


  9 mmol/L


(5-15)


 


Blood Urea Nitrogen


  


  45 mg/dL


(7-18)  H


 


Creatinine


  


  1.7 MG/DL


(0.55-1.30)  H


 


Estimat Glomerular Filtration


Rate 


   mL/min (>60)  


 


 


Glucose Level


  


  87 MG/DL


()


 


Lactic Acid Level


  


  0.70 mmol/L


(0.4-2.0)


 


Calcium Level


  


  8.4 MG/DL


(8.5-10.1)  L


 


Phosphorus Level


  


  4.0 MG/DL


(2.5-4.9)


 


Magnesium Level


  


  1.8 MG/DL


(1.8-2.4)


 


Total Bilirubin


  


  0.2 MG/DL


(0.2-1.0)


 


Aspartate Amino Transf


(AST/SGOT) 


  18 U/L (15-37)


 


 


Alanine Aminotransferase


(ALT/SGPT) 


  12 U/L (12-78)


 


 


Alkaline Phosphatase


  


  67 U/L


()


 


Total Protein


  


  7.2 G/DL


(6.4-8.2)


 


Albumin


  


  2.2 G/DL


(3.4-5.0)  L


 


Globulin  5.0 g/dL  


 


Albumin/Globulin Ratio


  


  0.4 (1.0-2.7)


L


 


Triglycerides Level


  


  84 MG/DL


()


 


Cholesterol Level


  


  106 MG/DL (<


200)


 


LDL Cholesterol


  


  65 mg/dL


(<100)


 


HDL Cholesterol


  


  27 MG/DL


(40-60)  L


 


Cholesterol/HDL Ratio  3.9 (3.3-4.4)  


 


Carcinoembryonic Antigen  Pending  


 


Vitamin B12 Level


  


  583 PG/ML


(193-986)


 


Vitamin D 25-Hydroxy  Pending  


 


25-Hydroxy Vitamin D2  Pending  


 


25-Hydroxy Vitamin D3  Pending  


 


Folate


  


  11.4 NG/ML


(8.6-58.9)








Height (Feet):  5


Height (Inches):  1.00


Weight (Pounds):  85


Medications





Current Medications








 Medications


  (Trade)  Dose


 Ordered  Sig/Naman


 Route


 PRN Reason  Start Time


 Stop Time Status Last Admin


Dose Admin


 


 Acetaminophen


  (Tylenol)  650 mg  Q4H  PRN


 ORAL


 fever  9/13/19 23:15


 10/13/19 23:14   


 


 


 Ceftriaxone


 Sodium 1 gm/


 Dextrose  55 ml @ 


 110 mls/hr  DAILY


 IVPB


   9/15/19 09:00


 9/22/19 08:59  9/15/19 09:56


 


 


 Dextrose


  (Dextrose 50%)  25 ml  Q30M  PRN


 IV


 Hypoglycemia  9/13/19 23:15


 10/13/19 23:14   


 


 


 Dextrose


  (Dextrose 50%)  50 ml  Q30M  PRN


 IV


 Hypoglycemia  9/13/19 23:15


 10/13/19 23:14   


 


 


 Dextrose/


 Electrolytes  1,000 ml @ 


 75 mls/hr  [id number]


 IV


   9/15/19 16:00


 10/15/19 15:59   


 


 


 Guaifenesin/


 Dextromethorphan


  (Robitussin DM


 Syrup)  10 ml  Q4H  PRN


 ORAL


 For Cough  9/15/19 10:30


 10/15/19 10:29   


 


 


 Lorazepam


  (Ativan 2mg/ml


 1ml)  0.5 mg  Q4H  PRN


 IV


 For Anxiety  9/13/19 23:15


 9/20/19 23:14   


 


 


 Morphine Sulfate


  (Morphine


 Sulfate)  1 mg  Q4H  PRN


 IVP


 For Pain  9/13/19 23:15


 9/20/19 23:14   


 


 


 Ondansetron HCl


  (Zofran)  4 mg  Q6H  PRN


 IVP


 Nausea & Vomiting  9/13/19 23:15


 10/13/19 23:14   


 


 


 Polyethylene


 Glycol


  (Miralax)  17 gm  HSPRN  PRN


 ORAL


 Constipation  9/13/19 23:15


 10/13/19 23:14   


 


 


 Polyethylene


 Glycol/


 Electrolytes


  (Nulytely)  4,000 ml  ONCE


 ORAL


   9/15/19 16:00


 9/15/19 21:00   


 


 


 Sodium Chloride  1,000 ml @ 


 75 mls/hr  [id number]


 IV


   9/14/19 11:00


 10/14/19 10:59  9/15/19 02:00


 


 


 Zolpidem Tartrate


  (Ambien)  5 mg  HSPRN  PRN


 ORAL


 Insomnia  9/13/19 23:15


 9/20/19 23:14   


 











Assessment/Plan


Assessment/Plan:





Hematology/Oncology Consultation   


   


Requesting MD: Flaco Stephens   


Date of Service:9/15/19   


Reason for consultation: Leukopenia and Anemia





HPI: 


Called by Dr. Stephens to [facility]. This is a 73-year-old female, who lives at home, 

complaining of recent headache, and was recently admitte for ashley, abd pain, n/

vt. The patient came to [place] diagnosed with pneumonia, infiltrates on cxr on 

ct scan and Hematology/Oncology was consulted for Leukopenia and Anemia. Hgb 

10.4, wbc 3.5. Have seen her before and she had similar findings, and send for 

a flow which was negative, no bone marrow biopsy was done, hep and hiv neg, as 

was abd us.





PAST MEDICAL HISTORY:  Includes rheumatoid arthritis and neuropathy.





PAST SURGICAL HISTORY:  None.





MEDICATIONS:  Include cefepime, vancomycin, heparin, Zofran, morphine, Tylenol, 

and albuterol.





ALLERGIES:  Denies.





SOCIAL HISTORY:  No smoking.  No alcohol.  No intravenous drug abuse.





FAMILY HISTORY:  Noncontributory.





PHYSICAL EXAMINATION:


GENERAL:  Calm in bed, oriented x3, slight short of breath.


VITAL SIGNS: reviewed


CARDIOVASCULAR:  No murmur.


LUNGS:  Poor air exchange.


ABDOMEN:  Bowel sounds distant.


EXTREMITIES:  No cyanosis or edema.


NEUROLOGIC:  The patient moves all extremities, slightly weak.





LABORATORY AND DIAGNOSTIC DATA: 


white count .6, hemoglobin and hematocrit 8.6/29, and platelets 173.  





ASSESSMENT/Recs


# Anemia of chronic disease (or of iron deficiency) due to underlying chronic 

medical issues, multifactorial 


--> Anemia workup has been ordered from [facility], this time, ferritin


--> No evidence of hemolysis is noted, peripheral smear has been reviewed


--> Hgb goal >7. Transfuse prn.


--> Epogen or iron at this time is not particularly indicated


--> Medications have been reviewed


--> egd/colo per gi


# Decreased white blood cell count,  (leukopenia) - wbc under 4k, could also be 

related to infection (pna)


--> continue antibiotics with ID service, appreciate recs


--> medications have been reviewed


--> hepatitis and hiv have been ordered


--> Prior igg was >3000 and spep was anbnormal concerning for myeloma


--> obtain immunofixation this time, better test for myeloma


# PNA, Antibiotics per Infectious Disease.


--> on abx


--> O2 and pulmonary treatment.


# Headache, chronic


# Recent ashley


# Dvt ppx scds


   


The timing of this note does not necessarily reflect the time of the patient 

was seen.   


   


Greatly appreciate consultation!











Agustin Giordano MD Sep 15, 2019 10:44

## 2019-09-16 VITALS — DIASTOLIC BLOOD PRESSURE: 62 MMHG | SYSTOLIC BLOOD PRESSURE: 106 MMHG

## 2019-09-16 VITALS — DIASTOLIC BLOOD PRESSURE: 56 MMHG | SYSTOLIC BLOOD PRESSURE: 105 MMHG

## 2019-09-16 VITALS — SYSTOLIC BLOOD PRESSURE: 114 MMHG | DIASTOLIC BLOOD PRESSURE: 59 MMHG

## 2019-09-16 VITALS — DIASTOLIC BLOOD PRESSURE: 56 MMHG | SYSTOLIC BLOOD PRESSURE: 116 MMHG

## 2019-09-16 VITALS — SYSTOLIC BLOOD PRESSURE: 100 MMHG | DIASTOLIC BLOOD PRESSURE: 54 MMHG

## 2019-09-16 VITALS — DIASTOLIC BLOOD PRESSURE: 61 MMHG | SYSTOLIC BLOOD PRESSURE: 117 MMHG

## 2019-09-16 LAB
ANION GAP SERPL CALC-SCNC: 8 MMOL/L (ref 5–15)
BUN SERPL-MCNC: 34 MG/DL (ref 7–18)
CALCIUM SERPL-MCNC: 8.6 MG/DL (ref 8.5–10.1)
CHLORIDE SERPL-SCNC: 113 MMOL/L (ref 98–107)
CO2 SERPL-SCNC: 21 MMOL/L (ref 21–32)
CREAT SERPL-MCNC: 1.5 MG/DL (ref 0.55–1.3)
POTASSIUM SERPL-SCNC: 4.6 MMOL/L (ref 3.5–5.1)
SODIUM SERPL-SCNC: 142 MMOL/L (ref 136–145)

## 2019-09-16 RX ADMIN — DOCUSATE SODIUM SCH MG: 100 CAPSULE, LIQUID FILLED ORAL at 13:24

## 2019-09-16 RX ADMIN — DOCUSATE SODIUM SCH MG: 100 CAPSULE, LIQUID FILLED ORAL at 18:00

## 2019-09-16 RX ADMIN — SODIUM CHLORIDE SCH MLS/HR: 0.9 INJECTION INTRAVENOUS at 08:33

## 2019-09-16 NOTE — NUR
NURSE NOTES:

Received patient in bed, ambulating in the hallway with her son, Italian speaking, steady 
gate, able to make her needs known, VSS, afebrile, no acute distress noted, IV site clean 
dry and intact. Call light is within reach, bed is lowered and locked, alarm is on, will 
continue to monitor for safety and comfort.

## 2019-09-16 NOTE — GENERAL PROGRESS NOTE
Assessment/Plan


Problem List:  


(1) Malnutrition


ICD Codes:  E46 - Unspecified protein-calorie malnutrition


SNOMED:  54199712


(2) UTI (urinary tract infection)


ICD Codes:  N39.0 - Urinary tract infection, site not specified


SNOMED:  88848305


(3) SOB (shortness of breath)


ICD Codes:  R06.02 - Shortness of breath


SNOMED:  772806187


(4) Cough


ICD Codes:  R05 - Cough


SNOMED:  35036119


(5) Renal failure (ARF), acute on chronic


ICD Codes:  N17.9 - Acute kidney failure, unspecified; N18.9 - Chronic kidney 

disease, unspecified


SNOMED:  918540347


(6) Episode of generalized weakness


ICD Codes:  R53.1 - Weakness


SNOMED:  56895042


(7) CHF (congestive heart failure)


ICD Codes:  I50.9 - Heart failure, unspecified


SNOMED:  55184937


Status:  stable, progressing


Assessment/Plan:


pt diet abx cbc bmp am aru eval





Subjective


Constitutional:  Reports: weakness


Allergies:  


Coded Allergies:  


     No Known Allergies (Unverified , 2/14/16)


All Systems:  reviewed and negative except above


Subjective


calm in bed sl weak





Objective





Last 24 Hour Vital Signs








  Date Time  Temp Pulse Resp B/P (MAP) Pulse Ox O2 Delivery O2 Flow Rate FiO2


 


9/16/19 08:00 98.1 70 22 105/56 (72) 97   


 


9/16/19 04:00 97.7 65 16 116/56 (76) 96   


 


9/16/19 00:00 97.3 72 16 100/54 (69) 97   


 


9/15/19 21:15      Room Air  


 


9/15/19 20:00 98.2 64 18 125/67 (86) 95   


 


9/15/19 16:00 97.9 68 19 138/78 (98) 98   


 


9/15/19 12:00 97.9 59 19 127/65 (85) 99   

















Intake and Output  


 


 9/15/19 9/16/19





 19:00 07:00


 


Intake Total 925 ml 1300 ml


 


Balance 925 ml 1300 ml


 


  


 


Intake Oral  400 ml


 


IV Total 75 ml 900 ml


 


Other 850 ml 


 


# Voids  3








Laboratory Tests


9/16/19 06:10: 


Sodium Level 142, Potassium Level 4.6, Chloride Level 113H, Carbon Dioxide 

Level 21, Anion Gap 8, Blood Urea Nitrogen 34H, Creatinine 1.5H, Estimat 

Glomerular Filtration Rate , Glucose Level 87, Calcium Level 8.6


Height (Feet):  5


Height (Inches):  1.00


Weight (Pounds):  91


General Appearance:  lethargic


EENT:  normal ENT inspection


Neck:  normal alignment


Cardiovascular:  normal peripheral pulses, normal rate, regular rhythm


Respiratory/Chest:  chest wall non-tender, lungs clear, normal breath sounds


Abdomen:  normal bowel sounds, non tender, soft


Extremities:  normal inspection


Edema:  no edema noted Arm (L), no edema noted Arm (R), no edema noted Leg (L), 

no edema noted Leg (R), no edema noted Pedal (L), no edema noted Pedal (R), no 

edema noted Generalized


Neurologic:  responsive, motor weakness


Skin:  normal pigmentation, warm/dry











Flaco Stephens DO Sep 16, 2019 09:45

## 2019-09-16 NOTE — PULMONOLOGY PROGRESS NOTE
Assessment/Plan


Problems:  


(1) Purulent bronchitis


(2) MIKA (acute kidney injury)


(3) Emphysema lung


(4) Severe anemia


Assessment/Plan


respiratory treatment


check sputum


chest PT


check electrolytes


titrate fio2 to sat of 92%





Subjective


ROS Limited/Unobtainable:  No


Constitutional:  Reports: no symptoms


HEENT:  Repors: no symptoms


Allergies:  


Coded Allergies:  


     No Known Allergies (Unverified , 2/14/16)





Objective





Last 24 Hour Vital Signs








  Date Time  Temp Pulse Resp B/P (MAP) Pulse Ox O2 Delivery O2 Flow Rate FiO2


 


9/16/19 12:00 98.1 65 20 114/59 (77) 96   


 


9/16/19 09:00      Room Air  


 


9/16/19 08:00 98.1 70 22 105/56 (72) 97   


 


9/16/19 04:00 97.7 65 16 116/56 (76) 96   


 


9/16/19 00:00 97.3 72 16 100/54 (69) 97   


 


9/15/19 21:15      Room Air  


 


9/15/19 20:00 98.2 64 18 125/67 (86) 95   


 


9/15/19 16:00 97.9 68 19 138/78 (98) 98   

















Intake and Output  


 


 9/15/19 9/16/19





 19:00 07:00


 


Intake Total 925 ml 1300 ml


 


Balance 925 ml 1300 ml


 


  


 


Intake Oral  400 ml


 


IV Total 75 ml 900 ml


 


Other 850 ml 


 


# Voids  3








General Appearance:  WD/WN


HEENT:  normocephalic, anicteric


Respiratory/Chest:  chest wall non-tender, lungs clear


Breasts:  no masses


Cardiovascular:  normal rate


Abdomen:  normal bowel sounds, no organomegaly


Genitourinary:  normal external genitalia





Microbiology








 Date/Time


Source Procedure


Growth Status


 


 


 9/13/19 19:05


Nasal Nares MRSA Culture - Final


NO METHICILLIN RESISTANT STAPH AUREUS... Complete


 


 9/13/19 19:05


Rectum VRE Culture - Final


NO VANCOMYCIN RESISTANT ENTEROCOCCUS ... Complete








Laboratory Tests


9/16/19 06:10: 


Sodium Level 142, Potassium Level 4.6, Chloride Level 113H, Carbon Dioxide 

Level 21, Anion Gap 8, Blood Urea Nitrogen 34H, Creatinine 1.5H, Estimat 

Glomerular Filtration Rate , Glucose Level 87, Calcium Level 8.6





Current Medications








 Medications


  (Trade)  Dose


 Ordered  Sig/Naman


 Route


 PRN Reason  Start Time


 Stop Time Status Last Admin


Dose Admin


 


 Acetaminophen


  (Tylenol)  650 mg  Q4H  PRN


 ORAL


 fever  9/13/19 23:15


 10/13/19 23:14   


 


 


 Ceftriaxone


 Sodium 1 gm/


 Dextrose  55 ml @ 


 110 mls/hr  DAILY


 IVPB


   9/15/19 09:00


 9/22/19 08:59  9/16/19 08:33


 


 


 Dextrose


  (Dextrose 50%)  25 ml  Q30M  PRN


 IV


 Hypoglycemia  9/13/19 23:15


 10/13/19 23:14   


 


 


 Dextrose


  (Dextrose 50%)  50 ml  Q30M  PRN


 IV


 Hypoglycemia  9/13/19 23:15


 10/13/19 23:14   


 


 


 Docusate Sodium


  (Colace)  100 mg  THREE TIMES A  DAY


 ORAL


   9/16/19 13:00


 10/16/19 12:59   


 


 


 Guaifenesin/


 Dextromethorphan


  (Robitussin DM


 Syrup)  10 ml  Q4H  PRN


 ORAL


 For Cough  9/15/19 10:30


 10/15/19 10:29   


 


 


 Lorazepam


  (Ativan 2mg/ml


 1ml)  0.5 mg  Q4H  PRN


 IV


 For Anxiety  9/13/19 23:15


 9/20/19 23:14   


 


 


 Morphine Sulfate


  (Morphine


 Sulfate)  1 mg  Q4H  PRN


 IVP


 For Pain  9/13/19 23:15


 9/20/19 23:14   


 


 


 Ondansetron HCl


  (Zofran)  4 mg  Q6H  PRN


 IVP


 Nausea & Vomiting  9/13/19 23:15


 10/13/19 23:14   


 


 


 Pantoprazole


  (Protonix)  40 mg  EVERY 12  HOURS


 ORAL


   9/16/19 10:30


 10/16/19 10:29  9/16/19 11:27


 


 


 Polyethylene


 Glycol


  (Miralax)  17 gm  HSPRN  PRN


 ORAL


 Constipation  9/13/19 23:15


 10/13/19 23:14   


 


 


 Sodium Chloride  1,000 ml @ 


 75 mls/hr  [id number]


 IV


   9/14/19 11:00


 10/14/19 10:59  9/16/19 12:26


 


 


 Zolpidem Tartrate


  (Ambien)  5 mg  HSPRN  PRN


 ORAL


 Insomnia  9/13/19 23:15


 9/20/19 23:14   


 

















Geovany Mustafa MD Sep 16, 2019 13:19

## 2019-09-16 NOTE — NUR
*-* DISCHARGE PLANNING *-*



PATIENT HAS BEEN REFERRED TO:



BR[facility]OFF[facility]

P: [phone]

F: [phone]

## 2019-09-16 NOTE — NEPHROLOGY PROGRESS NOTE
Assessment/Plan


Problem List:  


(1) MIKA (acute kidney injury)


Assessment:  Cr lowering





(2) Renal failure (ARF), acute on chronic


(3) Anemia


Assessment





MIKA  


bronchitis


abdominal pain and nausea, possibly enteritis ( by CT scan) 


anemia 


RA 


protein calorie malnutrition


Plan





hydrate-


monitor renal parameters


check ferritin and Iron panel





Subjective


ROS Limited/Unobtainable:  No


Interval Events/Complaints


Dr Nguyen coverage note


Constitutional:  Reports: malaise, weakness





Objective


Objective





Last 24 Hour Vital Signs








  Date Time  Temp Pulse Resp B/P (MAP) Pulse Ox O2 Delivery O2 Flow Rate FiO2


 


9/16/19 08:00 98.1 70 22 105/56 (72) 97   


 


9/16/19 04:00 97.7 65 16 116/56 (76) 96   


 


9/16/19 00:00 97.3 72 16 100/54 (69) 97   


 


9/15/19 21:15      Room Air  


 


9/15/19 20:00 98.2 64 18 125/67 (86) 95   


 


9/15/19 16:00 97.9 68 19 138/78 (98) 98   


 


9/15/19 12:00 97.9 59 19 127/65 (85) 99   

















Intake and Output  


 


 9/15/19 9/16/19





 19:00 07:00


 


Intake Total 925 ml 1300 ml


 


Balance 925 ml 1300 ml


 


  


 


Intake Oral  400 ml


 


IV Total 75 ml 900 ml


 


Other 850 ml 


 


# Voids  3








Laboratory Tests


9/16/19 06:10: 


Sodium Level 142, Potassium Level 4.6, Chloride Level 113H, Carbon Dioxide 

Level 21, Anion Gap 8, Blood Urea Nitrogen 34H, Creatinine 1.5H, Estimat 

Glomerular Filtration Rate , Glucose Level 87, Calcium Level 8.6


Height (Feet):  5


Height (Inches):  1.00


Weight (Pounds):  91


General Appearance:  no apparent distress, lethargic


Cardiovascular:  normal rate


Respiratory/Chest:  decreased breath sounds


Abdomen:  soft











Demar Myao MD Sep 16, 2019 10:18

## 2019-09-16 NOTE — NUR
NURSE NOTES:



REPORT RECEIVED FROM LANA WEISS. PATIENT RECEIVED ALERT AND STABLE SITTING IN BED EATING 
BREAKFAST, NO COMPLAINTS OF PAIN OR DISCOMFORT. NO SIGNS OF DISTRESS. PATIENT HAS CALL LIGHT 
WITHIN REACH AND BED IN THE LOW AND LOCKED POSITION. PATIENT HAS FLUIDS RUNNING AS 
PRESCRIBED, IV SITE IS CLEAN DRY AND INTACT.

## 2019-09-16 NOTE — HEMATOLOGY/ONC PROGRESS NOTE
Assessment/Plan


Assessment/Plan








ASSESSMENT/Recs


# Anemia of chronic disease (or of iron deficiency) due to underlying chronic 

medical issues, multifactorial 


--> Anemia workup has been ordered from pior adm, this time, ferritin


--> No evidence of hemolysis is noted, peripheral smear has been reviewed


--> Hgb goal >7. Transfuse prn.


--> Epogen or iron at this time is not particularly indicated


--> Medications have been reviewed


--> egd/colo per gi


# Decreased white blood cell count,  (leukopenia) - wbc under 4k, could also be 

related to infection (pna)


--> continue antibiotics with ID service, appreciate recs


--> medications have been reviewed


--> hepatitis and hiv have been ordered


--> Prior igg was >3000 and spep was anbnormal concerning for myeloma


--> obtain immunofixation this time, better test for myeloma


# PNA, Antibiotics per Infectious Disease.


--> on abx is on ctx


--> O2 and pulmonary treatment.


# Headache, chronic


# Recent ashley


# Dvt ppx scds


   


The timing of this note does not necessarily reflect the time of the patient 

was seen.   


   


Greatly appreciate consultation!





Subjective


Constitutional:  Denies: no symptoms, chills, fever, malaise, weakness, other


HEENT:  Denies: no symptoms, eye pain, blurred vision, tearing, double vision, 

ear pain, ear discharge, nose pain, nose congestion, throat pain, throat 

swelling, mouth pain, mouth swelling, other


Respiratory:  Denies: no symptoms, cough, shortness of breath, SOB with 

excertion, SOB at rest, sputum, wheezing, other


Gastrointestinal/Abdominal:  Denies: no symptoms, abdomen distended, abdominal 

pain, black stools, tarry stools, blood in stool, constipated, diarrhea, 

difficulty swallowing, nausea, poor appetite, poor fluid intake, rectal bleeding

, vomiting, other


Genitourinary:  Denies: no symptoms, burning, discharge, frequency, flank pain, 

hematuria, incontinence, pain, urgency, other


Neurologic/Psychiatric:  Denies: no symptoms, anxiety, depressed, emotional 

problems, headache, numbness, paresthesia, pre-existing deficit, seizure, 

tingling, tremors, weakness, other


Endocrine:  Denies: no symptoms, excessive sweating, flushing, intolerance to 

cold, intolerance to heat, increased hunger, increased thirst, increased urine, 

unexplained weight gain, unexplained weight loss, other


Hematologic/Lymphatic:  Denies: no symptoms, anemia, easy bleeding, easy 

bruising, adenopathy, other


Allergies:  


Coded Allergies:  


     No Known Allergies (Unverified , 2/14/16)


Subjective


9/16: no events, breathing better, labs noted incl anemia panel





Objective


Objective





Current Medications








 Medications


  (Trade)  Dose


 Ordered  Sig/Naman


 Route


 PRN Reason  Start Time


 Stop Time Status Last Admin


Dose Admin


 


 Acetaminophen


  (Tylenol)  650 mg  Q4H  PRN


 ORAL


 fever  9/13/19 23:15


 10/13/19 23:14   


 


 


 Ceftriaxone


 Sodium 1 gm/


 Dextrose  55 ml @ 


 110 mls/hr  DAILY


 IVPB


   9/15/19 09:00


 9/22/19 08:59  9/16/19 08:33


 


 


 Dextrose


  (Dextrose 50%)  25 ml  Q30M  PRN


 IV


 Hypoglycemia  9/13/19 23:15


 10/13/19 23:14   


 


 


 Dextrose


  (Dextrose 50%)  50 ml  Q30M  PRN


 IV


 Hypoglycemia  9/13/19 23:15


 10/13/19 23:14   


 


 


 Docusate Sodium


  (Colace)  100 mg  THREE TIMES A  DAY


 ORAL


   9/16/19 13:00


 10/16/19 12:59  9/16/19 13:24


 


 


 Guaifenesin/


 Dextromethorphan


  (Robitussin DM


 Syrup)  10 ml  Q4H  PRN


 ORAL


 For Cough  9/15/19 10:30


 10/15/19 10:29   


 


 


 Lorazepam


  (Ativan 2mg/ml


 1ml)  0.5 mg  Q4H  PRN


 IV


 For Anxiety  9/13/19 23:15


 9/20/19 23:14   


 


 


 Morphine Sulfate


  (Morphine


 Sulfate)  1 mg  Q4H  PRN


 IVP


 For Pain  9/13/19 23:15


 9/20/19 23:14   


 


 


 Ondansetron HCl


  (Zofran)  4 mg  Q6H  PRN


 IVP


 Nausea & Vomiting  9/13/19 23:15


 10/13/19 23:14   


 


 


 Pantoprazole


  (Protonix)  40 mg  EVERY 12  HOURS


 ORAL


   9/16/19 10:30


 10/16/19 10:29  9/16/19 11:27


 


 


 Polyethylene


 Glycol


  (Miralax)  17 gm  HSPRN  PRN


 ORAL


 Constipation  9/13/19 23:15


 10/13/19 23:14   


 


 


 Sodium Chloride  1,000 ml @ 


 75 mls/hr  [id number]


 IV


   9/14/19 11:00


 10/14/19 10:59  9/16/19 12:26


 


 


 Zolpidem Tartrate


  (Ambien)  5 mg  HSPRN  PRN


 ORAL


 Insomnia  9/13/19 23:15


 9/20/19 23:14   


 











Last 24 Hour Vital Signs








  Date Time  Temp Pulse Resp B/P (MAP) Pulse Ox O2 Delivery O2 Flow Rate FiO2


 


9/16/19 12:00 98.1 65 20 114/59 (77) 96   


 


9/16/19 09:00      Room Air  


 


9/16/19 08:00 98.1 70 22 105/56 (72) 97   


 


9/16/19 04:00 97.7 65 16 116/56 (76) 96   


 


9/16/19 00:00 97.3 72 16 100/54 (69) 97   


 


9/15/19 21:15      Room Air  


 


9/15/19 20:00 98.2 64 18 125/67 (86) 95   


 


9/15/19 16:00 97.9 68 19 138/78 (98) 98   


 


9/15/19 12:00 97.9 59 19 127/65 (85) 99   


 


9/15/19 09:00      Room Air  


 


9/15/19 08:00 97.8 70 18 103/51 (68) 97   


 


9/15/19 04:00 97.8 66 20 132/71 (91) 97   


 


9/15/19 00:00 97.6 70 18 114/49 (70) 95   


 


9/14/19 21:00      Room Air  


 


9/14/19 20:00 97.7 70 18 102/55 (71) 98   


 


9/14/19 16:00 98.1 67 18 95/54 (68) 98   

















Intake and Output  


 


 9/15/19 9/16/19





 19:00 07:00


 


Intake Total 925 ml 1300 ml


 


Balance 925 ml 1300 ml


 


  


 


Intake Oral  400 ml


 


IV Total 75 ml 900 ml


 


Other 850 ml 


 


# Voids  3











Labs








Test


  9/13/19


16:25 9/14/19


06:00 9/14/19


12:45 9/15/19


06:10


 


White Blood Count


  6.2 K/UL


(4.8-10.8) 3.3 K/UL


(4.8-10.8) 


  


 


 


Red Blood Count


  3.30 M/UL


(4.20-5.40) 2.79 M/UL


(4.20-5.40) 


  


 


 


Hemoglobin


  10.2 G/DL


(12.0-16.0) 8.6 G/DL


(12.0-16.0) 


  


 


 


Hematocrit


  30.5 %


(37.0-47.0) 25.8 %


(37.0-47.0) 


  


 


 


Mean Corpuscular Volume 93 FL (80-99)  93 FL (80-99)   


 


Mean Corpuscular Hemoglobin


  30.9 PG


(27.0-31.0) 30.9 PG


(27.0-31.0) 


  


 


 


Mean Corpuscular Hemoglobin


Concent 33.3 G/DL


(32.0-36.0) 33.4 G/DL


(32.0-36.0) 


  


 


 


Red Cell Distribution Width


  11.8 %


(11.6-14.8) 11.8 %


(11.6-14.8) 


  


 


 


Platelet Count


  369 K/UL


(150-450) 340 K/UL


(150-450) 


  


 


 


Mean Platelet Volume


  4.8 FL


(6.5-10.1) 5.0 FL


(6.5-10.1) 


  


 


 


Neutrophils (%) (Auto)


  76.2 %


(45.0-75.0)  % (45.0-75.0) 


  


  


 


 


Lymphocytes (%) (Auto)


  15.4 %


(20.0-45.0)  % (20.0-45.0) 


  


  


 


 


Monocytes (%) (Auto)


  7.0 %


(1.0-10.0)  % (1.0-10.0) 


  


  


 


 


Eosinophils (%) (Auto)


  0.8 %


(0.0-3.0)  % (0.0-3.0) 


  


  


 


 


Basophils (%) (Auto)


  0.6 %


(0.0-2.0)  % (0.0-2.0) 


  


  


 


 


Urine Color Pale yellow    


 


Urine Appearance Clear    


 


Urine pH 6 (4.5-8.0)    


 


Urine Specific Gravity


  1.010


(1.005-1.035) 


  


  


 


 


Urine Protein 2+ (NEGATIVE)    


 


Urine Glucose (UA)


  Negative


(NEGATIVE) 


  


  


 


 


Urine Ketones


  Negative


(NEGATIVE) 


  


  


 


 


Urine Blood 5+ (NEGATIVE)    


 


Urine Nitrite


  Negative


(NEGATIVE) 


  


  


 


 


Urine Bilirubin


  Negative


(NEGATIVE) 


  


  


 


 


Urine Urobilinogen


  Normal MG/DL


(0.0-1.0) 


  


  


 


 


Urine Leukocyte Esterase 1+ (NEGATIVE)    


 


Urine RBC


  5-10 /HPF (0 -


2) 


  


  


 


 


Urine WBC


  2-4 /HPF (0 -


2) 


  


  


 


 


Urine Squamous Epithelial


Cells Few /LPF


(NONE/OCC) 


  


  


 


 


Urine Bacteria


  Few /HPF


(NONE) 


  


  


 


 


Sodium Level


  137 MMOL/L


(136-145) 142 MMOL/L


(136-145) 


  


 


 


Potassium Level


  5.1 MMOL/L


(3.5-5.1) 4.8 MMOL/L


(3.5-5.1) 


  


 


 


Chloride Level


  105 MMOL/L


() 112 MMOL/L


() 


  


 


 


Carbon Dioxide Level


  22 MMOL/L


(21-32) 22 MMOL/L


(21-32) 


  


 


 


Anion Gap


  10 mmol/L


(5-15) 8 mmol/L


(5-15) 


  


 


 


Blood Urea Nitrogen


  58 mg/dL


(7-18) 49 mg/dL


(7-18) 


  


 


 


Creatinine


  2.2 MG/DL


(0.55-1.30) 1.9 MG/DL


(0.55-1.30) 


  


 


 


Estimat Glomerular Filtration


Rate  mL/min (>60) 


   mL/min (>60) 


  


  


 


 


Glucose Level


  133 MG/DL


() 89 MG/DL


() 


  


 


 


Calcium Level


  8.8 MG/DL


(8.5-10.1) 8.0 MG/DL


(8.5-10.1) 


  


 


 


Phosphorus Level


  4.4 MG/DL


(2.5-4.9) 


  


  


 


 


Magnesium Level


  2.3 MG/DL


(1.8-2.4) 


  


  


 


 


Total Bilirubin


  0.3 MG/DL


(0.2-1.0) 0.3 MG/DL


(0.2-1.0) 


  


 


 


Aspartate Amino Transf


(AST/SGOT) 18 U/L (15-37) 


  17 U/L (15-37) 


  


  


 


 


Alanine Aminotransferase


(ALT/SGPT) 11 U/L (12-78) 


  11 U/L (12-78) 


  


  


 


 


Alkaline Phosphatase


  84 U/L


() 62 U/L


() 


  


 


 


Troponin I


  0.000 ng/mL


(0.000-0.056) 


  


  


 


 


Pro-B-Type Natriuretic Peptide


  1401 pg/mL


(0-125) 


  


  


 


 


Total Protein


  8.7 G/DL


(6.4-8.2) 7.0 G/DL


(6.4-8.2) 


  


 


 


Albumin


  2.9 G/DL


(3.4-5.0) 2.2 G/DL


(3.4-5.0) 


  


 


 


Globulin 5.8 g/dL  4.8 g/dL   


 


Albumin/Globulin Ratio 0.5 (1.0-2.7)  0.5 (1.0-2.7)   


 


Differential Total Cells


Counted 


  100 


  


  


 


 


Neutrophils % (Manual)  64 % (45-75)   


 


Lymphocytes % (Manual)  26 % (20-45)   


 


Monocytes % (Manual)  7 % (1-10)   


 


Eosinophils % (Manual)  3 % (0-3)   


 


Basophils % (Manual)  0 % (0-2)   


 


Band Neutrophils  0 % (0-8)   


 


Platelet Estimate  Adequate   


 


Platelet Morphology  Normal   


 


Red Blood Cell Morphology  Normal   


 


Triglycerides Level


  


  80 MG/DL


() 


  


 


 


Cholesterol Level


  


  100 MG/DL (<


200) 


  


 


 


LDL Cholesterol


  


  62 mg/dL


(<100) 


  


 


 


HDL Cholesterol


  


  27 MG/DL


(40-60) 


  


 


 


Cholesterol/HDL Ratio  3.7 (3.3-4.4)   


 


Thyroid Stimulating Hormone


(TSH) 


  3.266 uiU/mL


(0.358-3.740) 


  


 


 


Stool Occult Blood


  


  


  Negative


(NEGATIVE) 


 


 


Test


  9/15/19


06:40 9/16/19


06:10 


  


 


 


White Blood Count


  3.6 K/UL


(4.8-10.8) 


  


  


 


 


Red Blood Count


  2.82 M/UL


(4.20-5.40) 


  


  


 


 


Hemoglobin


  8.7 G/DL


(12.0-16.0) 


  


  


 


 


Hematocrit


  26.3 %


(37.0-47.0) 


  


  


 


 


Mean Corpuscular Volume 93 FL (80-99)    


 


Mean Corpuscular Hemoglobin


  30.8 PG


(27.0-31.0) 


  


  


 


 


Mean Corpuscular Hemoglobin


Concent 33.0 G/DL


(32.0-36.0) 


  


  


 


 


Red Cell Distribution Width


  12.1 %


(11.6-14.8) 


  


  


 


 


Platelet Count


  348 K/UL


(150-450) 


  


  


 


 


Mean Platelet Volume


  5.1 FL


(6.5-10.1) 


  


  


 


 


Neutrophils (%) (Auto)


  62.3 %


(45.0-75.0) 


  


  


 


 


Lymphocytes (%) (Auto)


  25.6 %


(20.0-45.0) 


  


  


 


 


Monocytes (%) (Auto)


  8.9 %


(1.0-10.0) 


  


  


 


 


Eosinophils (%) (Auto)


  2.2 %


(0.0-3.0) 


  


  


 


 


Basophils (%) (Auto)


  0.9 %


(0.0-2.0) 


  


  


 


 


Sodium Level


  144 MMOL/L


(136-145) 142 MMOL/L


(136-145) 


  


 


 


Potassium Level


  4.9 MMOL/L


(3.5-5.1) 4.6 MMOL/L


(3.5-5.1) 


  


 


 


Chloride Level


  114 MMOL/L


() 113 MMOL/L


() 


  


 


 


Carbon Dioxide Level


  21 MMOL/L


(21-32) 21 MMOL/L


(21-32) 


  


 


 


Anion Gap


  9 mmol/L


(5-15) 8 mmol/L


(5-15) 


  


 


 


Blood Urea Nitrogen


  45 mg/dL


(7-18) 34 mg/dL


(7-18) 


  


 


 


Creatinine


  1.7 MG/DL


(0.55-1.30) 1.5 MG/DL


(0.55-1.30) 


  


 


 


Estimat Glomerular Filtration


Rate  mL/min (>60) 


   mL/min (>60) 


  


  


 


 


Glucose Level


  87 MG/DL


() 87 MG/DL


() 


  


 


 


Lactic Acid Level


  0.70 mmol/L


(0.4-2.0) 


  


  


 


 


Calcium Level


  8.4 MG/DL


(8.5-10.1) 8.6 MG/DL


(8.5-10.1) 


  


 


 


Phosphorus Level


  4.0 MG/DL


(2.5-4.9) 


  


  


 


 


Magnesium Level


  1.8 MG/DL


(1.8-2.4) 


  


  


 


 


Ferritin


  125 NG/ML


(8-388) 


  


  


 


 


Total Bilirubin


  0.2 MG/DL


(0.2-1.0) 


  


  


 


 


Aspartate Amino Transf


(AST/SGOT) 18 U/L (15-37) 


  


  


  


 


 


Alanine Aminotransferase


(ALT/SGPT) 12 U/L (12-78) 


  


  


  


 


 


Alkaline Phosphatase


  67 U/L


() 


  


  


 


 


Total Protein


  7.2 G/DL


(6.4-8.2) 


  


  


 


 


Albumin


  2.2 G/DL


(3.4-5.0) 


  


  


 


 


Globulin 5.0 g/dL    


 


Albumin/Globulin Ratio 0.4 (1.0-2.7)    


 


Triglycerides Level


  84 MG/DL


() 


  


  


 


 


Cholesterol Level


  106 MG/DL (<


200) 


  


  


 


 


LDL Cholesterol


  65 mg/dL


(<100) 


  


  


 


 


HDL Cholesterol


  27 MG/DL


(40-60) 


  


  


 


 


Cholesterol/HDL Ratio 3.9 (3.3-4.4)    


 


Vitamin B12 Level


  583 PG/ML


(193-986) 


  


  


 


 


Folate


  11.4 NG/ML


(8.6-58.9) 


  


  


 








Height (Feet):  5


Height (Inches):  1.00


Weight (Pounds):  91


Objective








PHYSICAL EXAMINATION:


GENERAL:  Calm in bed, oriented x3, slight short of breath.


VITAL SIGNS: reviewed


CARDIOVASCULAR:  No murmur.


LUNGS:  Poor air exchange.


ABDOMEN:  Bowel sounds distant.


EXTREMITIES:  No cyanosis or edema.


NEUROLOGIC:  The patient moves all extremities, slightly weak.











Agustin Giordano MD Sep 16, 2019 13:42

## 2019-09-16 NOTE — NUR
NURSE NOTES:



DR. ARCINIEGA SAW PATIENT WHO STATED SHE WAS HAVING CHEST AND ABDOMINAL PAIN. I ASKED PATIENT IF 
SHE WANTED SOME PAIN RELIEF AND SHE STATED THAT SHE DID NOT WANT ANY MEDICATIONS FOR PAIN. 
DR ARCINIEGA ALSO ASKED TO CHANGE HER DIET ORDER TO CONSISTENT CARBOHYDRATE DIET MEDIUM 1800 
CALORIES. ORDER PLACED PER MD. 


-------------------------------------------------------------------------------

Addendum: 09/16/19 at 1221 by Damon Flores RN

-------------------------------------------------------------------------------

NURSE NOTE ENTERED FOR THE WRONG PATIENT. PLEASE IGNORE THE NOTE.

## 2019-09-17 VITALS — DIASTOLIC BLOOD PRESSURE: 78 MMHG | SYSTOLIC BLOOD PRESSURE: 118 MMHG

## 2019-09-17 VITALS — DIASTOLIC BLOOD PRESSURE: 75 MMHG | SYSTOLIC BLOOD PRESSURE: 121 MMHG

## 2019-09-17 VITALS — SYSTOLIC BLOOD PRESSURE: 110 MMHG | DIASTOLIC BLOOD PRESSURE: 58 MMHG

## 2019-09-17 VITALS — DIASTOLIC BLOOD PRESSURE: 51 MMHG | SYSTOLIC BLOOD PRESSURE: 104 MMHG

## 2019-09-17 VITALS — DIASTOLIC BLOOD PRESSURE: 59 MMHG | SYSTOLIC BLOOD PRESSURE: 107 MMHG

## 2019-09-17 VITALS — DIASTOLIC BLOOD PRESSURE: 62 MMHG | SYSTOLIC BLOOD PRESSURE: 106 MMHG

## 2019-09-17 LAB
% IRON SATURATION: 29 % (ref 15–50)
ADD MANUAL DIFF: NO
ALBUMIN SERPL-MCNC: 2.3 G/DL (ref 3.4–5)
ALBUMIN/GLOB SERPL: 0.5 {RATIO} (ref 1–2.7)
ALP SERPL-CCNC: 64 U/L (ref 46–116)
ALT SERPL-CCNC: 8 U/L (ref 12–78)
ANION GAP SERPL CALC-SCNC: 9 MMOL/L (ref 5–15)
AST SERPL-CCNC: 15 U/L (ref 15–37)
BASOPHILS NFR BLD AUTO: 0.7 % (ref 0–2)
BILIRUB SERPL-MCNC: 0.3 MG/DL (ref 0.2–1)
BUN SERPL-MCNC: 30 MG/DL (ref 7–18)
CALCIUM SERPL-MCNC: 8.3 MG/DL (ref 8.5–10.1)
CHLORIDE SERPL-SCNC: 111 MMOL/L (ref 98–107)
CO2 SERPL-SCNC: 21 MMOL/L (ref 21–32)
CREAT SERPL-MCNC: 1.6 MG/DL (ref 0.55–1.3)
EOSINOPHIL NFR BLD AUTO: 1.2 % (ref 0–3)
ERYTHROCYTE [DISTWIDTH] IN BLOOD BY AUTOMATED COUNT: 11.4 % (ref 11.6–14.8)
FERRITIN SERPL-MCNC: 128 NG/ML (ref 8–388)
GLOBULIN SER-MCNC: 5 G/DL
HCT VFR BLD CALC: 25.6 % (ref 37–47)
HGB BLD-MCNC: 8.6 G/DL (ref 12–16)
IRON SERPL-MCNC: 52 UG/DL (ref 50–175)
LYMPHOCYTES NFR BLD AUTO: 24.5 % (ref 20–45)
MCV RBC AUTO: 94 FL (ref 80–99)
MONOCYTES NFR BLD AUTO: 9.2 % (ref 1–10)
NEUTROPHILS NFR BLD AUTO: 64.5 % (ref 45–75)
PHOSPHATE SERPL-MCNC: 3.6 MG/DL (ref 2.5–4.9)
PLATELET # BLD: 311 K/UL (ref 150–450)
POTASSIUM SERPL-SCNC: 4.2 MMOL/L (ref 3.5–5.1)
RBC # BLD AUTO: 2.71 M/UL (ref 4.2–5.4)
SODIUM SERPL-SCNC: 141 MMOL/L (ref 136–145)
TIBC SERPL-MCNC: 181 UG/DL (ref 250–450)
UNSATURATED IRON BINDING: 129 UG/DL (ref 112–346)
WBC # BLD AUTO: 4.3 K/UL (ref 4.8–10.8)

## 2019-09-17 RX ADMIN — SODIUM CHLORIDE SCH MLS/HR: 0.9 INJECTION INTRAVENOUS at 08:26

## 2019-09-17 RX ADMIN — DOCUSATE SODIUM SCH MG: 100 CAPSULE, LIQUID FILLED ORAL at 18:06

## 2019-09-17 RX ADMIN — DOCUSATE SODIUM SCH MG: 100 CAPSULE, LIQUID FILLED ORAL at 12:39

## 2019-09-17 RX ADMIN — DOCUSATE SODIUM SCH MG: 100 CAPSULE, LIQUID FILLED ORAL at 08:27

## 2019-09-17 NOTE — NUR
NURSE NOTES:



HANDOFF RECEIVED FROM LANA DOMINGUEZ. PATIENT RECEIVED SITTING UPRIGHT IN BED EATING HER BREAKFAST 
TRAY, NO OBVIOUS SIGNS OF DISTRESS. PATIENT IS ABLE TO MAKE NEEDS KNOWN. PATIENT HAS IV NS 
RUNNING AT 75, IV SITE IS CLEAN DRY AND INTACT. BED IN LOW AND LOCKED POSITION AND CALL 
LIGHT WITHIN REACH. WILL CONTINUE TO MONITOR.

## 2019-09-17 NOTE — HEMATOLOGY/ONC PROGRESS NOTE
Assessment/Plan


Assessment/Plan








ASSESSMENT/Recs


# Anemia of chronic disease due to underlying chronic medical issues, 

multifactorial 


--> Anemia workup has been ordered from pior adm, this time, ferritin


--> No evidence of hemolysis is noted, peripheral smear has been reviewed


--> Hgb goal >7. Transfuse prn.


--> Epogen or iron at this time is not particularly indicated


--> Medications have been reviewed


--> hgb trend 10.2-->8.7-->8.6


--> egd/colo per gi


# Decreased white blood cell count,  (leukopenia) - wbc under 4k, could also be 

related to infection (pna)


--> continue antibiotics with ID service, appreciate recs


--> medications have been reviewed


--> hepatitis and hiv have been ordered


--> Prior igg was 3500 -->2453


--> wbc trend 6.2-->3.4-->4.3


--> obtain immunofixation PEND


# PNA, Antibiotics per Infectious Disease.


--> on abx is on ctx


--> O2 and pulmonary treatment.


# Headache, chronic


# Recent ashley


# Dvt ppx scds


   


The timing of this note does not necessarily reflect the time of the patient 

was seen.   


   


Greatly appreciate consultation!





Subjective


Constitutional:  Denies: no symptoms, chills, fever, malaise, weakness, other


HEENT:  Denies: no symptoms, eye pain, blurred vision, tearing, double vision, 

ear pain, ear discharge, nose pain, nose congestion, throat pain, throat 

swelling, mouth pain, mouth swelling, other


Cardiovascular:  Denies: no symptoms, chest pain, edema, irregular heart rate, 

lightheadedness, palpitations, syncope, other


Respiratory:  Denies: no symptoms, cough, shortness of breath, SOB with 

excertion, SOB at rest, sputum, wheezing, other


Gastrointestinal/Abdominal:  Denies: no symptoms, abdomen distended, abdominal 

pain, black stools, tarry stools, blood in stool, constipated, diarrhea, 

difficulty swallowing, nausea, poor appetite, poor fluid intake, rectal bleeding

, vomiting, other


Genitourinary:  Denies: no symptoms, burning, discharge, frequency, flank pain, 

hematuria, incontinence, pain, urgency, other


Neurologic/Psychiatric:  Denies: no symptoms, anxiety, depressed, emotional 

problems, headache, numbness, paresthesia, pre-existing deficit, seizure, 

tingling, tremors, weakness, other


Endocrine:  Denies: no symptoms, excessive sweating, flushing, intolerance to 

cold, intolerance to heat, increased hunger, increased thirst, increased urine, 

unexplained weight gain, unexplained weight loss, other


Allergies:  


Coded Allergies:  


     No Known Allergies (Unverified , 2/14/16)


Subjective


9/16: no events, breathing better, labs noted incl anemia panel


9/17: no bleeding or chills, no major changes, still refusing gi procedures





Objective


Objective





Current Medications








 Medications


  (Trade)  Dose


 Ordered  Sig/Naman


 Route


 PRN Reason  Start Time


 Stop Time Status Last Admin


Dose Admin


 


 Acetaminophen


  (Tylenol)  650 mg  Q4H  PRN


 ORAL


 fever  9/13/19 23:15


 10/13/19 23:14   


 


 


 Ceftriaxone


 Sodium 1 gm/


 Dextrose  55 ml @ 


 110 mls/hr  DAILY


 IVPB


   9/15/19 09:00


 9/22/19 08:59  9/17/19 08:26


 


 


 Dextrose


  (Dextrose 50%)  25 ml  Q30M  PRN


 IV


 Hypoglycemia  9/13/19 23:15


 10/13/19 23:14   


 


 


 Dextrose


  (Dextrose 50%)  50 ml  Q30M  PRN


 IV


 Hypoglycemia  9/13/19 23:15


 10/13/19 23:14   


 


 


 Docusate Sodium


  (Colace)  100 mg  THREE TIMES A  DAY


 ORAL


   9/16/19 13:00


 10/16/19 12:59  9/17/19 12:39


 


 


 Guaifenesin/


 Dextromethorphan


  (Robitussin DM


 Syrup)  10 ml  Q4H  PRN


 ORAL


 For Cough  9/15/19 10:30


 10/15/19 10:29   


 


 


 Lorazepam


  (Ativan 2mg/ml


 1ml)  0.5 mg  Q4H  PRN


 IV


 For Anxiety  9/13/19 23:15


 9/20/19 23:14   


 


 


 Morphine Sulfate


  (Morphine


 Sulfate)  1 mg  Q4H  PRN


 IVP


 For Pain  9/13/19 23:15


 9/20/19 23:14   


 


 


 Ondansetron HCl


  (Zofran)  4 mg  Q6H  PRN


 IVP


 Nausea & Vomiting  9/13/19 23:15


 10/13/19 23:14   


 


 


 Pantoprazole


  (Protonix)  40 mg  EVERY 12  HOURS


 ORAL


   9/16/19 10:30


 10/16/19 10:29  9/17/19 08:27


 


 


 Polyethylene


 Glycol


  (Miralax)  17 gm  HSPRN  PRN


 ORAL


 Constipation  9/13/19 23:15


 10/13/19 23:14   


 


 


 Sodium Chloride  1,000 ml @ 


 75 mls/hr  [id number]


 IV


   9/14/19 11:00


 10/14/19 10:59  9/17/19 05:12


 


 


 Zolpidem Tartrate


  (Ambien)  5 mg  HSPRN  PRN


 ORAL


 Insomnia  9/13/19 23:15


 9/20/19 23:14   


 











Last 24 Hour Vital Signs








  Date Time  Temp Pulse Resp B/P (MAP) Pulse Ox O2 Delivery O2 Flow Rate FiO2


 


9/17/19 12:00 98.7 75 18 107/59 (75) 95   


 


9/17/19 09:00      Room Air  


 


9/17/19 08:00 98.6 74 19 104/51 (68) 95   


 


9/17/19 04:00 96.8 78 20 106/62 (77)    


 


9/17/19 00:00 98.7 87 20 121/75 (90)    


 


9/16/19 21:00      Room Air  


 


9/16/19 20:00 96.6 88 19 106/62 (77)    


 


9/16/19 16:00 97.3 66 18 117/61 (79) 96   


 


9/16/19 12:00 98.1 65 20 114/59 (77) 96   


 


9/16/19 09:00      Room Air  


 


9/16/19 08:00 98.1 70 22 105/56 (72) 97   


 


9/16/19 04:00 97.7 65 16 116/56 (76) 96   


 


9/16/19 00:00 97.3 72 16 100/54 (69) 97   


 


9/15/19 21:15      Room Air  


 


9/15/19 20:00 98.2 64 18 125/67 (86) 95   


 


9/15/19 16:00 97.9 68 19 138/78 (98) 98   

















Intake and Output  


 


 9/16/19 9/17/19





 19:00 07:00


 


Intake Total 75 ml 750 ml


 


Balance 75 ml 750 ml


 


  


 


IV Total 75 ml 750 ml


 


# Voids 3 3











Labs








Test


  9/15/19


06:10 9/15/19


06:40 9/16/19


06:10 9/17/19


05:35


 


Immunoglobulin G


  2453 mg/dL


(700-1600) 


  


  


 


 


Immunoglobulin A


  369 mg/dL


() 


  


  


 


 


Immunoglobulin M


  88 mg/dL


() 


  


  


 


 


Immunofixation Screen Comment (.)    


 


White Blood Count


  


  3.6 K/UL


(4.8-10.8) 


  4.3 K/UL


(4.8-10.8)


 


Red Blood Count


  


  2.82 M/UL


(4.20-5.40) 


  2.71 M/UL


(4.20-5.40)


 


Hemoglobin


  


  8.7 G/DL


(12.0-16.0) 


  8.6 G/DL


(12.0-16.0)


 


Hematocrit


  


  26.3 %


(37.0-47.0) 


  25.6 %


(37.0-47.0)


 


Mean Corpuscular Volume  93 FL (80-99)   94 FL (80-99) 


 


Mean Corpuscular Hemoglobin


  


  30.8 PG


(27.0-31.0) 


  31.8 PG


(27.0-31.0)


 


Mean Corpuscular Hemoglobin


Concent 


  33.0 G/DL


(32.0-36.0) 


  33.7 G/DL


(32.0-36.0)


 


Red Cell Distribution Width


  


  12.1 %


(11.6-14.8) 


  11.4 %


(11.6-14.8)


 


Platelet Count


  


  348 K/UL


(150-450) 


  311 K/UL


(150-450)


 


Mean Platelet Volume


  


  5.1 FL


(6.5-10.1) 


  5.6 FL


(6.5-10.1)


 


Neutrophils (%) (Auto)


  


  62.3 %


(45.0-75.0) 


  64.5 %


(45.0-75.0)


 


Lymphocytes (%) (Auto)


  


  25.6 %


(20.0-45.0) 


  24.5 %


(20.0-45.0)


 


Monocytes (%) (Auto)


  


  8.9 %


(1.0-10.0) 


  9.2 %


(1.0-10.0)


 


Eosinophils (%) (Auto)


  


  2.2 %


(0.0-3.0) 


  1.2 %


(0.0-3.0)


 


Basophils (%) (Auto)


  


  0.9 %


(0.0-2.0) 


  0.7 %


(0.0-2.0)


 


Sodium Level


  


  144 MMOL/L


(136-145) 142 MMOL/L


(136-145) 141 MMOL/L


(136-145)


 


Potassium Level


  


  4.9 MMOL/L


(3.5-5.1) 4.6 MMOL/L


(3.5-5.1) 4.2 MMOL/L


(3.5-5.1)


 


Chloride Level


  


  114 MMOL/L


() 113 MMOL/L


() 111 MMOL/L


()


 


Carbon Dioxide Level


  


  21 MMOL/L


(21-32) 21 MMOL/L


(21-32) 21 MMOL/L


(21-32)


 


Anion Gap


  


  9 mmol/L


(5-15) 8 mmol/L


(5-15) 9 mmol/L


(5-15)


 


Blood Urea Nitrogen


  


  45 mg/dL


(7-18) 34 mg/dL


(7-18) 30 mg/dL


(7-18)


 


Creatinine


  


  1.7 MG/DL


(0.55-1.30) 1.5 MG/DL


(0.55-1.30) 1.6 MG/DL


(0.55-1.30)


 


Estimat Glomerular Filtration


Rate 


   mL/min (>60) 


 


 


Glucose Level


  


  87 MG/DL


() 87 MG/DL


() 90 MG/DL


()


 


Lactic Acid Level


  


  0.70 mmol/L


(0.4-2.0) 


  


 


 


Calcium Level


  


  8.4 MG/DL


(8.5-10.1) 8.6 MG/DL


(8.5-10.1) 8.3 MG/DL


(8.5-10.1)


 


Phosphorus Level


  


  4.0 MG/DL


(2.5-4.9) 


  3.6 MG/DL


(2.5-4.9)


 


Magnesium Level


  


  1.8 MG/DL


(1.8-2.4) 


  1.5 MG/DL


(1.8-2.4)


 


Ferritin


  


  125 NG/ML


(8-388) 


  128 NG/ML


(8-388)


 


Total Bilirubin


  


  0.2 MG/DL


(0.2-1.0) 


  0.3 MG/DL


(0.2-1.0)


 


Aspartate Amino Transf


(AST/SGOT) 


  18 U/L (15-37) 


  


  15 U/L (15-37) 


 


 


Alanine Aminotransferase


(ALT/SGPT) 


  12 U/L (12-78) 


  


  8 U/L (12-78) 


 


 


Alkaline Phosphatase


  


  67 U/L


() 


  64 U/L


()


 


Total Protein


  


  7.2 G/DL


(6.4-8.2) 


  7.3 G/DL


(6.4-8.2)


 


Total Protein (PEP)


  


  6.4 g/dL


(6.0-8.5) 


  


 


 


Albumin


  


  2.2 G/DL


(3.4-5.0) 


  2.3 G/DL


(3.4-5.0)


 


Albumin (PEP)


  


  2.4 g/dL


(2.9-4.4) 


  


 


 


Globulin  5.0 g/dL   5.0 g/dL 


 


Globulin (PEP)


  


  4.0 g/dL


(2.2-3.9) 


  


 


 


Albumin/Globulin Ratio  0.6 (0.7-1.7)   0.5 (1.0-2.7) 


 


Alpha-1-Globulins


  


  0.3 g/dL


(0.0-0.4) 


  


 


 


Alpha-2-Globulins


  


  0.7 g/dL


(0.4-1.0) 


  


 


 


Beta Globulins


  


  0.9 g/dL


(0.7-1.3) 


  


 


 


Beta Gamma Globulin


  


  2.1 g/dL


(0.4-1.8) 


  


 


 


PEP Abnormal Protein Bands


  


  Not observed


g/dL (Not 


  


 


 


Protein Electrophoresis


Interpret 


  Comment (.) 


  


  


 


 


Triglycerides Level


  


  84 MG/DL


() 


  


 


 


Cholesterol Level


  


  106 MG/DL (<


200) 


  


 


 


LDL Cholesterol


  


  65 mg/dL


(<100) 


  


 


 


HDL Cholesterol


  


  27 MG/DL


(40-60) 


  


 


 


Cholesterol/HDL Ratio  3.9 (3.3-4.4)   


 


Carcinoembryonic Antigen


  


  0.9 ng/mL


(0.0-4.7) 


  


 


 


Vitamin B12 Level


  


  583 PG/ML


(193-986) 


  


 


 


Folate


  


  11.4 NG/ML


(8.6-58.9) 


  


 


 


Iron Level


  


  


  


  52 ug/dL


()


 


Total Iron Binding Capacity


  


  


  


  181 ug/dL


(250-450)


 


Percent Iron Saturation    29 % (15-50) 


 


Unsaturated Iron Binding


  


  


  


  129 ug/dL


(112-346)








Height (Feet):  5


Height (Inches):  1.00


Weight (Pounds):  91


Objective








PHYSICAL EXAMINATION:


GENERAL:  Calm in bed, oriented x3, slight short of breath.


VITAL SIGNS: reviewed


CARDIOVASCULAR:  No murmur.


LUNGS:  Poor air exchange.


ABDOMEN:  Bowel sounds distant.


EXTREMITIES:  No cyanosis or edema.


NEUROLOGIC:  The patient moves all extremities, slightly weak.











Agustin Giordano MD Sep 17, 2019 14:40

## 2019-09-17 NOTE — PULMONOLOGY PROGRESS NOTE
Assessment/Plan


Problems:  


(1) Purulent bronchitis


(2) MIKA (acute kidney injury)


(3) Emphysema lung


(4) Severe anemia


Assessment/Plan


respiratory treatment


check sputum


creatinine becoming more stable at 1.6


chest PT


check electrolytes


titrate fio2 to sat of 92%





Subjective


ROS Limited/Unobtainable:  No


Constitutional:  Reports: no symptoms


HEENT:  Repors: no symptoms


Respiratory:  Reports: no symptoms


Allergies:  


Coded Allergies:  


     No Known Allergies (Unverified , 2/14/16)





Objective





Last 24 Hour Vital Signs








  Date Time  Temp Pulse Resp B/P (MAP) Pulse Ox O2 Delivery O2 Flow Rate FiO2


 


9/17/19 09:00      Room Air  


 


9/17/19 08:00 98.6 74 19 104/51 (68) 95   


 


9/17/19 04:00 96.8 78 20 106/62 (77)    


 


9/17/19 00:00 98.7 87 20 121/75 (90)    


 


9/16/19 21:00      Room Air  


 


9/16/19 20:00 96.6 88 19 106/62 (77)    


 


9/16/19 16:00 97.3 66 18 117/61 (79) 96   


 


9/16/19 12:00 98.1 65 20 114/59 (77) 96   

















Intake and Output  


 


 9/16/19 9/17/19





 19:00 07:00


 


Intake Total 75 ml 750 ml


 


Balance 75 ml 750 ml


 


  


 


IV Total 75 ml 750 ml


 


# Voids 3 3








General Appearance:  WD/WN


HEENT:  normocephalic, atraumatic


Respiratory/Chest:  chest wall non-tender, lungs clear


Breasts:  no masses


Cardiovascular:  regular rhythm


Abdomen:  soft, non tender, no organomegaly


Extremities:  no cyanosis


Skin:  no rash


Laboratory Tests


9/17/19 05:35: 


White Blood Count 4.3L, Red Blood Count 2.71L, Hemoglobin 8.6L, Hematocrit 25.6L

, Mean Corpuscular Volume 94, Mean Corpuscular Hemoglobin 31.8H, Mean 

Corpuscular Hemoglobin Concent 33.7, Red Cell Distribution Width 11.4L, 

Platelet Count 311, Mean Platelet Volume 5.6L, Neutrophils (%) (Auto) 64.5, 

Lymphocytes (%) (Auto) 24.5, Monocytes (%) (Auto) 9.2, Eosinophils (%) (Auto) 

1.2, Basophils (%) (Auto) 0.7, Sodium Level 141, Potassium Level 4.2, Chloride 

Level 111H, Carbon Dioxide Level 21, Anion Gap 9, Blood Urea Nitrogen 30H, 

Creatinine 1.6H, Estimat Glomerular Filtration Rate , Glucose Level 90, Calcium 

Level 8.3L, Phosphorus Level 3.6, Magnesium Level 1.5L, Iron Level 52, Total 

Iron Binding Capacity 181L, Percent Iron Saturation 29, Unsaturated Iron 

Binding 129, Ferritin 128, Total Bilirubin 0.3, Aspartate Amino Transf (AST/SGOT

) 15, Alanine Aminotransferase (ALT/SGPT) 8L, Alkaline Phosphatase 64, Total 

Protein 7.3, Albumin 2.3L, Globulin 5.0, Albumin/Globulin Ratio 0.5L





Current Medications








 Medications


  (Trade)  Dose


 Ordered  Sig/Naman


 Route


 PRN Reason  Start Time


 Stop Time Status Last Admin


Dose Admin


 


 Acetaminophen


  (Tylenol)  650 mg  Q4H  PRN


 ORAL


 fever  9/13/19 23:15


 10/13/19 23:14   


 


 


 Ceftriaxone


 Sodium 1 gm/


 Dextrose  55 ml @ 


 110 mls/hr  DAILY


 IVPB


   9/15/19 09:00


 9/22/19 08:59  9/17/19 08:26


 


 


 Dextrose


  (Dextrose 50%)  25 ml  Q30M  PRN


 IV


 Hypoglycemia  9/13/19 23:15


 10/13/19 23:14   


 


 


 Dextrose


  (Dextrose 50%)  50 ml  Q30M  PRN


 IV


 Hypoglycemia  9/13/19 23:15


 10/13/19 23:14   


 


 


 Docusate Sodium


  (Colace)  100 mg  THREE TIMES A  DAY


 ORAL


   9/16/19 13:00


 10/16/19 12:59  9/17/19 08:27


 


 


 Guaifenesin/


 Dextromethorphan


  (Robitussin DM


 Syrup)  10 ml  Q4H  PRN


 ORAL


 For Cough  9/15/19 10:30


 10/15/19 10:29   


 


 


 Lorazepam


  (Ativan 2mg/ml


 1ml)  0.5 mg  Q4H  PRN


 IV


 For Anxiety  9/13/19 23:15


 9/20/19 23:14   


 


 


 Morphine Sulfate


  (Morphine


 Sulfate)  1 mg  Q4H  PRN


 IVP


 For Pain  9/13/19 23:15


 9/20/19 23:14   


 


 


 Ondansetron HCl


  (Zofran)  4 mg  Q6H  PRN


 IVP


 Nausea & Vomiting  9/13/19 23:15


 10/13/19 23:14   


 


 


 Pantoprazole


  (Protonix)  40 mg  EVERY 12  HOURS


 ORAL


   9/16/19 10:30


 10/16/19 10:29  9/17/19 08:27


 


 


 Polyethylene


 Glycol


  (Miralax)  17 gm  HSPRN  PRN


 ORAL


 Constipation  9/13/19 23:15


 10/13/19 23:14   


 


 


 Sodium Chloride  1,000 ml @ 


 75 mls/hr  [id number]


 IV


   9/14/19 11:00


 10/14/19 10:59  9/17/19 05:12


 


 


 Zolpidem Tartrate


  (Ambien)  5 mg  HSPRN  PRN


 ORAL


 Insomnia  9/13/19 23:15


 9/20/19 23:14   


 

















Geovany Mustafa MD Sep 17, 2019 11:51

## 2019-09-17 NOTE — NUR
NURSE NOTES:

Received patient in bed, family at bedside,no acute distress noted, VSS afebrile, IV site is 
clean dry and intact. Call light is within reach, bed is lowered, locked and alarm is on. 
Will continue to monitor for comfort and safety.

## 2019-09-17 NOTE — GENERAL PROGRESS NOTE
Assessment/Plan


Problem List:  


(1) UTI (urinary tract infection)


ICD Codes:  N39.0 - Urinary tract infection, site not specified


SNOMED:  56822523


(2) Renal failure (ARF), acute on chronic


ICD Codes:  N17.9 - Acute kidney failure, unspecified; N18.9 - Chronic kidney 

disease, unspecified


SNOMED:  840056837


(3) Episode of generalized weakness


ICD Codes:  R53.1 - Weakness


SNOMED:  26301791


Status:  stable, progressing


Assessment/Plan:


pt diet abx cbc bmp am aru eval





Subjective


Constitutional:  Reports: weakness


Allergies:  


Coded Allergies:  


     No Known Allergies (Unverified , 2/14/16)


All Systems:  reviewed and negative except above


Subjective


calm in bed sl weak





Objective





Last 24 Hour Vital Signs








  Date Time  Temp Pulse Resp B/P (MAP) Pulse Ox O2 Delivery O2 Flow Rate FiO2


 


9/17/19 12:00 98.7 75 18 107/59 (75) 95   


 


9/17/19 09:00      Room Air  


 


9/17/19 08:00 98.6 74 19 104/51 (68) 95   


 


9/17/19 04:00 96.8 78 20 106/62 (77)    


 


9/17/19 00:00 98.7 87 20 121/75 (90)    


 


9/16/19 21:00      Room Air  


 


9/16/19 20:00 96.6 88 19 106/62 (77)    


 


9/16/19 16:00 97.3 66 18 117/61 (79) 96   

















Intake and Output  


 


 9/16/19 9/17/19





 19:00 07:00


 


Intake Total 75 ml 750 ml


 


Balance 75 ml 750 ml


 


  


 


IV Total 75 ml 750 ml


 


# Voids 3 3








Laboratory Tests


9/17/19 05:35: 


White Blood Count 4.3L, Red Blood Count 2.71L, Hemoglobin 8.6L, Hematocrit 25.6L

, Mean Corpuscular Volume 94, Mean Corpuscular Hemoglobin 31.8H, Mean 

Corpuscular Hemoglobin Concent 33.7, Red Cell Distribution Width 11.4L, 

Platelet Count 311, Mean Platelet Volume 5.6L, Neutrophils (%) (Auto) 64.5, 

Lymphocytes (%) (Auto) 24.5, Monocytes (%) (Auto) 9.2, Eosinophils (%) (Auto) 

1.2, Basophils (%) (Auto) 0.7, Sodium Level 141, Potassium Level 4.2, Chloride 

Level 111H, Carbon Dioxide Level 21, Anion Gap 9, Blood Urea Nitrogen 30H, 

Creatinine 1.6H, Estimat Glomerular Filtration Rate , Glucose Level 90, Calcium 

Level 8.3L, Phosphorus Level 3.6, Magnesium Level 1.5L, Iron Level 52, Total 

Iron Binding Capacity 181L, Percent Iron Saturation 29, Unsaturated Iron 

Binding 129, Ferritin 128, Total Bilirubin 0.3, Aspartate Amino Transf (AST/SGOT

) 15, Alanine Aminotransferase (ALT/SGPT) 8L, Alkaline Phosphatase 64, Total 

Protein 7.3, Albumin 2.3L, Globulin 5.0, Albumin/Globulin Ratio 0.5L


Height (Feet):  5


Height (Inches):  1.00


Weight (Pounds):  91


General Appearance:  lethargic


EENT:  normal ENT inspection


Neck:  normal alignment


Cardiovascular:  normal peripheral pulses, normal rate, regular rhythm


Respiratory/Chest:  chest wall non-tender, lungs clear, normal breath sounds


Abdomen:  normal bowel sounds, non tender, soft


Extremities:  normal inspection


Edema:  no edema noted Arm (L), no edema noted Arm (R), no edema noted Leg (L), 

no edema noted Leg (R), no edema noted Pedal (L), no edema noted Pedal (R), no 

edema noted Generalized


Neurologic:  motor weakness


Skin:  normal pigmentation, warm/dry











Flaco Stephens DO Sep 17, 2019 13:23

## 2019-09-17 NOTE — NEPHROLOGY PROGRESS NOTE
Assessment/Plan


Problem List:  


(1) MIKA (acute kidney injury)


Assessment:  Cr lowering





(2) Renal failure (ARF), acute on chronic


(3) Severe anemia


Assessment





MIKA  


bronchitis


abdominal pain and nausea, possibly enteritis ( by CT scan) 


anemia 


RA 


protein calorie malnutrition


Plan





hydrate-


monitor renal parameters


check ferritin and Iron panel





Subjective


Constitutional:  Reports: malaise, weakness





Objective


Objective





Last 24 Hour Vital Signs








  Date Time  Temp Pulse Resp B/P (MAP) Pulse Ox O2 Delivery O2 Flow Rate FiO2


 


9/17/19 12:00 98.7 75 18 107/59 (75) 95   


 


9/17/19 09:00      Room Air  


 


9/17/19 08:00 98.6 74 19 104/51 (68) 95   


 


9/17/19 04:00 96.8 78 20 106/62 (77)    


 


9/17/19 00:00 98.7 87 20 121/75 (90)    


 


9/16/19 21:00      Room Air  


 


9/16/19 20:00 96.6 88 19 106/62 (77)    


 


9/16/19 16:00 97.3 66 18 117/61 (79) 96   

















Intake and Output  


 


 9/16/19 9/17/19





 19:00 07:00


 


Intake Total 75 ml 750 ml


 


Balance 75 ml 750 ml


 


  


 


IV Total 75 ml 750 ml


 


# Voids 3 3








Laboratory Tests


9/17/19 05:35: 


White Blood Count 4.3L, Red Blood Count 2.71L, Hemoglobin 8.6L, Hematocrit 25.6L

, Mean Corpuscular Volume 94, Mean Corpuscular Hemoglobin 31.8H, Mean 

Corpuscular Hemoglobin Concent 33.7, Red Cell Distribution Width 11.4L, 

Platelet Count 311, Mean Platelet Volume 5.6L, Neutrophils (%) (Auto) 64.5, 

Lymphocytes (%) (Auto) 24.5, Monocytes (%) (Auto) 9.2, Eosinophils (%) (Auto) 

1.2, Basophils (%) (Auto) 0.7, Sodium Level 141, Potassium Level 4.2, Chloride 

Level 111H, Carbon Dioxide Level 21, Anion Gap 9, Blood Urea Nitrogen 30H, 

Creatinine 1.6H, Estimat Glomerular Filtration Rate , Glucose Level 90, Calcium 

Level 8.3L, Phosphorus Level 3.6, Magnesium Level 1.5L, Iron Level 52, Total 

Iron Binding Capacity 181L, Percent Iron Saturation 29, Unsaturated Iron 

Binding 129, Ferritin 128, Total Bilirubin 0.3, Aspartate Amino Transf (AST/SGOT

) 15, Alanine Aminotransferase (ALT/SGPT) 8L, Alkaline Phosphatase 64, Total 

Protein 7.3, Albumin 2.3L, Globulin 5.0, Albumin/Globulin Ratio 0.5L


Height (Feet):  5


Height (Inches):  1.00


Weight (Pounds):  91


General Appearance:  no apparent distress


Respiratory/Chest:  decreased breath sounds


Abdomen:  soft


Objective


no change











Demar Mayo MD Sep 17, 2019 14:51

## 2019-09-17 NOTE — NUR
PT NOTE

Received MD order for PT evaluation. Patient previously evaluated by PT on 09/15/19 and was 
independent with all functional mobility without an assistive device. Patient's mobility 
re-assessed and patient was independent with all functional mobility without an assistive 
device. Skilled inpatient PT intervention not warranted, patient discharged from PT, Damon SCHWARTZ notified.

## 2019-09-18 VITALS — DIASTOLIC BLOOD PRESSURE: 76 MMHG | SYSTOLIC BLOOD PRESSURE: 126 MMHG

## 2019-09-18 VITALS — DIASTOLIC BLOOD PRESSURE: 60 MMHG | SYSTOLIC BLOOD PRESSURE: 110 MMHG

## 2019-09-18 VITALS — DIASTOLIC BLOOD PRESSURE: 61 MMHG | SYSTOLIC BLOOD PRESSURE: 112 MMHG

## 2019-09-18 VITALS — DIASTOLIC BLOOD PRESSURE: 63 MMHG | SYSTOLIC BLOOD PRESSURE: 117 MMHG

## 2019-09-18 VITALS — SYSTOLIC BLOOD PRESSURE: 107 MMHG | DIASTOLIC BLOOD PRESSURE: 54 MMHG

## 2019-09-18 LAB
ADD MANUAL DIFF: NO
ANION GAP SERPL CALC-SCNC: 9 MMOL/L (ref 5–15)
BASOPHILS NFR BLD AUTO: 0.8 % (ref 0–2)
BUN SERPL-MCNC: 31 MG/DL (ref 7–18)
CALCIUM SERPL-MCNC: 8.3 MG/DL (ref 8.5–10.1)
CHLORIDE SERPL-SCNC: 112 MMOL/L (ref 98–107)
CO2 SERPL-SCNC: 21 MMOL/L (ref 21–32)
CREAT SERPL-MCNC: 1.5 MG/DL (ref 0.55–1.3)
EOSINOPHIL NFR BLD AUTO: 1.8 % (ref 0–3)
ERYTHROCYTE [DISTWIDTH] IN BLOOD BY AUTOMATED COUNT: 12.4 % (ref 11.6–14.8)
HCT VFR BLD CALC: 26.3 % (ref 37–47)
HGB BLD-MCNC: 8.6 G/DL (ref 12–16)
LYMPHOCYTES NFR BLD AUTO: 20.6 % (ref 20–45)
MCV RBC AUTO: 93 FL (ref 80–99)
MONOCYTES NFR BLD AUTO: 9.5 % (ref 1–10)
NEUTROPHILS NFR BLD AUTO: 67.3 % (ref 45–75)
PHOSPHATE SERPL-MCNC: 3.6 MG/DL (ref 2.5–4.9)
PLATELET # BLD: 292 K/UL (ref 150–450)
POTASSIUM SERPL-SCNC: 4.1 MMOL/L (ref 3.5–5.1)
RBC # BLD AUTO: 2.83 M/UL (ref 4.2–5.4)
SODIUM SERPL-SCNC: 142 MMOL/L (ref 136–145)
WBC # BLD AUTO: 4.4 K/UL (ref 4.8–10.8)

## 2019-09-18 RX ADMIN — DOCUSATE SODIUM SCH MG: 100 CAPSULE, LIQUID FILLED ORAL at 12:22

## 2019-09-18 RX ADMIN — DOCUSATE SODIUM SCH MG: 100 CAPSULE, LIQUID FILLED ORAL at 17:03

## 2019-09-18 RX ADMIN — DOCUSATE SODIUM SCH MG: 100 CAPSULE, LIQUID FILLED ORAL at 09:01

## 2019-09-18 RX ADMIN — SODIUM CHLORIDE SCH MLS/HR: 0.9 INJECTION INTRAVENOUS at 09:01

## 2019-09-18 NOTE — NUR
NURSE NOTES:

Patient is alert and oriented. No reports of discomfort at the moment. Call light is within 
reach. Will continue to monitor.

## 2019-09-18 NOTE — NUR
NURSE NOTES:

Patient discharged Home with Home Health via private vehicle. IV removed. Discharge 
instructions given to son. Home Health information given to son. ID bracelet removed. 
Patient accompanied to lobby by RN.

## 2019-09-18 NOTE — PULMONOLOGY PROGRESS NOTE
Assessment/Plan


Problems:  


(1) Purulent bronchitis


(2) MIKA (acute kidney injury)


(3) Emphysema lung


(4) Severe anemia


Assessment/Plan


doing better


respiratory treatment


creatinine becoming more stable at 1.6


chest PT


check electrolytes


titrate fio2 to sat of 92%





Subjective


ROS Limited/Unobtainable:  No


Constitutional:  Reports: no symptoms


HEENT:  Repors: no symptoms


Respiratory:  Reports: no symptoms


Allergies:  


Coded Allergies:  


     No Known Allergies (Unverified , 2/14/16)





Objective





Last 24 Hour Vital Signs








  Date Time  Temp Pulse Resp B/P (MAP) Pulse Ox O2 Delivery O2 Flow Rate FiO2


 


9/18/19 08:45      Room Air  


 


9/18/19 08:00 98.4 81 18 107/54 (71) 95   


 


9/18/19 04:00 98.8 68 18 112/61 (78)    


 


9/18/19 00:00 97.9 69 18 126/76 (93)    


 


9/17/19 21:16      Room Air  


 


9/17/19 20:00 97.5 87 19 118/78 (91)    


 


9/17/19 16:00 98.9 73 17 110/58 (75) 95   

















Intake and Output  


 


 9/17/19 9/18/19





 19:00 07:00


 


Intake Total 795 ml 750 ml


 


Balance 795 ml 750 ml


 


  


 


Intake Oral 720 ml 


 


IV Total 75 ml 750 ml








General Appearance:  WD/WN


Respiratory/Chest:  chest wall non-tender, lungs clear


Breasts:  no masses


Cardiovascular:  normal peripheral pulses, regular rhythm


Abdomen:  soft, non tender, no organomegaly


Extremities:  no clubbing


Skin:  no rash


Laboratory Tests


9/18/19 05:30: 


White Blood Count 4.4L, Red Blood Count 2.83L, Hemoglobin 8.6L, Hematocrit 26.3L

, Mean Corpuscular Volume 93, Mean Corpuscular Hemoglobin 30.5, Mean 

Corpuscular Hemoglobin Concent 32.8, Red Cell Distribution Width 12.4, Platelet 

Count 292, Mean Platelet Volume 4.8L, Neutrophils (%) (Auto) 67.3, Lymphocytes (

%) (Auto) 20.6, Monocytes (%) (Auto) 9.5, Eosinophils (%) (Auto) 1.8, Basophils 

(%) (Auto) 0.8, Sodium Level 142, Potassium Level 4.1, Chloride Level 112H, 

Carbon Dioxide Level 21, Anion Gap 9, Blood Urea Nitrogen 31H, Creatinine 1.5H, 

Estimat Glomerular Filtration Rate , Glucose Level 84, Calcium Level 8.3L, 

Phosphorus Level 3.6, Magnesium Level 1.4L





Current Medications








 Medications


  (Trade)  Dose


 Ordered  Sig/Naman


 Route


 PRN Reason  Start Time


 Stop Time Status Last Admin


Dose Admin


 


 Acetaminophen


  (Tylenol)  650 mg  Q4H  PRN


 ORAL


 fever  9/13/19 23:15


 10/13/19 23:14   


 


 


 Ceftriaxone


 Sodium 1 gm/


 Dextrose  55 ml @ 


 110 mls/hr  DAILY


 IVPB


   9/15/19 09:00


 9/22/19 08:59  9/18/19 09:01


 


 


 Dextrose


  (Dextrose 50%)  25 ml  Q30M  PRN


 IV


 Hypoglycemia  9/13/19 23:15


 10/13/19 23:14   


 


 


 Dextrose


  (Dextrose 50%)  50 ml  Q30M  PRN


 IV


 Hypoglycemia  9/13/19 23:15


 10/13/19 23:14   


 


 


 Docusate Sodium


  (Colace)  100 mg  THREE TIMES A  DAY


 ORAL


   9/16/19 13:00


 10/16/19 12:59  9/18/19 12:22


 


 


 Guaifenesin/


 Dextromethorphan


  (Robitussin DM


 Syrup)  10 ml  Q4H  PRN


 ORAL


 For Cough  9/15/19 10:30


 10/15/19 10:29   


 


 


 Lorazepam


  (Ativan 2mg/ml


 1ml)  0.5 mg  Q4H  PRN


 IV


 For Anxiety  9/13/19 23:15


 9/20/19 23:14   


 


 


 Morphine Sulfate


  (Morphine


 Sulfate)  1 mg  Q4H  PRN


 IVP


 For Pain  9/13/19 23:15


 9/20/19 23:14   


 


 


 Ondansetron HCl


  (Zofran)  4 mg  Q6H  PRN


 IVP


 Nausea & Vomiting  9/13/19 23:15


 10/13/19 23:14   


 


 


 Pantoprazole


  (Protonix)  40 mg  EVERY 12  HOURS


 ORAL


   9/16/19 10:30


 10/16/19 10:29  9/18/19 09:01


 


 


 Polyethylene


 Glycol


  (Miralax)  17 gm  HSPRN  PRN


 ORAL


 Constipation  9/13/19 23:15


 10/13/19 23:14   


 


 


 Sodium Chloride  1,000 ml @ 


 75 mls/hr  [id number]


 IV


   9/14/19 11:00


 10/14/19 10:59  9/18/19 09:01


 


 


 Zolpidem Tartrate


  (Ambien)  5 mg  HSPRN  PRN


 ORAL


 Insomnia  9/13/19 23:15


 9/20/19 23:14   


 

















Geovany Mustafa MD Sep 18, 2019 12:52

## 2019-09-18 NOTE — GENERAL PROGRESS NOTE
Assessment/Plan


Problem List:  


(1) UTI (urinary tract infection)


ICD Codes:  N39.0 - Urinary tract infection, site not specified


SNOMED:  66178493


(2) Renal failure (ARF), acute on chronic


ICD Codes:  N17.9 - Acute kidney failure, unspecified; N18.9 - Chronic kidney 

disease, unspecified


SNOMED:  578603218


(3) Episode of generalized weakness


ICD Codes:  R53.1 - Weakness


SNOMED:  04764912


Status:  stable, progressing


Assessment/Plan:


pt diet abx dc to [facility]





Subjective


Constitutional:  Reports: weakness


Allergies:  


Coded Allergies:  


     No Known Allergies (Unverified , 2/14/16)


All Systems:  reviewed and negative except above


Subjective


calm in bed sl weak





Objective





Last 24 Hour Vital Signs








  Date Time  Temp Pulse Resp B/P (MAP) Pulse Ox O2 Delivery O2 Flow Rate FiO2


 


9/18/19 12:00 98.3 65 20 110/60 (77) 96   


 


9/18/19 08:45      Room Air  


 


9/18/19 08:00 98.4 81 18 107/54 (71) 95   


 


9/18/19 04:00 98.8 68 18 112/61 (78)    


 


9/18/19 00:00 97.9 69 18 126/76 (93)    


 


9/17/19 21:16      Room Air  


 


9/17/19 20:00 97.5 87 19 118/78 (91)    


 


9/17/19 16:00 98.9 73 17 110/58 (75) 95   

















Intake and Output  


 


 9/17/19 9/18/19





 19:00 07:00


 


Intake Total 795 ml 750 ml


 


Balance 795 ml 750 ml


 


  


 


Intake Oral 720 ml 


 


IV Total 75 ml 750 ml








Laboratory Tests


9/18/19 05:30: 


White Blood Count 4.4L, Red Blood Count 2.83L, Hemoglobin 8.6L, Hematocrit 26.3L

, Mean Corpuscular Volume 93, Mean Corpuscular Hemoglobin 30.5, Mean 

Corpuscular Hemoglobin Concent 32.8, Red Cell Distribution Width 12.4, Platelet 

Count 292, Mean Platelet Volume 4.8L, Neutrophils (%) (Auto) 67.3, Lymphocytes (

%) (Auto) 20.6, Monocytes (%) (Auto) 9.5, Eosinophils (%) (Auto) 1.8, Basophils 

(%) (Auto) 0.8, Sodium Level 142, Potassium Level 4.1, Chloride Level 112H, 

Carbon Dioxide Level 21, Anion Gap 9, Blood Urea Nitrogen 31H, Creatinine 1.5H, 

Estimat Glomerular Filtration Rate , Glucose Level 84, Calcium Level 8.3L, 

Phosphorus Level 3.6, Magnesium Level 1.4L


Height (Feet):  5


Height (Inches):  1.00


Weight (Pounds):  91


General Appearance:  lethargic


EENT:  normal ENT inspection


Neck:  normal alignment


Cardiovascular:  normal peripheral pulses, normal rate, regular rhythm


Respiratory/Chest:  chest wall non-tender, lungs clear, normal breath sounds


Abdomen:  normal bowel sounds, non tender, soft


Extremities:  normal inspection


Edema:  no edema noted Arm (L), no edema noted Arm (R), no edema noted Leg (L), 

no edema noted Leg (R), no edema noted Pedal (L), no edema noted Pedal (R), no 

edema noted Generalized


Neurologic:  responsive, motor weakness


Skin:  normal pigmentation, warm/dry











Flaco Stephens DO Sep 18, 2019 14:11

## 2019-09-18 NOTE — NEPHROLOGY PROGRESS NOTE
Assessment/Plan


Problem List:  


(1) MIKA (acute kidney injury)


Assessment:  Cr lowering





(2) Renal failure (ARF), acute on chronic


(3) Severe anemia


Assessment





MIKA  


bronchitis


abdominal pain and nausea, possibly enteritis ( by CT scan) 


anemia 


RA 


protein calorie malnutrition


Plan


Mag IV 


hydrate-


monitor renal parameters


check ferritin and Iron panel





Subjective


ROS Limited/Unobtainable:  No


Constitutional:  Reports: malaise





Objective


Objective





Last 24 Hour Vital Signs








  Date Time  Temp Pulse Resp B/P (MAP) Pulse Ox O2 Delivery O2 Flow Rate FiO2


 


9/18/19 04:00 98.8 68 18 112/61 (78)    


 


9/18/19 00:00 97.9 69 18 126/76 (93)    


 


9/17/19 21:16      Room Air  


 


9/17/19 20:00 97.5 87 19 118/78 (91)    


 


9/17/19 16:00 98.9 73 17 110/58 (75) 95   


 


9/17/19 12:00 98.7 75 18 107/59 (75) 95   

















Intake and Output  


 


 9/17/19 9/18/19





 19:00 07:00


 


Intake Total 795 ml 750 ml


 


Balance 795 ml 750 ml


 


  


 


Intake Oral 720 ml 


 


IV Total 75 ml 750 ml








Laboratory Tests


9/18/19 05:30: 


White Blood Count 4.4L, Red Blood Count 2.83L, Hemoglobin 8.6L, Hematocrit 26.3L

, Mean Corpuscular Volume 93, Mean Corpuscular Hemoglobin 30.5, Mean 

Corpuscular Hemoglobin Concent 32.8, Red Cell Distribution Width 12.4, Platelet 

Count 292, Mean Platelet Volume 4.8L, Neutrophils (%) (Auto) 67.3, Lymphocytes (

%) (Auto) 20.6, Monocytes (%) (Auto) 9.5, Eosinophils (%) (Auto) 1.8, Basophils 

(%) (Auto) 0.8, Sodium Level 142, Potassium Level 4.1, Chloride Level 112H, 

Carbon Dioxide Level 21, Anion Gap 9, Blood Urea Nitrogen 31H, Creatinine 1.5H, 

Estimat Glomerular Filtration Rate , Glucose Level 84, Calcium Level 8.3L, 

Phosphorus Level 3.6, Magnesium Level 1.4L


Height (Feet):  5


Height (Inches):  1.00


Weight (Pounds):  91


General Appearance:  no apparent distress


Objective


no change











Demar Mayo MD Sep 18, 2019 10:27

## 2019-09-18 NOTE — HEMATOLOGY/ONC PROGRESS NOTE
Assessment/Plan


Assessment/Plan


Assessment/Recs


# Anemia of chronic disease due to underlying chronic medical issues, 

multifactorial 


--> Anemia workup has been ordered from pior adm, this time, ferritin


--> No evidence of hemolysis is noted, peripheral smear has been reviewed


--> Hgb goal >7. Transfuse prn.


--> Epogen or iron at this time is not particularly indicated


--> Medications have been reviewed


--> hgb trend 10.2-->8.7-->8.6


--> egd/colo per gi


# Decreased white blood cell count,  (leukopenia) - wbc under 4k, could also be 

related to infection (pna)


--> continue antibiotics with ID service, appreciate recs


--> medications have been reviewed


--> hepatitis and hiv have been ordered


--> Prior igg was 3500 -->2453


--> wbc trend 6.2-->3.4-->4.3


--> obtain immunofixation PENDING


# PNA, Antibiotics per Infectious Disease.


--> on abx is on ctx


--> O2 and pulmonary treatment.


# Headache, chronic


# Recent ashley


--> per renal


# Dvt ppx scds


   


The timing of this note does not necessarily reflect the time of the patient 

was seen.   


   


Greatly appreciate consultation!





Subjective


Constitutional:  Denies: no symptoms, chills, fever, malaise, weakness, other


HEENT:  Denies: no symptoms, eye pain, blurred vision, tearing, double vision, 

ear pain, ear discharge, nose pain, nose congestion, throat pain, throat 

swelling, mouth pain, mouth swelling, other


Cardiovascular:  Denies: no symptoms, chest pain, edema, irregular heart rate, 

lightheadedness, palpitations, syncope, other


Gastrointestinal/Abdominal:  Denies: no symptoms, abdomen distended, abdominal 

pain, black stools, tarry stools, blood in stool, constipated, diarrhea, 

difficulty swallowing, nausea, poor appetite, poor fluid intake, rectal bleeding

, vomiting, other


Neurologic/Psychiatric:  Denies: no symptoms, anxiety, depressed, emotional 

problems, headache, numbness, paresthesia, pre-existing deficit, seizure, 

tingling, tremors, weakness, other


Endocrine:  Denies: no symptoms, excessive sweating, flushing, intolerance to 

cold, intolerance to heat, increased hunger, increased thirst, increased urine, 

unexplained weight gain, unexplained weight loss, other


Allergies:  


Coded Allergies:  


     No Known Allergies (Unverified , 2/14/16)


Subjective


9/16: no events, breathing better, labs noted incl anemia panel


9/17: no bleeding or chills, no major changes, still refusing gi procedures


9/18: no events to report, no fc, no bleeding noted, labs reviewed





Objective


Objective





Current Medications








 Medications


  (Trade)  Dose


 Ordered  Sig/Naman


 Route


 PRN Reason  Start Time


 Stop Time Status Last Admin


Dose Admin


 


 Acetaminophen


  (Tylenol)  650 mg  Q4H  PRN


 ORAL


 fever  9/13/19 23:15


 10/13/19 23:14   


 


 


 Ceftriaxone


 Sodium 1 gm/


 Dextrose  55 ml @ 


 110 mls/hr  DAILY


 IVPB


   9/15/19 09:00


 9/22/19 08:59  9/18/19 09:01


 


 


 Dextrose


  (Dextrose 50%)  25 ml  Q30M  PRN


 IV


 Hypoglycemia  9/13/19 23:15


 10/13/19 23:14   


 


 


 Dextrose


  (Dextrose 50%)  50 ml  Q30M  PRN


 IV


 Hypoglycemia  9/13/19 23:15


 10/13/19 23:14   


 


 


 Docusate Sodium


  (Colace)  100 mg  THREE TIMES A  DAY


 ORAL


   9/16/19 13:00


 10/16/19 12:59  9/18/19 09:01


 


 


 Guaifenesin/


 Dextromethorphan


  (Robitussin DM


 Syrup)  10 ml  Q4H  PRN


 ORAL


 For Cough  9/15/19 10:30


 10/15/19 10:29   


 


 


 Lorazepam


  (Ativan 2mg/ml


 1ml)  0.5 mg  Q4H  PRN


 IV


 For Anxiety  9/13/19 23:15


 9/20/19 23:14   


 


 


 Magnesium Sulfate  100 ml @ 


 100 mls/hr  Q1H


 IVPB


   9/18/19 08:00


 9/18/19 11:59   


 


 


 Morphine Sulfate


  (Morphine


 Sulfate)  1 mg  Q4H  PRN


 IVP


 For Pain  9/13/19 23:15


 9/20/19 23:14   


 


 


 Ondansetron HCl


  (Zofran)  4 mg  Q6H  PRN


 IVP


 Nausea & Vomiting  9/13/19 23:15


 10/13/19 23:14   


 


 


 Pantoprazole


  (Protonix)  40 mg  EVERY 12  HOURS


 ORAL


   9/16/19 10:30


 10/16/19 10:29  9/18/19 09:01


 


 


 Polyethylene


 Glycol


  (Miralax)  17 gm  HSPRN  PRN


 ORAL


 Constipation  9/13/19 23:15


 10/13/19 23:14   


 


 


 Sodium Chloride  1,000 ml @ 


 75 mls/hr  [id number]


 IV


   9/14/19 11:00


 10/14/19 10:59  9/18/19 09:01


 


 


 Zolpidem Tartrate


  (Ambien)  5 mg  HSPRN  PRN


 ORAL


 Insomnia  9/13/19 23:15


 9/20/19 23:14   


 











Last 24 Hour Vital Signs








  Date Time  Temp Pulse Resp B/P (MAP) Pulse Ox O2 Delivery O2 Flow Rate FiO2


 


9/18/19 04:00 98.8 68 18 112/61 (78)    


 


9/18/19 00:00 97.9 69 18 126/76 (93)    


 


9/17/19 21:16      Room Air  


 


9/17/19 20:00 97.5 87 19 118/78 (91)    


 


9/17/19 16:00 98.9 73 17 110/58 (75) 95   


 


9/17/19 12:00 98.7 75 18 107/59 (75) 95   


 


9/17/19 09:00      Room Air  


 


9/17/19 08:00 98.6 74 19 104/51 (68) 95   


 


9/17/19 04:00 96.8 78 20 106/62 (77)    


 


9/17/19 00:00 98.7 87 20 121/75 (90)    


 


9/16/19 21:00      Room Air  


 


9/16/19 20:00 96.6 88 19 106/62 (77)    


 


9/16/19 16:00 97.3 66 18 117/61 (79) 96   


 


9/16/19 12:00 98.1 65 20 114/59 (77) 96   

















Intake and Output  


 


 9/17/19 9/18/19





 19:00 07:00


 


Intake Total 795 ml 750 ml


 


Balance 795 ml 750 ml


 


  


 


Intake Oral 720 ml 


 


IV Total 75 ml 750 ml











Labs








Test


  9/16/19


06:10 9/17/19


05:35 9/18/19


05:30


 


Sodium Level


  142 MMOL/L


(136-145) 141 MMOL/L


(136-145) 142 MMOL/L


(136-145)


 


Potassium Level


  4.6 MMOL/L


(3.5-5.1) 4.2 MMOL/L


(3.5-5.1) 4.1 MMOL/L


(3.5-5.1)


 


Chloride Level


  113 MMOL/L


() 111 MMOL/L


() 112 MMOL/L


()


 


Carbon Dioxide Level


  21 MMOL/L


(21-32) 21 MMOL/L


(21-32) 21 MMOL/L


(21-32)


 


Anion Gap


  8 mmol/L


(5-15) 9 mmol/L


(5-15) 9 mmol/L


(5-15)


 


Blood Urea Nitrogen


  34 mg/dL


(7-18) 30 mg/dL


(7-18) 31 mg/dL


(7-18)


 


Creatinine


  1.5 MG/DL


(0.55-1.30) 1.6 MG/DL


(0.55-1.30) 1.5 MG/DL


(0.55-1.30)


 


Estimat Glomerular Filtration


Rate  mL/min (>60) 


   mL/min (>60) 


   mL/min (>60) 


 


 


Glucose Level


  87 MG/DL


() 90 MG/DL


() 84 MG/DL


()


 


Calcium Level


  8.6 MG/DL


(8.5-10.1) 8.3 MG/DL


(8.5-10.1) 8.3 MG/DL


(8.5-10.1)


 


White Blood Count


  


  4.3 K/UL


(4.8-10.8) 4.4 K/UL


(4.8-10.8)


 


Red Blood Count


  


  2.71 M/UL


(4.20-5.40) 2.83 M/UL


(4.20-5.40)


 


Hemoglobin


  


  8.6 G/DL


(12.0-16.0) 8.6 G/DL


(12.0-16.0)


 


Hematocrit


  


  25.6 %


(37.0-47.0) 26.3 %


(37.0-47.0)


 


Mean Corpuscular Volume  94 FL (80-99)  93 FL (80-99) 


 


Mean Corpuscular Hemoglobin


  


  31.8 PG


(27.0-31.0) 30.5 PG


(27.0-31.0)


 


Mean Corpuscular Hemoglobin


Concent 


  33.7 G/DL


(32.0-36.0) 32.8 G/DL


(32.0-36.0)


 


Red Cell Distribution Width


  


  11.4 %


(11.6-14.8) 12.4 %


(11.6-14.8)


 


Platelet Count


  


  311 K/UL


(150-450) 292 K/UL


(150-450)


 


Mean Platelet Volume


  


  5.6 FL


(6.5-10.1) 4.8 FL


(6.5-10.1)


 


Neutrophils (%) (Auto)


  


  64.5 %


(45.0-75.0) 67.3 %


(45.0-75.0)


 


Lymphocytes (%) (Auto)


  


  24.5 %


(20.0-45.0) 20.6 %


(20.0-45.0)


 


Monocytes (%) (Auto)


  


  9.2 %


(1.0-10.0) 9.5 %


(1.0-10.0)


 


Eosinophils (%) (Auto)


  


  1.2 %


(0.0-3.0) 1.8 %


(0.0-3.0)


 


Basophils (%) (Auto)


  


  0.7 %


(0.0-2.0) 0.8 %


(0.0-2.0)


 


Phosphorus Level


  


  3.6 MG/DL


(2.5-4.9) 3.6 MG/DL


(2.5-4.9)


 


Magnesium Level


  


  1.5 MG/DL


(1.8-2.4) 1.4 MG/DL


(1.8-2.4)


 


Iron Level


  


  52 ug/dL


() 


 


 


Total Iron Binding Capacity


  


  181 ug/dL


(250-450) 


 


 


Percent Iron Saturation  29 % (15-50)  


 


Unsaturated Iron Binding


  


  129 ug/dL


(112-346) 


 


 


Ferritin


  


  128 NG/ML


(8-388) 


 


 


Total Bilirubin


  


  0.3 MG/DL


(0.2-1.0) 


 


 


Aspartate Amino Transf


(AST/SGOT) 


  15 U/L (15-37) 


  


 


 


Alanine Aminotransferase


(ALT/SGPT) 


  8 U/L (12-78) 


  


 


 


Alkaline Phosphatase


  


  64 U/L


() 


 


 


Total Protein


  


  7.3 G/DL


(6.4-8.2) 


 


 


Albumin


  


  2.3 G/DL


(3.4-5.0) 


 


 


Globulin  5.0 g/dL  


 


Albumin/Globulin Ratio  0.5 (1.0-2.7)  








Height (Feet):  5


Height (Inches):  1.00


Weight (Pounds):  91


Objective








PHYSICAL EXAMINATION:


GENERAL:  Calm in bed, oriented x3, slight short of breath.


VITAL SIGNS: reviewed


CARDIOVASCULAR:  No murmur.


LUNGS:  Poor air exchange.


ABDOMEN:  Bowel sounds distant.


EXTREMITIES:  No cyanosis or edema.


NEUROLOGIC:  The patient moves all extremities, slightly weak.











Agustin Giordano MD Sep 18, 2019 09:25

## 2019-09-18 NOTE — GI PROGRESS NOTE
Assessment/Plan


Problems:  


(1) Severe anemia


ICD Codes:  D64.9 - Anemia, unspecified


SNOMED:  715993408


Status:  stable


Status Narrative


Discussed with Dr. Shepard.


Assessment/Plan


OB stool negative


stable H&H


patient refused EGD/colonoscopy





advance diet


prn transfusions


ppi


bowel regimen


zofran prn


dc planning


outpatient GI procedures





The patient was seen and examined at bedside and all new and available data was 

reviewed in the patients chart. I agree with the above findings, impression 

and plan.  (Patient seen earlier today. Signature stamp does not reflect 

patient encounter time.). - aDny Shepard MD





Subjective


Subjective


Patient refused EGD and colonoscopy


Denies patient denies any nausea vomiting





Objective





Last 24 Hour Vital Signs








  Date Time  Temp Pulse Resp B/P (MAP) Pulse Ox O2 Delivery O2 Flow Rate FiO2


 


9/18/19 08:45      Room Air  


 


9/18/19 08:00 98.4 81 18 107/54 (71) 95   


 


9/18/19 04:00 98.8 68 18 112/61 (78)    


 


9/18/19 00:00 97.9 69 18 126/76 (93)    


 


9/17/19 21:16      Room Air  


 


9/17/19 20:00 97.5 87 19 118/78 (91)    


 


9/17/19 16:00 98.9 73 17 110/58 (75) 95   

















Intake and Output  


 


 9/17/19 9/18/19





 19:00 07:00


 


Intake Total 795 ml 750 ml


 


Balance 795 ml 750 ml


 


  


 


Intake Oral 720 ml 


 


IV Total 75 ml 750 ml











Laboratory Tests








Test


  9/18/19


05:30


 


White Blood Count


  4.4 K/UL


(4.8-10.8)  L


 


Red Blood Count


  2.83 M/UL


(4.20-5.40)  L


 


Hemoglobin


  8.6 G/DL


(12.0-16.0)  L


 


Hematocrit


  26.3 %


(37.0-47.0)  L


 


Mean Corpuscular Volume 93 FL (80-99)  


 


Mean Corpuscular Hemoglobin


  30.5 PG


(27.0-31.0)


 


Mean Corpuscular Hemoglobin


Concent 32.8 G/DL


(32.0-36.0)


 


Red Cell Distribution Width


  12.4 %


(11.6-14.8)


 


Platelet Count


  292 K/UL


(150-450)


 


Mean Platelet Volume


  4.8 FL


(6.5-10.1)  L


 


Neutrophils (%) (Auto)


  67.3 %


(45.0-75.0)


 


Lymphocytes (%) (Auto)


  20.6 %


(20.0-45.0)


 


Monocytes (%) (Auto)


  9.5 %


(1.0-10.0)


 


Eosinophils (%) (Auto)


  1.8 %


(0.0-3.0)


 


Basophils (%) (Auto)


  0.8 %


(0.0-2.0)


 


Sodium Level


  142 MMOL/L


(136-145)


 


Potassium Level


  4.1 MMOL/L


(3.5-5.1)


 


Chloride Level


  112 MMOL/L


()  H


 


Carbon Dioxide Level


  21 MMOL/L


(21-32)


 


Anion Gap


  9 mmol/L


(5-15)


 


Blood Urea Nitrogen


  31 mg/dL


(7-18)  H


 


Creatinine


  1.5 MG/DL


(0.55-1.30)  H


 


Estimat Glomerular Filtration


Rate  mL/min (>60)  


 


 


Glucose Level


  84 MG/DL


()


 


Calcium Level


  8.3 MG/DL


(8.5-10.1)  L


 


Phosphorus Level


  3.6 MG/DL


(2.5-4.9)


 


Magnesium Level


  1.4 MG/DL


(1.8-2.4)  L








Height (Feet):  5


Height (Inches):  1.00


Weight (Pounds):  91


General Appearance:  WD/WN, no apparent distress, alert


Cardiovascular:  normal rate


Respiratory/Chest:  normal breath sounds, no respiratory distress


Abdominal Exam:  normal bowel sounds, non tender, soft


Extremities:  normal range of motion, non-tender











Jenny Rodriguez NP Sep 18, 2019 12:22

## 2019-09-24 ENCOUNTER — HOSPITAL ENCOUNTER (INPATIENT)
Dept: HOSPITAL 72 - EMR | Age: 74
LOS: 6 days | Discharge: HOME HEALTH | DRG: 682 | End: 2019-09-30
Payer: MEDICARE

## 2019-09-24 VITALS — HEIGHT: 61 IN | WEIGHT: 95.06 LBS | BODY MASS INDEX: 17.95 KG/M2

## 2019-09-24 VITALS — SYSTOLIC BLOOD PRESSURE: 129 MMHG | DIASTOLIC BLOOD PRESSURE: 85 MMHG

## 2019-09-24 VITALS — DIASTOLIC BLOOD PRESSURE: 58 MMHG | SYSTOLIC BLOOD PRESSURE: 130 MMHG

## 2019-09-24 DIAGNOSIS — N12: ICD-10-CM

## 2019-09-24 DIAGNOSIS — M06.9: ICD-10-CM

## 2019-09-24 DIAGNOSIS — E43: ICD-10-CM

## 2019-09-24 DIAGNOSIS — R51: ICD-10-CM

## 2019-09-24 DIAGNOSIS — D47.2: ICD-10-CM

## 2019-09-24 DIAGNOSIS — M81.8: ICD-10-CM

## 2019-09-24 DIAGNOSIS — D63.8: ICD-10-CM

## 2019-09-24 DIAGNOSIS — J43.9: ICD-10-CM

## 2019-09-24 DIAGNOSIS — C90.00: ICD-10-CM

## 2019-09-24 DIAGNOSIS — N39.0: ICD-10-CM

## 2019-09-24 DIAGNOSIS — N17.9: Primary | ICD-10-CM

## 2019-09-24 DIAGNOSIS — N18.9: ICD-10-CM

## 2019-09-24 DIAGNOSIS — J44.9: ICD-10-CM

## 2019-09-24 LAB
ADD MANUAL DIFF: NO
ALBUMIN SERPL-MCNC: 3 G/DL (ref 3.4–5)
ALBUMIN/GLOB SERPL: 0.5 {RATIO} (ref 1–2.7)
ALP SERPL-CCNC: 78 U/L (ref 46–116)
ALT SERPL-CCNC: 8 U/L (ref 12–78)
ANION GAP SERPL CALC-SCNC: 9 MMOL/L (ref 5–15)
APPEARANCE UR: CLEAR
APTT PPP: (no result) S
AST SERPL-CCNC: 20 U/L (ref 15–37)
BASOPHILS NFR BLD AUTO: 0.7 % (ref 0–2)
BILIRUB SERPL-MCNC: 0.4 MG/DL (ref 0.2–1)
BUN SERPL-MCNC: 52 MG/DL (ref 7–18)
CALCIUM SERPL-MCNC: 9.1 MG/DL (ref 8.5–10.1)
CHLORIDE SERPL-SCNC: 103 MMOL/L (ref 98–107)
CO2 SERPL-SCNC: 23 MMOL/L (ref 21–32)
CREAT SERPL-MCNC: 2.1 MG/DL (ref 0.55–1.3)
EOSINOPHIL NFR BLD AUTO: 0.3 % (ref 0–3)
ERYTHROCYTE [DISTWIDTH] IN BLOOD BY AUTOMATED COUNT: 11.7 % (ref 11.6–14.8)
GLOBULIN SER-MCNC: 5.6 G/DL
GLUCOSE UR STRIP-MCNC: NEGATIVE MG/DL
HCT VFR BLD CALC: 28.5 % (ref 37–47)
HGB BLD-MCNC: 9.5 G/DL (ref 12–16)
KETONES UR QL STRIP: NEGATIVE
LEUKOCYTE ESTERASE UR QL STRIP: (no result)
LYMPHOCYTES NFR BLD AUTO: 13.9 % (ref 20–45)
MCV RBC AUTO: 93 FL (ref 80–99)
MONOCYTES NFR BLD AUTO: 8.5 % (ref 1–10)
NEUTROPHILS NFR BLD AUTO: 76.6 % (ref 45–75)
NITRITE UR QL STRIP: NEGATIVE
PH UR STRIP: 6 [PH] (ref 4.5–8)
PLATELET # BLD: 303 K/UL (ref 150–450)
POTASSIUM SERPL-SCNC: 5.4 MMOL/L (ref 3.5–5.1)
PROT UR QL STRIP: (no result)
RBC # BLD AUTO: 3.06 M/UL (ref 4.2–5.4)
SODIUM SERPL-SCNC: 135 MMOL/L (ref 136–145)
SP GR UR STRIP: 1 (ref 1–1.03)
UROBILINOGEN UR-MCNC: NORMAL MG/DL (ref 0–1)
WBC # BLD AUTO: 7.8 K/UL (ref 4.8–10.8)

## 2019-09-24 PROCEDURE — 83735 ASSAY OF MAGNESIUM: CPT

## 2019-09-24 PROCEDURE — 84439 ASSAY OF FREE THYROXINE: CPT

## 2019-09-24 PROCEDURE — 80053 COMPREHEN METABOLIC PANEL: CPT

## 2019-09-24 PROCEDURE — 87086 URINE CULTURE/COLONY COUNT: CPT

## 2019-09-24 PROCEDURE — 83540 ASSAY OF IRON: CPT

## 2019-09-24 PROCEDURE — 82607 VITAMIN B-12: CPT

## 2019-09-24 PROCEDURE — 80048 BASIC METABOLIC PNL TOTAL CA: CPT

## 2019-09-24 PROCEDURE — 86140 C-REACTIVE PROTEIN: CPT

## 2019-09-24 PROCEDURE — 87040 BLOOD CULTURE FOR BACTERIA: CPT

## 2019-09-24 PROCEDURE — 85007 BL SMEAR W/DIFF WBC COUNT: CPT

## 2019-09-24 PROCEDURE — 85025 COMPLETE CBC W/AUTO DIFF WBC: CPT

## 2019-09-24 PROCEDURE — 96361 HYDRATE IV INFUSION ADD-ON: CPT

## 2019-09-24 PROCEDURE — 84481 FREE ASSAY (FT-3): CPT

## 2019-09-24 PROCEDURE — 84443 ASSAY THYROID STIM HORMONE: CPT

## 2019-09-24 PROCEDURE — 94664 DEMO&/EVAL PT USE INHALER: CPT

## 2019-09-24 PROCEDURE — 80061 LIPID PANEL: CPT

## 2019-09-24 PROCEDURE — 36415 COLL VENOUS BLD VENIPUNCTURE: CPT

## 2019-09-24 PROCEDURE — 82746 ASSAY OF FOLIC ACID SERUM: CPT

## 2019-09-24 PROCEDURE — 96368 THER/DIAG CONCURRENT INF: CPT

## 2019-09-24 PROCEDURE — 93005 ELECTROCARDIOGRAM TRACING: CPT

## 2019-09-24 PROCEDURE — 83880 ASSAY OF NATRIURETIC PEPTIDE: CPT

## 2019-09-24 PROCEDURE — 81003 URINALYSIS AUTO W/O SCOPE: CPT

## 2019-09-24 PROCEDURE — 84100 ASSAY OF PHOSPHORUS: CPT

## 2019-09-24 PROCEDURE — 96365 THER/PROPH/DIAG IV INF INIT: CPT

## 2019-09-24 PROCEDURE — 84550 ASSAY OF BLOOD/URIC ACID: CPT

## 2019-09-24 PROCEDURE — 82977 ASSAY OF GGT: CPT

## 2019-09-24 PROCEDURE — 99285 EMERGENCY DEPT VISIT HI MDM: CPT

## 2019-09-24 PROCEDURE — 87081 CULTURE SCREEN ONLY: CPT

## 2019-09-24 PROCEDURE — 82728 ASSAY OF FERRITIN: CPT

## 2019-09-24 PROCEDURE — 83550 IRON BINDING TEST: CPT

## 2019-09-24 NOTE — NUR
ED Nurse Note:

Patient transported to floor by ER tech without incident. Report called in 
prior to transport.

## 2019-09-24 NOTE — NUR
NURSE NOTES:

Pt arrived in the unit. AAOX4. Able to make needs known. On room air. Iv site is patent and 
intact. Skin is intact. Bed in lowest position. Bed alarm is on. Call light within reach. 
Will continue to monitor.

## 2019-09-24 NOTE — NUR
ED Nurse Note:



pt walked in to ER with a son and a daughter from home due to Rt flank pain 
5/10. pt aao x4 and ambulatory. skin clean and intact. calm and cooperative. no 
acute distress noted at this moment. pt is in gown and on cardiac monitor. per 
pt, she was discharged from [facility] [place] 2 days ago for the same symptoms.

## 2019-09-24 NOTE — EMERGENCY ROOM REPORT
History of Present Illness


General


Chief Complaint:  Female Urogenital Problems


Source:  Patient, Family Member





Present Illness


HPI


Disclaimer: Please note that this report is being documented using DRAGON 

technology. This can lead to erroneous entry secondary to incorrect 

interpretation by the dictating instrument.





HPI: 73-year-old female with a history of rheumatoid arthritis and MAC presents 

for evaluation of right-sided flank pain and weakness.  Symptoms have been 

present for 2 days.  She was recently discharged from the hospital for an 

admission of failure to thrive, decreased intake, anemia and acute on chronic 

kidney injury.  She was in her usual state of health until 2 days ago.  No 

significant weakness, chills, subjective fevers and right-sided flank pain 

radiating down the right groin.  Notes some dysuria but denies hematuria.  

Denies nausea, vomiting, chest pain, shortness of breath.  Continues to 

complain of a intermittent cough which was present on her last admission.


 


PMH: Mycobacterium avium complex infection, CKD, RA


 


PSH: See chart


 


Allergies: Denies


 


Social Hx: Denies tobacco, drug or alcohol use


Allergies:  


Coded Allergies:  


     No Known Allergies (Unverified , 2/14/16)





Patient History


Last Menstrual Period:  na





Nursing Documentation-PMH


Past Medical History:  No History, Except For


Hx Cardiac Problems:  No


Hx Cancer:  No


Hx Gastrointestinal Problems:  Yes - ABD pain, pyleonephritis


Hx Neurological Problems:  Yes - OSTEOPOROSIS LOWER BACK, RA


Hx Headaches:  Yes





Review of Systems


All Other Systems:  negative except mentioned in HPI





Physical Exam





Vital Signs








  Date Time  Temp Pulse Resp B/P (MAP) Pulse Ox O2 Delivery O2 Flow Rate FiO2


 


9/24/19 18:30 99.7 69 16 117/67 (84) 96 Room Air  





 





General: Awake and alert, no acute distress


HEENT: NC/AT. EOMI. dry mucous membranes


Cardiovascular: RRR.  S1 and S2 normal.  No murmur appreciated


Resp: Normal work of breathing.  Intermittent cough.  No wheezing, no crackles


Abdomen: Abdomen is soft, nondistended.  Nontender


Skin: Intact.  No abrasions, laceration or rash over the exposed skin


MSK: Normal tone and bulk. Moving all extremities.  No obvious deformity.


Neuro: Awake and alert.  Mentating appropriately.  


Back/Spine: Right-sided CVA tenderness, negative on left.





Medical Decision Making


Diagnostic Impression:  


 Primary Impression:  


 Renal failure (ARF), acute on chronic


 Additional Impressions:  


 UTI (urinary tract infection)


 Anemia


 Hematuria


ER Course


73-year-old female with recent admissions for acute on chronic kidney issues as 

well as urinary tract infections returns with similar symptoms complaining of 

right-sided flank pain.  Lab work was obtained on her arrival.  Shows her 

chronic anemia though her creatinine has increased to 2.1 from 1.5 on 

discharge.  She has 2+ leukocyte esterase and 30-40 white cells in her urine.  

Will be treated with ceftriaxone.  She will require readmission.  IV fluids are 

running.





Laboratory Tests








Test


  9/24/19


18:38 9/24/19


19:00


 


Urine Color Pale yellow   


 


Urine Appearance Clear   


 


Urine pH 6 (4.5-8.0)   


 


Urine Specific Gravity


  1.005


(1.005-1.035) 


 


 


Urine Protein


  2+ (NEGATIVE)


H 


 


 


Urine Glucose (UA)


  Negative


(NEGATIVE) 


 


 


Urine Ketones


  Negative


(NEGATIVE) 


 


 


Urine Blood


  5+ (NEGATIVE)


H 


 


 


Urine Nitrite


  Negative


(NEGATIVE) 


 


 


Urine Bilirubin


  Negative


(NEGATIVE) 


 


 


Urine Urobilinogen


  Normal MG/DL


(0.0-1.0) 


 


 


Urine Leukocyte Esterase


  2+ (NEGATIVE)


H 


 


 


Urine RBC


  30-40 /HPF (0


- 2)  H 


 


 


Urine WBC


  30-40 /HPF (0


- 2)  H 


 


 


Urine Squamous Epithelial


Cells None /LPF


(NONE/OCC) 


 


 


Urine Bacteria


  Occasional


/HPF (NONE) 


 


 


White Blood Count


  


  7.8 K/UL


(4.8-10.8)


 


Red Blood Count


  


  3.06 M/UL


(4.20-5.40)  L


 


Hemoglobin


  


  9.5 G/DL


(12.0-16.0)  L


 


Hematocrit


  


  28.5 %


(37.0-47.0)  L


 


Mean Corpuscular Volume  93 FL (80-99)  


 


Mean Corpuscular Hemoglobin


  


  31.0 PG


(27.0-31.0)


 


Mean Corpuscular Hemoglobin


Concent 


  33.2 G/DL


(32.0-36.0)


 


Red Cell Distribution Width


  


  11.7 %


(11.6-14.8)


 


Platelet Count


  


  303 K/UL


(150-450)


 


Mean Platelet Volume


  


  5.8 FL


(6.5-10.1)  L


 


Neutrophils (%) (Auto)


  


  76.6 %


(45.0-75.0)  H


 


Lymphocytes (%) (Auto)


  


  13.9 %


(20.0-45.0)  L


 


Monocytes (%) (Auto)


  


  8.5 %


(1.0-10.0)


 


Eosinophils (%) (Auto)


  


  0.3 %


(0.0-3.0)


 


Basophils (%) (Auto)


  


  0.7 %


(0.0-2.0)


 


Sodium Level


  


  135 MMOL/L


(136-145)  L


 


Potassium Level


  


  5.4 MMOL/L


(3.5-5.1)  H


 


Chloride Level


  


  103 MMOL/L


()


 


Carbon Dioxide Level


  


  23 MMOL/L


(21-32)


 


Anion Gap


  


  9 mmol/L


(5-15)


 


Blood Urea Nitrogen


  


  52 mg/dL


(7-18)  H


 


Creatinine


  


  2.1 MG/DL


(0.55-1.30)  H


 


Estimate Glomerular


Filtration Rate 


   mL/min (>60)  


 


 


Glucose Level


  


  102 MG/DL


()


 


Calcium Level


  


  9.1 MG/DL


(8.5-10.1)


 


Total Bilirubin


  


  0.4 MG/DL


(0.2-1.0)


 


Aspartate Amino Transferase


(AST) 


  20 U/L (15-37)


 


 


Alanine Aminotransferase (ALT)


  


  8 U/L (12-78)


L


 


Alkaline Phosphatase


  


  78 U/L


()


 


Total Protein


  


  8.6 G/DL


(6.4-8.2)  H


 


Albumin


  


  3.0 G/DL


(3.4-5.0)  L


 


Globulin  5.6 g/dL  


 


Albumin/Globulin Ratio


  


  0.5 (1.0-2.7)


L








EKG Diagnostic Results


EKG Time:  20:08


Rate:  normal


Rhythm:  NSR


ST Segments:  no acute changes


Other Impression


Sinus rhythm, normal axis, normal intervals, no acute ST segment changes





Rhythm Strip Diag. Results


Rhythm Strip Time:  20:08


EP Interpretation:  yes


Rate:  70s


Rhythm:  NSR, no PVC's, no ectopy


Reevaluation Time:  21:00





Last Vital Signs








  Date Time  Temp Pulse Resp B/P (MAP) Pulse Ox O2 Delivery O2 Flow Rate FiO2


 


9/24/19 18:58 99.7 70 16 130/58 96 Room Air  








Reevaluation Impression


Patient again shows evidence of an acute kidney injury on top of CKD.  Her 

creatinine today is 2.1 with an elevated BUN at 52.  Potassium is elevated 5.4 

but there are no EKG changes.  Urine again shows 5+ blood, 2+ leukocyte esterase

, 30-40 white cells and occasional bacteria.  We will again treat for urinary 

tract infection and possible pyelonephritis.  IV fluids are continuing.  

Patient will be readmitted for MIKA and hyperkalemia


Disposition:  ADMITTED AS INPATIENT


Condition:  Serious











Ga Mccord MD Sep 24, 2019 19:42

## 2019-09-24 NOTE — NUR
ED Nurse Note:

Second attempt to give report to 4E, will follow-up in 10 minutes, Patient's 
family at bedside. Patient is awake, alert and oriented x4.

## 2019-09-24 NOTE — NUR
ED Nurse Note:

Called and rendered report to Paulina SCHWARTZ. Patient will be taken to floor in 15 
minutes per receiving nurse request.

## 2019-09-25 VITALS — DIASTOLIC BLOOD PRESSURE: 58 MMHG | SYSTOLIC BLOOD PRESSURE: 116 MMHG

## 2019-09-25 VITALS — DIASTOLIC BLOOD PRESSURE: 85 MMHG | SYSTOLIC BLOOD PRESSURE: 114 MMHG

## 2019-09-25 VITALS — DIASTOLIC BLOOD PRESSURE: 56 MMHG | SYSTOLIC BLOOD PRESSURE: 104 MMHG

## 2019-09-25 VITALS — SYSTOLIC BLOOD PRESSURE: 111 MMHG | DIASTOLIC BLOOD PRESSURE: 61 MMHG

## 2019-09-25 VITALS — DIASTOLIC BLOOD PRESSURE: 64 MMHG | SYSTOLIC BLOOD PRESSURE: 104 MMHG

## 2019-09-25 VITALS — SYSTOLIC BLOOD PRESSURE: 99 MMHG | DIASTOLIC BLOOD PRESSURE: 68 MMHG

## 2019-09-25 LAB
ADD MANUAL DIFF: YES
ALBUMIN SERPL-MCNC: 2.5 G/DL (ref 3.4–5)
ALBUMIN/GLOB SERPL: 0.5 {RATIO} (ref 1–2.7)
ALP SERPL-CCNC: 70 U/L (ref 46–116)
ALT SERPL-CCNC: 10 U/L (ref 12–78)
ANION GAP SERPL CALC-SCNC: 9 MMOL/L (ref 5–15)
AST SERPL-CCNC: 20 U/L (ref 15–37)
BILIRUB SERPL-MCNC: 0.5 MG/DL (ref 0.2–1)
BUN SERPL-MCNC: 41 MG/DL (ref 7–18)
CALCIUM SERPL-MCNC: 8.5 MG/DL (ref 8.5–10.1)
CHLORIDE SERPL-SCNC: 109 MMOL/L (ref 98–107)
CO2 SERPL-SCNC: 21 MMOL/L (ref 21–32)
CREAT SERPL-MCNC: 1.9 MG/DL (ref 0.55–1.3)
ERYTHROCYTE [DISTWIDTH] IN BLOOD BY AUTOMATED COUNT: 12.5 % (ref 11.6–14.8)
GLOBULIN SER-MCNC: 5.3 G/DL
HCT VFR BLD CALC: 23.7 % (ref 37–47)
HGB BLD-MCNC: 7.8 G/DL (ref 12–16)
MCV RBC AUTO: 94 FL (ref 80–99)
PLATELET # BLD: 333 K/UL (ref 150–450)
POTASSIUM SERPL-SCNC: 4.6 MMOL/L (ref 3.5–5.1)
RBC # BLD AUTO: 2.53 M/UL (ref 4.2–5.4)
SODIUM SERPL-SCNC: 139 MMOL/L (ref 136–145)
WBC # BLD AUTO: 7.5 K/UL (ref 4.8–10.8)

## 2019-09-25 RX ADMIN — SODIUM CHLORIDE SCH MLS/HR: 0.9 INJECTION INTRAVENOUS at 13:03

## 2019-09-25 RX ADMIN — HEPARIN SODIUM SCH UNITS: 5000 INJECTION INTRAVENOUS; SUBCUTANEOUS at 20:20

## 2019-09-25 RX ADMIN — HEPARIN SODIUM SCH UNITS: 5000 INJECTION INTRAVENOUS; SUBCUTANEOUS at 08:53

## 2019-09-25 NOTE — NUR
CASE MANAGEMENT: REVIEW



73Y/FEMALE PRESENTED TO ED FROM HOME 





CC: RIGHT FLANK AREA AND LATERAL SIDE [id number] 





SI: UTI . MIKA 

T 99.7 HR 70 RR 16 BP 99/68 SAT 95% ROOM AIR

H/H 9.5/28.5  K 5.4 BUN 52 CR 2.1 







IS: NS IVF BOLUS X1

VANCO IV X1

CEFEPIME IV X1





***PATIENT ADMITTED TO MED/SURG UNIT 09/24/2019***

DCP: PATIENT IS FROM HOME

## 2019-09-25 NOTE — CONSULTATION
DATE OF CONSULTATION:  09/25/2019

INFECTIOUS DISEASE CONSULTATION



CONSULTING PHYSICIAN:  Jasson Domingo M.D



REASON FOR CONSULT:  UTI, pyelonephritis, antibiotic management.



HISTORY OF PRESENT ILLNESS:  This is a 73-year-old female, who was admitted

with impression of UTI and pyelonephritis.  Infectious Disease

consultation has been requested for further evaluation of the patient's

antibiotic management.



PAST MEDICAL HISTORY:  Significant for:



1. Rheumatoid arthritis.

2. History of pulmonary MAC.

3. History of empyema.

4. History of ______.



ALLERGIES:  No known drug allergies.



SOCIAL HISTORY:  The patient lives in a nursing home.



FAMILY HISTORY:  Not contributing.



REVIEW OF SYSTEMS:  A 10-point was done, except what has been mentioned

above is negative.  The patient has mild right flank tenderness.  No

nausea or vomiting.  No significant cough.



MEDICATIONS:  The patient is on IV cefepime and vancomycin.



PHYSICAL EXAMINATION:

VITAL SIGNS:  Temperature 99.7, pulse 86, respiratory rate 18, blood

pressure 192/85.

HEENT:  No pale conjunctivae.  No icterus.

NECK:  No lymphadenopathy.

CHEST:  Clear.

HEART:  S1, S2.

ABDOMEN:  Soft, nontender.  Mild right flank tenderness.

EXTREMITIES:  No cyanosis at this time.

NEUROLOGIC:  Awake and alert.



LABORATORY AND DIAGNOSTIC DATA:  White blood cells 7.5, hemoglobin 7.8,

platelets 333.  UA shows 30 to 40 white blood cells, 30 to 40 red blood

cells.  BUN 41, creatinine 1.9.  ALT, AST, and alkaline phosphatase

unremarkable.  Urine culture pending.  CT of the abdomen on 09/13/2019

showed no evidence of hydronephrosis.



ASSESSMENT:  The patient is a 73-year-old female with:



1. Low white blood cells.

2. Afebrile.

3. UTIs.

4. Pyelonephritis.

5. Pyuria.

6. Rule out bacteremia.



PLAN:

1. We will continue the patient is on cefepime day #1.

2. Discontinue IV vancomycin day #1.

3. Monitor CBC.

4. Monitor BMP.

5. Monitor cultures (blood, urine).

6. We will monitor the patient's clinical course and laboratories and

based on those, we will do further recommendation.



Thank you, Dr. Flaco Stephens, for allowing me to participate in the care

of this patient.  I will follow the patient with you during this

hospitalization.









  ______________________________________________

  Jasson Domingo M.D.





DR:  PAN

D:  09/25/2019 16:21

T:  09/25/2019 21:27

JOB#:  [id number]

CC:

## 2019-09-25 NOTE — HISTORY AND PHYSICAL REPORT
DATE OF ADMISSION:  09/24/2019

DATE AND TIME SEEN:  09/25/2019 at 9 a.m.



CONSULTANTS:

1. Geovany Mustafa M.D.

2. Demar Mayo M.D.

3. Jasson Domingo M.D.



CHIEF COMPLAINT:  Flank pain, renal insufficiency, and pyelonephritis.



BRIEF HISTORY:  This is a 73-year-old female, who lives at home, who was

recently hospitalized, went home, was feeling better for about three days

and started having some flank pain, but no nausea, vomiting, chest pain,

or shortness of breath.  The patient came to [facility] yesterday and

diagnosed with flank pain, MIKA, pyelonephritis and admitted to medical

floor for further treatment.  Currently, calm in bed, feeling little bit

better with antibiotics and IV fluids.  No complaint.



REVIEW OF SYSTEMS:  No chest pain.  No shortness of breath.  No nausea,

vomiting, or diarrhea.



PAST MEDICAL HISTORY:  Anemia, malnutrition, possible renal

failure.



PAST SURGICAL HISTORY:  None.



ALLERGIES:  Denies.



MEDICATIONS:  Include zolpidem, heparin, vancomycin, Tylenol, morphine,

Zofran, and temazepam.



SOCIAL HISTORY:  No smoking.  No alcohol.  No intravenous drug

abuse.



FAMILY HISTORY:  Noncontributory.



PHYSICAL EXAMINATION:

GENERAL:  Calm in bed, oriented x2, in no acute distress.

VITAL SIGNS:  Temperature is 97 degrees, pulse 80, respirations 16, and

blood pressure 111/61.

CARDIOVASCULAR:  No murmurs.

LUNGS:  Distant and clear.

ABDOMEN:  Bowel sounds positive.  Nontender.  Nondistended.

EXTREMITIES:  No cyanosis, clubbing, or edema.

NEUROLOGIC:  The patient moves all extremities, slightly weak.



LABORATORY AND DIAGNOSTIC DATA:  Labs at this time show hemoglobin 7.8,

otherwise CBC is normal.  Chloride 109, BUN and creatinine 41/1.9.

Albumin 2.5.  Urinalysis, 2+ leukocyte esterase.



ASSESSMENT:

1. Flank pain.

2. UTI.

3. Pyelonephritis.

4. MIKA.

5. Renal failure.

6. Weakness.



PLAN:

1. PT and dietary evaluation.

2. Check labs in the morning.

3. CBC and BMP in the morning.

4. IV fluids.

5. Antibiotics per Infectious Disease.

6. Resume home medications.

7. Pain control.









  ______________________________________________

  Flaco Stephens D.O.





DR:  GAERTH

D:  09/25/2019 09:30

T:  09/25/2019 10:27

JOB#:  [id number]

CC:

## 2019-09-25 NOTE — CONSULTATION
Consult Note


Consult Note





asked to eval for renal failure


known to me from her previous admissions





HPI: 73-year-old female with a history of rheumatoid arthritis and MAC presents 

for evaluation of right-sided flank pain and weakness.  Symptoms have been 

present for 2 days.  She was recently discharged from the hospital for an 

admission of failure to thrive, decreased intake, anemia and acute on chronic 

kidney injury.  She was in her usual state of health until 2 days ago.  No 

significant weakness, chills, subjective fevers and right-sided flank pain 

radiating down the right groin.  Notes some dysuria but denies hematuria.  

Denies nausea, vomiting, chest pain, shortness of breath.  Continues to 

complain of a intermittent cough which was present on her last admission.


 PMH: Mycobacterium avium complex infection, CKD, RA








Hx Gastrointestinal Problems:  Yes - ABD pain, pyleonephritis


Hx Neurological Problems:  Yes - OSTEOPOROSIS LOWER BACK, RA


Hx Headaches:  Yes





examined


data reviewed


Assessment/Plan





(1) MIKA (acute kidney injury)


(2) Renal failure (ARF), acute on chronic


(3) Severe anemia


(4) Protein calorie mal nutrition


(5) UTI








Others:


h/o bronchitis


h/o abdominal pain and nausea, possibly enteritis ( by CT scan) 


RA 








Plan


Avoid nephrotoxics


hydrate-


monitor renal parameters


check ferritin and Iron panel











Demar Mayo MD Sep 25, 2019 15:09

## 2019-09-25 NOTE — NUR
PT EVALUATION NOTE

Patient seen for initial evaluation. Patient is independent/supervised with all functional 
mobility without an assistive device. Skilled inpatient PT intervention not indicated, 
patient discharged from PT. Van SCHWARTZ notified, patient cleared to ambulate with nursing 
supervision. 










-------------------------------------------------------------------------------

Addendum: 09/25/19 at 1137 by MELISSA FERNÁNDEZ PT

-------------------------------------------------------------------------------

Amended: Links added.

## 2019-09-25 NOTE — NUR
NURSE NOTES:

Received patient on bed, awake. IV site intact and patent. Bed in low and locked position, 
call light in reach. No signs of respiratory distress or pain. Room board updated, will 
continue to monitor.

## 2019-09-25 NOTE — CONSULTATION
History of Present Illness


General


Date patient seen:  Sep 25, 2019


Chief Complaint:  Female Urogenital Problems





Present Illness


HPI


73-year-old female with a history of rheumatoid arthritis,  MAC emphysema 

recently discharged from [facility]  presented again  for evaluation of right-sided 

flank pain and weakness for 2 days.   No significant weakness, chills, 

subjective fevers and right-sided flank pain radiating down the right groin.  

Notes some dysuria but denies hematuria.  Denies nausea, vomiting, chest pain, 

shortness of breath.  Continues to complain of a intermittent cough which was 

present on her last admission as well.


Allergies:  


Coded Allergies:  


     No Known Allergies (Unverified , 2/14/16)





Medication History


Scheduled


Aspirin* (Aspir 81*), 81 MG ORAL DAILY, (Reported)


Calcium Carbonate (Calcium), 500 MG PO DAILY, (Reported)


Calcium Carbonate/Vitamin D3 (Calcium + Vitamin D Tablet), 1 EACH PO BID, (

Reported)


Hydroxychloroquine Sulfate (Hydroxychloroquine Sulfate), 200 MG PO DAILY, (

Reported)


Multivits-Min/Iron/FA/Lutein (Centrum Silver Women Tablet), 1 EACH PO DAILY, (

Reported)





Scheduled PRN


Ibuprofen* (Advil*), Unknown Dose ORAL PRN PRN for Mild Pain/Temp > 100.5, (

Reported)





Patient History


Healthcare decision maker





Resuscitation status


Full Code


Advanced Directive on File








Past Medical/Surgical History


Past Medical/Surgical History:  


(1) Severe anemia


(2) Emphysema lung


(3) Severe protein-calorie malnutrition


(4) LESLY (mycobacterium avium-intracellulare)





Review of Systems


All Other Systems:  negative except mentioned in HPI





Physical Exam


General Appearance:  cachetic, thin


Lines, tubes and drains:  peripheral


HEENT:  normocephalic, atraumatic


Neck:  non-tender, normal alignment


Respiratory/Chest:  chest wall non-tender, lungs clear


Breasts:  no masses


Cardiovascular/Chest:  normal peripheral pulses


Abdomen:  normal bowel sounds, non tender


Genitourinary/Rectal:  normal genital exam, heme negative stool


Extremities:  normal range of motion, normal inspection





Last 24 Hour Vital Signs








  Date Time  Temp Pulse Resp B/P (MAP) Pulse Ox O2 Delivery O2 Flow Rate FiO2


 


9/25/19 08:00 98.0 82 15 116/58 (77) 95   


 


9/25/19 04:00 97.2 80 16 111/61 (78) 95   


 


9/25/19 00:00 99.1 78 16 99/68 (78) 95   


 


9/24/19 22:59      Room Air  


 


9/24/19 22:35 99.4 73 16 129/85 (100) 95   


 


9/24/19 22:20 99.7 70 16 130/58 96 Room Air  


 


9/24/19 18:58 99.7 70 16 130/58 96 Room Air  


 


9/24/19 18:30 99.7 69 16 117/67 (84) 96 Room Air  

















Intake and Output  


 


 9/24/19 9/25/19





 19:00 07:00


 


Intake Total  2000 ml


 


Balance  2000 ml


 


  


 


Intake IV Total  2000 ml


 


# Voids  2











Laboratory Tests








Test


  9/24/19


18:38 9/24/19


19:00 9/25/19


05:35


 


Urine Color Pale yellow    


 


Urine Appearance Clear    


 


Urine pH 6 (4.5-8.0)    


 


Urine Specific Gravity


  1.005


(1.005-1.035) 


  


 


 


Urine Protein


  2+ (NEGATIVE)


H 


  


 


 


Urine Glucose (UA)


  Negative


(NEGATIVE) 


  


 


 


Urine Ketones


  Negative


(NEGATIVE) 


  


 


 


Urine Blood


  5+ (NEGATIVE)


H 


  


 


 


Urine Nitrite


  Negative


(NEGATIVE) 


  


 


 


Urine Bilirubin


  Negative


(NEGATIVE) 


  


 


 


Urine Urobilinogen


  Normal MG/DL


(0.0-1.0) 


  


 


 


Urine Leukocyte Esterase


  2+ (NEGATIVE)


H 


  


 


 


Urine RBC


  30-40 /HPF (0


- 2)  H 


  


 


 


Urine WBC


  30-40 /HPF (0


- 2)  H 


  


 


 


Urine Squamous Epithelial


Cells None /LPF


(NONE/OCC) 


  


 


 


Urine Bacteria


  Occasional


/HPF (NONE) 


  


 


 


White Blood Count


  


  7.8 K/UL


(4.8-10.8) 7.5 K/UL


(4.8-10.8)


 


Red Blood Count


  


  3.06 M/UL


(4.20-5.40)  L 2.53 M/UL


(4.20-5.40)  L


 


Hemoglobin


  


  9.5 G/DL


(12.0-16.0)  L 7.8 G/DL


(12.0-16.0)  L


 


Hematocrit


  


  28.5 %


(37.0-47.0)  L 23.7 %


(37.0-47.0)  L


 


Mean Corpuscular Volume  93 FL (80-99)   94 FL (80-99)  


 


Mean Corpuscular Hemoglobin


  


  31.0 PG


(27.0-31.0) 30.7 PG


(27.0-31.0)


 


Mean Corpuscular Hemoglobin


Concent 


  33.2 G/DL


(32.0-36.0) 32.7 G/DL


(32.0-36.0)


 


Red Cell Distribution Width


  


  11.7 %


(11.6-14.8) 12.5 %


(11.6-14.8)


 


Platelet Count


  


  303 K/UL


(150-450) 333 K/UL


(150-450)


 


Mean Platelet Volume


  


  5.8 FL


(6.5-10.1)  L 5.1 FL


(6.5-10.1)  L


 


Neutrophils (%) (Auto)


  


  76.6 %


(45.0-75.0)  H % (45.0-75.0)


 


 


Lymphocytes (%) (Auto)


  


  13.9 %


(20.0-45.0)  L % (20.0-45.0)


 


 


Monocytes (%) (Auto)


  


  8.5 %


(1.0-10.0)  % (1.0-10.0)  


 


 


Eosinophils (%) (Auto)


  


  0.3 %


(0.0-3.0)  % (0.0-3.0)  


 


 


Basophils (%) (Auto)


  


  0.7 %


(0.0-2.0)  % (0.0-2.0)  


 


 


Sodium Level


  


  135 MMOL/L


(136-145)  L 139 MMOL/L


(136-145)


 


Potassium Level


  


  5.4 MMOL/L


(3.5-5.1)  H 4.6 MMOL/L


(3.5-5.1)


 


Chloride Level


  


  103 MMOL/L


() 109 MMOL/L


()  H


 


Carbon Dioxide Level


  


  23 MMOL/L


(21-32) 21 MMOL/L


(21-32)


 


Anion Gap


  


  9 mmol/L


(5-15) 9 mmol/L


(5-15)


 


Blood Urea Nitrogen


  


  52 mg/dL


(7-18)  H 41 mg/dL


(7-18)  H


 


Creatinine


  


  2.1 MG/DL


(0.55-1.30)  H 1.9 MG/DL


(0.55-1.30)  H


 


Estimat Glomerular Filtration


Rate 


   mL/min (>60)  


   mL/min (>60)  


 


 


Glucose Level


  


  102 MG/DL


() 92 MG/DL


()


 


Calcium Level


  


  9.1 MG/DL


(8.5-10.1) 8.5 MG/DL


(8.5-10.1)


 


Total Bilirubin


  


  0.4 MG/DL


(0.2-1.0) 0.5 MG/DL


(0.2-1.0)


 


Aspartate Amino Transf


(AST/SGOT) 


  20 U/L (15-37)


  20 U/L (15-37)


 


 


Alanine Aminotransferase


(ALT/SGPT) 


  8 U/L (12-78)


L 10 U/L (12-78)


L


 


Alkaline Phosphatase


  


  78 U/L


() 70 U/L


()


 


Total Protein


  


  8.6 G/DL


(6.4-8.2)  H 7.8 G/DL


(6.4-8.2)


 


Albumin


  


  3.0 G/DL


(3.4-5.0)  L 2.5 G/DL


(3.4-5.0)  L


 


Globulin  5.6 g/dL   5.3 g/dL  


 


Albumin/Globulin Ratio


  


  0.5 (1.0-2.7)


L 0.5 (1.0-2.7)


L


 


Differential Total Cells


Counted 


  


  100  


 


 


Neutrophils % (Manual)   83 % (45-75)  H


 


Lymphocytes % (Manual)   12 % (20-45)  L


 


Monocytes % (Manual)   4 % (1-10)  


 


Eosinophils % (Manual)   1 % (0-3)  


 


Basophils % (Manual)   0 % (0-2)  


 


Band Neutrophils   0 % (0-8)  


 


Platelet Estimate   Adequate  


 


Platelet Morphology   Normal  











Microbiology








 Date/Time


Source Procedure


Growth Status


 


 


 9/24/19 18:38


Urine,Clean Catch Urine Culture - Preliminary


NO GROWTH Resulted


 


 9/24/19 19:30


Rectum  Received








Height (Feet):  5


Height (Inches):  1.00


Weight (Pounds):  95


Medications





Current Medications








 Medications


  (Trade)  Dose


 Ordered  Sig/Naman


 Route


 PRN Reason  Start Time


 Stop Time Status Last Admin


Dose Admin


 


 Acetaminophen


  (Tylenol)  650 mg  Q4H  PRN


 ORAL


 fever  9/24/19 22:00


 10/24/19 21:59   


 


 


 Albuterol/


 Ipratropium


  (Albuterol/


 Ipratropium)  3 ml  Q4H  PRN


 HHN


 Shortness of Breath  9/24/19 22:00


 9/29/19 21:59   


 


 


 Cefepime HCl 1 gm/


 Dextrose  55 ml @ 


 110 mls/hr  Q24H


 IVPB


   9/25/19 11:00


 10/2/19 10:59   


 


 


 Heparin Sodium


  (Porcine)


  (Heparin 5000


 units/ml)  5,000 units  EVERY 12  HOURS


 SUBQ


   9/25/19 09:00


 10/25/19 08:59  9/25/19 08:53


 


 


 Morphine Sulfate


  (Morphine


 Sulfate)  2 mg  Q4H  PRN


 IVP


 Moderate Pain (Pain Scale 4-6)  9/24/19 22:00


 10/1/19 21:59   


 


 


 Ondansetron HCl


  (Zofran)  4 mg  Q6H  PRN


 IVP


 Nausea & Vomiting  9/24/19 22:00


 10/24/19 21:59   


 


 


 Polyethylene


 Glycol


  (Miralax)  17 gm  DAILYPRN  PRN


 ORAL


 Constipation  9/24/19 22:00


 10/24/19 21:59   


 


 


 Temazepam


  (Restoril)  15 mg  HSPRN  PRN


 ORAL


 Insomnia  9/24/19 22:00


 10/1/19 21:59   


 


 


 Vancomycin HCl


  (Vanco rx to


 dose)  1 ea  DAILY  PRN


 MISC


 Per rx protocol  9/24/19 22:15


 10/24/19 22:14   


 











Assessment/Plan


Problem List:  


(1) UTI (urinary tract infection)


ICD Codes:  N39.0 - Urinary tract infection, site not specified


SNOMED:  04849308


(2) Severe anemia


ICD Codes:  D64.9 - Anemia, unspecified


SNOMED:  747879146


(3) Renal failure (ARF), acute on chronic


ICD Codes:  N17.9 - Acute kidney failure, unspecified; N18.9 - Chronic kidney 

disease, unspecified


SNOMED:  207970205


(4) Severe protein-calorie malnutrition


ICD Codes:  E43 - Unspecified severe protein-calorie malnutrition


SNOMED:  011110434, [id number], [id number]


(5) Emphysema lung


ICD Codes:  J43.9 - Emphysema, unspecified


SNOMED:  19853429


Assessment/Plan:


pan culture


iv abx


check electrolytes


iv fluids


renal studies


symptomatic treatment











Geovany Mustafa MD Sep 25, 2019 12:21

## 2019-09-25 NOTE — NUR
NURSE NOTES:

Received a report from LANA Araujo. Pt is in stable condition. AAOX4. Able to make needs 
known. Family members at the bedside. IV site is patent and intact. Bed in lowest position. 
Bed alarm is on. Call light within reach. Will continue to monitor.

## 2019-09-26 VITALS — DIASTOLIC BLOOD PRESSURE: 70 MMHG | SYSTOLIC BLOOD PRESSURE: 120 MMHG

## 2019-09-26 VITALS — DIASTOLIC BLOOD PRESSURE: 69 MMHG | SYSTOLIC BLOOD PRESSURE: 121 MMHG

## 2019-09-26 VITALS — DIASTOLIC BLOOD PRESSURE: 54 MMHG | SYSTOLIC BLOOD PRESSURE: 93 MMHG

## 2019-09-26 VITALS — SYSTOLIC BLOOD PRESSURE: 113 MMHG | DIASTOLIC BLOOD PRESSURE: 60 MMHG

## 2019-09-26 VITALS — SYSTOLIC BLOOD PRESSURE: 121 MMHG | DIASTOLIC BLOOD PRESSURE: 66 MMHG

## 2019-09-26 VITALS — SYSTOLIC BLOOD PRESSURE: 96 MMHG | DIASTOLIC BLOOD PRESSURE: 56 MMHG

## 2019-09-26 LAB
% IRON SATURATION: 18 % (ref 15–50)
ADD MANUAL DIFF: NO
ALBUMIN SERPL-MCNC: 2.2 G/DL (ref 3.4–5)
ALBUMIN/GLOB SERPL: 0.4 {RATIO} (ref 1–2.7)
ALP SERPL-CCNC: 65 U/L (ref 46–116)
ALT SERPL-CCNC: 11 U/L (ref 12–78)
ANION GAP SERPL CALC-SCNC: 8 MMOL/L (ref 5–15)
AST SERPL-CCNC: 19 U/L (ref 15–37)
BASOPHILS NFR BLD AUTO: 0.7 % (ref 0–2)
BILIRUB SERPL-MCNC: 0.3 MG/DL (ref 0.2–1)
BUN SERPL-MCNC: 39 MG/DL (ref 7–18)
CALCIUM SERPL-MCNC: 8.1 MG/DL (ref 8.5–10.1)
CHLORIDE SERPL-SCNC: 111 MMOL/L (ref 98–107)
CHOLEST SERPL-MCNC: 108 MG/DL (ref ?–200)
CO2 SERPL-SCNC: 22 MMOL/L (ref 21–32)
CREAT SERPL-MCNC: 1.9 MG/DL (ref 0.55–1.3)
EOSINOPHIL NFR BLD AUTO: 2.1 % (ref 0–3)
ERYTHROCYTE [DISTWIDTH] IN BLOOD BY AUTOMATED COUNT: 12.5 % (ref 11.6–14.8)
FERRITIN SERPL-MCNC: 137 NG/ML (ref 8–388)
GAMMA GLUTAMYL TRANSPEPTIDASE: 13 U/L (ref 5–85)
GLOBULIN SER-MCNC: 4.9 G/DL
HCT VFR BLD CALC: 25.3 % (ref 37–47)
HDLC SERPL-MCNC: 29 MG/DL (ref 40–60)
HGB BLD-MCNC: 8.3 G/DL (ref 12–16)
IRON SERPL-MCNC: 28 UG/DL (ref 50–175)
LYMPHOCYTES NFR BLD AUTO: 16 % (ref 20–45)
MCV RBC AUTO: 94 FL (ref 80–99)
MONOCYTES NFR BLD AUTO: 11.7 % (ref 1–10)
NEUTROPHILS NFR BLD AUTO: 69.4 % (ref 45–75)
PHOSPHATE SERPL-MCNC: 3.5 MG/DL (ref 2.5–4.9)
PLATELET # BLD: 267 K/UL (ref 150–450)
POTASSIUM SERPL-SCNC: 4.6 MMOL/L (ref 3.5–5.1)
RBC # BLD AUTO: 2.69 M/UL (ref 4.2–5.4)
SODIUM SERPL-SCNC: 141 MMOL/L (ref 136–145)
TIBC SERPL-MCNC: 156 UG/DL (ref 250–450)
TRIGL SERPL-MCNC: 92 MG/DL (ref 30–150)
UNSATURATED IRON BINDING: 128 UG/DL (ref 112–346)
WBC # BLD AUTO: 4.6 K/UL (ref 4.8–10.8)

## 2019-09-26 RX ADMIN — HEPARIN SODIUM SCH UNITS: 5000 INJECTION INTRAVENOUS; SUBCUTANEOUS at 09:03

## 2019-09-26 RX ADMIN — SODIUM CHLORIDE SCH MLS/HR: 0.9 INJECTION INTRAVENOUS at 11:00

## 2019-09-26 RX ADMIN — HEPARIN SODIUM SCH UNITS: 5000 INJECTION INTRAVENOUS; SUBCUTANEOUS at 20:51

## 2019-09-26 NOTE — INFECTIOUS DISEASES PROG NOTE
Assessment/Plan


Assessment/Plan


A:





 The patient is a 73-year-old female with:





nl WBC 


Afebrile.


UTIs / Pyelonephritis  UCx : 10 GNR 


  CT of the abdomen on 09/13/2019 showed no evidence of hydronephrosis


  Pyuria


Rule out bacteremia.


History of pulmonary MAC.








Rheumatoid arthritis.














PLAN:








continue the patient is on cefepime day # 2








  9/25 Sp IV vancomycin day #1


monitor CBC


Monitor BMP.


Monitor cultures (blood, urine).





Subjective


Allergies:  


Coded Allergies:  


     No Known Allergies (Unverified , 2/14/16)


Subjective








Afebrile





Objective


Vital Signs





Last 24 Hour Vital Signs








  Date Time  Temp Pulse Resp B/P (MAP) Pulse Ox O2 Delivery O2 Flow Rate FiO2


 


9/26/19 12:00 97.8 69 20 96/56 (69) 98   


 


9/26/19 09:00      Room Air  


 


9/26/19 08:05  71 18  97 Room Air  21


 


9/26/19 08:00 98.1 76 20 93/54 (67) 96   


 


9/26/19 04:00 97.3 60 16 120/70 (87) 97   


 


9/26/19 00:00 98.1 68 16 121/69 (86) 98   


 


9/25/19 21:00      Room Air  


 


9/25/19 20:00 99.0 71 16 104/56 (72) 97   


 


9/25/19 18:28 99.1       


 


9/25/19 16:00 99.4 74 20 104/64 (77) 94   








Height (Feet):  5


Height (Inches):  1.00


Weight (Pounds):  95


Respiratory/Chest:  normal breath sounds


Cardiovascular:  regular rhythm


Abdomen:  non distended





Microbiology








 Date/Time


Source Procedure


Growth Status


 


 


 9/24/19 18:38


Urine,Clean Catch Urine Culture - Final


Gram Negative Jayy Complete


 


 9/24/19 19:30


Rectum  Received











Laboratory Tests








Test


  9/26/19


04:48


 


White Blood Count


  4.6 K/UL


(4.8-10.8)  L


 


Red Blood Count


  2.69 M/UL


(4.20-5.40)  L


 


Hemoglobin


  8.3 G/DL


(12.0-16.0)  L


 


Hematocrit


  25.3 %


(37.0-47.0)  L


 


Mean Corpuscular Volume 94 FL (80-99)  


 


Mean Corpuscular Hemoglobin


  30.8 PG


(27.0-31.0)


 


Mean Corpuscular Hemoglobin


Concent 32.7 G/DL


(32.0-36.0)


 


Red Cell Distribution Width


  12.5 %


(11.6-14.8)


 


Platelet Count


  267 K/UL


(150-450)


 


Mean Platelet Volume


  5.2 FL


(6.5-10.1)  L


 


Neutrophils (%) (Auto)


  69.4 %


(45.0-75.0)


 


Lymphocytes (%) (Auto)


  16.0 %


(20.0-45.0)  L


 


Monocytes (%) (Auto)


  11.7 %


(1.0-10.0)  H


 


Eosinophils (%) (Auto)


  2.1 %


(0.0-3.0)


 


Basophils (%) (Auto)


  0.7 %


(0.0-2.0)


 


Sodium Level


  141 MMOL/L


(136-145)


 


Potassium Level


  4.6 MMOL/L


(3.5-5.1)


 


Chloride Level


  111 MMOL/L


()  H


 


Carbon Dioxide Level


  22 MMOL/L


(21-32)


 


Anion Gap


  8 mmol/L


(5-15)


 


Blood Urea Nitrogen


  39 mg/dL


(7-18)  H


 


Creatinine


  1.9 MG/DL


(0.55-1.30)  H


 


Estimat Glomerular Filtration


Rate  mL/min (>60)  


 


 


Glucose Level


  94 MG/DL


()


 


Uric Acid


  5.4 MG/DL


(2.6-7.2)


 


Calcium Level


  8.1 MG/DL


(8.5-10.1)  L


 


Phosphorus Level


  3.5 MG/DL


(2.5-4.9)


 


Magnesium Level


  1.8 MG/DL


(1.8-2.4)


 


Iron Level


  28 ug/dL


()  L


 


Total Iron Binding Capacity


  156 ug/dL


(250-450)  L


 


Percent Iron Saturation 18 % (15-50)  


 


Unsaturated Iron Binding


  128 ug/dL


(112-346)


 


Ferritin


  137 NG/ML


(8-388)


 


Total Bilirubin


  0.3 MG/DL


(0.2-1.0)


 


Gamma Glutamyl Transpeptidase 13 U/L (5-85)  


 


Aspartate Amino Transf


(AST/SGOT) 19 U/L (15-37)


 


 


Alanine Aminotransferase


(ALT/SGPT) 11 U/L (12-78)


L


 


Alkaline Phosphatase


  65 U/L


()


 


C-Reactive Protein,


Quantitative 8.2 mg/dL


(0.00-0.90)  H


 


Pro-B-Type Natriuretic Peptide


  2044 pg/mL


(0-125)  H


 


Total Protein


  7.1 G/DL


(6.4-8.2)


 


Albumin


  2.2 G/DL


(3.4-5.0)  L


 


Globulin 4.9 g/dL  


 


Albumin/Globulin Ratio


  0.4 (1.0-2.7)


L


 


Triglycerides Level


  92 MG/DL


()


 


Cholesterol Level


  108 MG/DL (<


200)


 


LDL Cholesterol


  64 mg/dL


(<100)


 


HDL Cholesterol


  29 MG/DL


(40-60)  L


 


Cholesterol/HDL Ratio 3.7 (3.3-4.4)  


 


Vitamin B12 Level


  495 PG/ML


(193-986)


 


Folate


  11.2 NG/ML


(8.6-58.9)


 


Thyroid Stimulating Hormone


(TSH) 3.279 uiU/mL


(0.358-3.740)


 


Free Thyroxine


  1.20 NG/DL


(0.76-1.46)


 


Free Triiodothyronine


  1.0 pg/mL


(2.3-4.2)  L











Current Medications








 Medications


  (Trade)  Dose


 Ordered  Sig/Naman


 Route


 PRN Reason  Start Time


 Stop Time Status Last Admin


Dose Admin


 


 Acetaminophen


  (Tylenol)  650 mg  Q4H  PRN


 ORAL


 fever  9/24/19 22:00


 10/24/19 21:59  9/25/19 17:58


 


 


 Albuterol/


 Ipratropium


  (Albuterol/


 Ipratropium)  3 ml  Q4H  PRN


 HHN


 Shortness of Breath  9/24/19 22:00


 9/29/19 21:59   


 


 


 Cefepime HCl 1 gm/


 Dextrose  55 ml @ 


 110 mls/hr  Q24H


 IVPB


   9/25/19 11:00


 10/2/19 10:59  9/26/19 11:00


 


 


 Heparin Sodium


  (Porcine)


  (Heparin 5000


 units/ml)  5,000 units  EVERY 12  HOURS


 SUBQ


   9/25/19 09:00


 10/25/19 08:59  9/26/19 09:03


 


 


 Morphine Sulfate


  (Morphine


 Sulfate)  2 mg  Q4H  PRN


 IVP


 Moderate Pain (Pain Scale 4-6)  9/24/19 22:00


 10/1/19 21:59   


 


 


 Ondansetron HCl


  (Zofran)  4 mg  Q6H  PRN


 IVP


 Nausea & Vomiting  9/24/19 22:00


 10/24/19 21:59   


 


 


 Polyethylene


 Glycol


  (Miralax)  17 gm  DAILYPRN  PRN


 ORAL


 Constipation  9/24/19 22:00


 10/24/19 21:59   


 


 


 Temazepam


  (Restoril)  15 mg  HSPRN  PRN


 ORAL


 Insomnia  9/24/19 22:00


 10/1/19 21:59   


 

















Jasson Domingo MD Sep 26, 2019 12:38

## 2019-09-26 NOTE — NUR
NURSE NOTES:

Patient is in bed, awake, alert and verbal. Breathing on room air. No acute distress noted. 
IV site on left arm patent and intact. Fall precaution is in place. Bed in low and locked 
position. Bed alarm is on. Call light within reach. Encouraged patient to use call for 
assistance before getting out of bed.Patient will be monitored.

## 2019-09-26 NOTE — CONSULTATION
History of Present Illness


General


Chief Complaint:  Female Urogenital Problems





Present Illness


Allergies:  


Coded Allergies:  


     No Known Allergies (Unverified , 2/14/16)





Medication History


Scheduled


Aspirin* (Aspir 81*), 81 MG ORAL DAILY, (Reported)


Calcium Carbonate (Calcium), 500 MG PO DAILY, (Reported)


Calcium Carbonate/Vitamin D3 (Calcium + Vitamin D Tablet), 1 EACH PO BID, (

Reported)


Hydroxychloroquine Sulfate (Hydroxychloroquine Sulfate), 200 MG PO DAILY, (

Reported)


Multivits-Min/Iron/FA/Lutein (Centrum Silver Women Tablet), 1 EACH PO DAILY, (

Reported)





Scheduled PRN


Ibuprofen* (Advil*), Unknown Dose ORAL PRN PRN for Mild Pain/Temp > 100.5, (

Reported)





Patient History


Healthcare decision maker





Resuscitation status


Full Code


Advanced Directive on File








Physical Exam





Last 24 Hour Vital Signs








  Date Time  Temp Pulse Resp B/P (MAP) Pulse Ox O2 Delivery O2 Flow Rate FiO2


 


9/26/19 12:00 97.8 69 20 96/56 (69) 98   


 


9/26/19 09:00      Room Air  


 


9/26/19 08:05  71 18  97 Room Air  21


 


9/26/19 08:00 98.1 76 20 93/54 (67) 96   


 


9/26/19 04:00 97.3 60 16 120/70 (87) 97   


 


9/26/19 00:00 98.1 68 16 121/69 (86) 98   


 


9/25/19 21:00      Room Air  


 


9/25/19 20:00 99.0 71 16 104/56 (72) 97   


 


9/25/19 18:28 99.1       


 


9/25/19 16:00 99.4 74 20 104/64 (77) 94   

















Intake and Output  


 


 9/25/19 9/26/19





 19:00 07:00


 


Intake Total 255 ml 500 ml


 


Balance 255 ml 500 ml


 


  


 


Intake IV Total 255 ml 500 ml


 


# Voids  1











Laboratory Tests








Test


  9/26/19


04:48


 


White Blood Count


  4.6 K/UL


(4.8-10.8)  L


 


Red Blood Count


  2.69 M/UL


(4.20-5.40)  L


 


Hemoglobin


  8.3 G/DL


(12.0-16.0)  L


 


Hematocrit


  25.3 %


(37.0-47.0)  L


 


Mean Corpuscular Volume 94 FL (80-99)  


 


Mean Corpuscular Hemoglobin


  30.8 PG


(27.0-31.0)


 


Mean Corpuscular Hemoglobin


Concent 32.7 G/DL


(32.0-36.0)


 


Red Cell Distribution Width


  12.5 %


(11.6-14.8)


 


Platelet Count


  267 K/UL


(150-450)


 


Mean Platelet Volume


  5.2 FL


(6.5-10.1)  L


 


Neutrophils (%) (Auto)


  69.4 %


(45.0-75.0)


 


Lymphocytes (%) (Auto)


  16.0 %


(20.0-45.0)  L


 


Monocytes (%) (Auto)


  11.7 %


(1.0-10.0)  H


 


Eosinophils (%) (Auto)


  2.1 %


(0.0-3.0)


 


Basophils (%) (Auto)


  0.7 %


(0.0-2.0)


 


Sodium Level


  141 MMOL/L


(136-145)


 


Potassium Level


  4.6 MMOL/L


(3.5-5.1)


 


Chloride Level


  111 MMOL/L


()  H


 


Carbon Dioxide Level


  22 MMOL/L


(21-32)


 


Anion Gap


  8 mmol/L


(5-15)


 


Blood Urea Nitrogen


  39 mg/dL


(7-18)  H


 


Creatinine


  1.9 MG/DL


(0.55-1.30)  H


 


Estimat Glomerular Filtration


Rate  mL/min (>60)  


 


 


Glucose Level


  94 MG/DL


()


 


Uric Acid


  5.4 MG/DL


(2.6-7.2)


 


Calcium Level


  8.1 MG/DL


(8.5-10.1)  L


 


Phosphorus Level


  3.5 MG/DL


(2.5-4.9)


 


Magnesium Level


  1.8 MG/DL


(1.8-2.4)


 


Iron Level


  28 ug/dL


()  L


 


Total Iron Binding Capacity


  156 ug/dL


(250-450)  L


 


Percent Iron Saturation 18 % (15-50)  


 


Unsaturated Iron Binding


  128 ug/dL


(112-346)


 


Ferritin


  137 NG/ML


(8-388)


 


Total Bilirubin


  0.3 MG/DL


(0.2-1.0)


 


Gamma Glutamyl Transpeptidase 13 U/L (5-85)  


 


Aspartate Amino Transf


(AST/SGOT) 19 U/L (15-37)


 


 


Alanine Aminotransferase


(ALT/SGPT) 11 U/L (12-78)


L


 


Alkaline Phosphatase


  65 U/L


()


 


C-Reactive Protein,


Quantitative 8.2 mg/dL


(0.00-0.90)  H


 


Pro-B-Type Natriuretic Peptide


  2044 pg/mL


(0-125)  H


 


Total Protein


  7.1 G/DL


(6.4-8.2)


 


Albumin


  2.2 G/DL


(3.4-5.0)  L


 


Globulin 4.9 g/dL  


 


Albumin/Globulin Ratio


  0.4 (1.0-2.7)


L


 


Triglycerides Level


  92 MG/DL


()


 


Cholesterol Level


  108 MG/DL (<


200)


 


LDL Cholesterol


  64 mg/dL


(<100)


 


HDL Cholesterol


  29 MG/DL


(40-60)  L


 


Cholesterol/HDL Ratio 3.7 (3.3-4.4)  


 


Vitamin B12 Level


  495 PG/ML


(193-986)


 


Folate


  11.2 NG/ML


(8.6-58.9)


 


Thyroid Stimulating Hormone


(TSH) 3.279 uiU/mL


(0.358-3.740)


 


Free Thyroxine


  1.20 NG/DL


(0.76-1.46)


 


Free Triiodothyronine


  1.0 pg/mL


(2.3-4.2)  L








Height (Feet):  5


Height (Inches):  1.00


Weight (Pounds):  95


Medications





Current Medications








 Medications


  (Trade)  Dose


 Ordered  Sig/Naman


 Route


 PRN Reason  Start Time


 Stop Time Status Last Admin


Dose Admin


 


 Acetaminophen


  (Tylenol)  650 mg  Q4H  PRN


 ORAL


 fever  9/24/19 22:00


 10/24/19 21:59  9/25/19 17:58


 


 


 Albuterol/


 Ipratropium


  (Albuterol/


 Ipratropium)  3 ml  Q4H  PRN


 HHN


 Shortness of Breath  9/24/19 22:00


 9/29/19 21:59   


 


 


 Cefepime HCl 1 gm/


 Dextrose  55 ml @ 


 110 mls/hr  Q24H


 IVPB


   9/25/19 11:00


 10/2/19 10:59  9/26/19 11:00


 


 


 Heparin Sodium


  (Porcine)


  (Heparin 5000


 units/ml)  5,000 units  EVERY 12  HOURS


 SUBQ


   9/25/19 09:00


 10/25/19 08:59  9/26/19 09:03


 


 


 Liothyronine


 Sodium


  (Cytomel)  5 mcg  DAILY


 ORAL


   9/27/19 09:00


 10/27/19 08:59   


 


 


 Morphine Sulfate


  (Morphine


 Sulfate)  2 mg  Q4H  PRN


 IVP


 Moderate Pain (Pain Scale 4-6)  9/24/19 22:00


 10/1/19 21:59   


 


 


 Ondansetron HCl


  (Zofran)  4 mg  Q6H  PRN


 IVP


 Nausea & Vomiting  9/24/19 22:00


 10/24/19 21:59   


 


 


 Polyethylene


 Glycol


  (Miralax)  17 gm  DAILYPRN  PRN


 ORAL


 Constipation  9/24/19 22:00


 10/24/19 21:59   


 


 


 Sodium Chloride  1,000 ml @ 


 100 mls/hr  Q10H ONCE


 IV


   9/26/19 14:15


 9/27/19 00:14   


 


 


 Temazepam


  (Restoril)  15 mg  HSPRN  PRN


 ORAL


 Insomnia  9/24/19 22:00


 10/1/19 21:59   


 











Assessment/Plan


Assessment/Plan:


Hematology/Oncology Consultation   


   


Requesting MD: Flaco Stephens   


Date of Service:9/26/19   


Reason for consultation: Leukopenia and Anemia





HPI: 


Asked by Dr. Stephens to eval. 


This is a 73-year-old female well known to me from prior admission, who lives 

at home, complaining of recent headache, and was recently admitte for abdominal 

pain, n/vt. Seen by id, pulm, renal, on cefepime at this time. Hematology/

Oncology was consulted for Leukopenia and Anemia. Hgb <10 wbc 3.6. Have seen 

her before and she had similar findings, and send for a flow which was negative

, no bone marrow biopsy was done, hep and hiv neg, as was abd us.





PAST MEDICAL HISTORY:  Includes rheumatoid arthritis and neuropathy.





PAST SURGICAL HISTORY:  None.





MEDICATIONS:  Include cefepime, vancomycin, heparin, Zofran, morphine, Tylenol, 

and albuterol.





ALLERGIES:  Denies.





SOCIAL HISTORY:  No smoking.  No alcohol.  No intravenous drug abuse.





FAMILY HISTORY:  Noncontributory.





PHYSICAL EXAMINATION:


GENERAL:  Calm in bed, oriented x3, slight short of breath.


VITAL SIGNS: reviewed


CARDIOVASCULAR:  No murmur.


LUNGS:  Poor air exchange.


ABDOMEN:  Bowel sounds distant.


EXTREMITIES:  No cyanosis or edema.


NEUROLOGIC:  The patient moves all extremities, slightly weak.





LABORATORY AND DIAGNOSTIC DATA: 


white count 3.6, hemoglobin and hematocrit 8.6/29, and platelets 173.  





ASSESSMENT/Recs


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

, cocerning for multiple myeloma. with LEUKOPENIA, decreased white blood cell 

count,  (leukopenia) - wbc under 4k, could also be related to infection v 

multiple myeloma


--> continue antibiotics with ID service, appreciate recs


--> medications have been reviewed


--> hepatitis and hiv in past negative


--> imaging of abdomen reviewed


--> Prior igg was 3961--> 2453 and spep was anbnormal concerning for myeloma


--> immunofixation of prior visit ++ for monoclonal proteinwith lambda light 

chain specificity


# Anemia of chronic disease due to underlying chronic medical issues, 

multifactorial 


--> Anemia workup has been ordered from [facility], and reviewed, ferritin is >100


--> No evidence of hemolysis is noted, peripheral smear has been reviewed


--> Hgb goal >7. Transfuse prn.


--> Epogen or iron at this time is not particularly indicated


--> Medications have been reviewed


--> egd/colo per gi on prn basis


# Abdominal pain, hx enteritis, a ntibiotics per Infectious Disease.


--> on abx


--> O2 and pulmonary treatment.


# Headache, chronic


# Recent ashley


# Dvt ppx heparin sq


   


The timing of this note does not necessarily reflect the time of the patient 

was seen.   


   


Greatly appreciate consultation!











Agustin Giordano MD Sep 26, 2019 14:34

## 2019-09-26 NOTE — NEPHROLOGY PROGRESS NOTE
Assessment/Plan


Problem List:  


(1) MIKA (acute kidney injury)


(2) Renal failure (ARF), acute on chronic


(3) Anemia


(4) Severe protein-calorie malnutrition


Assessment





(1) MIKA (acute kidney injury)


(2) Renal failure (ARF), acute on chronic


(3) Severe anemia


(4) Protein calorie mal nutrition


(5) UTI





Others:


h/o bronchitis


h/o abdominal pain and nausea, possibly enteritis ( by CT scan) 


RA


Plan





Plan


Avoid nephrotoxics


hydrate- NS


monitor renal parameters


check ferritin and Iron panel





Objective


Objective





Last 24 Hour Vital Signs








  Date Time  Temp Pulse Resp B/P (MAP) Pulse Ox O2 Delivery O2 Flow Rate FiO2


 


9/26/19 12:00 97.8 69 20 96/56 (69) 98   


 


9/26/19 09:00      Room Air  


 


9/26/19 08:05  71 18  97 Room Air  21


 


9/26/19 08:00 98.1 76 20 93/54 (67) 96   


 


9/26/19 04:00 97.3 60 16 120/70 (87) 97   


 


9/26/19 00:00 98.1 68 16 121/69 (86) 98   


 


9/25/19 21:00      Room Air  


 


9/25/19 20:00 99.0 71 16 104/56 (72) 97   


 


9/25/19 18:28 99.1       


 


9/25/19 16:00 99.4 74 20 104/64 (77) 94   

















Intake and Output  


 


 9/25/19 9/26/19





 19:00 07:00


 


Intake Total 255 ml 500 ml


 


Balance 255 ml 500 ml


 


  


 


Intake IV Total 255 ml 500 ml


 


# Voids  1








Laboratory Tests


9/26/19 04:48: 


White Blood Count 4.6L, Red Blood Count 2.69L, Hemoglobin 8.3L, Hematocrit 25.3L

, Mean Corpuscular Volume 94, Mean Corpuscular Hemoglobin 30.8, Mean 

Corpuscular Hemoglobin Concent 32.7, Red Cell Distribution Width 12.5, Platelet 

Count 267, Mean Platelet Volume 5.2L, Neutrophils (%) (Auto) 69.4, Lymphocytes (

%) (Auto) 16.0L, Monocytes (%) (Auto) 11.7H, Eosinophils (%) (Auto) 2.1, 

Basophils (%) (Auto) 0.7, Sodium Level 141, Potassium Level 4.6, Chloride Level 

111H, Carbon Dioxide Level 22, Anion Gap 8, Blood Urea Nitrogen 39H, Creatinine 

1.9H, Estimat Glomerular Filtration Rate , Glucose Level 94, Uric Acid 5.4, 

Calcium Level 8.1L, Phosphorus Level 3.5, Magnesium Level 1.8, Iron Level 28L, 

Total Iron Binding Capacity 156L, Percent Iron Saturation 18, Unsaturated Iron 

Binding 128, Ferritin 137, Total Bilirubin 0.3, Gamma Glutamyl Transpeptidase 13

, Aspartate Amino Transf (AST/SGOT) 19, Alanine Aminotransferase (ALT/SGPT) 11L

, Alkaline Phosphatase 65, C-Reactive Protein, Quantitative 8.2H, Pro-B-Type 

Natriuretic Peptide 2044H, Total Protein 7.1, Albumin 2.2L, Globulin 4.9, 

Albumin/Globulin Ratio 0.4L, Triglycerides Level 92, Cholesterol Level 108, LDL 

Cholesterol 64, HDL Cholesterol 29L, Cholesterol/HDL Ratio 3.7, Vitamin B12 

Level 495, Folate 11.2, Thyroid Stimulating Hormone (TSH) 3.279, Free Thyroxine 

1.20, Free Triiodothyronine 1.0L


Height (Feet):  5


Height (Inches):  1.00


Weight (Pounds):  95











Demar Mayo MD Sep 26, 2019 14:13

## 2019-09-26 NOTE — GENERAL PROGRESS NOTE
Assessment/Plan


Problem List:  


(1) Anemia


ICD Codes:  D64.9 - Anemia, unspecified


SNOMED:  787132707


(2) Renal failure (ARF), acute on chronic


ICD Codes:  N17.9 - Acute kidney failure, unspecified; N18.9 - Chronic kidney 

disease, unspecified


SNOMED:  894637577


(3) UTI (urinary tract infection)


ICD Codes:  N39.0 - Urinary tract infection, site not specified


SNOMED:  41086945


(4) Severe protein-calorie malnutrition


ICD Codes:  E43 - Unspecified severe protein-calorie malnutrition


SNOMED:  845366037, 739453401, [id number]


Status:  stable, progressing


Assessment/Plan:


o2 pulm tx abx cbc bmp am gi heme eval





Subjective


Constitutional:  Reports: weakness


Allergies:  


Coded Allergies:  


     No Known Allergies (Unverified , 2/14/16)


All Systems:  reviewed and negative except above


Subjective


sl anxious





Objective





Last 24 Hour Vital Signs








  Date Time  Temp Pulse Resp B/P (MAP) Pulse Ox O2 Delivery O2 Flow Rate FiO2


 


9/26/19 12:00 97.8 69 20 96/56 (69) 98   


 


9/26/19 09:00      Room Air  


 


9/26/19 08:05  71 18  97 Room Air  21


 


9/26/19 08:00 98.1 76 20 93/54 (67) 96   


 


9/26/19 04:00 97.3 60 16 120/70 (87) 97   


 


9/26/19 00:00 98.1 68 16 121/69 (86) 98   


 


9/25/19 21:00      Room Air  


 


9/25/19 20:00 99.0 71 16 104/56 (72) 97   


 


9/25/19 18:28 99.1       


 


9/25/19 16:00 99.4 74 20 104/64 (77) 94   

















Intake and Output  


 


 9/25/19 9/26/19





 19:00 07:00


 


Intake Total 255 ml 500 ml


 


Balance 255 ml 500 ml


 


  


 


Intake IV Total 255 ml 500 ml


 


# Voids  1








Laboratory Tests


9/26/19 04:48: 


White Blood Count 4.6L, Red Blood Count 2.69L, Hemoglobin 8.3L, Hematocrit 25.3L

, Mean Corpuscular Volume 94, Mean Corpuscular Hemoglobin 30.8, Mean 

Corpuscular Hemoglobin Concent 32.7, Red Cell Distribution Width 12.5, Platelet 

Count 267, Mean Platelet Volume 5.2L, Neutrophils (%) (Auto) 69.4, Lymphocytes (

%) (Auto) 16.0L, Monocytes (%) (Auto) 11.7H, Eosinophils (%) (Auto) 2.1, 

Basophils (%) (Auto) 0.7, Sodium Level 141, Potassium Level 4.6, Chloride Level 

111H, Carbon Dioxide Level 22, Anion Gap 8, Blood Urea Nitrogen 39H, Creatinine 

1.9H, Estimat Glomerular Filtration Rate , Glucose Level 94, Uric Acid 5.4, 

Calcium Level 8.1L, Phosphorus Level 3.5, Magnesium Level 1.8, Iron Level 28L, 

Total Iron Binding Capacity 156L, Percent Iron Saturation 18, Unsaturated Iron 

Binding 128, Ferritin 137, Total Bilirubin 0.3, Gamma Glutamyl Transpeptidase 13

, Aspartate Amino Transf (AST/SGOT) 19, Alanine Aminotransferase (ALT/SGPT) 11L

, Alkaline Phosphatase 65, C-Reactive Protein, Quantitative 8.2H, Pro-B-Type 

Natriuretic Peptide 2044H, Total Protein 7.1, Albumin 2.2L, Globulin 4.9, 

Albumin/Globulin Ratio 0.4L, Triglycerides Level 92, Cholesterol Level 108, LDL 

Cholesterol 64, HDL Cholesterol 29L, Cholesterol/HDL Ratio 3.7, Vitamin B12 

Level 495, Folate 11.2, Thyroid Stimulating Hormone (TSH) 3.279, Free Thyroxine 

1.20, Free Triiodothyronine 1.0L


Height (Feet):  5


Height (Inches):  1.00


Weight (Pounds):  95


General Appearance:  lethargic


EENT:  normal ENT inspection


Neck:  normal alignment


Cardiovascular:  normal peripheral pulses, normal rate, regular rhythm


Respiratory/Chest:  chest wall non-tender, lungs clear, normal breath sounds


Abdomen:  normal bowel sounds, non tender, soft


Extremities:  normal inspection


Edema:  no edema noted Arm (L), no edema noted Arm (R), no edema noted Leg (L), 

no edema noted Leg (R), no edema noted Pedal (L), no edema noted Pedal (R), no 

edema noted Generalized


Neurologic:  responsive, motor weakness


Skin:  normal pigmentation, warm/dry











Flaco Stephens DO Sep 26, 2019 14:38

## 2019-09-26 NOTE — NUR
NURSE NOTES:

Patient is in bed, awake, alert and verbal. Yoruba speaking.Pt's son by bedside.  No acute 
distress noted. IV site on left arm patent and intact. Fall precaution is in place. Bed in 
low and locked position. Bed alarm is on. Call light within reach. Encouraged patient to use 
call for assistance before getting out of bed.Patient will be monitored.



Trainee, Gio Weber,RN will be assisting in the care of this patient.

## 2019-09-26 NOTE — NUR
NURSE NOTES:

Pt resting in bed. awake, A/O x 4, calm and cooperative. denies pain, no SOB noted. 
tolerating diet well. no N/V. call light within reach. bed alarm on. fall precaution 
maintained. will continue to monitor

## 2019-09-27 VITALS — SYSTOLIC BLOOD PRESSURE: 110 MMHG | DIASTOLIC BLOOD PRESSURE: 68 MMHG

## 2019-09-27 VITALS — DIASTOLIC BLOOD PRESSURE: 57 MMHG | SYSTOLIC BLOOD PRESSURE: 107 MMHG

## 2019-09-27 VITALS — SYSTOLIC BLOOD PRESSURE: 131 MMHG | DIASTOLIC BLOOD PRESSURE: 70 MMHG

## 2019-09-27 VITALS — SYSTOLIC BLOOD PRESSURE: 109 MMHG | DIASTOLIC BLOOD PRESSURE: 69 MMHG

## 2019-09-27 VITALS — DIASTOLIC BLOOD PRESSURE: 77 MMHG | SYSTOLIC BLOOD PRESSURE: 133 MMHG

## 2019-09-27 VITALS — SYSTOLIC BLOOD PRESSURE: 120 MMHG | DIASTOLIC BLOOD PRESSURE: 65 MMHG

## 2019-09-27 LAB
ADD MANUAL DIFF: NO
ANION GAP SERPL CALC-SCNC: 13 MMOL/L (ref 5–15)
BASOPHILS NFR BLD AUTO: 0.9 % (ref 0–2)
BUN SERPL-MCNC: 33 MG/DL (ref 7–18)
CALCIUM SERPL-MCNC: 8.5 MG/DL (ref 8.5–10.1)
CHLORIDE SERPL-SCNC: 108 MMOL/L (ref 98–107)
CO2 SERPL-SCNC: 18 MMOL/L (ref 21–32)
CREAT SERPL-MCNC: 1.9 MG/DL (ref 0.55–1.3)
EOSINOPHIL NFR BLD AUTO: 1.9 % (ref 0–3)
ERYTHROCYTE [DISTWIDTH] IN BLOOD BY AUTOMATED COUNT: 11.9 % (ref 11.6–14.8)
HCT VFR BLD CALC: 27.1 % (ref 37–47)
HGB BLD-MCNC: 8.7 G/DL (ref 12–16)
LYMPHOCYTES NFR BLD AUTO: 22.1 % (ref 20–45)
MCV RBC AUTO: 95 FL (ref 80–99)
MONOCYTES NFR BLD AUTO: 12.4 % (ref 1–10)
NEUTROPHILS NFR BLD AUTO: 62.7 % (ref 45–75)
PLATELET # BLD: 257 K/UL (ref 150–450)
POTASSIUM SERPL-SCNC: 4.4 MMOL/L (ref 3.5–5.1)
RBC # BLD AUTO: 2.86 M/UL (ref 4.2–5.4)
SODIUM SERPL-SCNC: 138 MMOL/L (ref 136–145)
WBC # BLD AUTO: 4.3 K/UL (ref 4.8–10.8)

## 2019-09-27 RX ADMIN — SODIUM CHLORIDE SCH MLS/HR: 0.9 INJECTION INTRAVENOUS at 11:01

## 2019-09-27 RX ADMIN — HEPARIN SODIUM SCH UNITS: 5000 INJECTION INTRAVENOUS; SUBCUTANEOUS at 20:42

## 2019-09-27 RX ADMIN — LIOTHYRONINE SODIUM SCH MCG: 5 TABLET ORAL at 08:57

## 2019-09-27 RX ADMIN — HEPARIN SODIUM SCH UNITS: 5000 INJECTION INTRAVENOUS; SUBCUTANEOUS at 08:58

## 2019-09-27 NOTE — CONSULTATION
DATE OF CONSULTATION:  09/26/2019

GASTROENTEROLOGY CONSULTATION



CONSULTING PHYSICIAN:  Dany Shepard M.D.



CHIEF COMPLAINT:  Anemia.



HISTORY OF PRESENT ILLNESS:  This is a very pleasant 73-year-old female

known to us from last admission.  She was admitted last time with anemia.

The patient has a history of rheumatoid arthritis and multiple other

medical problems.  In the last admission, we offered the family to have an

endoscopy and colonoscopy.  At that time, family refused.  Now, she is

admitted again with possible UTI.



PAST MEDICAL HISTORY:

1. Osteoporosis.

2. Rheumatoid arthritis.

3. Pulmonary MAC.

4. Multidrug-resistant pneumonia.

5. Anemia.

6. Possible multiple myeloma.



MEDICATIONS:  Please see medication reconciliation list.



ALLERGIES:  No known drug allergies.



FAMILY HISTORY:  Noncontributory.



REVIEW OF SYSTEMS:  A 10-point review of systems was performed and

pertinent positives as dictated in the HPI.



PHYSICAL EXAMINATION:

VITAL SIGNS:  Temperature is 97.8, pulse 69, respirations 20, and blood

pressure is 96/56.

HEENT:  Normocephalic and atraumatic.  Sclerae are anicteric.

NECK:  Supple.  No evidence of obvious lymphadenopathy.

CARDIOVASCULAR:  Regular rate and rhythm.  Plus S1 and S2.  No obvious

murmur.

LUNGS:  Decreased breath sounds bilaterally based on the supine

exam.

ABDOMEN:  Soft and nontender.  No rebound.  No guarding.  No peritoneal

sign.

EXTREMITIES:  No cyanosis.  No clubbing.  No edema.



LABORATORY DATA:  White count of 4.6, hemoglobin 8.3, hematocrit 25, and

platelet count is 267,000.  BUN is 39 and creatinine is 1.9.  Iron

saturation is 18.  Urine cultures came back Gram-negative rods.



ASSESSMENT AND PLAN:  This is a 73-year-old female with acute UTI and

possible multiple myeloma, obvious source for her anemia.  On last

admission, the patient had multiple stool OB's, which were negative.

Hemoglobin still continues to be stable and there is no evidence of any

active GI bleeding.  We are going to hold off on doing an inpatient

endoscopy and colonoscopy.  The patient to follow as an outpatient.  Also,

the patient to follow with Hematology for treatment and management of

possible multiple myeloma.  Currently, the patient is getting antibiotics

per ID for UTI and we will follow.



I want to thank Dr. Flaco Stephens for this kind referral.









  ______________________________________________

  Dany KEILA Shepard





DR:  VERNON

D:  09/26/2019 15:41

T:  09/27/2019 01:50

JOB#:  [id number]

CC:  Flaco Stephens D.O.

## 2019-09-27 NOTE — NUR
NURSE NOTES:

NURSE NOTES:

Patient is in bed, asleep. No c/o pain or discomfort at this time. Assisted her to the 
bathroom as needed. Bed in low and locked position. Call light within reach.call light 
within reach. Will continue to monitor.

## 2019-09-27 NOTE — NUR
NURSE NOTES:

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 Message left for MD Domingo 
and charge nurse Sarah notified. Awaiting any new orders. Contact precaution sign posted. 

-------------------------------------------------------------------------------

Addendum: 09/27/19 at 1040 by WILMER DEVLIN RN RN

-------------------------------------------------------------------------------

MD Domingo called back, no new orders. Charge nurse aware.

## 2019-09-27 NOTE — NUR
NURSE NOTES:

Patient is in bed, awake, alert and verbal. Maltese speaking.  No acute distress noted. IV 
site on left arm patent and intact. Fall precaution is in place. Bed in low and locked 
position. Bed alarm is on. Call light within reach. Encouraged patient to use call for 
assistance before getting out of bed.Patient will be monitored.

## 2019-09-27 NOTE — PULMONOLOGY PROGRESS NOTE
Assessment/Plan


Problems:  


(1) UTI (urinary tract infection)


(2) Severe anemia


(3) Renal failure (ARF), acute on chronic


(4) Severe protein-calorie malnutrition


(5) Emphysema lung


Assessment/Plan


respiratory treatment


pan culture


iv abx


check electrolytes


iv fluids


renal studies


symptomatic treatment





Subjective


ROS Limited/Unobtainable:  No


Interval Events:  late note for 9/26


Allergies:  


Coded Allergies:  


     No Known Allergies (Unverified , 2/14/16)





Objective





Last 24 Hour Vital Signs








  Date Time  Temp Pulse Resp B/P (MAP) Pulse Ox O2 Delivery O2 Flow Rate FiO2


 


9/27/19 09:57  77 20  97 Room Air  21


 


9/27/19 09:00      Room Air  


 


9/27/19 08:00 98.7 80 16 110/68 (82) 96   


 


9/27/19 04:00 99.0 70 18 133/77 (95) 96   


 


9/27/19 00:00 98.9 69 20 131/70 (90) 94   


 


9/26/19 21:00      Room Air  


 


9/26/19 20:35  76 18  98 Room Air  21


 


9/26/19 20:00 99.8 71 20 121/66 (84) 96   


 


9/26/19 16:00 98.9 76 18 113/60 (77) 97   

















Intake and Output  


 


 9/26/19 9/27/19





 18:59 06:59


 


Intake Total 1305 ml 760 ml


 


Balance 1305 ml 760 ml


 


  


 


Intake Oral 750 ml 560 ml


 


IV Total 555 ml 200 ml


 


# Voids 4 2








General Appearance:  WD/WN


HEENT:  normocephalic, atraumatic


Respiratory/Chest:  chest wall non-tender, lungs clear


Breasts:  no masses


Cardiovascular:  normal peripheral pulses, no JVD


Abdomen:  no organomegaly


Genitourinary:  normal external genitalia


Extremities:  no clubbing


Skin:  no rash


Neurologic/Psychiatric:  CNs II-XII grossly normal


Lymphatic:  no neck adenopathy





Microbiology








 Date/Time


Source Procedure


Growth Status


 


 


 9/25/19 00:00


Blood Blood Culture - Preliminary


NO GROWTH AFTER 24 HOURS Resulted


 


 9/25/19 00:00


Blood Blood Culture - Preliminary


NO GROWTH AFTER 24 HOURS Resulted


 


 9/24/19 19:30


Nasal Nares MRSA Culture - Final


NO METHICILLIN RESISTANT STAPH AUREUS... Complete


 


 9/24/19 18:38


Urine,Clean Catch Urine Culture - Final


Gram Negative Jayy Complete


 


 9/24/19 19:30


Rectum - Final


NO CARBAPENEM-RESISTANT ENTEROBACTERI... Complete


 


 9/24/19 19:30


Rectum VRE Culture - Final


Enterococcus Faecium - Vre Complete








Laboratory Tests


9/27/19 05:40: 


White Blood Count 4.3L, Red Blood Count 2.86L, Hemoglobin 8.7L, Hematocrit 27.1L

, Mean Corpuscular Volume 95, Mean Corpuscular Hemoglobin 30.3, Mean 

Corpuscular Hemoglobin Concent 32.0, Red Cell Distribution Width 11.9, Platelet 

Count 257, Mean Platelet Volume 5.6L, Neutrophils (%) (Auto) 62.7, Lymphocytes (

%) (Auto) 22.1, Monocytes (%) (Auto) 12.4H, Eosinophils (%) (Auto) 1.9, 

Basophils (%) (Auto) 0.9, Sodium Level 138, Potassium Level 4.4, Chloride Level 

108H, Carbon Dioxide Level 18L, Anion Gap 13, Blood Urea Nitrogen 33H, 

Creatinine 1.9H, Estimat Glomerular Filtration Rate , Glucose Level 86, Calcium 

Level 8.5





Current Medications








 Medications


  (Trade)  Dose


 Ordered  Sig/Naman


 Route


 PRN Reason  Start Time


 Stop Time Status Last Admin


Dose Admin


 


 Acetaminophen


  (Tylenol)  650 mg  Q4H  PRN


 ORAL


 fever  9/24/19 22:00


 10/24/19 21:59  9/25/19 17:58


 


 


 Albuterol/


 Ipratropium


  (Albuterol/


 Ipratropium)  3 ml  Q4H  PRN


 HHN


 Shortness of Breath  9/24/19 22:00


 9/29/19 21:59   


 


 


 Cefepime HCl 1 gm/


 Dextrose  55 ml @ 


 110 mls/hr  Q24H


 IVPB


   9/25/19 11:00


 10/2/19 10:59  9/27/19 11:01


 


 


 Heparin Sodium


  (Porcine)


  (Heparin 5000


 units/ml)  5,000 units  EVERY 12  HOURS


 SUBQ


   9/25/19 09:00


 10/25/19 08:59  9/27/19 08:58


 


 


 Liothyronine


 Sodium


  (Cytomel)  5 mcg  DAILY


 ORAL


   9/27/19 09:00


 10/27/19 08:59  9/27/19 08:57


 


 


 Morphine Sulfate


  (Morphine


 Sulfate)  2 mg  Q4H  PRN


 IVP


 Moderate Pain (Pain Scale 4-6)  9/24/19 22:00


 10/1/19 21:59   


 


 


 Ondansetron HCl


  (Zofran)  4 mg  Q6H  PRN


 IVP


 Nausea & Vomiting  9/24/19 22:00


 10/24/19 21:59   


 


 


 Polyethylene


 Glycol


  (Miralax)  17 gm  DAILYPRN  PRN


 ORAL


 Constipation  9/24/19 22:00


 10/24/19 21:59   


 


 


 Temazepam


  (Restoril)  15 mg  HSPRN  PRN


 ORAL


 Insomnia  9/24/19 22:00


 10/1/19 21:59   


 

















Geovany Mustafa MD Sep 27, 2019 12:16

## 2019-09-27 NOTE — NUR
NURSE NOTES:

Received patient on bed, awake. IV site intact and patent. Bed in low and locked position, 
call light in reach. No signs of respiratory distress, patient denies pain. Room board 
updated, will continue to monitor.

## 2019-09-27 NOTE — GI PROGRESS NOTE
Assessment/Plan


Problems:  


(1) Severe protein-calorie malnutrition


ICD Codes:  E43 - Unspecified severe protein-calorie malnutrition


SNOMED:  932516301, 606817211, 486713233


(2) Pneumonia


ICD Codes:  J18.9 - Pneumonia, unspecified organism


SNOMED:  493942544


(3) Anemia


ICD Codes:  D64.9 - Anemia, unspecified


SNOMED:  229552823


Status:  unchanged


Status Narrative


Discussed with Dr. Shepard.


Assessment/Plan


This is a 73-year-old female with acute UTI and possible multiple myeloma, 

obvious source for her anemia. 





outpatient GI procedures


advance diet as tolerated


OB stool r/o GI bleed


prn transfusions


ppi


abx 


follow labs





The patient was seen and examined at bedside and all new and available data was 

reviewed in the patients chart. I agree with the above findings, impression 

and plan.  (Patient seen earlier today. Signature stamp does not reflect 

patient encounter time.). - Dany Shepard MD





Subjective


Gastrointestinal/Abdominal:  Reports: no symptoms





Objective





Last 24 Hour Vital Signs








  Date Time  Temp Pulse Resp B/P (MAP) Pulse Ox O2 Delivery O2 Flow Rate FiO2


 


9/27/19 09:57  77 20  97 Room Air  21


 


9/27/19 09:00      Room Air  


 


9/27/19 08:00 98.7 80 16 110/68 (82) 96   


 


9/27/19 04:00 99.0 70 18 133/77 (95) 96   


 


9/27/19 00:00 98.9 69 20 131/70 (90) 94   


 


9/26/19 21:00      Room Air  


 


9/26/19 20:35  76 18  98 Room Air  21


 


9/26/19 20:00 99.8 71 20 121/66 (84) 96   


 


9/26/19 16:00 98.9 76 18 113/60 (77) 97   


 


9/26/19 12:00 97.8 69 20 96/56 (69) 98   

















Intake and Output  


 


 9/26/19 9/27/19





 18:59 06:59


 


Intake Total 1305 ml 760 ml


 


Balance 1305 ml 760 ml


 


  


 


Intake Oral 750 ml 560 ml


 


IV Total 555 ml 200 ml


 


# Voids 4 2











Laboratory Tests








Test


  9/27/19


05:40


 


White Blood Count


  4.3 K/UL


(4.8-10.8)  L


 


Red Blood Count


  2.86 M/UL


(4.20-5.40)  L


 


Hemoglobin


  8.7 G/DL


(12.0-16.0)  L


 


Hematocrit


  27.1 %


(37.0-47.0)  L


 


Mean Corpuscular Volume 95 FL (80-99)  


 


Mean Corpuscular Hemoglobin


  30.3 PG


(27.0-31.0)


 


Mean Corpuscular Hemoglobin


Concent 32.0 G/DL


(32.0-36.0)


 


Red Cell Distribution Width


  11.9 %


(11.6-14.8)


 


Platelet Count


  257 K/UL


(150-450)


 


Mean Platelet Volume


  5.6 FL


(6.5-10.1)  L


 


Neutrophils (%) (Auto)


  62.7 %


(45.0-75.0)


 


Lymphocytes (%) (Auto)


  22.1 %


(20.0-45.0)


 


Monocytes (%) (Auto)


  12.4 %


(1.0-10.0)  H


 


Eosinophils (%) (Auto)


  1.9 %


(0.0-3.0)


 


Basophils (%) (Auto)


  0.9 %


(0.0-2.0)


 


Sodium Level


  138 MMOL/L


(136-145)


 


Potassium Level


  4.4 MMOL/L


(3.5-5.1)


 


Chloride Level


  108 MMOL/L


()  H


 


Carbon Dioxide Level


  18 MMOL/L


(21-32)  L


 


Anion Gap


  13 mmol/L


(5-15)


 


Blood Urea Nitrogen


  33 mg/dL


(7-18)  H


 


Creatinine


  1.9 MG/DL


(0.55-1.30)  H


 


Estimat Glomerular Filtration


Rate  mL/min (>60)  


 


 


Glucose Level


  86 MG/DL


()


 


Calcium Level


  8.5 MG/DL


(8.5-10.1)








Height (Feet):  5


Height (Inches):  1.00


Weight (Pounds):  95


General Appearance:  WD/WN, no apparent distress, alert, thin


Cardiovascular:  normal rate


Respiratory/Chest:  normal breath sounds, no respiratory distress


Abdominal Exam:  normal bowel sounds, non tender, soft


Extremities:  normal range of motion, non-tender











Jenny Rodriguez NP Sep 27, 2019 10:48

## 2019-09-27 NOTE — NEPHROLOGY PROGRESS NOTE
Assessment/Plan


Problem List:  


(1) MIKA (acute kidney injury)


(2) Renal failure (ARF), acute on chronic


(3) Anemia


(4) Severe protein-calorie malnutrition


Assessment





(1) MIKA (acute kidney injury)


(2) Renal failure (ARF), acute on chronic


(3) Severe anemia


(4) Protein calorie mal nutrition


(5) UTI





Others:


h/o bronchitis


h/o abdominal pain and nausea, possibly enteritis ( by CT scan) 


RA


Plan





Plan


Avoid nephrotoxics


hydrate- NS


monitor renal parameters


check ferritin and Iron panel





Subjective


ROS Limited/Unobtainable:  No


Constitutional:  Reports: malaise





Objective


Objective





Last 24 Hour Vital Signs








  Date Time  Temp Pulse Resp B/P (MAP) Pulse Ox O2 Delivery O2 Flow Rate FiO2


 


9/27/19 09:57  77 20  97 Room Air  21


 


9/27/19 09:00      Room Air  


 


9/27/19 08:00 98.7 80 16 110/68 (82) 96   


 


9/27/19 04:00 99.0 70 18 133/77 (95) 96   


 


9/27/19 00:00 98.9 69 20 131/70 (90) 94   


 


9/26/19 21:00      Room Air  


 


9/26/19 20:35  76 18  98 Room Air  21


 


9/26/19 20:00 99.8 71 20 121/66 (84) 96   


 


9/26/19 16:00 98.9 76 18 113/60 (77) 97   

















Intake and Output  


 


 9/26/19 9/27/19





 18:59 06:59


 


Intake Total 1305 ml 760 ml


 


Balance 1305 ml 760 ml


 


  


 


Intake Oral 750 ml 560 ml


 


IV Total 555 ml 200 ml


 


# Voids 4 2








Laboratory Tests


9/27/19 05:40: 


White Blood Count 4.3L, Red Blood Count 2.86L, Hemoglobin 8.7L, Hematocrit 27.1L

, Mean Corpuscular Volume 95, Mean Corpuscular Hemoglobin 30.3, Mean 

Corpuscular Hemoglobin Concent 32.0, Red Cell Distribution Width 11.9, Platelet 

Count 257, Mean Platelet Volume 5.6L, Neutrophils (%) (Auto) 62.7, Lymphocytes (

%) (Auto) 22.1, Monocytes (%) (Auto) 12.4H, Eosinophils (%) (Auto) 1.9, 

Basophils (%) (Auto) 0.9, Sodium Level 138, Potassium Level 4.4, Chloride Level 

108H, Carbon Dioxide Level 18L, Anion Gap 13, Blood Urea Nitrogen 33H, 

Creatinine 1.9H, Estimat Glomerular Filtration Rate , Glucose Level 86, Calcium 

Level 8.5


Height (Feet):  5


Height (Inches):  1.00


Weight (Pounds):  95


General Appearance:  no apparent distress


Cardiovascular:  normal rate


Respiratory/Chest:  lungs clear


Abdomen:  soft


Objective


no change











Demar Mayo MD Sep 27, 2019 12:14

## 2019-09-27 NOTE — INFECTIOUS DISEASES PROG NOTE
Assessment/Plan


Assessment/Plan


A:





 The patient is a 73-year-old female with:





nl WBC >> Leukopenia 


Afebrile


UTIs / Pyelonephritis  UCx : 10 GNR 


   Dysuria improving 


  CT of the abdomen on 09/13/2019 showed no evidence of hydronephrosis


  Pyuria


Rule out bacteremia.


History of pulmonary MAC.











Anemia 


? MM fup by Hem/onc


Rheumatoid arthritis.














PLAN:








continue the patient is on cefepime day # 3/10 , upon DC will change to 

Levaquin x 3 d








  9/25 Sp IV vancomycin day #1


monitor CBC


Monitor BMP.


Monitor cultures (blood, )





Subjective


Allergies:  


Coded Allergies:  


     No Known Allergies (Unverified , 2/14/16)


Subjective


no acute event 


Afebrile





Objective


Vital Signs





Last 24 Hour Vital Signs








  Date Time  Temp Pulse Resp B/P (MAP) Pulse Ox O2 Delivery O2 Flow Rate FiO2


 


9/27/19 12:00 98.6 68 17 109/69 (82) 97   


 


9/27/19 09:57  77 20  97 Room Air  21


 


9/27/19 09:00      Room Air  


 


9/27/19 08:00 98.7 80 16 110/68 (82) 96   


 


9/27/19 04:00 99.0 70 18 133/77 (95) 96   


 


9/27/19 00:00 98.9 69 20 131/70 (90) 94   


 


9/26/19 21:00      Room Air  


 


9/26/19 20:35  76 18  98 Room Air  21


 


9/26/19 20:00 99.8 71 20 121/66 (84) 96   


 


9/26/19 16:00 98.9 76 18 113/60 (77) 97   








Height (Feet):  5


Height (Inches):  1.00


Weight (Pounds):  95


HEENT:  anicteric


Respiratory/Chest:  normal breath sounds


Cardiovascular:  normal rate


Abdomen:  soft, non tender





Microbiology








 Date/Time


Source Procedure


Growth Status


 


 


 9/25/19 00:00


Blood Blood Culture - Preliminary


NO GROWTH AFTER 24 HOURS Resulted


 


 9/25/19 00:00


Blood Blood Culture - Preliminary


NO GROWTH AFTER 24 HOURS Resulted


 


 9/24/19 19:30


Nasal Nares MRSA Culture - Final


NO METHICILLIN RESISTANT STAPH AUREUS... Complete


 


 9/24/19 18:38


Urine,Clean Catch Urine Culture - Final


Gram Negative Jayy Complete


 


 9/24/19 19:30


Rectum - Final


NO CARBAPENEM-RESISTANT ENTEROBACTERI... Complete


 


 9/24/19 19:30


Rectum VRE Culture - Final


Enterococcus Faecium - Vre Complete











Laboratory Tests








Test


  9/27/19


05:40


 


White Blood Count


  4.3 K/UL


(4.8-10.8)  L


 


Red Blood Count


  2.86 M/UL


(4.20-5.40)  L


 


Hemoglobin


  8.7 G/DL


(12.0-16.0)  L


 


Hematocrit


  27.1 %


(37.0-47.0)  L


 


Mean Corpuscular Volume 95 FL (80-99)  


 


Mean Corpuscular Hemoglobin


  30.3 PG


(27.0-31.0)


 


Mean Corpuscular Hemoglobin


Concent 32.0 G/DL


(32.0-36.0)


 


Red Cell Distribution Width


  11.9 %


(11.6-14.8)


 


Platelet Count


  257 K/UL


(150-450)


 


Mean Platelet Volume


  5.6 FL


(6.5-10.1)  L


 


Neutrophils (%) (Auto)


  62.7 %


(45.0-75.0)


 


Lymphocytes (%) (Auto)


  22.1 %


(20.0-45.0)


 


Monocytes (%) (Auto)


  12.4 %


(1.0-10.0)  H


 


Eosinophils (%) (Auto)


  1.9 %


(0.0-3.0)


 


Basophils (%) (Auto)


  0.9 %


(0.0-2.0)


 


Sodium Level


  138 MMOL/L


(136-145)


 


Potassium Level


  4.4 MMOL/L


(3.5-5.1)


 


Chloride Level


  108 MMOL/L


()  H


 


Carbon Dioxide Level


  18 MMOL/L


(21-32)  L


 


Anion Gap


  13 mmol/L


(5-15)


 


Blood Urea Nitrogen


  33 mg/dL


(7-18)  H


 


Creatinine


  1.9 MG/DL


(0.55-1.30)  H


 


Estimat Glomerular Filtration


Rate  mL/min (>60)  


 


 


Glucose Level


  86 MG/DL


()


 


Calcium Level


  8.5 MG/DL


(8.5-10.1)











Current Medications








 Medications


  (Trade)  Dose


 Ordered  Sig/Naman


 Route


 PRN Reason  Start Time


 Stop Time Status Last Admin


Dose Admin


 


 Acetaminophen


  (Tylenol)  650 mg  Q4H  PRN


 ORAL


 fever  9/24/19 22:00


 10/24/19 21:59  9/25/19 17:58


 


 


 Albuterol/


 Ipratropium


  (Albuterol/


 Ipratropium)  3 ml  Q4H  PRN


 HHN


 Shortness of Breath  9/24/19 22:00


 9/29/19 21:59   


 


 


 Cefepime HCl 1 gm/


 Dextrose  55 ml @ 


 110 mls/hr  Q24H


 IVPB


   9/25/19 11:00


 10/2/19 10:59  9/27/19 11:01


 


 


 Heparin Sodium


  (Porcine)


  (Heparin 5000


 units/ml)  5,000 units  EVERY 12  HOURS


 SUBQ


   9/25/19 09:00


 10/25/19 08:59  9/27/19 08:58


 


 


 Liothyronine


 Sodium


  (Cytomel)  5 mcg  DAILY


 ORAL


   9/27/19 09:00


 10/27/19 08:59  9/27/19 08:57


 


 


 Morphine Sulfate


  (Morphine


 Sulfate)  2 mg  Q4H  PRN


 IVP


 Moderate Pain (Pain Scale 4-6)  9/24/19 22:00


 10/1/19 21:59   


 


 


 Ondansetron HCl


  (Zofran)  4 mg  Q6H  PRN


 IVP


 Nausea & Vomiting  9/24/19 22:00


 10/24/19 21:59   


 


 


 Polyethylene


 Glycol


  (Miralax)  17 gm  DAILYPRN  PRN


 ORAL


 Constipation  9/24/19 22:00


 10/24/19 21:59   


 


 


 Temazepam


  (Restoril)  15 mg  HSPRN  PRN


 ORAL


 Insomnia  9/24/19 22:00


 10/1/19 21:59   


 

















Jasson Domingo MD Sep 27, 2019 14:48

## 2019-09-27 NOTE — NUR
NURSE NOTES:

Patient is in bed, awake, alert and verbal. Amharic speaking.Pt's son by bedside. Pt 
ambulated the length of hallway with the son.  No acute distress noted. IV site on left arm 
patent and intact. Fall precaution is in place. Bed in low and locked position. Bed alarm is 
on. Call light within reach. Encouraged patient to use call for assistance before getting 
out of bed.Patient will be monitored.



Trainee, Tia PowersRN will be assisting in the care of this patient.

## 2019-09-27 NOTE — GENERAL PROGRESS NOTE
Assessment/Plan


Problem List:  


(1) Anemia


ICD Codes:  D64.9 - Anemia, unspecified


SNOMED:  441415738


(2) Renal failure (ARF), acute on chronic


ICD Codes:  N17.9 - Acute kidney failure, unspecified; N18.9 - Chronic kidney 

disease, unspecified


SNOMED:  616403777


(3) UTI (urinary tract infection)


ICD Codes:  N39.0 - Urinary tract infection, site not specified


SNOMED:  08223064


(4) Severe protein-calorie malnutrition


ICD Codes:  E43 - Unspecified severe protein-calorie malnutrition


SNOMED:  999116418, 472994131, 843649789


Status:  stable, progressing


Assessment/Plan:


o2 pulm tx abx cbc bmp am gi heme eval





Subjective


Constitutional:  Reports: weakness


Allergies:  


Coded Allergies:  


     No Known Allergies (Unverified , 2/14/16)


All Systems:  reviewed and negative except above


Subjective


sl anxious





Objective





Last 24 Hour Vital Signs








  Date Time  Temp Pulse Resp B/P (MAP) Pulse Ox O2 Delivery O2 Flow Rate FiO2


 


9/27/19 08:00 98.7 80 16 110/68 (82) 96   


 


9/27/19 04:00 99.0 70 18 133/77 (95) 96   


 


9/27/19 00:00 98.9 69 20 131/70 (90) 94   


 


9/26/19 21:00      Room Air  


 


9/26/19 20:35  76 18  98 Room Air  21


 


9/26/19 20:00 99.8 71 20 121/66 (84) 96   


 


9/26/19 16:00 98.9 76 18 113/60 (77) 97   


 


9/26/19 12:00 97.8 69 20 96/56 (69) 98   


 


9/26/19 09:00      Room Air  

















Intake and Output  


 


 9/26/19 9/27/19





 18:59 06:59


 


Intake Total 1305 ml 760 ml


 


Balance 1305 ml 760 ml


 


  


 


Intake Oral 750 ml 560 ml


 


IV Total 555 ml 200 ml


 


# Voids 4 2








Laboratory Tests


9/27/19 05:40: 


White Blood Count 4.3L, Red Blood Count 2.86L, Hemoglobin 8.7L, Hematocrit 27.1L

, Mean Corpuscular Volume 95, Mean Corpuscular Hemoglobin 30.3, Mean 

Corpuscular Hemoglobin Concent 32.0, Red Cell Distribution Width 11.9, Platelet 

Count 257, Mean Platelet Volume 5.6L, Neutrophils (%) (Auto) 62.7, Lymphocytes (

%) (Auto) 22.1, Monocytes (%) (Auto) 12.4H, Eosinophils (%) (Auto) 1.9, 

Basophils (%) (Auto) 0.9, Sodium Level 138, Potassium Level 4.4, Chloride Level 

108H, Carbon Dioxide Level 18L, Anion Gap 13, Blood Urea Nitrogen 33H, 

Creatinine 1.9H, Estimat Glomerular Filtration Rate , Glucose Level 86, Calcium 

Level 8.5


Height (Feet):  5


Height (Inches):  1.00


Weight (Pounds):  95


General Appearance:  lethargic


EENT:  normal ENT inspection


Neck:  normal alignment


Cardiovascular:  normal peripheral pulses, normal rate, regular rhythm


Respiratory/Chest:  chest wall non-tender, lungs clear, normal breath sounds


Abdomen:  normal bowel sounds, non tender, soft


Extremities:  normal inspection


Edema:  no edema noted Arm (L), no edema noted Arm (R), no edema noted Leg (L), 

no edema noted Leg (R), no edema noted Pedal (L), no edema noted Pedal (R), no 

edema noted Generalized


Neurologic:  responsive, motor weakness


Skin:  normal pigmentation, warm/dry











Flaco Stephens DO Sep 27, 2019 08:38

## 2019-09-27 NOTE — NUR
SI:     UTI

T. 98.6 HR 68 RR 20 B/P 109/69

RA 98%

WBC 4.3 BUN 33 CR 1.9







IS:     CEFEPIME IV

ALB HHN

******MED/SURG STATUS*****

## 2019-09-27 NOTE — PULMONOLOGY PROGRESS NOTE
Assessment/Plan


Problems:  


(1) UTI (urinary tract infection)


(2) Severe anemia


(3) Renal failure (ARF), acute on chronic


(4) Severe protein-calorie malnutrition


(5) Emphysema lung


Assessment/Plan


all noted


VRE rectum positive


GNR in urine


respiratory treatment


pan culture


iv abx


check electrolytes


iv fluids


renal studies


symptomatic treatment





Subjective


ROS Limited/Unobtainable:  No


Interval Events:  doing better


Allergies:  


Coded Allergies:  


     No Known Allergies (Unverified , 2/14/16)





Objective





Last 24 Hour Vital Signs








  Date Time  Temp Pulse Resp B/P (MAP) Pulse Ox O2 Delivery O2 Flow Rate FiO2


 


9/27/19 09:57  77 20  97 Room Air  21


 


9/27/19 09:00      Room Air  


 


9/27/19 08:00 98.7 80 16 110/68 (82) 96   


 


9/27/19 04:00 99.0 70 18 133/77 (95) 96   


 


9/27/19 00:00 98.9 69 20 131/70 (90) 94   


 


9/26/19 21:00      Room Air  


 


9/26/19 20:35  76 18  98 Room Air  21


 


9/26/19 20:00 99.8 71 20 121/66 (84) 96   


 


9/26/19 16:00 98.9 76 18 113/60 (77) 97   

















Intake and Output  


 


 9/26/19 9/27/19





 18:59 06:59


 


Intake Total 1305 ml 760 ml


 


Balance 1305 ml 760 ml


 


  


 


Intake Oral 750 ml 560 ml


 


IV Total 555 ml 200 ml


 


# Voids 4 2








General Appearance:  WD/WN


HEENT:  normocephalic, atraumatic


Respiratory/Chest:  chest wall non-tender, lungs clear, normal breath sounds


Breasts:  no masses


Cardiovascular:  normal peripheral pulses, regular rhythm


Abdomen:  normal bowel sounds, soft, non tender, no organomegaly


Genitourinary:  normal external genitalia


Extremities:  no cyanosis


Skin:  no rash


Neurologic/Psychiatric:  CNs II-XII grossly normal





Microbiology








 Date/Time


Source Procedure


Growth Status


 


 


 9/25/19 00:00


Blood Blood Culture - Preliminary


NO GROWTH AFTER 24 HOURS Resulted


 


 9/25/19 00:00


Blood Blood Culture - Preliminary


NO GROWTH AFTER 24 HOURS Resulted


 


 9/24/19 19:30


Nasal Nares MRSA Culture - Final


NO METHICILLIN RESISTANT STAPH AUREUS... Complete


 


 9/24/19 18:38


Urine,Clean Catch Urine Culture - Final


Gram Negative Jayy Complete


 


 9/24/19 19:30


Rectum - Final


NO CARBAPENEM-RESISTANT ENTEROBACTERI... Complete


 


 9/24/19 19:30


Rectum VRE Culture - Final


Enterococcus Faecium - Vre Complete








Laboratory Tests


9/27/19 05:40: 


White Blood Count 4.3L, Red Blood Count 2.86L, Hemoglobin 8.7L, Hematocrit 27.1L

, Mean Corpuscular Volume 95, Mean Corpuscular Hemoglobin 30.3, Mean 

Corpuscular Hemoglobin Concent 32.0, Red Cell Distribution Width 11.9, Platelet 

Count 257, Mean Platelet Volume 5.6L, Neutrophils (%) (Auto) 62.7, Lymphocytes (

%) (Auto) 22.1, Monocytes (%) (Auto) 12.4H, Eosinophils (%) (Auto) 1.9, 

Basophils (%) (Auto) 0.9, Sodium Level 138, Potassium Level 4.4, Chloride Level 

108H, Carbon Dioxide Level 18L, Anion Gap 13, Blood Urea Nitrogen 33H, 

Creatinine 1.9H, Estimat Glomerular Filtration Rate , Glucose Level 86, Calcium 

Level 8.5





Current Medications








 Medications


  (Trade)  Dose


 Ordered  Sig/Naman


 Route


 PRN Reason  Start Time


 Stop Time Status Last Admin


Dose Admin


 


 Acetaminophen


  (Tylenol)  650 mg  Q4H  PRN


 ORAL


 fever  9/24/19 22:00


 10/24/19 21:59  9/25/19 17:58


 


 


 Albuterol/


 Ipratropium


  (Albuterol/


 Ipratropium)  3 ml  Q4H  PRN


 HHN


 Shortness of Breath  9/24/19 22:00


 9/29/19 21:59   


 


 


 Cefepime HCl 1 gm/


 Dextrose  55 ml @ 


 110 mls/hr  Q24H


 IVPB


   9/25/19 11:00


 10/2/19 10:59  9/27/19 11:01


 


 


 Heparin Sodium


  (Porcine)


  (Heparin 5000


 units/ml)  5,000 units  EVERY 12  HOURS


 SUBQ


   9/25/19 09:00


 10/25/19 08:59  9/27/19 08:58


 


 


 Liothyronine


 Sodium


  (Cytomel)  5 mcg  DAILY


 ORAL


   9/27/19 09:00


 10/27/19 08:59  9/27/19 08:57


 


 


 Morphine Sulfate


  (Morphine


 Sulfate)  2 mg  Q4H  PRN


 IVP


 Moderate Pain (Pain Scale 4-6)  9/24/19 22:00


 10/1/19 21:59   


 


 


 Ondansetron HCl


  (Zofran)  4 mg  Q6H  PRN


 IVP


 Nausea & Vomiting  9/24/19 22:00


 10/24/19 21:59   


 


 


 Polyethylene


 Glycol


  (Miralax)  17 gm  DAILYPRN  PRN


 ORAL


 Constipation  9/24/19 22:00


 10/24/19 21:59   


 


 


 Temazepam


  (Restoril)  15 mg  HSPRN  PRN


 ORAL


 Insomnia  9/24/19 22:00


 10/1/19 21:59   


 

















Geovany Mustafa MD Sep 27, 2019 12:17

## 2019-09-27 NOTE — HEMATOLOGY/ONC PROGRESS NOTE
Assessment/Plan


Assessment/Plan





ASSESSMENT/Recs


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

, cocerning for multiple myeloma. with LEUKOPENIA, decreased white blood cell 

count,  (leukopenia) - wbc under 4k, could also be related to infection v 

multiple myeloma


--> continue antibiotics with ID service, appreciate recs


--> medications have been reviewed


--> hepatitis and hiv in past negative


--> imaging of abdomen reviewed


--> Prior igg was 3961--> 2453 and spep was anbnormal concerning for myeloma


--> immunofixation of prior visit ++ for monoclonal proteinwith lambda light 

chain specificity


--> may at some point consider bone marrow biopsy (WILL NOT SEND CYTOGENETICS) 

just simple biopsy


# Anemia of chronic disease due to underlying chronic medical issues, 

multifactorial 


--> Anemia workup has been ordered from pior adm, and reviewed, ferritin is >100


--> No evidence of hemolysis is noted, peripheral smear has been reviewed


--> Hgb goal >7. Transfuse prn.


--> Epogen or iron at this time is not particularly indicated


--> Medications have been reviewed


--> egd/colo per gi on prn basis


# Abdominal pain, hx enteritis, a ntibiotics per Infectious Disease.


--> on abx


--> O2 and pulmonary treatment.


# Headache, chronic


# Recent ashley


# Dvt ppx heparin sq


   


The timing of this note does not necessarily reflect the time of the patient 

was seen.   


   


Greatly appreciate consultation!





Subjective


HEENT:  Denies: no symptoms, eye pain, blurred vision, tearing, double vision, 

ear pain, ear discharge, nose pain, nose congestion, throat pain, throat 

swelling, mouth pain, mouth swelling, other


Respiratory:  Denies: no symptoms, cough, shortness of breath, SOB with 

excertion, SOB at rest, sputum, wheezing, other


Genitourinary:  Denies: no symptoms, burning, discharge, frequency, flank pain, 

hematuria, incontinence, pain, urgency, other


Neurologic/Psychiatric:  Denies: no symptoms, anxiety, depressed, emotional 

problems, headache, numbness, paresthesia, pre-existing deficit, seizure, 

tingling, tremors, weakness, other


Allergies:  


Coded Allergies:  


     No Known Allergies (Unverified , 2/14/16)


Subjective


9/27: no bleeding, no chills, seen with pcp, labs reviewed, seen by gi





Objective


Objective





Current Medications








 Medications


  (Trade)  Dose


 Ordered  Sig/Naman


 Route


 PRN Reason  Start Time


 Stop Time Status Last Admin


Dose Admin


 


 Acetaminophen


  (Tylenol)  650 mg  Q4H  PRN


 ORAL


 fever  9/24/19 22:00


 10/24/19 21:59  9/25/19 17:58


 


 


 Albuterol/


 Ipratropium


  (Albuterol/


 Ipratropium)  3 ml  Q4H  PRN


 HHN


 Shortness of Breath  9/24/19 22:00


 9/29/19 21:59   


 


 


 Cefepime HCl 1 gm/


 Dextrose  55 ml @ 


 110 mls/hr  Q24H


 IVPB


   9/25/19 11:00


 10/2/19 10:59  9/26/19 11:00


 


 


 Heparin Sodium


  (Porcine)


  (Heparin 5000


 units/ml)  5,000 units  EVERY 12  HOURS


 SUBQ


   9/25/19 09:00


 10/25/19 08:59  9/26/19 20:51


 


 


 Liothyronine


 Sodium


  (Cytomel)  5 mcg  DAILY


 ORAL


   9/27/19 09:00


 10/27/19 08:59   


 


 


 Morphine Sulfate


  (Morphine


 Sulfate)  2 mg  Q4H  PRN


 IVP


 Moderate Pain (Pain Scale 4-6)  9/24/19 22:00


 10/1/19 21:59   


 


 


 Ondansetron HCl


  (Zofran)  4 mg  Q6H  PRN


 IVP


 Nausea & Vomiting  9/24/19 22:00


 10/24/19 21:59   


 


 


 Polyethylene


 Glycol


  (Miralax)  17 gm  DAILYPRN  PRN


 ORAL


 Constipation  9/24/19 22:00


 10/24/19 21:59   


 


 


 Temazepam


  (Restoril)  15 mg  HSPRN  PRN


 ORAL


 Insomnia  9/24/19 22:00


 10/1/19 21:59   


 











Last 24 Hour Vital Signs








  Date Time  Temp Pulse Resp B/P (MAP) Pulse Ox O2 Delivery O2 Flow Rate FiO2


 


9/27/19 08:00 98.7 80 16 110/68 (82) 96   


 


9/27/19 04:00 99.0 70 18 133/77 (95) 96   


 


9/27/19 00:00 98.9 69 20 131/70 (90) 94   


 


9/26/19 21:00      Room Air  


 


9/26/19 20:35  76 18  98 Room Air  21


 


9/26/19 20:00 99.8 71 20 121/66 (84) 96   


 


9/26/19 16:00 98.9 76 18 113/60 (77) 97   


 


9/26/19 12:00 97.8 69 20 96/56 (69) 98   


 


9/26/19 09:00      Room Air  


 


9/26/19 08:05  71 18  97 Room Air  21


 


9/26/19 08:00 98.1 76 20 93/54 (67) 96   


 


9/26/19 04:00 97.3 60 16 120/70 (87) 97   


 


9/26/19 00:00 98.1 68 16 121/69 (86) 98   


 


9/25/19 21:00      Room Air  


 


9/25/19 20:00 99.0 71 16 104/56 (72) 97   


 


9/25/19 18:28 99.1       


 


9/25/19 16:00 99.4 74 20 104/64 (77) 94   


 


9/25/19 12:00 99.1 81 20 114/57 (76) 96   


 


9/25/19 12:00 98.2 64 20 192/85 (120) 96   


 


9/25/19 09:00      Room Air  

















Intake and Output  


 


 9/26/19 9/27/19





 18:59 06:59


 


Intake Total 1305 ml 760 ml


 


Balance 1305 ml 760 ml


 


  


 


Intake Oral 750 ml 560 ml


 


IV Total 555 ml 200 ml


 


# Voids 4 2











Labs








Test


  9/24/19


18:38 9/24/19


19:00 9/25/19


05:35 9/26/19


04:48


 


Urine Color Pale yellow    


 


Urine Appearance Clear    


 


Urine pH 6 (4.5-8.0)    


 


Urine Specific Gravity


  1.005


(1.005-1.035) 


  


  


 


 


Urine Protein 2+ (NEGATIVE)    


 


Urine Glucose (UA)


  Negative


(NEGATIVE) 


  


  


 


 


Urine Ketones


  Negative


(NEGATIVE) 


  


  


 


 


Urine Blood 5+ (NEGATIVE)    


 


Urine Nitrite


  Negative


(NEGATIVE) 


  


  


 


 


Urine Bilirubin


  Negative


(NEGATIVE) 


  


  


 


 


Urine Urobilinogen


  Normal MG/DL


(0.0-1.0) 


  


  


 


 


Urine Leukocyte Esterase 2+ (NEGATIVE)    


 


Urine RBC


  30-40 /HPF (0


- 2) 


  


  


 


 


Urine WBC


  30-40 /HPF (0


- 2) 


  


  


 


 


Urine Squamous Epithelial


Cells None /LPF


(NONE/OCC) 


  


  


 


 


Urine Bacteria


  Occasional


/HPF (NONE) 


  


  


 


 


White Blood Count


  


  7.8 K/UL


(4.8-10.8) 7.5 K/UL


(4.8-10.8) 4.6 K/UL


(4.8-10.8)


 


Red Blood Count


  


  3.06 M/UL


(4.20-5.40) 2.53 M/UL


(4.20-5.40) 2.69 M/UL


(4.20-5.40)


 


Hemoglobin


  


  9.5 G/DL


(12.0-16.0) 7.8 G/DL


(12.0-16.0) 8.3 G/DL


(12.0-16.0)


 


Hematocrit


  


  28.5 %


(37.0-47.0) 23.7 %


(37.0-47.0) 25.3 %


(37.0-47.0)


 


Mean Corpuscular Volume  93 FL (80-99)  94 FL (80-99)  94 FL (80-99) 


 


Mean Corpuscular Hemoglobin


  


  31.0 PG


(27.0-31.0) 30.7 PG


(27.0-31.0) 30.8 PG


(27.0-31.0)


 


Mean Corpuscular Hemoglobin


Concent 


  33.2 G/DL


(32.0-36.0) 32.7 G/DL


(32.0-36.0) 32.7 G/DL


(32.0-36.0)


 


Red Cell Distribution Width


  


  11.7 %


(11.6-14.8) 12.5 %


(11.6-14.8) 12.5 %


(11.6-14.8)


 


Platelet Count


  


  303 K/UL


(150-450) 333 K/UL


(150-450) 267 K/UL


(150-450)


 


Mean Platelet Volume


  


  5.8 FL


(6.5-10.1) 5.1 FL


(6.5-10.1) 5.2 FL


(6.5-10.1)


 


Neutrophils (%) (Auto)


  


  76.6 %


(45.0-75.0)  % (45.0-75.0) 


  69.4 %


(45.0-75.0)


 


Lymphocytes (%) (Auto)


  


  13.9 %


(20.0-45.0)  % (20.0-45.0) 


  16.0 %


(20.0-45.0)


 


Monocytes (%) (Auto)


  


  8.5 %


(1.0-10.0)  % (1.0-10.0) 


  11.7 %


(1.0-10.0)


 


Eosinophils (%) (Auto)


  


  0.3 %


(0.0-3.0)  % (0.0-3.0) 


  2.1 %


(0.0-3.0)


 


Basophils (%) (Auto)


  


  0.7 %


(0.0-2.0)  % (0.0-2.0) 


  0.7 %


(0.0-2.0)


 


Sodium Level


  


  135 MMOL/L


(136-145) 139 MMOL/L


(136-145) 141 MMOL/L


(136-145)


 


Potassium Level


  


  5.4 MMOL/L


(3.5-5.1) 4.6 MMOL/L


(3.5-5.1) 4.6 MMOL/L


(3.5-5.1)


 


Chloride Level


  


  103 MMOL/L


() 109 MMOL/L


() 111 MMOL/L


()


 


Carbon Dioxide Level


  


  23 MMOL/L


(21-32) 21 MMOL/L


(21-32) 22 MMOL/L


(21-32)


 


Anion Gap


  


  9 mmol/L


(5-15) 9 mmol/L


(5-15) 8 mmol/L


(5-15)


 


Blood Urea Nitrogen


  


  52 mg/dL


(7-18) 41 mg/dL


(7-18) 39 mg/dL


(7-18)


 


Creatinine


  


  2.1 MG/DL


(0.55-1.30) 1.9 MG/DL


(0.55-1.30) 1.9 MG/DL


(0.55-1.30)


 


Estimat Glomerular Filtration


Rate 


   mL/min (>60) 


 


 


Glucose Level


  


  102 MG/DL


() 92 MG/DL


() 94 MG/DL


()


 


Calcium Level


  


  9.1 MG/DL


(8.5-10.1) 8.5 MG/DL


(8.5-10.1) 8.1 MG/DL


(8.5-10.1)


 


Total Bilirubin


  


  0.4 MG/DL


(0.2-1.0) 0.5 MG/DL


(0.2-1.0) 0.3 MG/DL


(0.2-1.0)


 


Aspartate Amino Transf


(AST/SGOT) 


  20 U/L (15-37) 


  20 U/L (15-37) 


  19 U/L (15-37) 


 


 


Alanine Aminotransferase


(ALT/SGPT) 


  8 U/L (12-78) 


  10 U/L (12-78) 


  11 U/L (12-78) 


 


 


Alkaline Phosphatase


  


  78 U/L


() 70 U/L


() 65 U/L


()


 


Total Protein


  


  8.6 G/DL


(6.4-8.2) 7.8 G/DL


(6.4-8.2) 7.1 G/DL


(6.4-8.2)


 


Albumin


  


  3.0 G/DL


(3.4-5.0) 2.5 G/DL


(3.4-5.0) 2.2 G/DL


(3.4-5.0)


 


Globulin  5.6 g/dL  5.3 g/dL  4.9 g/dL 


 


Albumin/Globulin Ratio  0.5 (1.0-2.7)  0.5 (1.0-2.7)  0.4 (1.0-2.7) 


 


Differential Total Cells


Counted 


  


  100 


  


 


 


Neutrophils % (Manual)   83 % (45-75)  


 


Lymphocytes % (Manual)   12 % (20-45)  


 


Monocytes % (Manual)   4 % (1-10)  


 


Eosinophils % (Manual)   1 % (0-3)  


 


Basophils % (Manual)   0 % (0-2)  


 


Band Neutrophils   0 % (0-8)  


 


Platelet Estimate   Adequate  


 


Platelet Morphology   Normal  


 


Uric Acid


  


  


  


  5.4 MG/DL


(2.6-7.2)


 


Phosphorus Level


  


  


  


  3.5 MG/DL


(2.5-4.9)


 


Magnesium Level


  


  


  


  1.8 MG/DL


(1.8-2.4)


 


Iron Level


  


  


  


  28 ug/dL


()


 


Total Iron Binding Capacity


  


  


  


  156 ug/dL


(250-450)


 


Percent Iron Saturation    18 % (15-50) 


 


Unsaturated Iron Binding


  


  


  


  128 ug/dL


(112-346)


 


Ferritin


  


  


  


  137 NG/ML


(8-388)


 


Gamma Glutamyl Transpeptidase    13 U/L (5-85) 


 


C-Reactive Protein,


Quantitative 


  


  


  8.2 mg/dL


(0.00-0.90)


 


Pro-B-Type Natriuretic Peptide


  


  


  


  2044 pg/mL


(0-125)


 


Triglycerides Level


  


  


  


  92 MG/DL


()


 


Cholesterol Level


  


  


  


  108 MG/DL (<


200)


 


LDL Cholesterol


  


  


  


  64 mg/dL


(<100)


 


HDL Cholesterol


  


  


  


  29 MG/DL


(40-60)


 


Cholesterol/HDL Ratio    3.7 (3.3-4.4) 


 


Vitamin B12 Level


  


  


  


  495 PG/ML


(193-986)


 


Folate


  


  


  


  11.2 NG/ML


(8.6-58.9)


 


Thyroid Stimulating Hormone


(TSH) 


  


  


  3.279 uiU/mL


(0.358-3.740)


 


Free Thyroxine


  


  


  


  1.20 NG/DL


(0.76-1.46)


 


Free Triiodothyronine


  


  


  


  1.0 pg/mL


(2.3-4.2)


 


Test


  9/27/19


05:40 


  


  


 


 


White Blood Count


  4.3 K/UL


(4.8-10.8) 


  


  


 


 


Red Blood Count


  2.86 M/UL


(4.20-5.40) 


  


  


 


 


Hemoglobin


  8.7 G/DL


(12.0-16.0) 


  


  


 


 


Hematocrit


  27.1 %


(37.0-47.0) 


  


  


 


 


Mean Corpuscular Volume 95 FL (80-99)    


 


Mean Corpuscular Hemoglobin


  30.3 PG


(27.0-31.0) 


  


  


 


 


Mean Corpuscular Hemoglobin


Concent 32.0 G/DL


(32.0-36.0) 


  


  


 


 


Red Cell Distribution Width


  11.9 %


(11.6-14.8) 


  


  


 


 


Platelet Count


  257 K/UL


(150-450) 


  


  


 


 


Mean Platelet Volume


  5.6 FL


(6.5-10.1) 


  


  


 


 


Neutrophils (%) (Auto)


  62.7 %


(45.0-75.0) 


  


  


 


 


Lymphocytes (%) (Auto)


  22.1 %


(20.0-45.0) 


  


  


 


 


Monocytes (%) (Auto)


  12.4 %


(1.0-10.0) 


  


  


 


 


Eosinophils (%) (Auto)


  1.9 %


(0.0-3.0) 


  


  


 


 


Basophils (%) (Auto)


  0.9 %


(0.0-2.0) 


  


  


 


 


Sodium Level


  138 MMOL/L


(136-145) 


  


  


 


 


Potassium Level


  4.4 MMOL/L


(3.5-5.1) 


  


  


 


 


Chloride Level


  108 MMOL/L


() 


  


  


 


 


Carbon Dioxide Level


  18 MMOL/L


(21-32) 


  


  


 


 


Anion Gap


  13 mmol/L


(5-15) 


  


  


 


 


Blood Urea Nitrogen


  33 mg/dL


(7-18) 


  


  


 


 


Creatinine


  1.9 MG/DL


(0.55-1.30) 


  


  


 


 


Estimat Glomerular Filtration


Rate  mL/min (>60) 


  


  


  


 


 


Glucose Level


  86 MG/DL


() 


  


  


 


 


Calcium Level


  8.5 MG/DL


(8.5-10.1) 


  


  


 








Height (Feet):  5


Height (Inches):  1.00


Weight (Pounds):  95


Objective








PHYSICAL EXAMINATION:


GENERAL:  Calm in bed, oriented x3, slight short of breath.


VITAL SIGNS: reviewed


CARDIOVASCULAR:  No murmur.


LUNGS:  Poor air exchange.


ABDOMEN:  Bowel sounds distant.


EXTREMITIES:  No cyanosis or edema.


NEUROLOGIC:  The patient moves all extremities, slightly weak.











Agustin Giordano MD Sep 27, 2019 08:39

## 2019-09-28 VITALS — SYSTOLIC BLOOD PRESSURE: 98 MMHG | DIASTOLIC BLOOD PRESSURE: 57 MMHG

## 2019-09-28 VITALS — SYSTOLIC BLOOD PRESSURE: 115 MMHG | DIASTOLIC BLOOD PRESSURE: 52 MMHG

## 2019-09-28 VITALS — DIASTOLIC BLOOD PRESSURE: 67 MMHG | SYSTOLIC BLOOD PRESSURE: 119 MMHG

## 2019-09-28 VITALS — DIASTOLIC BLOOD PRESSURE: 65 MMHG | SYSTOLIC BLOOD PRESSURE: 121 MMHG

## 2019-09-28 VITALS — DIASTOLIC BLOOD PRESSURE: 78 MMHG | SYSTOLIC BLOOD PRESSURE: 121 MMHG

## 2019-09-28 VITALS — SYSTOLIC BLOOD PRESSURE: 111 MMHG | DIASTOLIC BLOOD PRESSURE: 61 MMHG

## 2019-09-28 LAB
ADD MANUAL DIFF: NO
ANION GAP SERPL CALC-SCNC: 9 MMOL/L (ref 5–15)
BASOPHILS NFR BLD AUTO: 0.8 % (ref 0–2)
BUN SERPL-MCNC: 32 MG/DL (ref 7–18)
CALCIUM SERPL-MCNC: 8.8 MG/DL (ref 8.5–10.1)
CHLORIDE SERPL-SCNC: 110 MMOL/L (ref 98–107)
CO2 SERPL-SCNC: 21 MMOL/L (ref 21–32)
CREAT SERPL-MCNC: 1.7 MG/DL (ref 0.55–1.3)
EOSINOPHIL NFR BLD AUTO: 2.4 % (ref 0–3)
ERYTHROCYTE [DISTWIDTH] IN BLOOD BY AUTOMATED COUNT: 12.7 % (ref 11.6–14.8)
HCT VFR BLD CALC: 25.2 % (ref 37–47)
HGB BLD-MCNC: 8.3 G/DL (ref 12–16)
LYMPHOCYTES NFR BLD AUTO: 29.5 % (ref 20–45)
MCV RBC AUTO: 93 FL (ref 80–99)
MONOCYTES NFR BLD AUTO: 10.2 % (ref 1–10)
NEUTROPHILS NFR BLD AUTO: 57.1 % (ref 45–75)
PLATELET # BLD: 283 K/UL (ref 150–450)
POTASSIUM SERPL-SCNC: 4.3 MMOL/L (ref 3.5–5.1)
RBC # BLD AUTO: 2.7 M/UL (ref 4.2–5.4)
SODIUM SERPL-SCNC: 140 MMOL/L (ref 136–145)
WBC # BLD AUTO: 4.1 K/UL (ref 4.8–10.8)

## 2019-09-28 RX ADMIN — HEPARIN SODIUM SCH UNITS: 5000 INJECTION INTRAVENOUS; SUBCUTANEOUS at 21:25

## 2019-09-28 RX ADMIN — LIOTHYRONINE SODIUM SCH MCG: 5 TABLET ORAL at 08:23

## 2019-09-28 RX ADMIN — HEPARIN SODIUM SCH UNITS: 5000 INJECTION INTRAVENOUS; SUBCUTANEOUS at 08:24

## 2019-09-28 RX ADMIN — SODIUM CHLORIDE SCH MLS/HR: 0.9 INJECTION INTRAVENOUS at 10:38

## 2019-09-28 NOTE — NEPHROLOGY PROGRESS NOTE
Assessment/Plan


Problem List:  


(1) MIKA (acute kidney injury)


Assessment:  Cr lowering





(2) Renal failure (ARF), acute on chronic


(3) Anemia


(4) Severe protein-calorie malnutrition


Assessment





(1) MIKA (acute kidney injury)


(2) Renal failure (ARF), acute on chronic


(3) Severe anemia


(4) Protein calorie mal nutrition


(5) UTI





Others:


h/o bronchitis


h/o abdominal pain and nausea, possibly enteritis ( by CT scan) 


RA


Plan





Plan


Avoid nephrotoxics


hydrate- NS


monitor renal parameters


check ferritin and Iron panel





Subjective


ROS Limited/Unobtainable:  No





Objective


Objective





Last 24 Hour Vital Signs








  Date Time  Temp Pulse Resp B/P (MAP) Pulse Ox O2 Delivery O2 Flow Rate FiO2


 


9/28/19 09:00      Room Air  


 


9/28/19 08:00 98.5 65 18 121/65 (83) 98   


 


9/28/19 04:00 97.8 62 18 115/52 (73) 95   


 


9/28/19 00:00 98.0 69 19 119/67 (84) 98   


 


9/27/19 21:00      Room Air  


 


9/27/19 20:07  70 18  98 Room Air  21


 


9/27/19 20:00 98.9 70 18 107/57 (74) 100   


 


9/27/19 16:00 98.4 66 18 120/65 (83) 100   


 


9/27/19 12:00 98.6 68 17 109/69 (82) 97   


 


9/27/19 09:57  77 20  97 Room Air  21

















Intake and Output  


 


 9/27/19 9/28/19





 19:00 07:00


 


Intake Total 855 ml 620 ml


 


Balance 855 ml 620 ml


 


  


 


Intake Oral 800 ml 620 ml


 


IV Total 55 ml 


 


# Voids 5 3


 


# Bowel Movements 1 








Laboratory Tests


9/28/19 06:19: 


White Blood Count 4.1L, Red Blood Count 2.70L, Hemoglobin 8.3L, Hematocrit 25.2L

, Mean Corpuscular Volume 93, Mean Corpuscular Hemoglobin 30.7, Mean 

Corpuscular Hemoglobin Concent 33.0, Red Cell Distribution Width 12.7, Platelet 

Count 283, Mean Platelet Volume 4.9L, Neutrophils (%) (Auto) 57.1, Lymphocytes (

%) (Auto) 29.5, Monocytes (%) (Auto) 10.2H, Eosinophils (%) (Auto) 2.4, 

Basophils (%) (Auto) 0.8, Sodium Level 140, Potassium Level 4.3, Chloride Level 

110H, Carbon Dioxide Level 21, Anion Gap 9, Blood Urea Nitrogen 32H, Creatinine 

1.7H, Estimat Glomerular Filtration Rate , Glucose Level 84, Calcium Level 8.8


Height (Feet):  5


Height (Inches):  1.00


Weight (Pounds):  95


General Appearance:  no apparent distress


Objective


no change











Demar Mayo MD Sep 28, 2019 09:33

## 2019-09-28 NOTE — GENERAL PROGRESS NOTE
Assessment/Plan


Status:  unchanged


Assessment/Plan:


(1) Severe protein-calorie malnutrition


ICD Codes:  E43 - Unspecified severe protein-calorie malnutrition


SNOMED:  761554757, 534770292, 768873897


(2) Pneumonia


ICD Codes:  J18.9 - Pneumonia, unspecified organism


SNOMED:  328999106


(3) Anemia


ICD Codes:  D64.9 - Anemia, unspecified


SNOMED:  380742599





Assessment/Plan


This is a 73-year-old female with acute UTI and possible multiple myeloma, 

obvious source for her anemia. 





outpatient GI procedures


OB stool r/o GI bleed


prn transfusions


ppi


abx 


follow labs





Subjective


Allergies:  


Coded Allergies:  


     No Known Allergies (Unverified , 2/14/16)





Objective





Last 24 Hour Vital Signs








  Date Time  Temp Pulse Resp B/P (MAP) Pulse Ox O2 Delivery O2 Flow Rate FiO2


 


9/28/19 12:00 98.1 71 19 98/57 (71) 99   


 


9/28/19 09:00      Room Air  


 


9/28/19 08:20  75 18  98 Room Air  21


 


9/28/19 08:00 98.5 65 18 121/65 (83) 98   


 


9/28/19 04:00 97.8 62 18 115/52 (73) 95   


 


9/28/19 00:00 98.0 69 19 119/67 (84) 98   


 


9/27/19 21:00      Room Air  


 


9/27/19 20:07  70 18  98 Room Air  21


 


9/27/19 20:00 98.9 70 18 107/57 (74) 100   


 


9/27/19 16:00 98.4 66 18 120/65 (83) 100   

















Intake and Output  


 


 9/27/19 9/28/19





 18:59 06:59


 


Intake Total 855 ml 620 ml


 


Balance 855 ml 620 ml


 


  


 


Intake Oral 800 ml 620 ml


 


IV Total 55 ml 


 


# Voids 5 3


 


# Bowel Movements 1 








Laboratory Tests


9/28/19 06:19: 


White Blood Count 4.1L, Red Blood Count 2.70L, Hemoglobin 8.3L, Hematocrit 25.2L

, Mean Corpuscular Volume 93, Mean Corpuscular Hemoglobin 30.7, Mean 

Corpuscular Hemoglobin Concent 33.0, Red Cell Distribution Width 12.7, Platelet 

Count 283, Mean Platelet Volume 4.9L, Neutrophils (%) (Auto) 57.1, Lymphocytes (

%) (Auto) 29.5, Monocytes (%) (Auto) 10.2H, Eosinophils (%) (Auto) 2.4, 

Basophils (%) (Auto) 0.8, Sodium Level 140, Potassium Level 4.3, Chloride Level 

110H, Carbon Dioxide Level 21, Anion Gap 9, Blood Urea Nitrogen 32H, Creatinine 

1.7H, Estimat Glomerular Filtration Rate , Glucose Level 84, Calcium Level 8.8


Height (Feet):  5


Height (Inches):  1.00


Weight (Pounds):  95


General Appearance:  alert


EENT:  normal ENT inspection


Neck:  supple


Cardiovascular:  normal rate


Respiratory/Chest:  decreased breath sounds


Abdomen:  normal bowel sounds, non tender, soft


Extremities:  non-tender











Dany Shepard MD Sep 28, 2019 13:27

## 2019-09-28 NOTE — GENERAL PROGRESS NOTE
Assessment/Plan


Problem List:  


(1) Anemia


ICD Codes:  D64.9 - Anemia, unspecified


SNOMED:  664267580


(2) Renal failure (ARF), acute on chronic


ICD Codes:  N17.9 - Acute kidney failure, unspecified; N18.9 - Chronic kidney 

disease, unspecified


SNOMED:  631483192


(3) UTI (urinary tract infection)


ICD Codes:  N39.0 - Urinary tract infection, site not specified


SNOMED:  10880956


(4) Severe protein-calorie malnutrition


ICD Codes:  E43 - Unspecified severe protein-calorie malnutrition


SNOMED:  265810622, 825372385, 767192426


Status:  unchanged


Assessment/Plan:


o2 pulm tx abx cbc bmp am gi heme eval





Subjective


Constitutional:  Reports: weakness


Allergies:  


Coded Allergies:  


     No Known Allergies (Unverified , 2/14/16)


All Systems:  reviewed and negative except above


Subjective


sl anxious





Objective





Last 24 Hour Vital Signs








  Date Time  Temp Pulse Resp B/P (MAP) Pulse Ox O2 Delivery O2 Flow Rate FiO2


 


9/28/19 08:00 98.5 65 18 121/65 (83) 98   


 


9/28/19 04:00 97.8 62 18 115/52 (73) 95   


 


9/28/19 00:00 98.0 69 19 119/67 (84) 98   


 


9/27/19 21:00      Room Air  


 


9/27/19 20:07  70 18  98 Room Air  21


 


9/27/19 20:00 98.9 70 18 107/57 (74) 100   


 


9/27/19 16:00 98.4 66 18 120/65 (83) 100   


 


9/27/19 12:00 98.6 68 17 109/69 (82) 97   


 


9/27/19 09:57  77 20  97 Room Air  21

















Intake and Output  


 


 9/27/19 9/28/19





 19:00 07:00


 


Intake Total 855 ml 620 ml


 


Balance 855 ml 620 ml


 


  


 


Intake Oral 800 ml 620 ml


 


IV Total 55 ml 


 


# Voids 5 3


 


# Bowel Movements 1 








Laboratory Tests


9/28/19 06:19: 


White Blood Count 4.1L, Red Blood Count 2.70L, Hemoglobin 8.3L, Hematocrit 25.2L

, Mean Corpuscular Volume 93, Mean Corpuscular Hemoglobin 30.7, Mean 

Corpuscular Hemoglobin Concent 33.0, Red Cell Distribution Width 12.7, Platelet 

Count 283, Mean Platelet Volume 4.9L, Neutrophils (%) (Auto) 57.1, Lymphocytes (

%) (Auto) 29.5, Monocytes (%) (Auto) 10.2H, Eosinophils (%) (Auto) 2.4, 

Basophils (%) (Auto) 0.8, Sodium Level 140, Potassium Level 4.3, Chloride Level 

110H, Carbon Dioxide Level 21, Anion Gap 9, Blood Urea Nitrogen 32H, Creatinine 

1.7H, Estimat Glomerular Filtration Rate , Glucose Level 84, Calcium Level 8.8


Height (Feet):  5


Height (Inches):  1.00


Weight (Pounds):  95


General Appearance:  lethargic


EENT:  normal ENT inspection


Neck:  normal alignment


Cardiovascular:  normal peripheral pulses, normal rate, regular rhythm


Respiratory/Chest:  chest wall non-tender, lungs clear, normal breath sounds


Abdomen:  normal bowel sounds, non tender, soft


Extremities:  normal inspection


Edema:  no edema noted Arm (L), no edema noted Arm (R), no edema noted Leg (L), 

no edema noted Leg (R), no edema noted Pedal (L), no edema noted Pedal (R), no 

edema noted Generalized


Neurologic:  motor weakness


Skin:  normal pigmentation, warm/dry











Flaco Stephens DO Sep 28, 2019 09:30

## 2019-09-28 NOTE — NUR
NURSE NOTES:

Received pt in bed, AAO x 4. Sami speaking. No c/o of pain/distress at this moment. IV on 
L wrist 22g intact and patent, saline locked. Bed is in the lowest position, locked, side 
rails x2, call light within reach, family at bedside.  Will continue to monitor

## 2019-09-28 NOTE — HEMATOLOGY/ONC PROGRESS NOTE
Assessment/Plan


Assessment/Plan





ASSESSMENT/Recs


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

, cocerning for multiple myeloma. with LEUKOPENIA, decreased white blood cell 

count,  (leukopenia) - wbc under 4k, could also be related to infection v 

multiple myeloma


--> continue antibiotics with ID service, appreciate recs


--> medications have been reviewed


--> hepatitis and hiv in past negative


--> imaging of abdomen reviewed


--> Prior igg was 3961-->2453 and spep was abnormal concerning for myeloma


--> immunofixation of prior visit ++ for monoclonal proteinwith lambda light 

chain specificity


--> may at some point consider bone marrow biopsy (WILL NOT SEND CYTOGENETICS) 

just simple biopsy


# Anemia of chronic disease due to underlying chronic medical issues, 

multifactorial 


--> Anemia workup has been ordered from [facility], and reviewed, ferritin is >100


--> No evidence of hemolysis is noted, peripheral smear has been reviewed


--> Hgb goal >7. Transfuse prn.


--> Epogen or iron at this time is not particularly indicated


--> Medications have been reviewed


--> egd/colo per gi on prn basis


# Abdominal pain, hx enteritis, a antibiotics per Infectious Disease.


--> on abx cefepime


--> O2 and pulmonary treatment.


# Headache, chronic


# Recent ashley


# Dvt ppx heparin sq


   


The timing of this note does not necessarily reflect the time of the patient 

was seen.   


   


Greatly appreciate consultation!





Subjective


Constitutional:  Denies: no symptoms, chills, fever, malaise, weakness, other


HEENT:  Denies: no symptoms, eye pain, blurred vision, tearing, double vision, 

ear pain, ear discharge, nose pain, nose congestion, throat pain, throat 

swelling, mouth pain, mouth swelling, other


Cardiovascular:  Denies: no symptoms, chest pain, edema, irregular heart rate, 

lightheadedness, palpitations, syncope, other


Respiratory:  Denies: no symptoms, cough, shortness of breath, SOB with 

excertion, SOB at rest, sputum, wheezing, other


Gastrointestinal/Abdominal:  Denies: no symptoms, abdomen distended, abdominal 

pain, black stools, tarry stools, blood in stool, constipated, diarrhea, 

difficulty swallowing, nausea, poor appetite, poor fluid intake, rectal bleeding

, vomiting, other


Neurologic/Psychiatric:  Denies: no symptoms, anxiety, depressed, emotional 

problems, headache, numbness, paresthesia, pre-existing deficit, seizure, 

tingling, tremors, weakness, other


Allergies:  


Coded Allergies:  


     No Known Allergies (Unverified , 2/14/16)


Subjective


9/27: no bleeding, no chills, seen with pcp, labs reviewed, seen by gi


9/28: no events, no changes, no bleeding, reviewed labs and no f/c





Objective


Objective





Current Medications








 Medications


  (Trade)  Dose


 Ordered  Sig/Naman


 Route


 PRN Reason  Start Time


 Stop Time Status Last Admin


Dose Admin


 


 Acetaminophen


  (Tylenol)  650 mg  Q4H  PRN


 ORAL


 fever  9/24/19 22:00


 10/24/19 21:59  9/25/19 17:58


 


 


 Albuterol/


 Ipratropium


  (Albuterol/


 Ipratropium)  3 ml  Q4H  PRN


 HHN


 Shortness of Breath  9/28/19 14:00


 10/3/19 13:59   


 


 


 Cefepime HCl 1 gm/


 Dextrose  55 ml @ 


 110 mls/hr  Q24H


 IVPB


   9/25/19 11:00


 10/4/19 23:59  9/28/19 10:38


 


 


 Heparin Sodium


  (Porcine)


  (Heparin 5000


 units/ml)  5,000 units  EVERY 12  HOURS


 SUBQ


   9/25/19 09:00


 10/25/19 08:59  9/28/19 08:24


 


 


 Liothyronine


 Sodium


  (Cytomel)  5 mcg  DAILY


 ORAL


   9/27/19 09:00


 10/27/19 08:59  9/28/19 08:23


 


 


 Morphine Sulfate


  (Morphine


 Sulfate)  2 mg  Q4H  PRN


 IVP


 Moderate Pain (Pain Scale 4-6)  9/24/19 22:00


 10/1/19 21:59   


 


 


 Ondansetron HCl


  (Zofran)  4 mg  Q6H  PRN


 IVP


 Nausea & Vomiting  9/24/19 22:00


 10/24/19 21:59   


 


 


 Polyethylene


 Glycol


  (Miralax)  17 gm  DAILYPRN  PRN


 ORAL


 Constipation  9/24/19 22:00


 10/24/19 21:59   


 


 


 Temazepam


  (Restoril)  15 mg  HSPRN  PRN


 ORAL


 Insomnia  9/24/19 22:00


 10/1/19 21:59   


 











Last 24 Hour Vital Signs








  Date Time  Temp Pulse Resp B/P (MAP) Pulse Ox O2 Delivery O2 Flow Rate FiO2


 


9/28/19 16:00 98.1 67 18 111/61 (78) 99   


 


9/28/19 12:00 98.1 71 19 98/57 (71) 99   


 


9/28/19 09:00      Room Air  


 


9/28/19 08:20  75 18  98 Room Air  21


 


9/28/19 08:00 98.5 65 18 121/65 (83) 98   


 


9/28/19 04:00 97.8 62 18 115/52 (73) 95   


 


9/28/19 00:00 98.0 69 19 119/67 (84) 98   


 


9/27/19 21:00      Room Air  


 


9/27/19 20:07  70 18  98 Room Air  21


 


9/27/19 20:00 98.9 70 18 107/57 (74) 100   


 


9/27/19 16:00 98.4 66 18 120/65 (83) 100   


 


9/27/19 12:00 98.6 68 17 109/69 (82) 97   


 


9/27/19 09:57  77 20  97 Room Air  21


 


9/27/19 09:00      Room Air  


 


9/27/19 08:00 98.7 80 16 110/68 (82) 96   


 


9/27/19 04:00 99.0 70 18 133/77 (95) 96   


 


9/27/19 00:00 98.9 69 20 131/70 (90) 94   


 


9/26/19 21:00      Room Air  


 


9/26/19 20:35  76 18  98 Room Air  21


 


9/26/19 20:00 99.8 71 20 121/66 (84) 96   

















Intake and Output  


 


 9/27/19 9/28/19





 19:00 07:00


 


Intake Total 855 ml 620 ml


 


Balance 855 ml 620 ml


 


  


 


Intake Oral 800 ml 620 ml


 


IV Total 55 ml 


 


# Voids 5 3


 


# Bowel Movements 1 











Labs








Test


  9/26/19


04:48 9/27/19


05:40 9/28/19


06:19


 


White Blood Count


  4.6 K/UL


(4.8-10.8) 4.3 K/UL


(4.8-10.8) 4.1 K/UL


(4.8-10.8)


 


Red Blood Count


  2.69 M/UL


(4.20-5.40) 2.86 M/UL


(4.20-5.40) 2.70 M/UL


(4.20-5.40)


 


Hemoglobin


  8.3 G/DL


(12.0-16.0) 8.7 G/DL


(12.0-16.0) 8.3 G/DL


(12.0-16.0)


 


Hematocrit


  25.3 %


(37.0-47.0) 27.1 %


(37.0-47.0) 25.2 %


(37.0-47.0)


 


Mean Corpuscular Volume 94 FL (80-99)  95 FL (80-99)  93 FL (80-99) 


 


Mean Corpuscular Hemoglobin


  30.8 PG


(27.0-31.0) 30.3 PG


(27.0-31.0) 30.7 PG


(27.0-31.0)


 


Mean Corpuscular Hemoglobin


Concent 32.7 G/DL


(32.0-36.0) 32.0 G/DL


(32.0-36.0) 33.0 G/DL


(32.0-36.0)


 


Red Cell Distribution Width


  12.5 %


(11.6-14.8) 11.9 %


(11.6-14.8) 12.7 %


(11.6-14.8)


 


Platelet Count


  267 K/UL


(150-450) 257 K/UL


(150-450) 283 K/UL


(150-450)


 


Mean Platelet Volume


  5.2 FL


(6.5-10.1) 5.6 FL


(6.5-10.1) 4.9 FL


(6.5-10.1)


 


Neutrophils (%) (Auto)


  69.4 %


(45.0-75.0) 62.7 %


(45.0-75.0) 57.1 %


(45.0-75.0)


 


Lymphocytes (%) (Auto)


  16.0 %


(20.0-45.0) 22.1 %


(20.0-45.0) 29.5 %


(20.0-45.0)


 


Monocytes (%) (Auto)


  11.7 %


(1.0-10.0) 12.4 %


(1.0-10.0) 10.2 %


(1.0-10.0)


 


Eosinophils (%) (Auto)


  2.1 %


(0.0-3.0) 1.9 %


(0.0-3.0) 2.4 %


(0.0-3.0)


 


Basophils (%) (Auto)


  0.7 %


(0.0-2.0) 0.9 %


(0.0-2.0) 0.8 %


(0.0-2.0)


 


Sodium Level


  141 MMOL/L


(136-145) 138 MMOL/L


(136-145) 140 MMOL/L


(136-145)


 


Potassium Level


  4.6 MMOL/L


(3.5-5.1) 4.4 MMOL/L


(3.5-5.1) 4.3 MMOL/L


(3.5-5.1)


 


Chloride Level


  111 MMOL/L


() 108 MMOL/L


() 110 MMOL/L


()


 


Carbon Dioxide Level


  22 MMOL/L


(21-32) 18 MMOL/L


(21-32) 21 MMOL/L


(21-32)


 


Anion Gap


  8 mmol/L


(5-15) 13 mmol/L


(5-15) 9 mmol/L


(5-15)


 


Blood Urea Nitrogen


  39 mg/dL


(7-18) 33 mg/dL


(7-18) 32 mg/dL


(7-18)


 


Creatinine


  1.9 MG/DL


(0.55-1.30) 1.9 MG/DL


(0.55-1.30) 1.7 MG/DL


(0.55-1.30)


 


Estimat Glomerular Filtration


Rate  mL/min (>60) 


   mL/min (>60) 


   mL/min (>60) 


 


 


Glucose Level


  94 MG/DL


() 86 MG/DL


() 84 MG/DL


()


 


Uric Acid


  5.4 MG/DL


(2.6-7.2) 


  


 


 


Calcium Level


  8.1 MG/DL


(8.5-10.1) 8.5 MG/DL


(8.5-10.1) 8.8 MG/DL


(8.5-10.1)


 


Phosphorus Level


  3.5 MG/DL


(2.5-4.9) 


  


 


 


Magnesium Level


  1.8 MG/DL


(1.8-2.4) 


  


 


 


Iron Level


  28 ug/dL


() 


  


 


 


Total Iron Binding Capacity


  156 ug/dL


(250-450) 


  


 


 


Percent Iron Saturation 18 % (15-50)   


 


Unsaturated Iron Binding


  128 ug/dL


(112-346) 


  


 


 


Ferritin


  137 NG/ML


(8-388) 


  


 


 


Total Bilirubin


  0.3 MG/DL


(0.2-1.0) 


  


 


 


Gamma Glutamyl Transpeptidase 13 U/L (5-85)   


 


Aspartate Amino Transf


(AST/SGOT) 19 U/L (15-37) 


  


  


 


 


Alanine Aminotransferase


(ALT/SGPT) 11 U/L (12-78) 


  


  


 


 


Alkaline Phosphatase


  65 U/L


() 


  


 


 


C-Reactive Protein,


Quantitative 8.2 mg/dL


(0.00-0.90) 


  


 


 


Pro-B-Type Natriuretic Peptide


  2044 pg/mL


(0-125) 


  


 


 


Total Protein


  7.1 G/DL


(6.4-8.2) 


  


 


 


Albumin


  2.2 G/DL


(3.4-5.0) 


  


 


 


Globulin 4.9 g/dL   


 


Albumin/Globulin Ratio 0.4 (1.0-2.7)   


 


Triglycerides Level


  92 MG/DL


() 


  


 


 


Cholesterol Level


  108 MG/DL (<


200) 


  


 


 


LDL Cholesterol


  64 mg/dL


(<100) 


  


 


 


HDL Cholesterol


  29 MG/DL


(40-60) 


  


 


 


Cholesterol/HDL Ratio 3.7 (3.3-4.4)   


 


Vitamin B12 Level


  495 PG/ML


(193-986) 


  


 


 


Folate


  11.2 NG/ML


(8.6-58.9) 


  


 


 


Thyroid Stimulating Hormone


(TSH) 3.279 uiU/mL


(0.358-3.740) 


  


 


 


Free Thyroxine


  1.20 NG/DL


(0.76-1.46) 


  


 


 


Free Triiodothyronine


  1.0 pg/mL


(2.3-4.2) 


  


 








Height (Feet):  5


Height (Inches):  1.00


Weight (Pounds):  95


Objective








PHYSICAL EXAMINATION:


GENERAL:  Calm in bed, oriented x3, slight short of breath.


VITAL SIGNS: reviewed


CARDIOVASCULAR:  No murmur.


LUNGS:  Poor air exchange.


ABDOMEN:  Bowel sounds distant.


EXTREMITIES:  No cyanosis or edema.


NEUROLOGIC:  The patient moves all extremities, slightly weak.











Agustin Giordano MD Sep 28, 2019 17:00

## 2019-09-28 NOTE — NUR
NURSE NOTES:

Patient is in bed, asleep. No c/o pain or discomfort at this time. Assisted her to the 
bathroom as needed. Bed in low and locked position. Call light within reach.call light 
within reach. Will continue to monitor.

## 2019-09-28 NOTE — NUR
NURSE NOTES:

Received pt in bed, AAO x 4. Kiswahili speaking. No c/o of pain/distress at this moment. IV on 
L wrist 22g intact and patent with saline lock. Side rails x 2. Bed in the lowest and 
locked. Call light within reach. Will continue to monitor

## 2019-09-28 NOTE — PULMONOLOGY PROGRESS NOTE
Assessment/Plan


Assessment/Plan


ASSESSMENT


Acute on chronic renal failure


Dysuria, possible UTI


COPD/emphysema


Severe protein calorie malnutrition


Anemia


Rheumatoid arthritis


Monoclonal gammopathy, rule out multiple myeloma





PLAN OF CARE


MS floor


IV fluid


monitor renal parameters, lytes, correct electrolytes as needed, avoid 

nephrotoxic


creat trending down


O2 HHN prn


DVT prophylaxis


abx as per ID 


BCX negative , UCX + GNR with 10 K


monitor H&H with goal to keep hemoglobin above 7 


anemia work-up c/w anemia of chronic disease 


PPI 


stool OB 


GI recommended outpatient GI procedure 


oncologist follows; recommended consider bone marrow biopsy to rule out 

multiple myeloma at some point 


supportive care 


pain management 


nutritional recommendations implemented in plan of care   





case discussed and evaluated by supervising physician





Subjective


Allergies:  


Coded Allergies:  


     No Known Allergies (Unverified , 2/14/16)


Subjective


afebrile, no leukocytosis, 


+ dysuria, no flank pain





Objective





Last 24 Hour Vital Signs








  Date Time  Temp Pulse Resp B/P (MAP) Pulse Ox O2 Delivery O2 Flow Rate FiO2


 


9/28/19 04:00 97.8 62 18 115/52 (73) 95   


 


9/28/19 00:00 98.0 69 19 119/67 (84) 98   


 


9/27/19 21:00      Room Air  


 


9/27/19 20:07  70 18  98 Room Air  21


 


9/27/19 20:00 98.9 70 18 107/57 (74) 100   


 


9/27/19 16:00 98.4 66 18 120/65 (83) 100   


 


9/27/19 12:00 98.6 68 17 109/69 (82) 97   


 


9/27/19 09:57  77 20  97 Room Air  21


 


9/27/19 09:00      Room Air  


 


9/27/19 08:00 98.7 80 16 110/68 (82) 96   

















Intake and Output  


 


 9/27/19 9/28/19





 19:00 07:00


 


Intake Total 855 ml 620 ml


 


Balance 855 ml 620 ml


 


  


 


Intake Oral 800 ml 620 ml


 


IV Total 55 ml 


 


# Voids 5 3


 


# Bowel Movements 1 








General Appearance:  no acute distress, cachetic, other - awake, alert, 

resposnive Kiswahili speaking female


HEENT:  normocephalic, atraumatic, anicteric, mucous membranes moist


Respiratory/Chest:  lungs clear - with moderate air exchange, no respiratory 

distress, no accessory muscle use


Cardiovascular:  normal peripheral pulses, normal rate


Abdomen:  soft, non tender, non distended


Extremities:  no edema, pedal pulses normal


Neurologic/Psychiatric:  no motor/sensory deficits, alert, oriented x 3, 

responsive


Musculoskeletal:  atrophy - BLE





Current Medications








 Medications


  (Trade)  Dose


 Ordered  Sig/Naman


 Route


 PRN Reason  Start Time


 Stop Time Status Last Admin


Dose Admin


 


 Acetaminophen


  (Tylenol)  650 mg  Q4H  PRN


 ORAL


 fever  9/24/19 22:00


 10/24/19 21:59  9/25/19 17:58


 


 


 Albuterol/


 Ipratropium


  (Albuterol/


 Ipratropium)  3 ml  Q4H  PRN


 HHN


 Shortness of Breath  9/24/19 22:00


 9/29/19 21:59   


 


 


 Cefepime HCl 1 gm/


 Dextrose  55 ml @ 


 110 mls/hr  Q24H


 IVPB


   9/25/19 11:00


 10/4/19 23:59  9/27/19 11:01


 


 


 Heparin Sodium


  (Porcine)


  (Heparin 5000


 units/ml)  5,000 units  EVERY 12  HOURS


 SUBQ


   9/25/19 09:00


 10/25/19 08:59  9/27/19 20:42


 


 


 Liothyronine


 Sodium


  (Cytomel)  5 mcg  DAILY


 ORAL


   9/27/19 09:00


 10/27/19 08:59  9/27/19 08:57


 


 


 Morphine Sulfate


  (Morphine


 Sulfate)  2 mg  Q4H  PRN


 IVP


 Moderate Pain (Pain Scale 4-6)  9/24/19 22:00


 10/1/19 21:59   


 


 


 Ondansetron HCl


  (Zofran)  4 mg  Q6H  PRN


 IVP


 Nausea & Vomiting  9/24/19 22:00


 10/24/19 21:59   


 


 


 Polyethylene


 Glycol


  (Miralax)  17 gm  DAILYPRN  PRN


 ORAL


 Constipation  9/24/19 22:00


 10/24/19 21:59   


 


 


 Temazepam


  (Restoril)  15 mg  HSPRN  PRN


 ORAL


 Insomnia  9/24/19 22:00


 10/1/19 21:59   


 

















Minal Barnes NP Sep 28, 2019 07:38

## 2019-09-29 VITALS — DIASTOLIC BLOOD PRESSURE: 70 MMHG | SYSTOLIC BLOOD PRESSURE: 115 MMHG

## 2019-09-29 VITALS — SYSTOLIC BLOOD PRESSURE: 102 MMHG | DIASTOLIC BLOOD PRESSURE: 60 MMHG

## 2019-09-29 VITALS — SYSTOLIC BLOOD PRESSURE: 102 MMHG | DIASTOLIC BLOOD PRESSURE: 76 MMHG

## 2019-09-29 VITALS — DIASTOLIC BLOOD PRESSURE: 51 MMHG | SYSTOLIC BLOOD PRESSURE: 100 MMHG

## 2019-09-29 VITALS — SYSTOLIC BLOOD PRESSURE: 109 MMHG | DIASTOLIC BLOOD PRESSURE: 47 MMHG

## 2019-09-29 VITALS — SYSTOLIC BLOOD PRESSURE: 95 MMHG | DIASTOLIC BLOOD PRESSURE: 55 MMHG

## 2019-09-29 LAB
ADD MANUAL DIFF: NO
ANION GAP SERPL CALC-SCNC: 9 MMOL/L (ref 5–15)
BASOPHILS NFR BLD AUTO: 1.3 % (ref 0–2)
BUN SERPL-MCNC: 41 MG/DL (ref 7–18)
CALCIUM SERPL-MCNC: 8.7 MG/DL (ref 8.5–10.1)
CHLORIDE SERPL-SCNC: 109 MMOL/L (ref 98–107)
CO2 SERPL-SCNC: 23 MMOL/L (ref 21–32)
CREAT SERPL-MCNC: 1.6 MG/DL (ref 0.55–1.3)
EOSINOPHIL NFR BLD AUTO: 3.6 % (ref 0–3)
ERYTHROCYTE [DISTWIDTH] IN BLOOD BY AUTOMATED COUNT: 12.4 % (ref 11.6–14.8)
HCT VFR BLD CALC: 24.5 % (ref 37–47)
HGB BLD-MCNC: 8 G/DL (ref 12–16)
LYMPHOCYTES NFR BLD AUTO: 29.3 % (ref 20–45)
MCV RBC AUTO: 93 FL (ref 80–99)
MONOCYTES NFR BLD AUTO: 10.8 % (ref 1–10)
NEUTROPHILS NFR BLD AUTO: 55.1 % (ref 45–75)
PLATELET # BLD: 307 K/UL (ref 150–450)
POTASSIUM SERPL-SCNC: 4.5 MMOL/L (ref 3.5–5.1)
RBC # BLD AUTO: 2.63 M/UL (ref 4.2–5.4)
SODIUM SERPL-SCNC: 141 MMOL/L (ref 136–145)
WBC # BLD AUTO: 4 K/UL (ref 4.8–10.8)

## 2019-09-29 RX ADMIN — HEPARIN SODIUM SCH UNITS: 5000 INJECTION INTRAVENOUS; SUBCUTANEOUS at 09:26

## 2019-09-29 RX ADMIN — LIOTHYRONINE SODIUM SCH MCG: 5 TABLET ORAL at 09:21

## 2019-09-29 RX ADMIN — SODIUM CHLORIDE SCH MLS/HR: 0.9 INJECTION INTRAVENOUS at 12:26

## 2019-09-29 RX ADMIN — HEPARIN SODIUM SCH UNITS: 5000 INJECTION INTRAVENOUS; SUBCUTANEOUS at 20:15

## 2019-09-29 NOTE — GENERAL PROGRESS NOTE
Assessment/Plan


Problem List:  


(1) Anemia


ICD Codes:  D64.9 - Anemia, unspecified


SNOMED:  712425778


(2) Renal failure (ARF), acute on chronic


ICD Codes:  N17.9 - Acute kidney failure, unspecified; N18.9 - Chronic kidney 

disease, unspecified


SNOMED:  764551259


(3) UTI (urinary tract infection)


ICD Codes:  N39.0 - Urinary tract infection, site not specified


SNOMED:  83202757


(4) Severe protein-calorie malnutrition


ICD Codes:  E43 - Unspecified severe protein-calorie malnutrition


SNOMED:  593846912, 370586996, 407365783


Status:  stable, progressing, unchanged


Assessment/Plan:


o2 pulm tx abx cbc bmp am gi heme eval





Subjective


Constitutional:  Reports: weakness


Allergies:  


Coded Allergies:  


     No Known Allergies (Unverified , 2/14/16)


All Systems:  reviewed and negative except above


Subjective


sl anxious





Objective





Last 24 Hour Vital Signs








  Date Time  Temp Pulse Resp B/P (MAP) Pulse Ox O2 Delivery O2 Flow Rate FiO2


 


9/29/19 04:00 97.6 62 18 109/47 (67) 98   


 


9/29/19 00:00 98.0 70 18 115/70 (85) 98   


 


9/28/19 21:24  77 18  98 Room Air  21


 


9/28/19 21:00      Room Air  


 


9/28/19 20:00 98.2 72 19 121/78 (92) 95   


 


9/28/19 16:00 98.1 67 18 111/61 (78) 99   


 


9/28/19 12:00 98.1 71 19 98/57 (71) 99   


 


9/28/19 09:00      Room Air  


 


9/28/19 08:20  75 18  98 Room Air  21


 


9/28/19 08:00 98.5 65 18 121/65 (83) 98   

















Intake and Output  


 


 9/28/19 9/29/19





 19:00 07:00


 


Intake Total 955 ml 2900 ml


 


Balance 955 ml 2900 ml


 


  


 


Intake Oral 900 ml 900 ml


 


IV Total 55 ml 2000 ml


 


# Voids 3 2


 


# Bowel Movements  1








Laboratory Tests


9/29/19 05:50: 


White Blood Count 4.0L, Red Blood Count 2.63L, Hemoglobin 8.0L, Hematocrit 24.5L

, Mean Corpuscular Volume 93, Mean Corpuscular Hemoglobin 30.5, Mean 

Corpuscular Hemoglobin Concent 32.7, Red Cell Distribution Width 12.4, Platelet 

Count 307, Mean Platelet Volume 4.8L, Neutrophils (%) (Auto) 55.1, Lymphocytes (

%) (Auto) 29.3, Monocytes (%) (Auto) 10.8H, Eosinophils (%) (Auto) 3.6H, 

Basophils (%) (Auto) 1.3, Sodium Level 141, Potassium Level 4.5, Chloride Level 

109H, Carbon Dioxide Level 23, Anion Gap 9, Blood Urea Nitrogen 41H, Creatinine 

1.6H, Estimat Glomerular Filtration Rate , Glucose Level 82, Calcium Level 8.7


Height (Feet):  5


Height (Inches):  1.00


Weight (Pounds):  95


General Appearance:  lethargic


EENT:  normal ENT inspection


Neck:  normal alignment


Cardiovascular:  normal peripheral pulses, normal rate, regular rhythm


Respiratory/Chest:  chest wall non-tender, lungs clear, normal breath sounds


Abdomen:  normal bowel sounds, non tender, soft


Extremities:  normal inspection


Edema:  no edema noted Arm (L), no edema noted Arm (R), no edema noted Leg (L), 

no edema noted Leg (R), no edema noted Pedal (L), no edema noted Pedal (R), no 

edema noted Generalized


Neurologic:  motor weakness


Skin:  normal pigmentation, warm/dry











Flaco Stephens DO Sep 29, 2019 07:35

## 2019-09-29 NOTE — NUR
NURSE NOTES: RECEIVED PATIENT LYING IN BED, AWAKE, ALERT/ORIENTED X3, VERBALLY 
RESPONSIVE/Japanese SPEAKING, DENIES PAIN, NO SIGNS AND SYMPTOMS OF ACUTE CARDIO RESPIRATORY 
DISTRESS/SHORTNESS OF BREATH, NO PERIPHERAL EDEMA NOTED. ABDOMEN SOFT/NON DISTENDED/BOWEL 
SOUNDS AUDIBLE, CONTINENT OF BLADDER, NO COMPLAINTS OF PAINFUL URINATION, REINFORCED 
IMPORTANCE OF UTILIZING CALL LIGHT FOR ASSISTANCE. SIDE RAILS UP X3/BED IN LOWEST POSITION 
FOR SAFETY. BED ALARM ACTIVATED. NAD.

## 2019-09-29 NOTE — NEPHROLOGY PROGRESS NOTE
Assessment/Plan


Problem List:  


(1) MIKA (acute kidney injury)


Assessment:  Cr lowering





(2) Renal failure (ARF), acute on chronic


(3) Anemia


(4) Severe protein-calorie malnutrition


Assessment





(1) MIKA (acute kidney injury)


(2) Renal failure (ARF), acute on chronic


(3) Severe anemia


(4) Protein calorie mal nutrition


(5) UTI





Others:


h/o bronchitis


h/o abdominal pain and nausea, possibly enteritis ( by CT scan) 


RA


Plan





Plan


Avoid nephrotoxics


hydrate- NS


monitor renal parameters


check ferritin and Iron panel





Subjective


ROS Limited/Unobtainable:  No


Constitutional:  Reports: malaise





Objective


Objective





Last 24 Hour Vital Signs








  Date Time  Temp Pulse Resp B/P (MAP) Pulse Ox O2 Delivery O2 Flow Rate FiO2


 


9/29/19 12:00 98.5 66 18 100/51 (67) 95   


 


9/29/19 09:00      Room Air  


 


9/29/19 08:00 98.7 86 18 95/55 (68) 96   


 


9/29/19 07:31  70 18  97 Room Air  21


 


9/29/19 04:00 97.6 62 18 109/47 (67) 98   


 


9/29/19 00:00 98.0 70 18 115/70 (85) 98   


 


9/28/19 21:24  77 18  98 Room Air  21


 


9/28/19 21:00      Room Air  


 


9/28/19 20:00 98.2 72 19 121/78 (92) 95   


 


9/28/19 16:00 98.1 67 18 111/61 (78) 99   

















Intake and Output  


 


 9/28/19 9/29/19





 19:00 07:00


 


Intake Total 955 ml 2900 ml


 


Balance 955 ml 2900 ml


 


  


 


Intake Oral 900 ml 900 ml


 


IV Total 55 ml 2000 ml


 


# Voids 3 2


 


# Bowel Movements  1








Laboratory Tests


9/29/19 05:50: 


White Blood Count 4.0L, Red Blood Count 2.63L, Hemoglobin 8.0L, Hematocrit 24.5L

, Mean Corpuscular Volume 93, Mean Corpuscular Hemoglobin 30.5, Mean 

Corpuscular Hemoglobin Concent 32.7, Red Cell Distribution Width 12.4, Platelet 

Count 307, Mean Platelet Volume 4.8L, Neutrophils (%) (Auto) 55.1, Lymphocytes (

%) (Auto) 29.3, Monocytes (%) (Auto) 10.8H, Eosinophils (%) (Auto) 3.6H, 

Basophils (%) (Auto) 1.3, Sodium Level 141, Potassium Level 4.5, Chloride Level 

109H, Carbon Dioxide Level 23, Anion Gap 9, Blood Urea Nitrogen 41H, Creatinine 

1.6H, Estimat Glomerular Filtration Rate , Glucose Level 82, Calcium Level 8.7


Height (Feet):  5


Height (Inches):  1.00


Weight (Pounds):  95


General Appearance:  no apparent distress


Objective


no change











Demar Mayo MD Sep 29, 2019 14:29

## 2019-09-29 NOTE — PULMONOLOGY PROGRESS NOTE
Assessment/Plan


Assessment/Plan


ASSESSMENT


Acute on chronic renal failure


Dysuria, probably  UTI


COPD/emphysema


Severe protein calorie malnutrition


Anemia


Rheumatoid arthritis


Monoclonal gammopathy, rule out multiple myeloma





PLAN OF CARE


MS floor


IV fluid


monitor renal parameters, lytes, correct electrolytes as needed, avoid 

nephrotoxic


creat trending down


O2 HHN prn


DVT prophylaxis


abx as per ID 


BCX negative , UCX + GNR with 10 K


monitor H&H with goal to keep hemoglobin above 7 


anemia work-up c/w anemia of chronic disease 


PPI 


stool OB 


GI recommended outpatient GI procedure 


oncologist follows; recommended consider bone marrow biopsy to rule out 

multiple myeloma at some point 


supportive care 


pain management 


nutritional recommendations implemented in plan of care   





case discussed and evaluated by supervising physician





Subjective


Allergies:  


Coded Allergies:  


     No Known Allergies (Unverified , 2/14/16)


Subjective


afebrile, no leukocytosis, 


+ dysuria, no flank pain  


creat trending down





Objective





Last 24 Hour Vital Signs








  Date Time  Temp Pulse Resp B/P (MAP) Pulse Ox O2 Delivery O2 Flow Rate FiO2


 


9/29/19 04:00 97.6 62 18 109/47 (67) 98   


 


9/29/19 00:00 98.0 70 18 115/70 (85) 98   


 


9/28/19 21:24  77 18  98 Room Air  21


 


9/28/19 21:00      Room Air  


 


9/28/19 20:00 98.2 72 19 121/78 (92) 95   


 


9/28/19 16:00 98.1 67 18 111/61 (78) 99   


 


9/28/19 12:00 98.1 71 19 98/57 (71) 99   


 


9/28/19 09:00      Room Air  


 


9/28/19 08:20  75 18  98 Room Air  21


 


9/28/19 08:00 98.5 65 18 121/65 (83) 98   

















Intake and Output  


 


 9/28/19 9/29/19





 19:00 07:00


 


Intake Total 955 ml 2900 ml


 


Balance 955 ml 2900 ml


 


  


 


Intake Oral 900 ml 900 ml


 


IV Total 55 ml 2000 ml


 


# Voids 3 2


 


# Bowel Movements  1








Objective





General Appearance:  no acute distress, cachetic, awake, alert, responsive 

[place] speaking female


HEENT:  normocephalic, atraumatic, anicteric, mucous membranes moist


Respiratory/Chest:  lungs clear  with moderate air exchange, no respiratory 

distress, no accessory muscle use


Cardiovascular:  normal peripheral pulses, normal rate


Abdomen:  soft, non tender, non distended


Extremities:  no edema, pedal pulses normal


Neurologic/Psychiatric:  no motor/sensory deficits, alert, oriented x 3, 

responsive


Musculoskeletal:  atrophy - BLE


Laboratory Tests


9/29/19 05:50: 


White Blood Count 4.0L, Red Blood Count 2.63L, Hemoglobin 8.0L, Hematocrit 24.5L

, Mean Corpuscular Volume 93, Mean Corpuscular Hemoglobin 30.5, Mean 

Corpuscular Hemoglobin Concent 32.7, Red Cell Distribution Width 12.4, Platelet 

Count 307, Mean Platelet Volume 4.8L, Neutrophils (%) (Auto) 55.1, Lymphocytes (

%) (Auto) 29.3, Monocytes (%) (Auto) 10.8H, Eosinophils (%) (Auto) 3.6H, 

Basophils (%) (Auto) 1.3, Sodium Level 141, Potassium Level 4.5, Chloride Level 

109H, Carbon Dioxide Level 23, Anion Gap 9, Blood Urea Nitrogen 41H, Creatinine 

1.6H, Estimat Glomerular Filtration Rate , Glucose Level 82, Calcium Level 8.7





Current Medications








 Medications


  (Trade)  Dose


 Ordered  Sig/Naman


 Route


 PRN Reason  Start Time


 Stop Time Status Last Admin


Dose Admin


 


 Acetaminophen


  (Tylenol)  650 mg  Q4H  PRN


 ORAL


 fever  9/24/19 22:00


 10/24/19 21:59  9/25/19 17:58


 


 


 Albuterol/


 Ipratropium


  (Albuterol/


 Ipratropium)  3 ml  Q4H  PRN


 HHN


 Shortness of Breath  9/28/19 14:00


 10/3/19 13:59   


 


 


 Cefepime HCl 1 gm/


 Dextrose  55 ml @ 


 110 mls/hr  Q24H


 IVPB


   9/25/19 11:00


 10/4/19 23:59  9/28/19 10:38


 


 


 Heparin Sodium


  (Porcine)


  (Heparin 5000


 units/ml)  5,000 units  EVERY 12  HOURS


 SUBQ


   9/25/19 09:00


 10/25/19 08:59  9/28/19 21:25


 


 


 Liothyronine


 Sodium


  (Cytomel)  5 mcg  DAILY


 ORAL


   9/27/19 09:00


 10/27/19 08:59  9/28/19 08:23


 


 


 Morphine Sulfate


  (Morphine


 Sulfate)  2 mg  Q4H  PRN


 IVP


 Moderate Pain (Pain Scale 4-6)  9/24/19 22:00


 10/1/19 21:59   


 


 


 Ondansetron HCl


  (Zofran)  4 mg  Q6H  PRN


 IVP


 Nausea & Vomiting  9/24/19 22:00


 10/24/19 21:59   


 


 


 Polyethylene


 Glycol


  (Miralax)  17 gm  DAILYPRN  PRN


 ORAL


 Constipation  9/24/19 22:00


 10/24/19 21:59   


 


 


 Temazepam


  (Restoril)  15 mg  HSPRN  PRN


 ORAL


 Insomnia  9/24/19 22:00


 10/1/19 21:59   


 

















Minal Barnes NP Sep 29, 2019 07:57

## 2019-09-29 NOTE — INFECTIOUS DISEASES PROG NOTE
Assessment/Plan


Assessment/Plan


A:





 The patient is a 73-year-old female with:





nl WBC >> Leukopenia 


Afebrile


UTIs / Pyelonephritis  UCx : 10 GNR 


   Dysuria resolved 


  CT of the abdomen on 09/13/2019 showed no evidence of hydronephrosis


  Pyuria





History of pulmonary MAC.











Anemia 


? MM fup by Hem/onc


Rheumatoid arthritis.














PLAN:








continue the patient is on cefepime day # 5/10 , upon DC will change to Keflex 

x 5 d








  9/25 Sp IV vancomycin day #1


monitor CBC


Monitor BMP.


Monitor cultures (blood, )





Subjective


Allergies:  


Coded Allergies:  


     No Known Allergies (Unverified , 2/14/16)


Subjective


no acute event 


Afebrile





Objective


Vital Signs





Last 24 Hour Vital Signs








  Date Time  Temp Pulse Resp B/P (MAP) Pulse Ox O2 Delivery O2 Flow Rate FiO2


 


9/29/19 16:00 98.0 75 16 102/60 (74) 96   


 


9/29/19 12:00 98.5 66 18 100/51 (67) 95   


 


9/29/19 09:00      Room Air  


 


9/29/19 08:00 98.7 86 18 95/55 (68) 96   


 


9/29/19 07:31  70 18  97 Room Air  21


 


9/29/19 04:00 97.6 62 18 109/47 (67) 98   


 


9/29/19 00:00 98.0 70 18 115/70 (85) 98   


 


9/28/19 21:24  77 18  98 Room Air  21


 


9/28/19 21:00      Room Air  


 


9/28/19 20:00 98.2 72 19 121/78 (92) 95   








Height (Feet):  5


Height (Inches):  1.00


Weight (Pounds):  95


HEENT:  anicteric


Respiratory/Chest:  normal breath sounds


Cardiovascular:  regular rhythm


Abdomen:  soft, non tender





Laboratory Tests








Test


  9/29/19


05:50


 


White Blood Count


  4.0 K/UL


(4.8-10.8)  L


 


Red Blood Count


  2.63 M/UL


(4.20-5.40)  L


 


Hemoglobin


  8.0 G/DL


(12.0-16.0)  L


 


Hematocrit


  24.5 %


(37.0-47.0)  L


 


Mean Corpuscular Volume 93 FL (80-99)  


 


Mean Corpuscular Hemoglobin


  30.5 PG


(27.0-31.0)


 


Mean Corpuscular Hemoglobin


Concent 32.7 G/DL


(32.0-36.0)


 


Red Cell Distribution Width


  12.4 %


(11.6-14.8)


 


Platelet Count


  307 K/UL


(150-450)


 


Mean Platelet Volume


  4.8 FL


(6.5-10.1)  L


 


Neutrophils (%) (Auto)


  55.1 %


(45.0-75.0)


 


Lymphocytes (%) (Auto)


  29.3 %


(20.0-45.0)


 


Monocytes (%) (Auto)


  10.8 %


(1.0-10.0)  H


 


Eosinophils (%) (Auto)


  3.6 %


(0.0-3.0)  H


 


Basophils (%) (Auto)


  1.3 %


(0.0-2.0)


 


Sodium Level


  141 MMOL/L


(136-145)


 


Potassium Level


  4.5 MMOL/L


(3.5-5.1)


 


Chloride Level


  109 MMOL/L


()  H


 


Carbon Dioxide Level


  23 MMOL/L


(21-32)


 


Anion Gap


  9 mmol/L


(5-15)


 


Blood Urea Nitrogen


  41 mg/dL


(7-18)  H


 


Creatinine


  1.6 MG/DL


(0.55-1.30)  H


 


Estimat Glomerular Filtration


Rate  mL/min (>60)  


 


 


Glucose Level


  82 MG/DL


()


 


Calcium Level


  8.7 MG/DL


(8.5-10.1)











Current Medications








 Medications


  (Trade)  Dose


 Ordered  Sig/Naman


 Route


 PRN Reason  Start Time


 Stop Time Status Last Admin


Dose Admin


 


 Acetaminophen


  (Tylenol)  650 mg  Q4H  PRN


 ORAL


 fever  9/24/19 22:00


 10/24/19 21:59  9/25/19 17:58


 


 


 Albuterol/


 Ipratropium


  (Albuterol/


 Ipratropium)  3 ml  Q4H  PRN


 HHN


 Shortness of Breath  9/28/19 14:00


 10/3/19 13:59   


 


 


 Cefepime HCl 1 gm/


 Dextrose  55 ml @ 


 110 mls/hr  Q24H


 IVPB


   9/25/19 11:00


 10/4/19 23:59  9/29/19 12:26


 


 


 Heparin Sodium


  (Porcine)


  (Heparin 5000


 units/ml)  5,000 units  EVERY 12  HOURS


 SUBQ


   9/25/19 09:00


 10/25/19 08:59  9/29/19 09:26


 


 


 Liothyronine


 Sodium


  (Cytomel)  5 mcg  DAILY


 ORAL


   9/27/19 09:00


 10/27/19 08:59  9/29/19 09:21


 


 


 Morphine Sulfate


  (Morphine


 Sulfate)  2 mg  Q4H  PRN


 IVP


 Moderate Pain (Pain Scale 4-6)  9/24/19 22:00


 10/1/19 21:59   


 


 


 Ondansetron HCl


  (Zofran)  4 mg  Q6H  PRN


 IVP


 Nausea & Vomiting  9/24/19 22:00


 10/24/19 21:59   


 


 


 Polyethylene


 Glycol


  (Miralax)  17 gm  DAILYPRN  PRN


 ORAL


 Constipation  9/24/19 22:00


 10/24/19 21:59   


 


 


 Temazepam


  (Restoril)  15 mg  HSPRN  PRN


 ORAL


 Insomnia  9/24/19 22:00


 10/1/19 21:59   


 

















Jasson Domingo MD Sep 29, 2019 19:30

## 2019-09-29 NOTE — GENERAL PROGRESS NOTE
Assessment/Plan


Status:  unchanged


Assessment/Plan:


(1) Severe protein-calorie malnutrition


ICD Codes:  E43 - Unspecified severe protein-calorie malnutrition


SNOMED:  605176038, 090244863, 168359131


(2) Pneumonia


ICD Codes:  J18.9 - Pneumonia, unspecified organism


SNOMED:  918734422


(3) Anemia


ICD Codes:  D64.9 - Anemia, unspecified


SNOMED:  861667469





Assessment/Plan


This is a 73-year-old female with acute UTI and possible multiple myeloma, 

obvious source for her anemia. 





outpatient GI procedures


OB stool r/o GI bleed


prn transfusions


ppi


abx 


follow labs





Subjective


ROS Limited/Unobtainable:  Yes


Allergies:  


Coded Allergies:  


     No Known Allergies (Unverified , 2/14/16)





Objective





Last 24 Hour Vital Signs








  Date Time  Temp Pulse Resp B/P (MAP) Pulse Ox O2 Delivery O2 Flow Rate FiO2


 


9/29/19 04:00 97.6 62 18 109/47 (67) 98   


 


9/29/19 00:00 98.0 70 18 115/70 (85) 98   


 


9/28/19 21:24  77 18  98 Room Air  21


 


9/28/19 21:00      Room Air  


 


9/28/19 20:00 98.2 72 19 121/78 (92) 95   


 


9/28/19 16:00 98.1 67 18 111/61 (78) 99   


 


9/28/19 12:00 98.1 71 19 98/57 (71) 99   


 


9/28/19 09:00      Room Air  


 


9/28/19 08:20  75 18  98 Room Air  21


 


9/28/19 08:00 98.5 65 18 121/65 (83) 98   

















Intake and Output  


 


 9/28/19 9/29/19





 19:00 07:00


 


Intake Total 955 ml 2900 ml


 


Balance 955 ml 2900 ml


 


  


 


Intake Oral 900 ml 900 ml


 


IV Total 55 ml 2000 ml


 


# Voids 3 2


 


# Bowel Movements  1








Laboratory Tests


9/29/19 05:50: 


White Blood Count [Pending], Red Blood Count [Pending], Hemoglobin [Pending], 

Hematocrit [Pending], Mean Corpuscular Volume [Pending], Mean Corpuscular 

Hemoglobin [Pending], Mean Corpuscular Hemoglobin Concent [Pending], Red Cell 

Distribution Width [Pending], Platelet Count [Pending], Mean Platelet Volume [

Pending], Neutrophils (%) (Auto) [Pending], Lymphocytes (%) (Auto) [Pending], 

Monocytes (%) (Auto) [Pending], Eosinophils (%) (Auto) [Pending], Basophils (%) 

(Auto) [Pending], Sodium Level 141, Potassium Level 4.5, Chloride Level 109H, 

Carbon Dioxide Level 23, Anion Gap 9, Blood Urea Nitrogen 41H, Creatinine 1.6H, 

Estimat Glomerular Filtration Rate , Glucose Level 82, Calcium Level 8.7


Height (Feet):  5


Height (Inches):  1.00


Weight (Pounds):  95


General Appearance:  alert


EENT:  normal ENT inspection


Neck:  supple


Cardiovascular:  normal rate


Respiratory/Chest:  decreased breath sounds


Abdomen:  normal bowel sounds, non tender, soft


Extremities:  non-tender











Dany Shepard MD Sep 29, 2019 07:24

## 2019-09-30 VITALS — DIASTOLIC BLOOD PRESSURE: 56 MMHG | SYSTOLIC BLOOD PRESSURE: 105 MMHG

## 2019-09-30 VITALS — SYSTOLIC BLOOD PRESSURE: 116 MMHG | DIASTOLIC BLOOD PRESSURE: 60 MMHG

## 2019-09-30 VITALS — SYSTOLIC BLOOD PRESSURE: 120 MMHG | DIASTOLIC BLOOD PRESSURE: 80 MMHG

## 2019-09-30 VITALS — DIASTOLIC BLOOD PRESSURE: 66 MMHG | SYSTOLIC BLOOD PRESSURE: 110 MMHG

## 2019-09-30 LAB
ADD MANUAL DIFF: NO
ALBUMIN SERPL-MCNC: 2.4 G/DL (ref 3.4–5)
ALBUMIN/GLOB SERPL: 0.5 {RATIO} (ref 1–2.7)
ALP SERPL-CCNC: 87 U/L (ref 46–116)
ALT SERPL-CCNC: 27 U/L (ref 12–78)
ANION GAP SERPL CALC-SCNC: 7 MMOL/L (ref 5–15)
AST SERPL-CCNC: 44 U/L (ref 15–37)
BASOPHILS NFR BLD AUTO: 0.9 % (ref 0–2)
BILIRUB SERPL-MCNC: 0.2 MG/DL (ref 0.2–1)
BUN SERPL-MCNC: 47 MG/DL (ref 7–18)
CALCIUM SERPL-MCNC: 9 MG/DL (ref 8.5–10.1)
CHLORIDE SERPL-SCNC: 109 MMOL/L (ref 98–107)
CO2 SERPL-SCNC: 23 MMOL/L (ref 21–32)
CREAT SERPL-MCNC: 1.6 MG/DL (ref 0.55–1.3)
EOSINOPHIL NFR BLD AUTO: 3.5 % (ref 0–3)
ERYTHROCYTE [DISTWIDTH] IN BLOOD BY AUTOMATED COUNT: 12.6 % (ref 11.6–14.8)
GLOBULIN SER-MCNC: 5.3 G/DL
HCT VFR BLD CALC: 27 % (ref 37–47)
HGB BLD-MCNC: 8.7 G/DL (ref 12–16)
LYMPHOCYTES NFR BLD AUTO: 37.7 % (ref 20–45)
MCV RBC AUTO: 93 FL (ref 80–99)
MONOCYTES NFR BLD AUTO: 8.6 % (ref 1–10)
NEUTROPHILS NFR BLD AUTO: 49.5 % (ref 45–75)
PLATELET # BLD: 316 K/UL (ref 150–450)
POTASSIUM SERPL-SCNC: 4.3 MMOL/L (ref 3.5–5.1)
RBC # BLD AUTO: 2.9 M/UL (ref 4.2–5.4)
SODIUM SERPL-SCNC: 139 MMOL/L (ref 136–145)
WBC # BLD AUTO: 3.7 K/UL (ref 4.8–10.8)

## 2019-09-30 RX ADMIN — HEPARIN SODIUM SCH UNITS: 5000 INJECTION INTRAVENOUS; SUBCUTANEOUS at 09:14

## 2019-09-30 RX ADMIN — LIOTHYRONINE SODIUM SCH MCG: 5 TABLET ORAL at 09:00

## 2019-09-30 RX ADMIN — LIOTHYRONINE SODIUM SCH MCG: 5 TABLET ORAL at 09:13

## 2019-09-30 RX ADMIN — SODIUM CHLORIDE SCH MLS/HR: 0.9 INJECTION INTRAVENOUS at 11:26

## 2019-09-30 NOTE — NUR
DISCHARGE PLANNING: NOTE 



CLINICALS FAXED AND EMAILED TO [facility] FOR REVIEW. 

-------------------------------------------------------------------------------

Addendum: 09/30/19 at 1330 by Merissa Chery CM

-------------------------------------------------------------------------------

RITA MORALES AT [facility] HE WILL ACCEPT THIS PATIENT AND FOLLOW UP

## 2019-09-30 NOTE — INFECTIOUS DISEASES PROG NOTE
Assessment/Plan


Assessment/Plan


A:





 The patient is a 73-year-old female with:





nl WBC >> Leukopenia 


Afebrile


UTIs / Pyelonephritis  UCx : 10 GNR 


   Dysuria resolved 


  CT of the abdomen on 09/13/2019 showed no evidence of hydronephrosis


  Pyuria





History of pulmonary MAC.











Anemia 


? MM fup by Hem/onc


Rheumatoid arthritis.














PLAN:








continue the patient is on cefepime day # 6/10 , upon DC will change to Keflex 

x 4 d








  9/25 Sp IV vancomycin day #1


monitor CBC


Monitor BMP.


Monitor cultures (blood, )





Subjective


Allergies:  


Coded Allergies:  


     No Known Allergies (Unverified , 2/14/16)


Subjective





Afebrile





Objective


Vital Signs





Last 24 Hour Vital Signs








  Date Time  Temp Pulse Resp B/P (MAP) Pulse Ox O2 Delivery O2 Flow Rate FiO2


 


9/30/19 12:00 97.7 70 18 120/80 (93) 100   


 


9/30/19 08:30      Room Air  


 


9/30/19 08:00 97.6 67 18 116/60 (78) 100   


 


9/30/19 04:00 98.1 65 18 105/56 (72)    


 


9/30/19 00:00 98.6 79 18 110/66 (81) 97   


 


9/29/19 21:03  69 19  98 Room Air  21


 


9/29/19 21:00      Room Air  


 


9/29/19 20:00 98.2 74 18 102/62 (75) 97   


 


9/29/19 16:00 98.0 75 16 102/60 (74) 96   








Height (Feet):  5


Height (Inches):  1.00


Weight (Pounds):  95


HEENT:  anicteric


Respiratory/Chest:  normal breath sounds


Cardiovascular:  regular rhythm


Abdomen:  soft, non tender





Laboratory Tests








Test


  9/30/19


05:25


 


White Blood Count


  3.7 K/UL


(4.8-10.8)  L


 


Red Blood Count


  2.90 M/UL


(4.20-5.40)  L


 


Hemoglobin


  8.7 G/DL


(12.0-16.0)  L


 


Hematocrit


  27.0 %


(37.0-47.0)  L


 


Mean Corpuscular Volume 93 FL (80-99)  


 


Mean Corpuscular Hemoglobin


  29.9 PG


(27.0-31.0)


 


Mean Corpuscular Hemoglobin


Concent 32.2 G/DL


(32.0-36.0)


 


Red Cell Distribution Width


  12.6 %


(11.6-14.8)


 


Platelet Count


  316 K/UL


(150-450)


 


Mean Platelet Volume


  5.0 FL


(6.5-10.1)  L


 


Neutrophils (%) (Auto)


  49.5 %


(45.0-75.0)


 


Lymphocytes (%) (Auto)


  37.7 %


(20.0-45.0)


 


Monocytes (%) (Auto)


  8.6 %


(1.0-10.0)


 


Eosinophils (%) (Auto)


  3.5 %


(0.0-3.0)  H


 


Basophils (%) (Auto)


  0.9 %


(0.0-2.0)


 


Sodium Level


  139 MMOL/L


(136-145)


 


Potassium Level


  4.3 MMOL/L


(3.5-5.1)


 


Chloride Level


  109 MMOL/L


()  H


 


Carbon Dioxide Level


  23 MMOL/L


(21-32)


 


Anion Gap


  7 mmol/L


(5-15)


 


Blood Urea Nitrogen


  47 mg/dL


(7-18)  H


 


Creatinine


  1.6 MG/DL


(0.55-1.30)  H


 


Estimat Glomerular Filtration


Rate  mL/min (>60)  


 


 


Glucose Level


  84 MG/DL


()


 


Calcium Level


  9.0 MG/DL


(8.5-10.1)


 


Total Bilirubin


  0.2 MG/DL


(0.2-1.0)


 


Aspartate Amino Transf


(AST/SGOT) 44 U/L (15-37)


H


 


Alanine Aminotransferase


(ALT/SGPT) 27 U/L (12-78)


 


 


Alkaline Phosphatase


  87 U/L


()


 


Total Protein


  7.7 G/DL


(6.4-8.2)


 


Albumin


  2.4 G/DL


(3.4-5.0)  L


 


Globulin 5.3 g/dL  


 


Albumin/Globulin Ratio


  0.5 (1.0-2.7)


L











Current Medications








 Medications


  (Trade)  Dose


 Ordered  Sig/Naman


 Route


 PRN Reason  Start Time


 Stop Time Status Last Admin


Dose Admin


 


 Acetaminophen


  (Tylenol)  650 mg  Q4H  PRN


 ORAL


 fever  9/24/19 22:00


 10/24/19 21:59  9/25/19 17:58


 


 


 Albuterol/


 Ipratropium


  (Albuterol/


 Ipratropium)  3 ml  Q4H  PRN


 HHN


 Shortness of Breath  9/28/19 14:00


 10/3/19 13:59   


 


 


 Cefepime HCl 1 gm/


 Dextrose  55 ml @ 


 110 mls/hr  Q24H


 IVPB


   9/25/19 11:00


 10/4/19 23:59  9/30/19 11:26


 


 


 Heparin Sodium


  (Porcine)


  (Heparin 5000


 units/ml)  5,000 units  EVERY 12  HOURS


 SUBQ


   9/25/19 09:00


 10/25/19 08:59  9/30/19 09:14


 


 


 Liothyronine


 Sodium


  (Cytomel)  5 mcg  DAILY


 ORAL


   9/27/19 09:00


 10/27/19 08:59  9/29/19 09:21


 


 


 Morphine Sulfate


  (Morphine


 Sulfate)  2 mg  Q4H  PRN


 IVP


 Moderate Pain (Pain Scale 4-6)  9/24/19 22:00


 10/1/19 21:59   


 


 


 Ondansetron HCl


  (Zofran)  4 mg  Q6H  PRN


 IVP


 Nausea & Vomiting  9/24/19 22:00


 10/24/19 21:59   


 


 


 Polyethylene


 Glycol


  (Miralax)  17 gm  DAILYPRN  PRN


 ORAL


 Constipation  9/24/19 22:00


 10/24/19 21:59   


 


 


 Temazepam


  (Restoril)  15 mg  HSPRN  PRN


 ORAL


 Insomnia  9/24/19 22:00


 10/1/19 21:59   


 

















Jasson Domingo MD Sep 30, 2019 14:32

## 2019-09-30 NOTE — GENERAL PROGRESS NOTE
Assessment/Plan


Problem List:  


(1) Anemia


ICD Codes:  D64.9 - Anemia, unspecified


SNOMED:  196623486


(2) Renal failure (ARF), acute on chronic


ICD Codes:  N17.9 - Acute kidney failure, unspecified; N18.9 - Chronic kidney 

disease, unspecified


SNOMED:  715839453


(3) UTI (urinary tract infection)


ICD Codes:  N39.0 - Urinary tract infection, site not specified


SNOMED:  53775228


(4) Severe protein-calorie malnutrition


ICD Codes:  E43 - Unspecified severe protein-calorie malnutrition


SNOMED:  807032471, 916516989, 604608410


Status:  stable, progressing, unchanged


Assessment/Plan:


o2 pulm tx abx cbc bmp am dc w hh if clear





Subjective


Constitutional:  Reports: weakness


Allergies:  


Coded Allergies:  


     No Known Allergies (Unverified , 2/14/16)


All Systems:  reviewed and negative except above


Subjective


sl anxious





Objective





Last 24 Hour Vital Signs








  Date Time  Temp Pulse Resp B/P (MAP) Pulse Ox O2 Delivery O2 Flow Rate FiO2


 


9/30/19 04:00 98.1 65 18 105/56 (72)    


 


9/30/19 00:00 98.6 79 18 110/66 (81) 97   


 


9/29/19 21:03  69 19  98 Room Air  21


 


9/29/19 21:00      Room Air  


 


9/29/19 20:00 98.2 74 18 102/62 (75) 97   


 


9/29/19 16:00 98.0 75 16 102/60 (74) 96   


 


9/29/19 12:00 98.5 66 18 100/51 (67) 95   


 


9/29/19 09:00      Room Air  

















Intake and Output  


 


 9/29/19 9/30/19





 19:00 07:00


 


Intake Total 700 ml 300 ml


 


Balance 700 ml 300 ml


 


  


 


Intake Oral 700 ml 300 ml


 


# Voids 6 4








Laboratory Tests


9/30/19 05:25: 


White Blood Count 3.7L, Red Blood Count 2.90L, Hemoglobin 8.7L, Hematocrit 27.0L

, Mean Corpuscular Volume 93, Mean Corpuscular Hemoglobin 29.9, Mean 

Corpuscular Hemoglobin Concent 32.2, Red Cell Distribution Width 12.6, Platelet 

Count 316, Mean Platelet Volume 5.0L, Neutrophils (%) (Auto) 49.5, Lymphocytes (

%) (Auto) 37.7, Monocytes (%) (Auto) 8.6, Eosinophils (%) (Auto) 3.5H, 

Basophils (%) (Auto) 0.9, Sodium Level 139, Potassium Level 4.3, Chloride Level 

109H, Carbon Dioxide Level 23, Anion Gap 7, Blood Urea Nitrogen 47H, Creatinine 

1.6H, Estimat Glomerular Filtration Rate , Glucose Level 84, Calcium Level 9.0, 

Total Bilirubin 0.2, Aspartate Amino Transf (AST/SGOT) 44H, Alanine 

Aminotransferase (ALT/SGPT) 27, Alkaline Phosphatase 87, Total Protein 7.7, 

Albumin 2.4L, Globulin 5.3, Albumin/Globulin Ratio 0.5L


Height (Feet):  5


Height (Inches):  1.00


Weight (Pounds):  95


General Appearance:  lethargic


EENT:  normal ENT inspection


Neck:  normal alignment


Cardiovascular:  normal peripheral pulses, normal rate, regular rhythm


Respiratory/Chest:  chest wall non-tender, lungs clear, normal breath sounds


Abdomen:  normal bowel sounds, non tender, soft


Extremities:  normal inspection


Edema:  no edema noted Arm (L), no edema noted Arm (R), no edema noted Leg (L), 

no edema noted Leg (R), no edema noted Pedal (L), no edema noted Pedal (R), no 

edema noted Generalized


Neurologic:  responsive, motor weakness


Skin:  normal pigmentation, warm/dry











Flaco Stephens DO Sep 30, 2019 08:55

## 2019-09-30 NOTE — NUR
NURSE NOTES:

Patient discharged Home with Home Health via private vehicle. Patient picked up by daughter. 
Discharge instructions given to daughter. Belongings checked with patient. IV removed. ID 
bracelet removed. Patient stable upon discharge.

## 2019-09-30 NOTE — NEPHROLOGY PROGRESS NOTE
Assessment/Plan


Problem List:  


(1) MIKA (acute kidney injury)


Assessment:  Cr lowering





(2) Renal failure (ARF), acute on chronic


(3) Anemia


(4) Severe protein-calorie malnutrition


Assessment





(1) MIKA (acute kidney injury)


(2) Renal failure (ARF), acute on chronic


(3) Severe anemia


(4) Protein calorie mal nutrition


(5) UTI





Others:


h/o bronchitis


h/o abdominal pain and nausea, possibly enteritis ( by CT scan) 


RA


Plan





Plan


Avoid nephrotoxics


monitor renal parameters


ferritin and Iron panel checked


DC planning?





Subjective


ROS Limited/Unobtainable:  No


Constitutional:  Reports: malaise





Objective


Objective





Last 24 Hour Vital Signs








  Date Time  Temp Pulse Resp B/P (MAP) Pulse Ox O2 Delivery O2 Flow Rate FiO2


 


9/30/19 08:30      Room Air  


 


9/30/19 08:00 97.6 67 18 116/60 (78) 100   


 


9/30/19 04:00 98.1 65 18 105/56 (72)    


 


9/30/19 00:00 98.6 79 18 110/66 (81) 97   


 


9/29/19 21:03  69 19  98 Room Air  21


 


9/29/19 21:00      Room Air  


 


9/29/19 20:00 98.2 74 18 102/62 (75) 97   


 


9/29/19 16:00 98.0 75 16 102/60 (74) 96   

















Intake and Output  


 


 9/29/19 9/30/19





 19:00 07:00


 


Intake Total 700 ml 300 ml


 


Balance 700 ml 300 ml


 


  


 


Intake Oral 700 ml 300 ml


 


# Voids 6 4








Laboratory Tests


9/30/19 05:25: 


White Blood Count 3.7L, Red Blood Count 2.90L, Hemoglobin 8.7L, Hematocrit 27.0L

, Mean Corpuscular Volume 93, Mean Corpuscular Hemoglobin 29.9, Mean 

Corpuscular Hemoglobin Concent 32.2, Red Cell Distribution Width 12.6, Platelet 

Count 316, Mean Platelet Volume 5.0L, Neutrophils (%) (Auto) 49.5, Lymphocytes (

%) (Auto) 37.7, Monocytes (%) (Auto) 8.6, Eosinophils (%) (Auto) 3.5H, 

Basophils (%) (Auto) 0.9, Sodium Level 139, Potassium Level 4.3, Chloride Level 

109H, Carbon Dioxide Level 23, Anion Gap 7, Blood Urea Nitrogen 47H, Creatinine 

1.6H, Estimat Glomerular Filtration Rate , Glucose Level 84, Calcium Level 9.0, 

Total Bilirubin 0.2, Aspartate Amino Transf (AST/SGOT) 44H, Alanine 

Aminotransferase (ALT/SGPT) 27, Alkaline Phosphatase 87, Total Protein 7.7, 

Albumin 2.4L, Globulin 5.3, Albumin/Globulin Ratio 0.5L


Height (Feet):  5


Height (Inches):  1.00


Weight (Pounds):  95


General Appearance:  no apparent distress


Objective


no change











Demar Mayo MD Sep 30, 2019 12:54

## 2019-09-30 NOTE — HEMATOLOGY/ONC PROGRESS NOTE
Assessment/Plan


Assessment/Plan





ASSESSMENT/Recs


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

, concerning for multiple myeloma. with LEUKOPENIA, decreased white blood cell 

count,  (leukopenia) - wbc under 4k, could also be related to infection v 

multiple myeloma


--> continue antibiotics with ID service, appreciate recs


--> medications have been reviewed


--> hepatitis and hiv in past negative


--> imaging of abdomen reviewed


--> Prior igg was 3961-->2453 and spep was abnormal concerning for myeloma


--> immunofixation of prior visit ++ for monoclonal proteinwith lambda light 

chain specificity


--> may at some point consider bone marrow biopsy (WILL NOT SEND CYTOGENETICS) 

just simple biopsy


# Anemia of chronic disease due to underlying chronic medical issues, 

multifactorial 


--> Anemia workup has been ordered from [facility], and reviewed, ferritin is >100


--> No evidence of hemolysis is noted, peripheral smear has been reviewed


--> Hgb goal >7. Transfuse prn.


--> Epogen or iron at this time is not particularly indicated


--> Medications have been reviewed


--> egd/colo per gi on prn basis


# Abdominal pain, hx enteritis, a antibiotics per Infectious Disease.


--> on abx cefepime


--> O2 and pulmonary treatment.


# Headache, chronic


# Recent ashley


# Dvt ppx heparin sq


   


The timing of this note does not necessarily reflect the time of the patient 

was seen.   


   


Greatly appreciate consultation!





Subjective


Constitutional:  Denies: no symptoms, chills, fever, malaise, weakness, other


HEENT:  Denies: no symptoms, eye pain, blurred vision, tearing, double vision, 

ear pain, ear discharge, nose pain, nose congestion, throat pain, throat 

swelling, mouth pain, mouth swelling, other


Cardiovascular:  Denies: no symptoms, chest pain, edema, irregular heart rate, 

lightheadedness, palpitations, syncope, other


Respiratory:  Denies: no symptoms, cough, shortness of breath, SOB with 

excertion, SOB at rest, sputum, wheezing, other


Gastrointestinal/Abdominal:  Denies: no symptoms, abdomen distended, abdominal 

pain, black stools, tarry stools, blood in stool, constipated, diarrhea, 

difficulty swallowing, nausea, poor appetite, poor fluid intake, rectal bleeding

, vomiting, other


Genitourinary:  Denies: no symptoms, burning, discharge, frequency, flank pain, 

hematuria, incontinence, pain, urgency, other


Endocrine:  Denies: no symptoms, excessive sweating, flushing, intolerance to 

cold, intolerance to heat, increased hunger, increased thirst, increased urine, 

unexplained weight gain, unexplained weight loss, other


Allergies:  


Coded Allergies:  


     No Known Allergies (Unverified , 2/14/16)


Subjective


9/27: no bleeding, no chills, seen with pcp, labs reviewed, seen by gi


9/28: no events, no changes, no bleeding, reviewed labs and no f/c


9/30: no bleeding or chills, no f/c, anemia panel reviewed





Objective


Objective





Current Medications








 Medications


  (Trade)  Dose


 Ordered  Sig/Naman


 Route


 PRN Reason  Start Time


 Stop Time Status Last Admin


Dose Admin


 


 Acetaminophen


  (Tylenol)  650 mg  Q4H  PRN


 ORAL


 fever  9/24/19 22:00


 10/24/19 21:59  9/25/19 17:58


 


 


 Albuterol/


 Ipratropium


  (Albuterol/


 Ipratropium)  3 ml  Q4H  PRN


 HHN


 Shortness of Breath  9/28/19 14:00


 10/3/19 13:59   


 


 


 Cefepime HCl 1 gm/


 Dextrose  55 ml @ 


 110 mls/hr  Q24H


 IVPB


   9/25/19 11:00


 10/4/19 23:59  9/30/19 11:26


 


 


 Heparin Sodium


  (Porcine)


  (Heparin 5000


 units/ml)  5,000 units  EVERY 12  HOURS


 SUBQ


   9/25/19 09:00


 10/25/19 08:59  9/30/19 09:14


 


 


 Liothyronine


 Sodium


  (Cytomel)  5 mcg  DAILY


 ORAL


   9/27/19 09:00


 10/27/19 08:59  9/29/19 09:21


 


 


 Morphine Sulfate


  (Morphine


 Sulfate)  2 mg  Q4H  PRN


 IVP


 Moderate Pain (Pain Scale 4-6)  9/24/19 22:00


 10/1/19 21:59   


 


 


 Ondansetron HCl


  (Zofran)  4 mg  Q6H  PRN


 IVP


 Nausea & Vomiting  9/24/19 22:00


 10/24/19 21:59   


 


 


 Polyethylene


 Glycol


  (Miralax)  17 gm  DAILYPRN  PRN


 ORAL


 Constipation  9/24/19 22:00


 10/24/19 21:59   


 


 


 Temazepam


  (Restoril)  15 mg  HSPRN  PRN


 ORAL


 Insomnia  9/24/19 22:00


 10/1/19 21:59   


 











Last 24 Hour Vital Signs








  Date Time  Temp Pulse Resp B/P (MAP) Pulse Ox O2 Delivery O2 Flow Rate FiO2


 


9/30/19 12:00 97.7 70 18 120/80 (93) 100   


 


9/30/19 08:30      Room Air  


 


9/30/19 08:00 97.6 67 18 116/60 (78) 100   


 


9/30/19 04:00 98.1 65 18 105/56 (72)    


 


9/30/19 00:00 98.6 79 18 110/66 (81) 97   


 


9/29/19 21:03  69 19  98 Room Air  21


 


9/29/19 21:00      Room Air  


 


9/29/19 20:00 98.2 74 18 102/62 (75) 97   


 


9/29/19 16:00 98.0 75 16 102/60 (74) 96   


 


9/29/19 12:00 98.5 66 18 100/51 (67) 95   


 


9/29/19 09:00      Room Air  


 


9/29/19 08:00 98.7 86 18 95/55 (68) 96   


 


9/29/19 07:31  70 18  97 Room Air  21


 


9/29/19 04:00 97.6 62 18 109/47 (67) 98   


 


9/29/19 00:00 98.0 70 18 115/70 (85) 98   


 


9/28/19 21:24  77 18  98 Room Air  21


 


9/28/19 21:00      Room Air  


 


9/28/19 20:00 98.2 72 19 121/78 (92) 95   


 


9/28/19 16:00 98.1 67 18 111/61 (78) 99   

















Intake and Output  


 


 9/29/19 9/30/19





 19:00 07:00


 


Intake Total 700 ml 300 ml


 


Balance 700 ml 300 ml


 


  


 


Intake Oral 700 ml 300 ml


 


# Voids 6 4











Labs








Test


  9/28/19


06:19 9/29/19


05:50 9/30/19


05:25


 


White Blood Count


  4.1 K/UL


(4.8-10.8) 4.0 K/UL


(4.8-10.8) 3.7 K/UL


(4.8-10.8)


 


Red Blood Count


  2.70 M/UL


(4.20-5.40) 2.63 M/UL


(4.20-5.40) 2.90 M/UL


(4.20-5.40)


 


Hemoglobin


  8.3 G/DL


(12.0-16.0) 8.0 G/DL


(12.0-16.0) 8.7 G/DL


(12.0-16.0)


 


Hematocrit


  25.2 %


(37.0-47.0) 24.5 %


(37.0-47.0) 27.0 %


(37.0-47.0)


 


Mean Corpuscular Volume 93 FL (80-99)  93 FL (80-99)  93 FL (80-99) 


 


Mean Corpuscular Hemoglobin


  30.7 PG


(27.0-31.0) 30.5 PG


(27.0-31.0) 29.9 PG


(27.0-31.0)


 


Mean Corpuscular Hemoglobin


Concent 33.0 G/DL


(32.0-36.0) 32.7 G/DL


(32.0-36.0) 32.2 G/DL


(32.0-36.0)


 


Red Cell Distribution Width


  12.7 %


(11.6-14.8) 12.4 %


(11.6-14.8) 12.6 %


(11.6-14.8)


 


Platelet Count


  283 K/UL


(150-450) 307 K/UL


(150-450) 316 K/UL


(150-450)


 


Mean Platelet Volume


  4.9 FL


(6.5-10.1) 4.8 FL


(6.5-10.1) 5.0 FL


(6.5-10.1)


 


Neutrophils (%) (Auto)


  57.1 %


(45.0-75.0) 55.1 %


(45.0-75.0) 49.5 %


(45.0-75.0)


 


Lymphocytes (%) (Auto)


  29.5 %


(20.0-45.0) 29.3 %


(20.0-45.0) 37.7 %


(20.0-45.0)


 


Monocytes (%) (Auto)


  10.2 %


(1.0-10.0) 10.8 %


(1.0-10.0) 8.6 %


(1.0-10.0)


 


Eosinophils (%) (Auto)


  2.4 %


(0.0-3.0) 3.6 %


(0.0-3.0) 3.5 %


(0.0-3.0)


 


Basophils (%) (Auto)


  0.8 %


(0.0-2.0) 1.3 %


(0.0-2.0) 0.9 %


(0.0-2.0)


 


Sodium Level


  140 MMOL/L


(136-145) 141 MMOL/L


(136-145) 139 MMOL/L


(136-145)


 


Potassium Level


  4.3 MMOL/L


(3.5-5.1) 4.5 MMOL/L


(3.5-5.1) 4.3 MMOL/L


(3.5-5.1)


 


Chloride Level


  110 MMOL/L


() 109 MMOL/L


() 109 MMOL/L


()


 


Carbon Dioxide Level


  21 MMOL/L


(21-32) 23 MMOL/L


(21-32) 23 MMOL/L


(21-32)


 


Anion Gap


  9 mmol/L


(5-15) 9 mmol/L


(5-15) 7 mmol/L


(5-15)


 


Blood Urea Nitrogen


  32 mg/dL


(7-18) 41 mg/dL


(7-18) 47 mg/dL


(7-18)


 


Creatinine


  1.7 MG/DL


(0.55-1.30) 1.6 MG/DL


(0.55-1.30) 1.6 MG/DL


(0.55-1.30)


 


Estimat Glomerular Filtration


Rate  mL/min (>60) 


   mL/min (>60) 


   mL/min (>60) 


 


 


Glucose Level


  84 MG/DL


() 82 MG/DL


() 84 MG/DL


()


 


Calcium Level


  8.8 MG/DL


(8.5-10.1) 8.7 MG/DL


(8.5-10.1) 9.0 MG/DL


(8.5-10.1)


 


Total Bilirubin


  


  


  0.2 MG/DL


(0.2-1.0)


 


Aspartate Amino Transf


(AST/SGOT) 


  


  44 U/L (15-37) 


 


 


Alanine Aminotransferase


(ALT/SGPT) 


  


  27 U/L (12-78) 


 


 


Alkaline Phosphatase


  


  


  87 U/L


()


 


Total Protein


  


  


  7.7 G/DL


(6.4-8.2)


 


Albumin


  


  


  2.4 G/DL


(3.4-5.0)


 


Globulin   5.3 g/dL 


 


Albumin/Globulin Ratio   0.5 (1.0-2.7) 








Height (Feet):  5


Height (Inches):  1.00


Weight (Pounds):  95


Objective








PHYSICAL EXAMINATION:


GENERAL:  Calm in bed, oriented x3, slight short of breath.


VITAL SIGNS: reviewed


CARDIOVASCULAR:  No murmur.


LUNGS:  Poor air exchange.


ABDOMEN:  Bowel sounds distant.


EXTREMITIES:  No cyanosis or edema.


NEUROLOGIC:  The patient moves all extremities, slightly weak.











Agustin Giordano MD Sep 30, 2019 13:37

## 2019-09-30 NOTE — PULMONOLOGY PROGRESS NOTE
Assessment/Plan


Problems:  


(1) UTI (urinary tract infection)


(2) Severe anemia


(3) Renal failure (ARF), acute on chronic


(4) Severe protein-calorie malnutrition


(5) Emphysema lung


Assessment/Plan


all noted


VRE rectum positive


doing much better


respiratory treatment





renal studies


symptomatic treatment





dc planning





Subjective


ROS Limited/Unobtainable:  No


Constitutional:  Reports: no symptoms


HEENT:  Repors: no symptoms


Respiratory:  Reports: no symptoms


Allergies:  


Coded Allergies:  


     No Known Allergies (Unverified , 2/14/16)





Objective





Last 24 Hour Vital Signs








  Date Time  Temp Pulse Resp B/P (MAP) Pulse Ox O2 Delivery O2 Flow Rate FiO2


 


9/30/19 12:00 97.7 70 18 120/80 (93) 100   


 


9/30/19 08:30      Room Air  


 


9/30/19 08:00 97.6 67 18 116/60 (78) 100   


 


9/30/19 04:00 98.1 65 18 105/56 (72)    


 


9/30/19 00:00 98.6 79 18 110/66 (81) 97   


 


9/29/19 21:03  69 19  98 Room Air  21


 


9/29/19 21:00      Room Air  


 


9/29/19 20:00 98.2 74 18 102/62 (75) 97   


 


9/29/19 16:00 98.0 75 16 102/60 (74) 96   

















Intake and Output  


 


 9/29/19 9/30/19





 19:00 07:00


 


Intake Total 700 ml 300 ml


 


Balance 700 ml 300 ml


 


  


 


Intake Oral 700 ml 300 ml


 


# Voids 6 4








General Appearance:  WD/WN


HEENT:  normocephalic, atraumatic


Respiratory/Chest:  chest wall non-tender, lungs clear


Breasts:  no masses


Cardiovascular:  normal peripheral pulses, regular rhythm


Abdomen:  normal bowel sounds, soft, non tender


Extremities:  no cyanosis


Skin:  no rash, no lesions


Neurologic/Psychiatric:  CNs II-XII grossly normal


Laboratory Tests


9/30/19 05:25: 


White Blood Count 3.7L, Red Blood Count 2.90L, Hemoglobin 8.7L, Hematocrit 27.0L

, Mean Corpuscular Volume 93, Mean Corpuscular Hemoglobin 29.9, Mean 

Corpuscular Hemoglobin Concent 32.2, Red Cell Distribution Width 12.6, Platelet 

Count 316, Mean Platelet Volume 5.0L, Neutrophils (%) (Auto) 49.5, Lymphocytes (

%) (Auto) 37.7, Monocytes (%) (Auto) 8.6, Eosinophils (%) (Auto) 3.5H, 

Basophils (%) (Auto) 0.9, Sodium Level 139, Potassium Level 4.3, Chloride Level 

109H, Carbon Dioxide Level 23, Anion Gap 7, Blood Urea Nitrogen 47H, Creatinine 

1.6H, Estimat Glomerular Filtration Rate , Glucose Level 84, Calcium Level 9.0, 

Total Bilirubin 0.2, Aspartate Amino Transf (AST/SGOT) 44H, Alanine 

Aminotransferase (ALT/SGPT) 27, Alkaline Phosphatase 87, Total Protein 7.7, 

Albumin 2.4L, Globulin 5.3, Albumin/Globulin Ratio 0.5L





Current Medications








 Medications


  (Trade)  Dose


 Ordered  Sig/Naman


 Route


 PRN Reason  Start Time


 Stop Time Status Last Admin


Dose Admin


 


 Acetaminophen


  (Tylenol)  650 mg  Q4H  PRN


 ORAL


 fever  9/24/19 22:00


 10/24/19 21:59  9/25/19 17:58


 


 


 Albuterol/


 Ipratropium


  (Albuterol/


 Ipratropium)  3 ml  Q4H  PRN


 HHN


 Shortness of Breath  9/28/19 14:00


 10/3/19 13:59   


 


 


 Cefepime HCl 1 gm/


 Dextrose  55 ml @ 


 110 mls/hr  Q24H


 IVPB


   9/25/19 11:00


 10/4/19 23:59  9/30/19 11:26


 


 


 Heparin Sodium


  (Porcine)


  (Heparin 5000


 units/ml)  5,000 units  EVERY 12  HOURS


 SUBQ


   9/25/19 09:00


 10/25/19 08:59  9/30/19 09:14


 


 


 Liothyronine


 Sodium


  (Cytomel)  5 mcg  DAILY


 ORAL


   9/27/19 09:00


 10/27/19 08:59  9/29/19 09:21


 


 


 Morphine Sulfate


  (Morphine


 Sulfate)  2 mg  Q4H  PRN


 IVP


 Moderate Pain (Pain Scale 4-6)  9/24/19 22:00


 10/1/19 21:59   


 


 


 Ondansetron HCl


  (Zofran)  4 mg  Q6H  PRN


 IVP


 Nausea & Vomiting  9/24/19 22:00


 10/24/19 21:59   


 


 


 Polyethylene


 Glycol


  (Miralax)  17 gm  DAILYPRN  PRN


 ORAL


 Constipation  9/24/19 22:00


 10/24/19 21:59   


 


 


 Temazepam


  (Restoril)  15 mg  HSPRN  PRN


 ORAL


 Insomnia  9/24/19 22:00


 10/1/19 21:59   


 

















Geovany Mustafa MD Sep 30, 2019 14:21

## 2019-09-30 NOTE — NUR
NURSE NOTES:

Patient is alert and oriented. NO reports of discomfort. IV is intact. Bed is locked and in 
lowest position. Will continue to monitor.

## 2019-09-30 NOTE — GI PROGRESS NOTE
Assessment/Plan


Problems:  


(1) Severe protein-calorie malnutrition


ICD Codes:  E43 - Unspecified severe protein-calorie malnutrition


SNOMED:  865386641, [id number], [id number]


(2) Pneumonia


ICD Codes:  J18.9 - Pneumonia, unspecified organism


SNOMED:  935224662


(3) Anemia


ICD Codes:  D64.9 - Anemia, unspecified


SNOMED:  004263711


Status:  unchanged


Status Narrative


Discussed with Dr. Shepard.


Assessment/Plan


This is a 73-year-old female with acute UTI and possible multiple myeloma, 

obvious source for her anemia. 





outpatient GI procedures


OB stool r/o GI bleed


prn transfusions


ppi


abx 


follow labs





The patient was seen and examined at bedside and all new and available data was 

reviewed in the patients chart. I agree with the above findings, impression 

and plan.  (Patient seen earlier today. Signature stamp does not reflect 

patient encounter time.). - Dany Shepard MD





Subjective


Gastrointestinal/Abdominal:  Reports: no symptoms





Objective





Last 24 Hour Vital Signs








  Date Time  Temp Pulse Resp B/P (MAP) Pulse Ox O2 Delivery O2 Flow Rate FiO2


 


9/30/19 08:30      Room Air  


 


9/30/19 08:00 97.6 67 18 116/60 (78) 100   


 


9/30/19 04:00 98.1 65 18 105/56 (72)    


 


9/30/19 00:00 98.6 79 18 110/66 (81) 97   


 


9/29/19 21:03  69 19  98 Room Air  21


 


9/29/19 21:00      Room Air  


 


9/29/19 20:00 98.2 74 18 102/62 (75) 97   


 


9/29/19 16:00 98.0 75 16 102/60 (74) 96   


 


9/29/19 12:00 98.5 66 18 100/51 (67) 95   

















Intake and Output  


 


 9/29/19 9/30/19





 19:00 07:00


 


Intake Total 700 ml 300 ml


 


Balance 700 ml 300 ml


 


  


 


Intake Oral 700 ml 300 ml


 


# Voids 6 4











Laboratory Tests








Test


  9/30/19


05:25


 


White Blood Count


  3.7 K/UL


(4.8-10.8)  L


 


Red Blood Count


  2.90 M/UL


(4.20-5.40)  L


 


Hemoglobin


  8.7 G/DL


(12.0-16.0)  L


 


Hematocrit


  27.0 %


(37.0-47.0)  L


 


Mean Corpuscular Volume 93 FL (80-99)  


 


Mean Corpuscular Hemoglobin


  29.9 PG


(27.0-31.0)


 


Mean Corpuscular Hemoglobin


Concent 32.2 G/DL


(32.0-36.0)


 


Red Cell Distribution Width


  12.6 %


(11.6-14.8)


 


Platelet Count


  316 K/UL


(150-450)


 


Mean Platelet Volume


  5.0 FL


(6.5-10.1)  L


 


Neutrophils (%) (Auto)


  49.5 %


(45.0-75.0)


 


Lymphocytes (%) (Auto)


  37.7 %


(20.0-45.0)


 


Monocytes (%) (Auto)


  8.6 %


(1.0-10.0)


 


Eosinophils (%) (Auto)


  3.5 %


(0.0-3.0)  H


 


Basophils (%) (Auto)


  0.9 %


(0.0-2.0)


 


Sodium Level


  139 MMOL/L


(136-145)


 


Potassium Level


  4.3 MMOL/L


(3.5-5.1)


 


Chloride Level


  109 MMOL/L


()  H


 


Carbon Dioxide Level


  23 MMOL/L


(21-32)


 


Anion Gap


  7 mmol/L


(5-15)


 


Blood Urea Nitrogen


  47 mg/dL


(7-18)  H


 


Creatinine


  1.6 MG/DL


(0.55-1.30)  H


 


Estimat Glomerular Filtration


Rate  mL/min (>60)  


 


 


Glucose Level


  84 MG/DL


()


 


Calcium Level


  9.0 MG/DL


(8.5-10.1)


 


Total Bilirubin


  0.2 MG/DL


(0.2-1.0)


 


Aspartate Amino Transf


(AST/SGOT) 44 U/L (15-37)


H


 


Alanine Aminotransferase


(ALT/SGPT) 27 U/L (12-78)


 


 


Alkaline Phosphatase


  87 U/L


()


 


Total Protein


  7.7 G/DL


(6.4-8.2)


 


Albumin


  2.4 G/DL


(3.4-5.0)  L


 


Globulin 5.3 g/dL  


 


Albumin/Globulin Ratio


  0.5 (1.0-2.7)


L








Height (Feet):  5


Height (Inches):  1.00


Weight (Pounds):  95


General Appearance:  no apparent distress


Cardiovascular:  normal rate


Respiratory/Chest:  normal breath sounds, no respiratory distress


Abdominal Exam:  normal bowel sounds, non tender, soft


Extremities:  non-tender











Jenny Rodriguez NP Sep 30, 2019 11:45

## 2019-10-01 NOTE — DISCHARGE SUMMARY
Discharge Summary


Discharge Summary


_


DATE OF ADMISSION: 9/24/2019





DATE OF DISCHARGE: 9/30/2019








DISCHARGED BY: Dr. Stephens 





REASON FOR ADMISSION: 


73 years old female , with  past medical history of rheumatoid arthritis, 

chronic kidney disease, history of  Mycobacterium avium complex, presented 

complaining of right-sided flank pain and generalized weakness.  


Symptoms started  two days ago.  


Patient was recently hospitalized for failure to thrive , anemia and acute on 

chronic kidney injury. 


Patient reported chills and subjective fevers  along with right-sided flank 

pain , radiating down her right groin.  


She noted some dysuria , but denied hematuria .


No nausea or vomiting.  


No chest pain or shortness of breath.  


Upon evaluation patient had low-grade fever , otherwise vital signs were 

stable.  


Urinalysis revealed +2 protein , +5 blood , pyuria , and occasional bacteria.


No leukocytosis, hemoglobin 9.5 , hematocrit 28.5,  platelet count 303.


Potassium 5.4.  


BUN 52, creatinine 2.1.  


Glucose 102.  


Stable LFT.  


EKG revealed  sinus rhythm , no acute ischemic changes.  


Patient started on the IV fluids,  treated for hyperkalemia ,  started on 

empiric antibiotic  and admitted for acute kidney injury and hyperkalemia.





CONSULTANTS:


hospitalist Dr. Mustafa


ID specialist Dr. Domingo


GI specialist Dr. Shepard


nephrologist Dr. Mayo


hematologist/oncologist Dr. Giordano


 


 


HOSPITAL COURSE: 


Patient admitted to medical surgical floor.  


Patient started on the IV fluids and empiric antibiotic.  


Renal parameters and  electrolytes were closely monitored.  


Electrolytes corrected as needed , and nephrotoxins were avoided.  


BUN and creatinine w ere  trending down.  


Nephrologist followed.  


Prior to discharge BUN from 52 down to 47 , and creatinine from 2.1 down to 

1.6.  


Supplemental oxygen provided as needed to keep pulse oximetry above 92%.  


Bronchodilator treatment provided as needed.  


DVT prophylaxis provided.  


Patient complained of dysuria . Antibiotic provided as per ID specialist 

recommendation.  


Blood cultures were negative.  


Urine culture revealed gram-negative rods .


Hemoglobin  and hematocrit  were  closely monitored with goal to keep 

hemoglobin above 7.  


Anemia work-up was consistent with anemia of chronic disease.  


GI prophylaxis provided.  


GI recommended outpatient GI procedure.  





Oncologist followed.  


Oncologist recommended to consider bone marrow biopsy to rule out multiple 

myeloma at some point.  


Pain management was addressed.  


Nutritional recommendation regarding protein supplement implemented  in  plan 

of care.  


Pain management  was addressed.  


Supportive care provided.  


Patient clinically stabilized and was ready for discharge home with home health 

services to follow .


IV antibiotic changed to oral for additional 4 days to complete the course as 

per ID specialist recommendations. 





FINAL DIAGNOSES: 


Acute on chronic renal failure


Dysuria,  probably UTI


COPD/emphysema


Severe protein calorie malnutrition


Anemia


Rheumatoid arthritis


Monoclonal gammopathy, rule out multiple myeloma





DISCHARGE MEDICATIONS:


See Medication Reconciliation list.





DISCHARGE INSTRUCTIONS:


Patient was discharged home with home health services.  


Follow up with primary care provider in one week.





I have been assigned to dictate discharge summary for this account. 


I was not involved in the patient's management.











Minal Barnes NP Oct 1, 2019 11:47

## 2019-11-20 ENCOUNTER — HOSPITAL ENCOUNTER (EMERGENCY)
Dept: HOSPITAL 72 - EMR | Age: 74
Discharge: HOME | End: 2019-11-20
Payer: MEDICARE

## 2019-11-20 VITALS — BODY MASS INDEX: 17.11 KG/M2 | HEIGHT: 62 IN | WEIGHT: 93 LBS

## 2019-11-20 VITALS — DIASTOLIC BLOOD PRESSURE: 70 MMHG | SYSTOLIC BLOOD PRESSURE: 115 MMHG

## 2019-11-20 VITALS — DIASTOLIC BLOOD PRESSURE: 68 MMHG | SYSTOLIC BLOOD PRESSURE: 115 MMHG

## 2019-11-20 DIAGNOSIS — N18.9: ICD-10-CM

## 2019-11-20 DIAGNOSIS — N28.1: ICD-10-CM

## 2019-11-20 DIAGNOSIS — M06.9: ICD-10-CM

## 2019-11-20 DIAGNOSIS — M81.0: ICD-10-CM

## 2019-11-20 DIAGNOSIS — R10.9: Primary | ICD-10-CM

## 2019-11-20 LAB
ADD MANUAL DIFF: NO
ALBUMIN SERPL-MCNC: 2.7 G/DL (ref 3.4–5)
ALBUMIN/GLOB SERPL: 0.5 {RATIO} (ref 1–2.7)
ALP SERPL-CCNC: 76 U/L (ref 46–116)
ALT SERPL-CCNC: 15 U/L (ref 12–78)
ANION GAP SERPL CALC-SCNC: 7 MMOL/L (ref 5–15)
APPEARANCE UR: CLEAR
APTT PPP: (no result) S
AST SERPL-CCNC: 21 U/L (ref 15–37)
BASOPHILS NFR BLD AUTO: 0.9 % (ref 0–2)
BILIRUB SERPL-MCNC: 0.3 MG/DL (ref 0.2–1)
BUN SERPL-MCNC: 48 MG/DL (ref 7–18)
CALCIUM SERPL-MCNC: 8.4 MG/DL (ref 8.5–10.1)
CHLORIDE SERPL-SCNC: 105 MMOL/L (ref 98–107)
CO2 SERPL-SCNC: 24 MMOL/L (ref 21–32)
CREAT SERPL-MCNC: 2 MG/DL (ref 0.55–1.3)
EOSINOPHIL NFR BLD AUTO: 0.5 % (ref 0–3)
ERYTHROCYTE [DISTWIDTH] IN BLOOD BY AUTOMATED COUNT: 12 % (ref 11.6–14.8)
GLOBULIN SER-MCNC: 5.8 G/DL
GLUCOSE UR STRIP-MCNC: NEGATIVE MG/DL
HCT VFR BLD CALC: 29.1 % (ref 37–47)
HGB BLD-MCNC: 9.6 G/DL (ref 12–16)
KETONES UR QL STRIP: NEGATIVE
LEUKOCYTE ESTERASE UR QL STRIP: (no result)
LYMPHOCYTES NFR BLD AUTO: 9.5 % (ref 20–45)
MCV RBC AUTO: 93 FL (ref 80–99)
MONOCYTES NFR BLD AUTO: 8.9 % (ref 1–10)
NEUTROPHILS NFR BLD AUTO: 80.3 % (ref 45–75)
NITRITE UR QL STRIP: NEGATIVE
PH UR STRIP: 6.5 [PH] (ref 4.5–8)
PLATELET # BLD: 328 K/UL (ref 150–450)
POTASSIUM SERPL-SCNC: 5.8 MMOL/L (ref 3.5–5.1)
PROT UR QL STRIP: (no result)
RBC # BLD AUTO: 3.13 M/UL (ref 4.2–5.4)
SODIUM SERPL-SCNC: 136 MMOL/L (ref 136–145)
SP GR UR STRIP: 1 (ref 1–1.03)
UROBILINOGEN UR-MCNC: NORMAL MG/DL (ref 0–1)
WBC # BLD AUTO: 5.7 K/UL (ref 4.8–10.8)

## 2019-11-20 PROCEDURE — 74176 CT ABD & PELVIS W/O CONTRAST: CPT

## 2019-11-20 PROCEDURE — 80053 COMPREHEN METABOLIC PANEL: CPT

## 2019-11-20 PROCEDURE — 85025 COMPLETE CBC W/AUTO DIFF WBC: CPT

## 2019-11-20 PROCEDURE — 99284 EMERGENCY DEPT VISIT MOD MDM: CPT

## 2019-11-20 PROCEDURE — 96374 THER/PROPH/DIAG INJ IV PUSH: CPT

## 2019-11-20 PROCEDURE — 36415 COLL VENOUS BLD VENIPUNCTURE: CPT

## 2019-11-20 PROCEDURE — 81003 URINALYSIS AUTO W/O SCOPE: CPT

## 2019-11-20 PROCEDURE — 83690 ASSAY OF LIPASE: CPT

## 2019-11-20 NOTE — NUR
ED Nurse Note:pt. came from home with c/o right flank pain since yesterday ,no 
urinary symptoms reported, no fever on arrival, pt. is ambulatory, A/Ox4

## 2019-11-20 NOTE — EMERGENCY ROOM REPORT
History of Present Illness


General


Chief Complaint:  Pain


Source:  Patient





Present Illness


HPI


74-year-old female who is presenting of 3 days of right flank pain.  Patient 

has a history of acute kidney injury and chronic infections.  Patient states 

that her pain is sharp in nature nonradiating.  Patient has no fever, no dysuria

, no nausea, no vomiting, no diarrhea, no constipation, no rectal bleeding, 

hematuria.  Patient has not tried any medications for pain control.


Allergies:  


Coded Allergies:  


     No Known Allergies (Unverified , 2/14/16)





Nursing Documentation-[facility]


Past Medical History:  No History, Except For


Hx Cardiac Problems:  No


Hx Cancer:  No


Hx Gastrointestinal Problems:  Yes - ABD pain, pyleonephritis


Hx Neurological Problems:  Yes - OSTEOPOROSIS LOWER BACK, RA


Hx Headaches:  Yes





Review of Systems


Constitutional:  Denies: chills, fever


Respiratory:  Denies: cough, shortness of breath


Cardiovascular:  Denies: chest pain, palpitations


Gastrointestinal:  Reports: abdominal pain; Denies: diarrhea, vomiting


Genitourinary:  Denies: hematuria, pain


Musculoskeletal:  Denies: joint swelling


Skin:  Denies: rash, lesions


Neurological:  Denies: headache, dizziness





Physical Exam





Vital Signs








  Date Time  Temp Pulse Resp B/P (MAP) Pulse Ox O2 Delivery O2 Flow Rate FiO2


 


11/20/19 11:24 98.4 78 20 115/68 (84) 95 Room Air  








Sp02 EP Interpretation:  reviewed


General Appearance:  well appearing, no apparent distress, non-toxic


Head:  normocephalic, atraumatic


Eyes:  bilateral eye normal inspection


ENT:  hearing grossly normal, moist mucus membranes


Neck:  supple


Respiratory:  lungs clear, normal breath sounds, no respiratory distress, 

speaking full sentences


Cardiovascular #1:  regular rate, rhythm, normal capillary refill


Cardiovascular #2:  2+ radial (R), 2+ radial (L)


Gastrointestinal:  non tender, soft, non-distended, no guarding, no hernia, no 

pulsatile mass


Rectal:  deferred


Genitourinary:  CVA tenderness (R)


Musculoskeletal:  non-tender, no calf tenderness


Neurologic:  grossly normal


Psychiatric:  mood/affect normal


Skin:  warm/dry, normal turgor





Medical Decision Making


Diagnostic Impression:  


 Primary Impression:  


 CKD (chronic kidney disease)


 Additional Impression:  


 Flank pain


ER Course


74-year-old female 3 days of right flank pain found to have mild right CVA 

tenderness.


Differential includes kidney infection, UTI, diverticulosis, diverticulitis, 

intra-abdominal infection, appendicitis.


Will perform lab testing, and CT scan to evaluate for above differentials.





Laboratory Tests








Test


  11/20/19


12:00 11/20/19


12:39


 


Urine Color Pale yellow   


 


Urine Appearance Clear   


 


Urine pH 6.5 (4.5-8.0)   


 


Urine Specific Gravity


  1.005


(1.005-1.035) 


 


 


Urine Protein


  3+ (NEGATIVE)


H 


 


 


Urine Glucose (UA)


  Negative


(NEGATIVE) 


 


 


Urine Ketones


  Negative


(NEGATIVE) 


 


 


Urine Blood


  5+ (NEGATIVE)


H 


 


 


Urine Nitrite


  Negative


(NEGATIVE) 


 


 


Urine Bilirubin


  Negative


(NEGATIVE) 


 


 


Urine Urobilinogen


  Normal MG/DL


(0.0-1.0) 


 


 


Urine Leukocyte Esterase


  1+ (NEGATIVE)


H 


 


 


Urine RBC


  15-20 /HPF (0


- 2)  H 


 


 


Urine WBC


  5-10 /HPF (0 -


2)  H 


 


 


Urine Squamous Epithelial


Cells Occasional


/LPF 


 


 


Urine Bacteria


  Few /HPF


(NONE) 


 


 


White Blood Count


  


  5.7 K/UL


(4.8-10.8)


 


Red Blood Count


  


  3.13 M/UL


(4.20-5.40)  L


 


Hemoglobin


  


  9.6 G/DL


(12.0-16.0)  L


 


Hematocrit


  


  29.1 %


(37.0-47.0)  L


 


Mean Corpuscular Volume  93 FL (80-99)  


 


Mean Corpuscular Hemoglobin


  


  30.6 PG


(27.0-31.0)


 


Mean Corpuscular Hemoglobin


Concent 


  32.9 G/DL


(32.0-36.0)


 


Red Cell Distribution Width


  


  12.0 %


(11.6-14.8)


 


Platelet Count


  


  328 K/UL


(150-450)


 


Mean Platelet Volume


  


  5.2 FL


(6.5-10.1)  L


 


Neutrophils (%) (Auto)


  


  80.3 %


(45.0-75.0)  H


 


Lymphocytes (%) (Auto)


  


  9.5 %


(20.0-45.0)  L


 


Monocytes (%) (Auto)


  


  8.9 %


(1.0-10.0)


 


Eosinophils (%) (Auto)


  


  0.5 %


(0.0-3.0)


 


Basophils (%) (Auto)


  


  0.9 %


(0.0-2.0)


 


Sodium Level


  


  136 MMOL/L


(136-145)


 


Potassium Level


  


  5.8 MMOL/L


(3.5-5.1)  H


 


Chloride Level


  


  105 MMOL/L


()


 


Carbon Dioxide Level


  


  24 MMOL/L


(21-32)


 


Anion Gap


  


  7 mmol/L


(5-15)


 


Blood Urea Nitrogen


  


  48 mg/dL


(7-18)  H


 


Creatinine


  


  2.0 MG/DL


(0.55-1.30)  H


 


Estimate Glomerular


Filtration Rate 


   mL/min (>60)  


 


 


Glucose Level


  


  108 MG/DL


()  H


 


Calcium Level


  


  8.4 MG/DL


(8.5-10.1)  L


 


Total Bilirubin


  


  0.3 MG/DL


(0.2-1.0)


 


Aspartate Amino Transferase


(AST) 


  21 U/L (15-37)


 


 


Alanine Aminotransferase (ALT)


  


  15 U/L (12-78)


 


 


Alkaline Phosphatase


  


  76 U/L


()


 


Total Protein


  


  8.5 G/DL


(6.4-8.2)  H


 


Albumin


  


  2.7 G/DL


(3.4-5.0)  L


 


Globulin  5.8 g/dL  


 


Albumin/Globulin Ratio


  


  0.5 (1.0-2.7)


L


 


Lipase


  


  327 U/L


()








Lab Results Impression


MIKA, mild hyperkalemia,


CT/MRI/US Diagnostic Results


CT/MRI/US Diagnostic Results :  


   Impression





Procedure: CT Abdomen Pelvis WO Contrast


 


 


Indication: Abdominal pain, right flank pain for 3 days


 


Comparison:  9/13/2019


 


Findings: No renal or ureteral calculi are demonstrated. This the ureters are 

mildly


ectatic bilaterally, but there is no hydronephrosis. Lack of IV contrast limits


assessment of the renal parenchyma. There is a 1 cm cyst in the periphery of the


interpolar region of the right kidney. No focal renal abnormality seen on the 

left.


The bladder is unremarkable.


 


Lack of IV contrast limits assessment of the other solid organs. The liver


demonstrates a calcification in the right lobe and a subcapsular calcification 

in the


left lobe. The gallbladder and bile ducts are unremarkable. There is mild 

ectasia of


the pancreatic duct. The pancreas is otherwise unremarkable. The spleen and 

adrenals


are unremarkable. No retroperitoneal or mesenteric mass or adenopathy.


Retroperitoneal nodes are abundant but not enlarged.


 


The appendix is only questionably visualized, but no findings to suggest acute


appendicitis are evident. No evidence of colonic diverticulosis or 

diverticulitis. No


small bowel distention. No free or loculated intraperitoneal gas or fluid is 

evident.


The distal esophagus, stomach, duodenum are unremarkable.


 


The included lung bases demonstrate extensive cystic changes, interstitial 

septal


thickening, bronchial wall thickening, bronchiectasis, and nonspecific 

groundglass


opacities. Appearance is similar to the previous study. The bones are 

unremarkable.


 


Impression: Negative for evidence of obstructive uropathy or urinary stone 

disease


 


No acute abnormality elsewhere


 


Extensive basilar pulmonary parenchymal abnormality, as described. Similarity to


previous exam suggests that there is a significant chronic component to this.


Correlate with clinical history


 


Mild ectasia of the pancreatic duct, unchanged from previous study of 9/13/2019 

and


of doubtful significance


 


Other findings as noted, including small right renal cyst, hepatic 

calcifications





Last Vital Signs








  Date Time  Temp Pulse Resp B/P (MAP) Pulse Ox O2 Delivery O2 Flow Rate FiO2


 


11/20/19 11:38 98.4 78 20 115/68 95 Room Air  








Disposition:  HOME, SELF-CARE


Condition:  Stable


Referrals:  


Brennan Nguyen MD (PCP)





Additional Instructions:  


Follow-up with your nephrologist as scheduled











Joseph Lawson M.D. Nov 20, 2019 12:52

## 2019-11-20 NOTE — NUR
ER DISCHARGE NOTE:

Patient is cleared to be discharged per ERMD, pt is aox4, on room air, with 
stable vital signs. pt was given dc and CT copy instructions, pt was able to 
verbalize understanding, pt id band and iv site removed without complications. 
pt is able to ambulate with steady gait and family member. pt took all 
belongings.

## 2019-11-20 NOTE — DIAGNOSTIC IMAGING REPORT
Indication: Abdominal pain, right flank pain for 3 days

 

Technique: Spiral acquisitions obtained through the abdomen and pelvis. No oral or IV

contrast utilized, per urinary stone protocol. Multiplanar reconstructions were

generated.  Total dose length product 399 mGycm. CTDIvol(s)  8 mGy. Dose reduction

achieved using automated exposure control

 

 

Comparison:  9/13/2019

 

Findings: No renal or ureteral calculi are demonstrated. This the ureters are mildly

ectatic bilaterally, but there is no hydronephrosis. Lack of IV contrast limits

assessment of the renal parenchyma. There is a 1 cm cyst in the periphery of the

interpolar region of the right kidney. No focal renal abnormality seen on the left.

The bladder is unremarkable.

 

Lack of IV contrast limits assessment of the other solid organs. The liver

demonstrates a calcification in the right lobe and a subcapsular calcification in the

left lobe. The gallbladder and bile ducts are unremarkable. There is mild ectasia of

the pancreatic duct. The pancreas is otherwise unremarkable. The spleen and adrenals

are unremarkable. No retroperitoneal or mesenteric mass or adenopathy.

Retroperitoneal nodes are abundant but not enlarged.

 

The appendix is only questionably visualized, but no findings to suggest acute

appendicitis are evident. No evidence of colonic diverticulosis or diverticulitis. No

small bowel distention. No free or loculated intraperitoneal gas or fluid is evident.

The distal esophagus, stomach, duodenum are unremarkable.

 

The included lung bases demonstrate extensive cystic changes, interstitial septal

thickening, bronchial wall thickening, bronchiectasis, and nonspecific groundglass

opacities. Appearance is similar to the previous study. The bones are unremarkable.

 

Impression: Negative for evidence of obstructive uropathy or urinary stone disease

 

No acute abnormality elsewhere

 

Extensive basilar pulmonary parenchymal abnormality, as described. Similarity to

previous exam suggests that there is a significant chronic component to this.

Correlate with clinical history

 

Mild ectasia of the pancreatic duct, unchanged from previous study of 9/13/2019 and

of doubtful significance

 

Other findings as noted, including small right renal cyst, hepatic calcifications

 

 

 

The CT scanner at [facility] is accredited by the [employer] College of

Radiology and the scans are performed using protocols designed to limit radiation

exposure to as low as reasonably achievable to attain images of sufficient resolution

adequate for diagnostic evaluation.

## 2020-02-07 ENCOUNTER — HOSPITAL ENCOUNTER (INPATIENT)
Dept: HOSPITAL 72 - EMR | Age: 75
LOS: 5 days | Discharge: HOME | DRG: 682 | End: 2020-02-12
Payer: MEDICARE

## 2020-02-07 VITALS — BODY MASS INDEX: 18.06 KG/M2 | WEIGHT: 92 LBS | HEIGHT: 60 IN

## 2020-02-07 VITALS — SYSTOLIC BLOOD PRESSURE: 125 MMHG | DIASTOLIC BLOOD PRESSURE: 65 MMHG

## 2020-02-07 VITALS — DIASTOLIC BLOOD PRESSURE: 60 MMHG | SYSTOLIC BLOOD PRESSURE: 112 MMHG

## 2020-02-07 VITALS — SYSTOLIC BLOOD PRESSURE: 130 MMHG | DIASTOLIC BLOOD PRESSURE: 67 MMHG

## 2020-02-07 DIAGNOSIS — M81.0: ICD-10-CM

## 2020-02-07 DIAGNOSIS — J43.9: ICD-10-CM

## 2020-02-07 DIAGNOSIS — E87.5: ICD-10-CM

## 2020-02-07 DIAGNOSIS — M06.9: ICD-10-CM

## 2020-02-07 DIAGNOSIS — N17.9: Primary | ICD-10-CM

## 2020-02-07 DIAGNOSIS — E43: ICD-10-CM

## 2020-02-07 DIAGNOSIS — D47.2: ICD-10-CM

## 2020-02-07 DIAGNOSIS — D63.8: ICD-10-CM

## 2020-02-07 DIAGNOSIS — I12.9: ICD-10-CM

## 2020-02-07 DIAGNOSIS — Z79.82: ICD-10-CM

## 2020-02-07 DIAGNOSIS — N18.9: ICD-10-CM

## 2020-02-07 LAB
ADD MANUAL DIFF: NO
ANION GAP SERPL CALC-SCNC: 7 MMOL/L (ref 5–15)
APPEARANCE UR: CLEAR
APTT PPP: (no result) S
BASOPHILS NFR BLD AUTO: 0.6 % (ref 0–2)
BUN SERPL-MCNC: 50 MG/DL (ref 7–18)
CALCIUM SERPL-MCNC: 8.5 MG/DL (ref 8.5–10.1)
CHLORIDE SERPL-SCNC: 108 MMOL/L (ref 98–107)
CO2 SERPL-SCNC: 22 MMOL/L (ref 21–32)
CREAT SERPL-MCNC: 1.5 MG/DL (ref 0.55–1.3)
EOSINOPHIL NFR BLD AUTO: 1.2 % (ref 0–3)
ERYTHROCYTE [DISTWIDTH] IN BLOOD BY AUTOMATED COUNT: 13.4 % (ref 11.6–14.8)
GLUCOSE UR STRIP-MCNC: NEGATIVE MG/DL
HCT VFR BLD CALC: 28.8 % (ref 37–47)
HGB BLD-MCNC: 9.4 G/DL (ref 12–16)
KETONES UR QL STRIP: NEGATIVE
LEUKOCYTE ESTERASE UR QL STRIP: NEGATIVE
LYMPHOCYTES NFR BLD AUTO: 19.6 % (ref 20–45)
MCV RBC AUTO: 92 FL (ref 80–99)
MONOCYTES NFR BLD AUTO: 9.4 % (ref 1–10)
NEUTROPHILS NFR BLD AUTO: 69.2 % (ref 45–75)
NITRITE UR QL STRIP: NEGATIVE
PH UR STRIP: 6 [PH] (ref 4.5–8)
PLATELET # BLD: 278 K/UL (ref 150–450)
POTASSIUM SERPL-SCNC: 5.8 MMOL/L (ref 3.5–5.1)
PROT UR QL STRIP: (no result)
RBC # BLD AUTO: 3.12 M/UL (ref 4.2–5.4)
SODIUM SERPL-SCNC: 137 MMOL/L (ref 136–145)
SP GR UR STRIP: 1.01 (ref 1–1.03)
UROBILINOGEN UR-MCNC: NORMAL MG/DL (ref 0–1)
WBC # BLD AUTO: 4 K/UL (ref 4.8–10.8)

## 2020-02-07 PROCEDURE — 83550 IRON BINDING TEST: CPT

## 2020-02-07 PROCEDURE — 81001 URINALYSIS AUTO W/SCOPE: CPT

## 2020-02-07 PROCEDURE — 84100 ASSAY OF PHOSPHORUS: CPT

## 2020-02-07 PROCEDURE — 84484 ASSAY OF TROPONIN QUANT: CPT

## 2020-02-07 PROCEDURE — 80053 COMPREHEN METABOLIC PANEL: CPT

## 2020-02-07 PROCEDURE — 94664 DEMO&/EVAL PT USE INHALER: CPT

## 2020-02-07 PROCEDURE — 99285 EMERGENCY DEPT VISIT HI MDM: CPT

## 2020-02-07 PROCEDURE — 96375 TX/PRO/DX INJ NEW DRUG ADDON: CPT

## 2020-02-07 PROCEDURE — 71045 X-RAY EXAM CHEST 1 VIEW: CPT

## 2020-02-07 PROCEDURE — 36415 COLL VENOUS BLD VENIPUNCTURE: CPT

## 2020-02-07 PROCEDURE — 97802 MEDICAL NUTRITION INDIV IN: CPT

## 2020-02-07 PROCEDURE — 96365 THER/PROPH/DIAG IV INF INIT: CPT

## 2020-02-07 PROCEDURE — 82378 CARCINOEMBRYONIC ANTIGEN: CPT

## 2020-02-07 PROCEDURE — 82746 ASSAY OF FOLIC ACID SERUM: CPT

## 2020-02-07 PROCEDURE — 82962 GLUCOSE BLOOD TEST: CPT

## 2020-02-07 PROCEDURE — 82270 OCCULT BLOOD FECES: CPT

## 2020-02-07 PROCEDURE — 83735 ASSAY OF MAGNESIUM: CPT

## 2020-02-07 PROCEDURE — 82728 ASSAY OF FERRITIN: CPT

## 2020-02-07 PROCEDURE — 84443 ASSAY THYROID STIM HORMONE: CPT

## 2020-02-07 PROCEDURE — 80048 BASIC METABOLIC PNL TOTAL CA: CPT

## 2020-02-07 PROCEDURE — 93005 ELECTROCARDIOGRAM TRACING: CPT

## 2020-02-07 PROCEDURE — 82784 ASSAY IGA/IGD/IGG/IGM EACH: CPT

## 2020-02-07 PROCEDURE — 84300 ASSAY OF URINE SODIUM: CPT

## 2020-02-07 PROCEDURE — 85025 COMPLETE CBC W/AUTO DIFF WBC: CPT

## 2020-02-07 PROCEDURE — 83540 ASSAY OF IRON: CPT

## 2020-02-07 PROCEDURE — 84550 ASSAY OF BLOOD/URIC ACID: CPT

## 2020-02-07 PROCEDURE — 82607 VITAMIN B-12: CPT

## 2020-02-07 RX ADMIN — DOCUSATE SODIUM SCH MG: 100 CAPSULE, LIQUID FILLED ORAL at 18:00

## 2020-02-07 NOTE — NUR
ED Nurse Note:

PT walked in to ED states she was told by her primary doctor due to high 
potassium level ( unknown level ) PT is alert x4.

## 2020-02-07 NOTE — NUR
ED Nurse Note:



Patient resting in bed, tolerating medications well. No s/s of acute distress. 
Daughter at bedside.

## 2020-02-07 NOTE — EMERGENCY ROOM REPORT
History of Present Illness


General


Chief Complaint:  Abnormal Labs


Source:  Patient, Medical Record





Present Illness


HPI


Patient has known history of renal insufficiency


Was getting routine blood work when her potassium was elevated I do not have 

access to the specific number





Denies any chest pain denies any vomiting denies any palpitations denies any 

focal weakness





Denies any change in medications denies any recent travel or trauma


Allergies:  


Coded Allergies:  


     No Known Allergies (Unverified , 2/14/16)





Patient History


Past Medical History:  see triage record


Reviewed Nursing Documentation:  PMH: Agreed; PSxH: Agreed





Nursing Documentation-PMH


Past Medical History:  No History, Except For


Hx Cardiac Problems:  No - Arthritis


Hx Hypertension:  No


Hx Pacemaker:  No


Hx Asthma:  No


Hx COPD:  No


Hx Diabetes:  No


Hx Cancer:  No


Hx Gastrointestinal Problems:  No - Pyelonephritis


Hx Dialysis:  No - "Kidney problem"


History Of Psychiatric Problem:  No


Hx Neurological Problems:  No


Hx Cerebrovascular Accident:  No


Hx Seizures:  No


Hx Headaches:  Yes





Review of Systems


All Other Systems:  negative except mentioned in HPI





Physical Exam





Vital Signs








  Date Time  Temp Pulse Resp B/P (MAP) Pulse Ox O2 Delivery O2 Flow Rate FiO2


 


2/7/20 13:35 98.2 82 15 101/61 (74) 99 Room Air  








Sp02 EP Interpretation:  reviewed, normal


General Appearance:  well appearing, no apparent distress


Head:  normocephalic, atraumatic


Eyes:  bilateral eye PERRL, bilateral eye EOMI


ENT:  hearing grossly normal, normal pharynx, TMs + canals normal, uvula midline


Neck:  full range of motion, supple, no meningismus, no bony tend


Respiratory:  lungs clear, normal breath sounds, no rhonchi, no respiratory 

distress, no retraction, no accessory muscle use


Cardiovascular #1:  normal peripheral pulses, regular rate, rhythm, no edema, 

no gallop, no JVD, no murmur


Gastrointestinal:  normal bowel sounds, non tender, soft, no mass, no 

organomegaly, non-distended, no guarding, no hernia, no pulsatile mass, no 

rebound


Genitourinary:  no CVA tenderness


Musculoskeletal:  normal inspection


Neurologic:  motor strength/tone normal, CNs III-XII nml as tested, oriented x3

, sensory intact, responsive


Psychiatric:  mood/affect normal


Skin:  no rash


Lymphatic:  normal inspection, no adenopathy





Medical Decision Making


Diagnostic Impression:  


 Primary Impression:  


 Hyperkalemia


 Additional Impressions:  


 Renal failure (ARF), acute on chronic


 CKD (chronic kidney disease)


ER Course


Given the history and presentation blood work is initiated


EKG does show concerning findings with mildly peaked T waves potassium level 

does also come back elevated





Given change in EKG and the patient's history


She is provided with emergent medication and admitted for further care





Labs








Test


  2/7/20


14:00


 


White Blood Count


  4.0 K/UL


(4.8-10.8)


 


Red Blood Count


  3.12 M/UL


(4.20-5.40)


 


Hemoglobin


  9.4 G/DL


(12.0-16.0)


 


Hematocrit


  28.8 %


(37.0-47.0)


 


Mean Corpuscular Volume 92 FL (80-99) 


 


Mean Corpuscular Hemoglobin


  30.1 PG


(27.0-31.0)


 


Mean Corpuscular Hemoglobin


Concent 32.6 G/DL


(32.0-36.0)


 


Red Cell Distribution Width


  13.4 %


(11.6-14.8)


 


Platelet Count


  278 K/UL


(150-450)


 


Mean Platelet Volume


  5.4 FL


(6.5-10.1)


 


Neutrophils (%) (Auto)


  69.2 %


(45.0-75.0)


 


Lymphocytes (%) (Auto)


  19.6 %


(20.0-45.0)


 


Monocytes (%) (Auto)


  9.4 %


(1.0-10.0)


 


Eosinophils (%) (Auto)


  1.2 %


(0.0-3.0)


 


Basophils (%) (Auto)


  0.6 %


(0.0-2.0)


 


Sodium Level


  137 MMOL/L


(136-145)


 


Potassium Level


  5.8 MMOL/L


(3.5-5.1)


 


Chloride Level


  108 MMOL/L


()


 


Carbon Dioxide Level


  22 MMOL/L


(21-32)


 


Anion Gap


  7 mmol/L


(5-15)


 


Blood Urea Nitrogen


  50 mg/dL


(7-18)


 


Creatinine


  1.5 MG/DL


(0.55-1.30)


 


Estimat Glomerular Filtration


Rate  mL/min (>60) 


 


 


Glucose Level


  108 MG/DL


()


 


Calcium Level


  8.5 MG/DL


(8.5-10.1)








EKG Diagnostic Results


Rate:  normal


Rhythm:  NSR


ST Segments:  other - Peaked T wave





Rhythm Strip Diag. Results


EP Interpretation:  yes


Rate:  66


Rhythm:  NSR, no PVC's, no ectopy





Last Vital Signs








  Date Time  Temp Pulse Resp B/P (MAP) Pulse Ox O2 Delivery O2 Flow Rate FiO2


 


2/7/20 16:56 97.0 96 18 130/67 (88) 97   


 


2/7/20 13:43      Room Air  








Status:  improved


Disposition:  ADMITTED AS INPATIENT


Condition:  Serious


Referrals:  


NON PHYSICIAN (PCP)











Joey Dent DO Feb 7, 2020 17:06

## 2020-02-07 NOTE — NUR
NURSE NOTES:

Received patient from LANA Nick. Patient resting in bed. Awake, alert and talkative. Family at 
bedside. Bed locked and lowest position. Call light within reach. Will continue to monitor.

## 2020-02-07 NOTE — NUR
NURSE NOTES:

Received report from Lon SCHWARTZ. Pt transferred from ED via gurney with ED RN and ED tech. No 
c/o pain. No acute distress noted. Denied SOB. IV SITE In left hand 18g patent and 
asymptomatic. Call light within easy reach. side railx2 up for safety. cardiac monitor 
applied. Will page the Dr. Stephens for admission orders.

## 2020-02-08 VITALS — DIASTOLIC BLOOD PRESSURE: 63 MMHG | SYSTOLIC BLOOD PRESSURE: 108 MMHG

## 2020-02-08 VITALS — SYSTOLIC BLOOD PRESSURE: 116 MMHG | DIASTOLIC BLOOD PRESSURE: 72 MMHG

## 2020-02-08 VITALS — DIASTOLIC BLOOD PRESSURE: 58 MMHG | SYSTOLIC BLOOD PRESSURE: 117 MMHG

## 2020-02-08 VITALS — SYSTOLIC BLOOD PRESSURE: 116 MMHG | DIASTOLIC BLOOD PRESSURE: 60 MMHG

## 2020-02-08 VITALS — DIASTOLIC BLOOD PRESSURE: 72 MMHG | SYSTOLIC BLOOD PRESSURE: 126 MMHG

## 2020-02-08 VITALS — SYSTOLIC BLOOD PRESSURE: 114 MMHG | DIASTOLIC BLOOD PRESSURE: 62 MMHG

## 2020-02-08 LAB
% IRON SATURATION: 15 % (ref 15–50)
ADD MANUAL DIFF: NO
ALBUMIN SERPL-MCNC: 2.6 G/DL (ref 3.4–5)
ALBUMIN/GLOB SERPL: 0.5 {RATIO} (ref 1–2.7)
ALP SERPL-CCNC: 89 U/L (ref 46–116)
ALT SERPL-CCNC: 21 U/L (ref 12–78)
ANION GAP SERPL CALC-SCNC: 7 MMOL/L (ref 5–15)
AST SERPL-CCNC: 25 U/L (ref 15–37)
BASOPHILS NFR BLD AUTO: 0.8 % (ref 0–2)
BILIRUB SERPL-MCNC: 0.2 MG/DL (ref 0.2–1)
BUN SERPL-MCNC: 41 MG/DL (ref 7–18)
CALCIUM SERPL-MCNC: 8.6 MG/DL (ref 8.5–10.1)
CHLORIDE SERPL-SCNC: 108 MMOL/L (ref 98–107)
CO2 SERPL-SCNC: 24 MMOL/L (ref 21–32)
CREAT SERPL-MCNC: 1.7 MG/DL (ref 0.55–1.3)
EOSINOPHIL NFR BLD AUTO: 1.7 % (ref 0–3)
ERYTHROCYTE [DISTWIDTH] IN BLOOD BY AUTOMATED COUNT: 13.6 % (ref 11.6–14.8)
FERRITIN SERPL-MCNC: 109 NG/ML (ref 8–388)
GLOBULIN SER-MCNC: 5.2 G/DL
HCT VFR BLD CALC: 29.4 % (ref 37–47)
HGB BLD-MCNC: 9.8 G/DL (ref 12–16)
IRON SERPL-MCNC: 38 UG/DL (ref 50–175)
LYMPHOCYTES NFR BLD AUTO: 17.4 % (ref 20–45)
MCV RBC AUTO: 93 FL (ref 80–99)
MONOCYTES NFR BLD AUTO: 8.2 % (ref 1–10)
NEUTROPHILS NFR BLD AUTO: 71.9 % (ref 45–75)
PHOSPHATE SERPL-MCNC: 3.7 MG/DL (ref 2.5–4.9)
PLATELET # BLD: 302 K/UL (ref 150–450)
POTASSIUM SERPL-SCNC: 4.8 MMOL/L (ref 3.5–5.1)
RBC # BLD AUTO: 3.17 M/UL (ref 4.2–5.4)
SODIUM SERPL-SCNC: 139 MMOL/L (ref 136–145)
TIBC SERPL-MCNC: 257 UG/DL (ref 250–450)
UNSATURATED IRON BINDING: 219 UG/DL (ref 112–346)
WBC # BLD AUTO: 4.4 K/UL (ref 4.8–10.8)

## 2020-02-08 RX ADMIN — DOCUSATE SODIUM SCH MG: 100 CAPSULE, LIQUID FILLED ORAL at 17:58

## 2020-02-08 RX ADMIN — DOCUSATE SODIUM SCH MG: 100 CAPSULE, LIQUID FILLED ORAL at 15:29

## 2020-02-08 RX ADMIN — DEXTROSE AND SODIUM CHLORIDE SCH MLS/HR: 5; .45 INJECTION, SOLUTION INTRAVENOUS at 22:39

## 2020-02-08 RX ADMIN — DOCUSATE SODIUM SCH MG: 100 CAPSULE, LIQUID FILLED ORAL at 10:01

## 2020-02-08 NOTE — NUR
HAND-OFF: 

Report given to 4E RN. Pt stable. Now in 408-2. Tele box removed and son alerted of 
transfer.

## 2020-02-08 NOTE — NUR
RD ASSESSMENT & RECOMMENDATIONS

SEE CARE ACTIVITY FOR COMPLETE ASSESSMENT



DAILY ESTIMATED NEEDS:

Needs based on Underweight/ 42kg 

30-35  kcals/kg 

9553-2458  total kcals

1-1.5  g protein/kg

42-63  g total protein 

25-30  mL/kg

6634-3889  total fluid mLs



NUTRITION DIAGNOSIS:

(1) Increased kcal/pro needs R/T underweight status as evidenced by

pt @ 92% IBW, underweight BMI per guidelines.

(2) Altered nutrition related lab values r/t acute kidney injury, clinical

condition as evidenced by elev K (5.8 upon adm -> now wnl), elev creat

(1.7)





CURRENT DIET:Renal    



 



PO DIET RECOMMENDATIONS:

LOW NA,  LOW POTASSIUM diet/ soft easy chew  



 



ADDITIONAL RECOMMENDATIONS:

* Weekly calibrated bedscale wt 

* Monitor K closely: 5.8 -> wnl 

* Nepro x 1 (425kcal/19g prot per [place])- vanilla flavor per pt request 

* Monitor for continued good PO intake 

. 

.

## 2020-02-08 NOTE — CONSULTATION
Consult Note


Consult Note





asked to eval by Dr Stephens for high K and renal failure


poor historian





ER





Patient has known history of renal insufficiency


Was getting routine blood work when her potassium was elevated I do not have 

access to the specific number


Denies any chest pain denies any vomiting denies any palpitations denies any 

focal weakness


Denies any change in medications denies any recent travel or trauma





No Known Allergies (Unverified , 2/14/16)





examined


data reviewed





.


Assessment/Plan








(1) MIKA (acute kidney injury)


(2) Renal failure (ARF), acute on chronic


(3) Severe anemia


(4) Protein calorie mal nutrition- BMI 17.2


(5) h/o UTI





Others:


h/o bronchitis


h/o abdominal pain and nausea, possibly enteritis ( by CT scan) 


RA 








Plan


one dose IV Iron


Avoid nephrotoxics


hydrate-


Anemia workup











Demar Mayo MD Feb 8, 2020 09:44

## 2020-02-08 NOTE — NUR
NURSE NOTES:

Received pt from LANA Tucker. Pt awake, alert, and talkative. Bed in lowest position. Son at 
bedside. Call light within reach. Will continue to monitor.

## 2020-02-08 NOTE — CONSULTATION
History of Present Illness


General


Date patient seen:  Feb 8, 2020


Time patient seen:  09:30


Chief Complaint:  Abnormal Labs


Referring physician:  dr Stephens


Reason for Consultation:  hx of bronchtiis, hx of Mycobacterim PNA





Present Illness


HPI


74 years old female with past medical history of rheumatoid arthritis, renal 

insufficiency, hyperkalemia, malnutrition, history of bronchitis , pneumonia 

with Mycobacterium  avium complex , emphysema,  presented  for evaluation and 

found to have evidence of renal insufficiency and hyperkalemia.  


Patient denies chest pain , shortness of breath, cough.


Upon evaluation potassium was 5.8.  


BUN 50, creatinine 1.5.  


Glucose 108.  


No leukocytosis,  WBC 4.0 ,hemoglobin 9.4 ,hematocrit 28.8 ,platelet count 278.

  


Urinalysis revealed no evidence of urinary tract infection.  


Patient subsequently admitted for further management.


Allergies:  


Coded Allergies:  


     No Known Allergies (Unverified , 2/14/16)





Medication History


Scheduled


Hydroxychloroquine Sulfate (Hydroxychloroquine Sulfate), 200 MG PO DAILY, (

Reported)





Scheduled PRN


Ibuprofen* (Advil*), Unknown Dose ORAL PRN PRN for Mild Pain/Temp > 100.5, (

Reported)





Discontinued Medications


Aspirin* (Aspir 81*), 81 MG ORAL DAILY, (Reported)


   Discontinued Reason: Pt stopped taking med


Calcium Carbonate (Calcium), 500 MG PO DAILY, (Reported)


   Discontinued Reason: Pt stopped taking med


Calcium Carbonate/Vitamin D3 (Calcium + Vitamin D Tablet), 1 EACH PO BID, (

Reported)


   Discontinued Reason: Pt stopped taking med


Multivits-Min/Iron/FA/Lutein (Centrum Silver Women Tablet), 1 EACH PO DAILY, (

Reported)


   Discontinued Reason: Pt stopped taking med





Patient History


History Provided By:  Patient


Healthcare decision maker





Resuscitation status


Full Code


Advanced Directive on File








Past Medical/Surgical History


Past Medical/Surgical History:  


(1) Emphysema lung


(2) Purulent bronchitis


(3) Severe anemia


(4) Pneumonia


(5) Acute respiratory failure


(6) Renal failure (ARF), acute on chronic


(7) CKD (chronic kidney disease)


(8) Renal failure (ARF), acute on chronic


(9) LESLY (mycobacterium avium-intracellulare)


(10) Severe protein-calorie malnutrition


(11) MIKA (acute kidney injury)





Review of Systems


Constitutional:  Reports: no symptoms


Eye:  Reports: no symptoms


ENT:  Reports: no symptoms


Respiratory:  Reports: other - hx of PNA, COPD, bronchitis,


Cardiovascular:  Reports: no symptoms


Gastrointestinal:  Reports: no symptoms


Genitourinary:  Reports: no symptoms


Musculoskeletal:  Reports: joint pain, joint swelling, muscle stiffness, other 

- RA


Skin:  Reports: no symptoms


Psychiatric:  Reports: no symptoms


Neurological:  Reports: no symptoms


Endocrine:  Reports: no symptoms


Hematologic/Lymphatic:  Reports: anemia





Physical Exam


General Appearance:  no apparent distress, other - frail cachetic  

female in NAD


Lines, tubes and drains:  peripheral


HEENT:  normocephalic, atraumatic, anicteric, mucous membranes moist


Neck:  non-tender, supple


Respiratory/Chest:  decreased breath sounds


Cardiovascular/Chest:  normal peripheral pulses, normal rate, regular rhythm


Abdomen:  normal bowel sounds, non tender, soft


Skin Exam:  warm/dry


Neurologic:  no motor/sensory deficits, alert, oriented x 3, responsive


Musculoskeletal:  normal muscle bulk





Last 24 Hour Vital Signs








  Date Time  Temp Pulse Resp B/P (MAP) Pulse Ox O2 Delivery O2 Flow Rate FiO2


 


2/8/20 04:00 97.8 62 18 117/58 (77) 97   


 


2/8/20 03:45  57      


 


2/8/20 00:00 98.1 63 18 126/72 (90) 96   


 


2/7/20 23:51  57      


 


2/7/20 21:00      Room Air  


 


2/7/20 20:01  69      


 


2/7/20 20:00 97.3 68 18 125/65 (85) 97   


 


2/7/20 18:35      Room Air  


 


2/7/20 16:56 97.0 96 18 130/67 (88) 97   


 


2/7/20 16:45 98.1 72 18 138/86 100 Room Air  


 


2/7/20 13:43 98.2 72 15 112/60 99 Room Air  


 


2/7/20 13:35 98.2 82 15 101/61 (74) 99 Room Air  

















Intake and Output  


 


 2/7/20 2/8/20





 19:00 07:00


 


Intake Total 0 ml 700 ml


 


Balance 0 ml 700 ml


 


  


 


Intake Oral 0 ml 100 ml


 


IV Total  600 ml


 


# Voids  2











Laboratory Tests








Test


  2/7/20


14:00 2/7/20


19:30 2/8/20


06:45


 


White Blood Count


  4.0 K/UL


(4.8-10.8)  L 


  4.4 K/UL


(4.8-10.8)  L


 


Red Blood Count


  3.12 M/UL


(4.20-5.40)  L 


  3.17 M/UL


(4.20-5.40)  L


 


Hemoglobin


  9.4 G/DL


(12.0-16.0)  L 


  9.8 G/DL


(12.0-16.0)  L


 


Hematocrit


  28.8 %


(37.0-47.0)  L 


  29.4 %


(37.0-47.0)  L


 


Mean Corpuscular Volume 92 FL (80-99)    93 FL (80-99)  


 


Mean Corpuscular Hemoglobin


  30.1 PG


(27.0-31.0) 


  30.9 PG


(27.0-31.0)


 


Mean Corpuscular Hemoglobin


Concent 32.6 G/DL


(32.0-36.0) 


  33.4 G/DL


(32.0-36.0)


 


Red Cell Distribution Width


  13.4 %


(11.6-14.8) 


  13.6 %


(11.6-14.8)


 


Platelet Count


  278 K/UL


(150-450) 


  302 K/UL


(150-450)


 


Mean Platelet Volume


  5.4 FL


(6.5-10.1)  L 


  5.4 FL


(6.5-10.1)  L


 


Neutrophils (%) (Auto)


  69.2 %


(45.0-75.0) 


  71.9 %


(45.0-75.0)


 


Lymphocytes (%) (Auto)


  19.6 %


(20.0-45.0)  L 


  17.4 %


(20.0-45.0)  L


 


Monocytes (%) (Auto)


  9.4 %


(1.0-10.0) 


  8.2 %


(1.0-10.0)


 


Eosinophils (%) (Auto)


  1.2 %


(0.0-3.0) 


  1.7 %


(0.0-3.0)


 


Basophils (%) (Auto)


  0.6 %


(0.0-2.0) 


  0.8 %


(0.0-2.0)


 


Sodium Level


  137 MMOL/L


(136-145) 


  139 MMOL/L


(136-145)


 


Potassium Level


  5.8 MMOL/L


(3.5-5.1)  H 


  4.8 MMOL/L


(3.5-5.1)


 


Chloride Level


  108 MMOL/L


()  H 


  108 MMOL/L


()  H


 


Carbon Dioxide Level


  22 MMOL/L


(21-32) 


  24 MMOL/L


(21-32)


 


Anion Gap


  7 mmol/L


(5-15) 


  7 mmol/L


(5-15)


 


Blood Urea Nitrogen


  50 mg/dL


(7-18)  H 


  41 mg/dL


(7-18)  H


 


Creatinine


  1.5 MG/DL


(0.55-1.30)  H 


  1.7 MG/DL


(0.55-1.30)  H


 


Estimat Glomerular Filtration


Rate  mL/min (>60)  


  


   mL/min (>60)  


 


 


Glucose Level


  108 MG/DL


()  H 


  93 MG/DL


()


 


Calcium Level


  8.5 MG/DL


(8.5-10.1) 


  8.6 MG/DL


(8.5-10.1)


 


Urine Color  Pale yellow   


 


Urine Appearance  Clear   


 


Urine pH  6 (4.5-8.0)   


 


Urine Specific Gravity


  


  1.010


(1.005-1.035) 


 


 


Urine Protein


  


  3+ (NEGATIVE)


H 


 


 


Urine Glucose (UA)


  


  Negative


(NEGATIVE) 


 


 


Urine Ketones


  


  Negative


(NEGATIVE) 


 


 


Urine Blood


  


  4+ (NEGATIVE)


H 


 


 


Urine Nitrite


  


  Negative


(NEGATIVE) 


 


 


Urine Bilirubin


  


  Negative


(NEGATIVE) 


 


 


Urine Urobilinogen


  


  Normal MG/DL


(0.0-1.0) 


 


 


Urine Leukocyte Esterase


  


  Negative


(NEGATIVE) 


 


 


Urine RBC


  


  2-4 /HPF (0 -


2)  H 


 


 


Urine WBC


  


  0-2 /HPF (0 -


2) 


 


 


Urine Squamous Epithelial


Cells 


  None /LPF


(NONE/OCC) 


 


 


Urine Bacteria


  


  None /HPF


(NONE) 


 


 


Urine Random Sodium


  


  71 mmol/L


() 


 


 


Uric Acid


  


  


  6.4 MG/DL


(2.6-7.2)


 


Phosphorus Level


  


  


  3.7 MG/DL


(2.5-4.9)


 


Magnesium Level


  


  


  2.1 MG/DL


(1.8-2.4)


 


Iron Level


  


  


  38 ug/dL


()  L


 


Total Iron Binding Capacity


  


  


  257 ug/dL


(250-450)


 


Percent Iron Saturation   15 % (15-50)  


 


Unsaturated Iron Binding


  


  


  219 ug/dL


(112-346)


 


Ferritin


  


  


  109 NG/ML


(8-388)


 


Total Bilirubin


  


  


  0.2 MG/DL


(0.2-1.0)


 


Aspartate Amino Transf


(AST/SGOT) 


  


  25 U/L (15-37)


 


 


Alanine Aminotransferase


(ALT/SGPT) 


  


  21 U/L (12-78)


 


 


Alkaline Phosphatase


  


  


  89 U/L


()


 


Troponin I


  


  


  0.000 ng/mL


(0.000-0.056)


 


Total Protein


  


  


  7.8 G/DL


(6.4-8.2)


 


Albumin


  


  


  2.6 G/DL


(3.4-5.0)  L


 


Globulin   5.2 g/dL  


 


Albumin/Globulin Ratio


  


  


  0.5 (1.0-2.7)


L


 


Vitamin B12 Level


  


  


  647 PG/ML


(193-986)


 


Folate


  


  


  14.0 NG/ML


(8.6-58.9)


 


Thyroid Stimulating Hormone


(TSH) 


  


  2.356 uiU/mL


(0.358-3.740)








Height (Feet):  5


Height (Inches):  0.00


Weight (Pounds):  88


Medications





Current Medications








 Medications


  (Trade)  Dose


 Ordered  Sig/Naman


 Route


 PRN Reason  Start Time


 Stop Time Status Last Admin


Dose Admin


 


 Dextrose/Sodium


 Chloride  1,000 ml @ 


 75 mls/hr  [id number]


 IV


   2/8/20 10:00


 3/8/20 09:59  2/8/20 10:02


 


 


 Docusate Sodium


  (Colace)  100 mg  THREE TIMES A  DAY


 ORAL


   2/7/20 18:00


 3/8/20 17:59  2/8/20 10:01


 


 


 Heparin Sodium


  (Porcine)


  (Heparin 5000


 units/ml)  5,000 units  EVERY 12  HOURS


 SUBQ


   2/8/20 21:00


 3/9/20 20:59   


 


 


 Hydroxychloroquine


 Sulfate


  (Plaquenil)  200 mg  DAILY


 ORAL


   2/8/20 09:00


 3/9/20 08:59  2/8/20 10:01


 


 


 Pantoprazole


  (Protonix)  40 mg  EVERY 12  HOURS


 ORAL


   2/7/20 21:00


 3/8/20 20:59  2/8/20 10:01


 











Assessment/Plan


Assessment/Plan:


ASSESSMENT


Hyperkalemia


MIKA on CRI


COPD/emphysema lung


Anemia 


Protein calorie malnutrition


RA


Hx of Mycobacterium avium PNA





PLAN OF CARE


tele


O2 HHN prn


check CXR 


DVT prophayxlis 


Hyperkalemia- treated, resolved 


IVF, monitor renal parametees, correct electrolytes as neeed


nephro on board


avoid nephrotoxic, NSAIDS was DC  ( probably was used for RA)


pt will need other means for pain management for RA than NSAID


Plaquenil continue


anemia w/up noted, 1 dose of Venofer as ordered by nephro 


check stool OB and CEA ( prior in 2019 negative)


GI prophayxlsi  dietary eval


can be transferred to [facility] 





case discussed and evaluated by supervising physician











Minal Barnes NP Feb 8, 2020 12:17

## 2020-02-08 NOTE — NUR
NURSE NOTES:

Received report from LANA Cerna. AAO x 4, on room air. Greek speaking. Pt transferred from 
tele. IV site intact and running IVF. All belongings checked. Skin is intact. No acute 
distress noted. Vitals 112/65 BP, 74 HR, 95%, 98.2F. Bed locked, lowest position, alarm on, 
side rails up, call light within reach. Will continue to monitor.

## 2020-02-08 NOTE — NUR
NURSE NOTES:

Pt alert X4 and awake.  No complains of pain.  Bed is locked and in lowest position.  Call 
light within reach.  Pt on cardiac monitor no signs of cardiac or respiratory distress at 
this moment.  Will continue to follow plan of care.

## 2020-02-08 NOTE — DIAGNOSTIC IMAGING REPORT
EXAM:

  XR Chest, 1 View

 

CLINICAL HISTORY:

  SOB

 

TECHNIQUE:

  Frontal view of the chest.

 

COMPARISON:

  No relevant prior studies available.

 

FINDINGS:

  Lungs:  Thickening of interstitial markings.

  Pleural space:  Unremarkable.  No pneumothorax.

  Heart: Mild cardiomegaly.

  Mediastinum:  Unremarkable.

  Bones/joints:  No acute fracture.

 

IMPRESSION:     

  Thickening of interstitial markings.

## 2020-02-08 NOTE — CDS PHYSICIAN QUERY
Clarification is required for compliance, coding accuracy, and to reflect 
severity of illness for this patient





Dear Demar Kenyon MD           Date: 2/8/2020



/CDS Name: Jeremie Jacobs            





Assessment/Plan

(1) MIKA (acute kidney injury)

(2) Renal failure (ARF), acute on chronic

(3) Severe anemia

(4) Protein calorie mal nutrition- BMI 17.2

(5) h/o UTI



Albumin: 2.6







Please select the most appropriate option:



[] Protein/Calorie Malnutrition  

               [] Mild       

               [] Moderate        

               [] Severe







Present on Admission:  []  Yes          []  No         []  Clinically 
Undetermined





_________________                                    _____________

Physician signature                                       Date



Please also document in your Progress Notes and/or Discharge Summary and 
indicate if the condition was present on admission.

MTDD

## 2020-02-08 NOTE — HISTORY AND PHYSICAL REPORT
DATE OF ADMISSION:  02/07/2020

DATE AND TIME SEEN:  02/08/2020 at 8 a.m.



CONSULTANT:  Demar Mayo M.D.



CHIEF COMPLAINT:  Weakness, hyperkalemia, and renal insufficiency.



BRIEF HISTORY:  This is a 74-year-old female, who lives at home, presents

with two-day increased weakness, was found to have hyperkalemia and was

transferred to [place], diagnosed with the above, admitted to telemetry

for further care.  Currently, calm, in bed.  No complaint.  No chest pain.

No shortness of breath.  No nausea, vomiting, or diarrhea.



PAST MEDICAL HISTORY:  Renal insufficiency, respiratory failure, anemia,

CKD, malnutrition, and emphysema.



PAST SURGICAL HISTORY:  None.



ALLERGIES:  Denies.



MEDICATIONS:  Include aspirin, Plaquenil, Protonix, Advil, and Colace.



SOCIAL HISTORY:  No smoking.  No alcohol.  No intravenous drug

abuse.



FAMILY HISTORY:  Noncontributory.



PHYSICAL EXAMINATION:

GENERAL:  Calm in bed, oriented x2, in no acute distress.

VITAL SIGNS:  Temperature 97 degrees, pulse 62, respirations 18, and blood

pressure 117/58.

CARDIOVASCULAR:  No murmur.

LUNGS:  Clear and distant.

ABDOMEN:  Bowel sound positive.  Nontender.  Nondistended.

EXTREMITIES:  No cyanosis, clubbing, or edema.

NEUROLOGIC:  The patient moves all extremities, slightly weak.



LABORATORY AND DIAGNOSTIC DATA:  Labs at this time show white count 4.4, H

and H 9.8/29, otherwise CBC is normal.  BMP - initial potassium 5.8, now

is 4.8; chloride 108; BUN and creatinine 41/1.7 _____ 38.  Albumin 2.6.

Troponin 0.00.  Urinalysis, 4+ blood, 3+ protein.



ASSESSMENT:

1. Hyperkalemia.

2. Renal insufficiency.

3. Weakness.

4. History of respiratory failure.

5. Emphysema.



PLAN:

1. O2 and pulmonary treatment

2. IV fluids.

3. Dietary followup.

4. Nephrology followup.

5. Resume home medications.

6. PT and dietary evaluation.

7. CBC and BMP in the morning.









  ______________________________________________

  Flaco Stephens D.O.





DR:  GARETH

D:  02/08/2020 08:42

T:  02/08/2020 19:34

JOB#:  [id number]

CC:

## 2020-02-09 VITALS — DIASTOLIC BLOOD PRESSURE: 56 MMHG | SYSTOLIC BLOOD PRESSURE: 110 MMHG

## 2020-02-09 VITALS — SYSTOLIC BLOOD PRESSURE: 123 MMHG | DIASTOLIC BLOOD PRESSURE: 65 MMHG

## 2020-02-09 VITALS — DIASTOLIC BLOOD PRESSURE: 71 MMHG | SYSTOLIC BLOOD PRESSURE: 128 MMHG

## 2020-02-09 VITALS — SYSTOLIC BLOOD PRESSURE: 119 MMHG | DIASTOLIC BLOOD PRESSURE: 65 MMHG

## 2020-02-09 VITALS — DIASTOLIC BLOOD PRESSURE: 63 MMHG | SYSTOLIC BLOOD PRESSURE: 113 MMHG

## 2020-02-09 VITALS — DIASTOLIC BLOOD PRESSURE: 57 MMHG | SYSTOLIC BLOOD PRESSURE: 113 MMHG

## 2020-02-09 LAB
ADD MANUAL DIFF: NO
ANION GAP SERPL CALC-SCNC: 7 MMOL/L (ref 5–15)
BASOPHILS NFR BLD AUTO: 0.7 % (ref 0–2)
BUN SERPL-MCNC: 34 MG/DL (ref 7–18)
CALCIUM SERPL-MCNC: 8.6 MG/DL (ref 8.5–10.1)
CHLORIDE SERPL-SCNC: 109 MMOL/L (ref 98–107)
CO2 SERPL-SCNC: 23 MMOL/L (ref 21–32)
CREAT SERPL-MCNC: 1.6 MG/DL (ref 0.55–1.3)
EOSINOPHIL NFR BLD AUTO: 1.2 % (ref 0–3)
ERYTHROCYTE [DISTWIDTH] IN BLOOD BY AUTOMATED COUNT: 13.4 % (ref 11.6–14.8)
HCT VFR BLD CALC: 28.2 % (ref 37–47)
HGB BLD-MCNC: 9.4 G/DL (ref 12–16)
LYMPHOCYTES NFR BLD AUTO: 22 % (ref 20–45)
MCV RBC AUTO: 92 FL (ref 80–99)
MONOCYTES NFR BLD AUTO: 8.2 % (ref 1–10)
NEUTROPHILS NFR BLD AUTO: 67.9 % (ref 45–75)
PLATELET # BLD: 280 K/UL (ref 150–450)
POTASSIUM SERPL-SCNC: 4.2 MMOL/L (ref 3.5–5.1)
RBC # BLD AUTO: 3.08 M/UL (ref 4.2–5.4)
SODIUM SERPL-SCNC: 139 MMOL/L (ref 136–145)
WBC # BLD AUTO: 4.6 K/UL (ref 4.8–10.8)

## 2020-02-09 RX ADMIN — HEPARIN SODIUM SCH UNITS: 5000 INJECTION INTRAVENOUS; SUBCUTANEOUS at 09:08

## 2020-02-09 RX ADMIN — DOCUSATE SODIUM SCH MG: 100 CAPSULE, LIQUID FILLED ORAL at 13:23

## 2020-02-09 RX ADMIN — HEPARIN SODIUM SCH UNITS: 5000 INJECTION INTRAVENOUS; SUBCUTANEOUS at 20:05

## 2020-02-09 RX ADMIN — DEXTROSE AND SODIUM CHLORIDE SCH MLS/HR: 5; .45 INJECTION, SOLUTION INTRAVENOUS at 13:23

## 2020-02-09 RX ADMIN — DOCUSATE SODIUM SCH MG: 100 CAPSULE, LIQUID FILLED ORAL at 09:06

## 2020-02-09 RX ADMIN — DOCUSATE SODIUM SCH MG: 100 CAPSULE, LIQUID FILLED ORAL at 17:24

## 2020-02-09 NOTE — GENERAL PROGRESS NOTE
Assessment/Plan


Problem List:  


(1) Anemia


ICD Codes:  D64.9 - Anemia, unspecified


SNOMED:  443962766


(2) Renal insufficiency


ICD Codes:  N28.9 - Disorder of kidney and ureter, unspecified


SNOMED:  723467468, 626666711


(3) Hyperkalemia


ICD Codes:  E87.5 - Hyperkalemia


SNOMED:  45998713


(4) CKD (chronic kidney disease)


ICD Codes:  N18.9 - Chronic kidney disease, unspecified


SNOMED:  271919280


(5) Emphysema lung


ICD Codes:  J43.9 - Emphysema, unspecified


SNOMED:  63700050


Status:  unchanged


Assessment/Plan:


pt diet o2 pulm tx prn neph f/u cbc bmp am dc plan w hh





Subjective


Constitutional:  Reports: weakness


Allergies:  


Coded Allergies:  


     No Known Allergies (Unverified , 2/14/16)


All Systems:  reviewed and negative except above


Subjective


calm in bed





Objective





Last 24 Hour Vital Signs








  Date Time  Temp Pulse Resp B/P (MAP) Pulse Ox O2 Delivery O2 Flow Rate FiO2


 


2/9/20 04:00 98.2 70 18 113/63 (80) 97   


 


2/9/20 00:00 99.0 71 18 110/56 (74) 96   


 


2/8/20 21:00      Room Air  


 


2/8/20 20:00 99.0 89 16 116/60 (78) 96   


 


2/8/20 16:00 98.4 72 16 108/63 (78) 97   


 


2/8/20 16:00  61      


 


2/8/20 12:00  68      


 


2/8/20 12:00 97.8 68 16 114/62 (79) 97   


 


2/8/20 09:00      Room Air  

















Intake and Output  


 


 2/8/20 2/9/20





 19:00 07:00


 


Intake Total 800 ml 1050 ml


 


Balance 800 ml 1050 ml


 


  


 


Intake Oral 800 ml 600 ml


 


IV Total  450 ml


 


# Voids 3 6








Laboratory Tests


2/8/20 18:30: Stool Occult Blood [Pending]


2/9/20 06:20: 


White Blood Count 4.6L, Red Blood Count 3.08L, Hemoglobin 9.4L, Hematocrit 28.2L

, Mean Corpuscular Volume 92, Mean Corpuscular Hemoglobin 30.7, Mean 

Corpuscular Hemoglobin Concent 33.5, Red Cell Distribution Width 13.4, Platelet 

Count 280, Mean Platelet Volume 5.7L, Neutrophils (%) (Auto) 67.9, Lymphocytes (

%) (Auto) 22.0, Monocytes (%) (Auto) 8.2, Eosinophils (%) (Auto) 1.2, Basophils 

(%) (Auto) 0.7, Sodium Level 139, Potassium Level 4.2, Chloride Level 109H, 

Carbon Dioxide Level 23, Anion Gap 7, Blood Urea Nitrogen 34H, Creatinine 1.6H, 

Estimat Glomerular Filtration Rate , Glucose Level 99, Calcium Level 8.6


Height (Feet):  5


Height (Inches):  0.00


Weight (Pounds):  88


General Appearance:  lethargic


EENT:  normal ENT inspection


Neck:  normal alignment


Cardiovascular:  normal peripheral pulses, normal rate, regular rhythm


Respiratory/Chest:  chest wall non-tender, lungs clear, normal breath sounds


Abdomen:  normal bowel sounds, non tender, soft


Extremities:  normal inspection


Edema:  no edema noted Arm (L), no edema noted Arm (R), no edema noted Leg (L), 

no edema noted Leg (R), no edema noted Pedal (L), no edema noted Pedal (R), no 

edema noted Generalized


Neurologic:  motor weakness


Skin:  normal pigmentation, warm/dry











Flaco Stephens DO Feb 9, 2020 08:52

## 2020-02-09 NOTE — NUR
PT Note

PT eval completed. Patient is independent in all mobility and gait without any AD. No loss 
of balance noted. Denies any dizziness. No further PT treatments needed at this time. 

-------------------------------------------------------------------------------

Addendum: 02/09/20 at 0941 by MANNIE WILKS PT

-------------------------------------------------------------------------------

Amended: Links added.

## 2020-02-09 NOTE — PULMONOLOGY PROGRESS NOTE
Assessment/Plan


Assessment/Plan


ASSESSMENT


Hyperkalemia-resolved 


MIKA on CRI


COPD/emphysema lung


Anemia 


Protein calorie malnutrition


RA


Hx of Mycobacterium avium PNA





PLAN OF CARE


[facility] floor 


O2 HHN prn


check CXR - with  thickening of interstitial markings 


DVT prophayxlis 


Hyperkalemia- treated, resolved 


IVF, monitor renal parameters, correct electrolytes as neeed


nephro on board


avoid nephrotoxic, NSAIDS was DC  ( probably was used for RA)


pt will need other means for pain management for RA than NSAID


Plaquenil continue


anemia w/up noted, 1 dose of Venofer as ordered by nephro 


stool OB P  and CEA  WNL ( prior in 2019 negative)


GI prophayxlsi  dietary eval


 





case discussed and evaluated by supervising physician





Subjective


Allergies:  


Coded Allergies:  


     No Known Allergies (Unverified , 2/14/16)


Subjective


remains respiratory wise stable 


on RA pulse ox stable, no cough, no congestion, no SOB


CXR noted





Objective





Last 24 Hour Vital Signs








  Date Time  Temp Pulse Resp B/P (MAP) Pulse Ox O2 Delivery O2 Flow Rate FiO2


 


2/9/20 09:03  82 20  96 Room Air  21


 


2/9/20 09:00      Room Air  


 


2/9/20 08:00 98.7 80 18 113/57 (75) 97   


 


2/9/20 04:00 98.2 70 18 113/63 (80) 97   


 


2/9/20 00:00 99.0 71 18 110/56 (74) 96   


 


2/8/20 21:00      Room Air  


 


2/8/20 20:00 99.0 89 16 116/60 (78) 96   


 


2/8/20 16:00 98.4 72 16 108/63 (78) 97   


 


2/8/20 16:00  61      


 


2/8/20 12:00  68      


 


2/8/20 12:00 97.8 68 16 114/62 (79) 97   

















Intake and Output  


 


 2/8/20 2/9/20





 19:00 07:00


 


Intake Total 800 ml 1050 ml


 


Balance 800 ml 1050 ml


 


  


 


Intake Oral 800 ml 600 ml


 


IV Total  450 ml


 


# Voids 3 6








Objective


General Appearance:  no apparent distress,   frail cachetic  female in 

NAD


Lines, tubes and drains:  peripheral


HEENT:  normocephalic, atraumatic, anicteric, mucous membranes moist


Neck:  non-tender, supple


Respiratory/Chest:  decreased breath sounds


Cardiovascular/Chest:  normal peripheral pulses, normal rate, regular rhythm


Abdomen:  normal bowel sounds, non tender, soft


Skin Exam:  warm/dry


Neurologic:  no motor/sensory deficits, alert, oriented x 3, responsive


Musculoskeletal:  normal muscle bulk


Laboratory Tests


2/8/20 18:30: Stool Occult Blood [Pending]


2/9/20 06:20: 


White Blood Count 4.6L, Red Blood Count 3.08L, Hemoglobin 9.4L, Hematocrit 28.2L

, Mean Corpuscular Volume 92, Mean Corpuscular Hemoglobin 30.7, Mean 

Corpuscular Hemoglobin Concent 33.5, Red Cell Distribution Width 13.4, Platelet 

Count 280, Mean Platelet Volume 5.7L, Neutrophils (%) (Auto) 67.9, Lymphocytes (

%) (Auto) 22.0, Monocytes (%) (Auto) 8.2, Eosinophils (%) (Auto) 1.2, Basophils 

(%) (Auto) 0.7, Sodium Level 139, Potassium Level 4.2, Chloride Level 109H, 

Carbon Dioxide Level 23, Anion Gap 7, Blood Urea Nitrogen 34H, Creatinine 1.6H, 

Estimat Glomerular Filtration Rate , Glucose Level 99, Calcium Level 8.6





Current Medications








 Medications


  (Trade)  Dose


 Ordered  Sig/Naman


 Route


 PRN Reason  Start Time


 Stop Time Status Last Admin


Dose Admin


 


 Albuterol/


 Ipratropium


  (Albuterol/


 Ipratropium)  3 ml  Q4H  PRN


 HHN


 Shortness of Breath  2/8/20 23:45


 2/13/20 11:44   


 


 


 Dextrose/Sodium


 Chloride  1,000 ml @ 


 75 mls/hr  [id number]


 IV


   2/8/20 22:30


 3/8/20 09:59  2/8/20 22:39


 


 


 Docusate Sodium


  (Colace)  100 mg  THREE TIMES A  DAY


 ORAL


   2/9/20 09:00


 3/8/20 17:59  2/9/20 09:06


 


 


 Heparin Sodium


  (Porcine)


  (Heparin 5000


 units/ml)  5,000 units  EVERY 12  HOURS


 SUBQ


   2/9/20 09:00


 3/9/20 20:59  2/9/20 09:08


 


 


 Hydroxychloroquine


 Sulfate


  (Plaquenil)  200 mg  DAILY


 ORAL


   2/9/20 09:00


 3/9/20 08:59  2/9/20 09:06


 


 


 Pantoprazole


  (Protonix)  40 mg  EVERY 12  HOURS


 ORAL


   2/9/20 09:00


 3/8/20 20:59  2/9/20 09:06


 

















Minal Barnes NP Feb 9, 2020 10:49

## 2020-02-09 NOTE — NUR
NURSE NOTES:

Received report from LANA Lowry. AAO x 4, on room air. Citizen of [place] speaking. Family at bedside. 
IV site intact and running IVF. No acute distress noted. Bed locked, lowest position, alarm 
on, side rails up, call light within reach. Will continue to monitor.

## 2020-02-09 NOTE — NEPHROLOGY PROGRESS NOTE
Assessment/Plan


Problem List:  


(1) MIKA (acute kidney injury)


(2) Severe anemia


(3) Severe protein-calorie malnutrition


Assessment:  BMI 17.2





Assessment





(1) MIKA (acute kidney injury)


(2) Renal failure (ARF), acute on chronic


(3) Severe anemia


(4) Protein calorie mal nutrition- BMI 17.2


(5) h/o UTI





Others:


h/o bronchitis


h/o abdominal pain and nausea, possibly enteritis ( by CT scan) 


RA


Plan





Plan


one dose IV Iron


Avoid nephrotoxics


hydrate-


Anemia workup





Subjective


ROS Limited/Unobtainable:  No


Constitutional:  Reports: malaise, weakness





Objective


Objective





Last 24 Hour Vital Signs








  Date Time  Temp Pulse Resp B/P (MAP) Pulse Ox O2 Delivery O2 Flow Rate FiO2


 


2/9/20 09:03  82 20  96 Room Air  21


 


2/9/20 09:00      Room Air  


 


2/9/20 08:00 98.7 80 18 113/57 (75) 97   


 


2/9/20 04:00 98.2 70 18 113/63 (80) 97   


 


2/9/20 00:00 99.0 71 18 110/56 (74) 96   


 


2/8/20 21:00      Room Air  


 


2/8/20 20:00 99.0 89 16 116/60 (78) 96   


 


2/8/20 16:00 98.4 72 16 108/63 (78) 97   


 


2/8/20 16:00  61      

















Intake and Output  


 


 2/8/20 2/9/20





 19:00 07:00


 


Intake Total 800 ml 1050 ml


 


Balance 800 ml 1050 ml


 


  


 


Intake Oral 800 ml 600 ml


 


IV Total  450 ml


 


# Voids 3 6








Laboratory Tests


2/8/20 18:30: Stool Occult Blood [Pending]


2/9/20 06:20: 


White Blood Count 4.6L, Red Blood Count 3.08L, Hemoglobin 9.4L, Hematocrit 28.2L

, Mean Corpuscular Volume 92, Mean Corpuscular Hemoglobin 30.7, Mean 

Corpuscular Hemoglobin Concent 33.5, Red Cell Distribution Width 13.4, Platelet 

Count 280, Mean Platelet Volume 5.7L, Neutrophils (%) (Auto) 67.9, Lymphocytes (

%) (Auto) 22.0, Monocytes (%) (Auto) 8.2, Eosinophils (%) (Auto) 1.2, Basophils 

(%) (Auto) 0.7, Sodium Level 139, Potassium Level 4.2, Chloride Level 109H, 

Carbon Dioxide Level 23, Anion Gap 7, Blood Urea Nitrogen 34H, Creatinine 1.6H, 

Estimat Glomerular Filtration Rate , Glucose Level 99, Calcium Level 8.6


Height (Feet):  5


Height (Inches):  0.00


Weight (Pounds):  88


General Appearance:  no apparent distress


Cardiovascular:  normal rate


Respiratory/Chest:  lungs clear


Abdomen:  non tender


Objective


no change











Demar Mayo MD Feb 9, 2020 12:06

## 2020-02-09 NOTE — NUR
NURSE NOTES:

received report from LANA bassett. patient in bed, alert. oriented. verbally responsive. no 
respiratory distress noted. no c/o pain at this time. IV on LFA 22g d51/2 running @75/hr. 
potassium 4.2 today. ambulatory. bed in the lowest position and locked. call light within 
reach. will continue to provide plan of care.

## 2020-02-10 VITALS — SYSTOLIC BLOOD PRESSURE: 113 MMHG | DIASTOLIC BLOOD PRESSURE: 61 MMHG

## 2020-02-10 VITALS — DIASTOLIC BLOOD PRESSURE: 70 MMHG | SYSTOLIC BLOOD PRESSURE: 123 MMHG

## 2020-02-10 VITALS — SYSTOLIC BLOOD PRESSURE: 108 MMHG | DIASTOLIC BLOOD PRESSURE: 62 MMHG

## 2020-02-10 VITALS — SYSTOLIC BLOOD PRESSURE: 102 MMHG | DIASTOLIC BLOOD PRESSURE: 59 MMHG

## 2020-02-10 VITALS — DIASTOLIC BLOOD PRESSURE: 67 MMHG | SYSTOLIC BLOOD PRESSURE: 107 MMHG

## 2020-02-10 VITALS — DIASTOLIC BLOOD PRESSURE: 57 MMHG | SYSTOLIC BLOOD PRESSURE: 104 MMHG

## 2020-02-10 LAB
ADD MANUAL DIFF: NO
ANION GAP SERPL CALC-SCNC: 8 MMOL/L (ref 5–15)
BASOPHILS NFR BLD AUTO: 1.1 % (ref 0–2)
BUN SERPL-MCNC: 31 MG/DL (ref 7–18)
CALCIUM SERPL-MCNC: 8.6 MG/DL (ref 8.5–10.1)
CHLORIDE SERPL-SCNC: 108 MMOL/L (ref 98–107)
CO2 SERPL-SCNC: 22 MMOL/L (ref 21–32)
CREAT SERPL-MCNC: 1.6 MG/DL (ref 0.55–1.3)
EOSINOPHIL NFR BLD AUTO: 1.7 % (ref 0–3)
ERYTHROCYTE [DISTWIDTH] IN BLOOD BY AUTOMATED COUNT: 13.1 % (ref 11.6–14.8)
HCT VFR BLD CALC: 28.6 % (ref 37–47)
HGB BLD-MCNC: 9.7 G/DL (ref 12–16)
LYMPHOCYTES NFR BLD AUTO: 23.3 % (ref 20–45)
MCV RBC AUTO: 92 FL (ref 80–99)
MONOCYTES NFR BLD AUTO: 7.4 % (ref 1–10)
NEUTROPHILS NFR BLD AUTO: 66.5 % (ref 45–75)
PLATELET # BLD: 283 K/UL (ref 150–450)
POTASSIUM SERPL-SCNC: 4.1 MMOL/L (ref 3.5–5.1)
RBC # BLD AUTO: 3.11 M/UL (ref 4.2–5.4)
SODIUM SERPL-SCNC: 138 MMOL/L (ref 136–145)
WBC # BLD AUTO: 4.3 K/UL (ref 4.8–10.8)

## 2020-02-10 RX ADMIN — DOCUSATE SODIUM SCH MG: 100 CAPSULE, LIQUID FILLED ORAL at 17:36

## 2020-02-10 RX ADMIN — DOCUSATE SODIUM SCH MG: 100 CAPSULE, LIQUID FILLED ORAL at 08:53

## 2020-02-10 RX ADMIN — DEXTROSE AND SODIUM CHLORIDE SCH MLS/HR: 5; .45 INJECTION, SOLUTION INTRAVENOUS at 00:00

## 2020-02-10 RX ADMIN — HEPARIN SODIUM SCH UNITS: 5000 INJECTION INTRAVENOUS; SUBCUTANEOUS at 08:55

## 2020-02-10 RX ADMIN — HEPARIN SODIUM SCH UNITS: 5000 INJECTION INTRAVENOUS; SUBCUTANEOUS at 20:14

## 2020-02-10 RX ADMIN — DEXTROSE AND SODIUM CHLORIDE SCH MLS/HR: 5; .45 INJECTION, SOLUTION INTRAVENOUS at 14:56

## 2020-02-10 RX ADMIN — DOCUSATE SODIUM SCH MG: 100 CAPSULE, LIQUID FILLED ORAL at 13:02

## 2020-02-10 NOTE — NUR
CASE MANAGEMENT:DISCHARGE PLANNING



[facility]

P: [phone]

F: [phone]



CLINICALS FAXED

## 2020-02-10 NOTE — NEPHROLOGY PROGRESS NOTE
Assessment/Plan


Problem List:  


(1) MIKA (acute kidney injury)


(2) Severe anemia


(3) Severe protein-calorie malnutrition


Assessment:  BMI 17.2





Assessment





(1) MIKA (acute kidney injury)


(2) Renal failure (ARF), acute on chronic


(3) Severe anemia


(4) Protein calorie mal nutrition- BMI 17.2


(5) h/o UTI





Others:


h/o bronchitis


h/o abdominal pain and nausea, possibly enteritis ( by CT scan) 


RA


Plan





Plan


one dose IV Iron


Avoid nephrotoxics


hydrate-


Anemia workup





Subjective


ROS Limited/Unobtainable:  No


Constitutional:  Reports: malaise, weakness





Objective


Objective





Last 24 Hour Vital Signs








  Date Time  Temp Pulse Resp B/P (MAP) Pulse Ox O2 Delivery O2 Flow Rate FiO2


 


2/10/20 08:00 97.9 77 18 104/57 (73) 97   


 


2/10/20 04:00 98.8 70 18 102/59 (73) 95   


 


2/10/20 00:00 98.4 85 18 113/61 (78) 95   


 


2/9/20 19:50 99.7 78 20 123/65 (84) 96   


 


2/9/20 19:29      Room Air  


 


2/9/20 16:00 99.7 79 20 128/71 (90) 98   


 


2/9/20 12:00 98.6 68 19 119/65 (83) 98   


 


2/9/20 09:03  82 20  96 Room Air  21


 


2/9/20 09:00      Room Air  

















Intake and Output  


 


 2/9/20 2/10/20





 19:00 07:00


 


Intake Total 1665 ml 825 ml


 


Balance 1665 ml 825 ml


 


  


 


Intake Oral 840 ml 


 


IV Total 825 ml 825 ml


 


# Voids 3 3











Current Medications








 Medications


  (Trade)  Dose


 Ordered  Sig/Naman


 Route


 PRN Reason  Start Time


 Stop Time Status Last Admin


Dose Admin


 


 Albuterol/


 Ipratropium


  (Albuterol/


 Ipratropium)  3 ml  Q4H  PRN


 HHN


 Shortness of Breath  2/8/20 23:45


 2/13/20 11:44   


 


 


 Dextrose/Sodium


 Chloride  1,000 ml @ 


 75 mls/hr  [id number]


 IV


   2/8/20 22:30


 3/8/20 09:59  2/10/20 00:00


 


 


 Docusate Sodium


  (Colace)  100 mg  THREE TIMES A  DAY


 ORAL


   2/9/20 09:00


 3/8/20 17:59  2/9/20 17:24


 


 


 Heparin Sodium


  (Porcine)


  (Heparin 5000


 units/ml)  5,000 units  EVERY 12  HOURS


 SUBQ


   2/9/20 09:00


 3/9/20 20:59  2/9/20 20:05


 


 


 Hydroxychloroquine


 Sulfate


  (Plaquenil)  200 mg  DAILY


 ORAL


   2/9/20 09:00


 3/9/20 08:59  2/9/20 09:06


 


 


 Pantoprazole


  (Protonix)  40 mg  EVERY 12  HOURS


 ORAL


   2/9/20 09:00


 3/8/20 20:59  2/9/20 20:05


 





Laboratory Tests


2/10/20 07:10: 


White Blood Count 4.3L, Red Blood Count 3.11L, Hemoglobin 9.7L, Hematocrit 28.6L

, Mean Corpuscular Volume 92, Mean Corpuscular Hemoglobin 31.0, Mean 

Corpuscular Hemoglobin Concent 33.8, Red Cell Distribution Width 13.1, Platelet 

Count 283, Mean Platelet Volume 5.2L, Neutrophils (%) (Auto) 66.5, Lymphocytes (

%) (Auto) 23.3, Monocytes (%) (Auto) 7.4, Eosinophils (%) (Auto) 1.7, Basophils 

(%) (Auto) 1.1, Sodium Level 138, Potassium Level 4.1, Chloride Level 108H, 

Carbon Dioxide Level 22, Anion Gap 8, Blood Urea Nitrogen 31H, Creatinine 1.6H, 

Estimat Glomerular Filtration Rate , Glucose Level 85, Calcium Level 8.6


Height (Feet):  5


Height (Inches):  0.00


Weight (Pounds):  88


General Appearance:  no apparent distress, lethargic


Cardiovascular:  normal rate


Respiratory/Chest:  decreased breath sounds


Abdomen:  soft


Objective


no change











Dmear Mayo MD Feb 10, 2020 08:55

## 2020-02-10 NOTE — GENERAL PROGRESS NOTE
Assessment/Plan


Problem List:  


(1) Anemia


ICD Codes:  D64.9 - Anemia, unspecified


SNOMED:  480669782


(2) Renal insufficiency


ICD Codes:  N28.9 - Disorder of kidney and ureter, unspecified


SNOMED:  756129734, 025348787


(3) Hyperkalemia


ICD Codes:  E87.5 - Hyperkalemia


SNOMED:  59836891


(4) CKD (chronic kidney disease)


ICD Codes:  N18.9 - Chronic kidney disease, unspecified


SNOMED:  689943256


(5) Emphysema lung


ICD Codes:  J43.9 - Emphysema, unspecified


SNOMED:  92125821


Status:  stable, progressing


Assessment/Plan:


pt diet o2 pulm tx prn neph f/u cbc bmp am dc plan w hh





Subjective


Constitutional:  Reports: weakness


Allergies:  


Coded Allergies:  


     No Known Allergies (Unverified , 2/14/16)


All Systems:  reviewed and negative except above


Subjective


calm in bed





Objective





Last 24 Hour Vital Signs








  Date Time  Temp Pulse Resp B/P (MAP) Pulse Ox O2 Delivery O2 Flow Rate FiO2


 


2/10/20 08:00 97.9 77 18 104/57 (73) 97   


 


2/10/20 04:00 98.8 70 18 102/59 (73) 95   


 


2/10/20 00:00 98.4 85 18 113/61 (78) 95   


 


2/9/20 19:50 99.7 78 20 123/65 (84) 96   


 


2/9/20 19:29      Room Air  


 


2/9/20 16:00 99.7 79 20 128/71 (90) 98   


 


2/9/20 12:00 98.6 68 19 119/65 (83) 98   

















Intake and Output  


 


 2/9/20 2/10/20





 19:00 07:00


 


Intake Total 1665 ml 825 ml


 


Balance 1665 ml 825 ml


 


  


 


Intake Oral 840 ml 


 


IV Total 825 ml 825 ml


 


# Voids 3 3








Laboratory Tests


2/10/20 07:10: 


White Blood Count 4.3L, Red Blood Count 3.11L, Hemoglobin 9.7L, Hematocrit 28.6L

, Mean Corpuscular Volume 92, Mean Corpuscular Hemoglobin 31.0, Mean 

Corpuscular Hemoglobin Concent 33.8, Red Cell Distribution Width 13.1, Platelet 

Count 283, Mean Platelet Volume 5.2L, Neutrophils (%) (Auto) 66.5, Lymphocytes (

%) (Auto) 23.3, Monocytes (%) (Auto) 7.4, Eosinophils (%) (Auto) 1.7, Basophils 

(%) (Auto) 1.1, Sodium Level 138, Potassium Level 4.1, Chloride Level 108H, 

Carbon Dioxide Level 22, Anion Gap 8, Blood Urea Nitrogen 31H, Creatinine 1.6H, 

Estimat Glomerular Filtration Rate , Glucose Level 85, Calcium Level 8.6


Height (Feet):  5


Height (Inches):  0.00


Weight (Pounds):  88


General Appearance:  lethargic


EENT:  normal ENT inspection


Neck:  normal alignment


Cardiovascular:  normal peripheral pulses, normal rate, regular rhythm


Respiratory/Chest:  chest wall non-tender, lungs clear, normal breath sounds


Abdomen:  normal bowel sounds, non tender, soft


Extremities:  normal inspection


Edema:  no edema noted Arm (L), no edema noted Arm (R), no edema noted Leg (L), 

no edema noted Leg (R), no edema noted Pedal (L), no edema noted Pedal (R), no 

edema noted Generalized


Neurologic:  motor weakness


Skin:  normal pigmentation, warm/dry











Flaco Stephens DO Feb 10, 2020 09:08

## 2020-02-10 NOTE — NUR
NURSE NOTES:

Received report from LANA Salgado. AAO x 4, on room air. Mongolian speaking. IV site intact and 
running IVF. No acute distress noted. Bed locked, lowest position, alarm on, side rails up, 
call light within reach. Will continue to monitor.

## 2020-02-10 NOTE — NUR
CASE MANAGEMENT:INITIAL REVIEW



02/08/2020



74 YR OLD FEMALE AMBULATORY TO ED AS PER MD RECOMMENDATION



CC;ABNORMAL LABS



SI;HYPERKALEMIA.   AC/CHR RENAL FAILURE.  CKD.

98.2   96   15   138/86   97% ON RA

K+ 5.8   BUN 50  CREAT 1.5



IS;KAYEXALATE PO X1

D5W BOLUS X1

INSULIN HUMAN X1



*******ADMITTED TO MED SURG******

*****MED SURG STATUS*****



*******************************************************************

CASE MANAGEMENT:REVIEW



02/10/2020



SI;RENAL INSUFFICIENCY.  HYPERKALEMIA.   EMPHYSEMA LUNG.

99.7   96   17  102/59   95% ON RA



IS;IVF D5W @ 75 ML/HR

DUO NEB HHN Q4 HR

PROTONIX PO Q12 HRS

PLAQUENIL PO QD



*****MED SURG STATUS****



DCP;HOME WITH HOME HEALTH

## 2020-02-10 NOTE — PULMONOLOGY PROGRESS NOTE
Assessment/Plan


Problems:  


(1) Emphysema lung


(2) Renal failure (ARF), acute on chronic


(3) Hyperkalemia


(4) Severe protein-calorie malnutrition


(5) LESLY (mycobacterium avium-intracellulare)


(6) Anemia


Assessment/Plan


improving


check electrolytes


respiratory treatment


titrate fio2 to sat of 92%


dvt prophylaxis


CXR reviewed, slight increase in interstitial markings.





Subjective


ROS Limited/Unobtainable:  No


Interval Events:  doing better


Allergies:  


Coded Allergies:  


     No Known Allergies (Unverified , 2/14/16)





Objective





Last 24 Hour Vital Signs








  Date Time  Temp Pulse Resp B/P (MAP) Pulse Ox O2 Delivery O2 Flow Rate FiO2


 


2/10/20 11:32 98.2 96 17 107/67 (80) 97   


 


2/10/20 08:00 97.9 77 18 104/57 (73) 97   


 


2/10/20 04:00 98.8 70 18 102/59 (73) 95   


 


2/10/20 00:00 98.4 85 18 113/61 (78) 95   


 


2/9/20 19:50 99.7 78 20 123/65 (84) 96   


 


2/9/20 19:29      Room Air  


 


2/9/20 16:00 99.7 79 20 128/71 (90) 98   

















Intake and Output  


 


 2/9/20 2/10/20





 19:00 07:00


 


Intake Total 1665 ml 825 ml


 


Balance 1665 ml 825 ml


 


  


 


Intake Oral 840 ml 


 


IV Total 825 ml 825 ml


 


# Voids 3 3








Objective


General Appearance:  morbidly obese


Lines, tubes and drains:  peripheral


HEENT:  normocephalic, atraumatic


Neck:  non-tender, normal alignment


Respiratory/Chest:  chest wall non-tender, rhonchi - right, expiratory wheezing


Breasts:  no masses


Cardiovascular/Chest:  normal peripheral pulses


Abdomen:  normal bowel sounds, non tender, hyperactive bowel sounds


Extremities:  normal range of motion


Laboratory Tests


2/10/20 07:10: 


White Blood Count 4.3L, Red Blood Count 3.11L, Hemoglobin 9.7L, Hematocrit 28.6L

, Mean Corpuscular Volume 92, Mean Corpuscular Hemoglobin 31.0, Mean 

Corpuscular Hemoglobin Concent 33.8, Red Cell Distribution Width 13.1, Platelet 

Count 283, Mean Platelet Volume 5.2L, Neutrophils (%) (Auto) 66.5, Lymphocytes (

%) (Auto) 23.3, Monocytes (%) (Auto) 7.4, Eosinophils (%) (Auto) 1.7, Basophils 

(%) (Auto) 1.1, Sodium Level 138, Potassium Level 4.1, Chloride Level 108H, 

Carbon Dioxide Level 22, Anion Gap 8, Blood Urea Nitrogen 31H, Creatinine 1.6H, 

Estimat Glomerular Filtration Rate , Glucose Level 85, Calcium Level 8.6





Current Medications








 Medications


  (Trade)  Dose


 Ordered  Sig/Naman


 Route


 PRN Reason  Start Time


 Stop Time Status Last Admin


Dose Admin


 


 Albuterol/


 Ipratropium


  (Albuterol/


 Ipratropium)  3 ml  Q4H  PRN


 HHN


 Shortness of Breath  2/8/20 23:45


 2/13/20 11:44   


 


 


 Dextrose/Sodium


 Chloride  1,000 ml @ 


 75 mls/hr  [id number]


 IV


   2/8/20 22:30


 3/8/20 09:59  2/10/20 00:00


 


 


 Docusate Sodium


  (Colace)  100 mg  THREE TIMES A  DAY


 ORAL


   2/9/20 09:00


 3/8/20 17:59  2/10/20 08:53


 


 


 Heparin Sodium


  (Porcine)


  (Heparin 5000


 units/ml)  5,000 units  EVERY 12  HOURS


 SUBQ


   2/9/20 09:00


 3/9/20 20:59  2/10/20 08:55


 


 


 Hydroxychloroquine


 Sulfate


  (Plaquenil)  200 mg  DAILY


 ORAL


   2/9/20 09:00


 3/9/20 08:59  2/10/20 08:53


 


 


 Pantoprazole


  (Protonix)  40 mg  EVERY 12  HOURS


 ORAL


   2/9/20 09:00


 3/8/20 20:59  2/10/20 08:53


 

















Geovany Mustafa MD Feb 10, 2020 12:19

## 2020-02-10 NOTE — NUR
NURSE NOTES:

Received patient in bed awake. No SOB or acute distress. IV line intact and patent. HOB 
elevated. Bed locked in lowest position. Call light within reach. Will continue plan of 
care.

## 2020-02-11 VITALS — DIASTOLIC BLOOD PRESSURE: 74 MMHG | SYSTOLIC BLOOD PRESSURE: 128 MMHG

## 2020-02-11 VITALS — DIASTOLIC BLOOD PRESSURE: 63 MMHG | SYSTOLIC BLOOD PRESSURE: 126 MMHG

## 2020-02-11 VITALS — DIASTOLIC BLOOD PRESSURE: 61 MMHG | SYSTOLIC BLOOD PRESSURE: 128 MMHG

## 2020-02-11 VITALS — SYSTOLIC BLOOD PRESSURE: 116 MMHG | DIASTOLIC BLOOD PRESSURE: 73 MMHG

## 2020-02-11 VITALS — SYSTOLIC BLOOD PRESSURE: 118 MMHG | DIASTOLIC BLOOD PRESSURE: 60 MMHG

## 2020-02-11 VITALS — SYSTOLIC BLOOD PRESSURE: 128 MMHG | DIASTOLIC BLOOD PRESSURE: 53 MMHG

## 2020-02-11 LAB
ADD MANUAL DIFF: NO
ANION GAP SERPL CALC-SCNC: 8 MMOL/L (ref 5–15)
BASOPHILS NFR BLD AUTO: 0.6 % (ref 0–2)
BUN SERPL-MCNC: 34 MG/DL (ref 7–18)
CALCIUM SERPL-MCNC: 8.4 MG/DL (ref 8.5–10.1)
CHLORIDE SERPL-SCNC: 108 MMOL/L (ref 98–107)
CO2 SERPL-SCNC: 21 MMOL/L (ref 21–32)
CREAT SERPL-MCNC: 1.7 MG/DL (ref 0.55–1.3)
EOSINOPHIL NFR BLD AUTO: 0.6 % (ref 0–3)
ERYTHROCYTE [DISTWIDTH] IN BLOOD BY AUTOMATED COUNT: 13 % (ref 11.6–14.8)
HCT VFR BLD CALC: 26.5 % (ref 37–47)
HGB BLD-MCNC: 9 G/DL (ref 12–16)
LYMPHOCYTES NFR BLD AUTO: 11.7 % (ref 20–45)
MCV RBC AUTO: 91 FL (ref 80–99)
MONOCYTES NFR BLD AUTO: 6 % (ref 1–10)
NEUTROPHILS NFR BLD AUTO: 81.2 % (ref 45–75)
PLATELET # BLD: 262 K/UL (ref 150–450)
POTASSIUM SERPL-SCNC: 4.1 MMOL/L (ref 3.5–5.1)
RBC # BLD AUTO: 2.89 M/UL (ref 4.2–5.4)
SODIUM SERPL-SCNC: 137 MMOL/L (ref 136–145)
WBC # BLD AUTO: 8.3 K/UL (ref 4.8–10.8)

## 2020-02-11 RX ADMIN — DOCUSATE SODIUM SCH MG: 100 CAPSULE, LIQUID FILLED ORAL at 17:37

## 2020-02-11 RX ADMIN — DEXTROSE AND SODIUM CHLORIDE SCH MLS/HR: 5; .45 INJECTION, SOLUTION INTRAVENOUS at 15:55

## 2020-02-11 RX ADMIN — DEXTROSE AND SODIUM CHLORIDE SCH MLS/HR: 5; .45 INJECTION, SOLUTION INTRAVENOUS at 03:35

## 2020-02-11 RX ADMIN — HEPARIN SODIUM SCH UNITS: 5000 INJECTION INTRAVENOUS; SUBCUTANEOUS at 21:48

## 2020-02-11 RX ADMIN — DOCUSATE SODIUM SCH MG: 100 CAPSULE, LIQUID FILLED ORAL at 13:24

## 2020-02-11 RX ADMIN — HEPARIN SODIUM SCH UNITS: 5000 INJECTION INTRAVENOUS; SUBCUTANEOUS at 08:38

## 2020-02-11 RX ADMIN — DOCUSATE SODIUM SCH MG: 100 CAPSULE, LIQUID FILLED ORAL at 08:37

## 2020-02-11 NOTE — NUR
CASE MANAGEMENT:[facility] CONFIRMED ACCEPTANCE 



AGENCY WILL CONTACT PATIENT TO SCHEDULE INITIAL HOME VISIT UPON PATIENTS DISCHARGE

## 2020-02-11 NOTE — PULMONOLOGY PROGRESS NOTE
Assessment/Plan


Problems:  


(1) Emphysema lung


(2) Renal failure (ARF), acute on chronic


(3) Hyperkalemia


(4) Severe protein-calorie malnutrition


(5) LESLY (mycobacterium avium-intracellulare)


(6) Anemia


Assessment/Plan


all reviewed


improving


check electrolytes


respiratory treatment


titrate fio2 to sat of 92%


dvt prophylaxis





ok to go home





Subjective


ROS Limited/Unobtainable:  No


Constitutional:  Reports: no symptoms


Allergies:  


Coded Allergies:  


     No Known Allergies (Unverified , 2/14/16)





Objective





Last 24 Hour Vital Signs








  Date Time  Temp Pulse Resp B/P (MAP) Pulse Ox O2 Delivery O2 Flow Rate FiO2


 


2/11/20 09:00      Room Air  


 


2/11/20 08:00 98.3 93 20 116/73 (87) 96   


 


2/11/20 04:00 99.4 77 20 126/63 (84) 96   


 


2/11/20 00:00 99.6 78 20 128/61 (83) 95   


 


2/10/20 20:00 99.5 77 18 123/70 (87) 95   


 


2/10/20 19:52      Room Air  


 


2/10/20 16:00 98.1 79 18 108/62 (77) 98   

















Intake and Output  


 


 2/10/20 2/11/20





 19:00 07:00


 


Intake Total 825 ml 870 ml


 


Balance 825 ml 870 ml


 


  


 


Intake Oral 750 ml 120 ml


 


IV Total 75 ml 750 ml


 


# Voids 3 


 


# Bowel Movements 1 








Objective


General Appearance:  morbidly obese


Lines, tubes and drains:  peripheral


HEENT:  normocephalic, atraumatic


Neck:  non-tender, normal alignment


Respiratory/Chest:  chest wall non-tender, rhonchi - right, expiratory wheezing


Breasts:  no masses


Cardiovascular/Chest:  normal peripheral pulses


Abdomen:  normal bowel sounds, non tender, hyperactive bowel sounds


Extremities:  normal range of motion


Laboratory Tests


2/11/20 06:00: 


White Blood Count 8.3#, Red Blood Count 2.89L, Hemoglobin 9.0L, Hematocrit 26.5L

, Mean Corpuscular Volume 91, Mean Corpuscular Hemoglobin 31.0, Mean 

Corpuscular Hemoglobin Concent 33.9, Red Cell Distribution Width 13.0, Platelet 

Count 262, Mean Platelet Volume 5.4L, Neutrophils (%) (Auto) 81.2H, Lymphocytes 

(%) (Auto) 11.7L, Monocytes (%) (Auto) 6.0, Eosinophils (%) (Auto) 0.6, 

Basophils (%) (Auto) 0.6, Sodium Level 137, Potassium Level 4.1, Chloride Level 

108H, Carbon Dioxide Level 21, Anion Gap 8, Blood Urea Nitrogen 34H, Creatinine 

1.7H, Estimat Glomerular Filtration Rate , Glucose Level 104, Calcium Level 8.4L





Current Medications








 Medications


  (Trade)  Dose


 Ordered  Sig/Naman


 Route


 PRN Reason  Start Time


 Stop Time Status Last Admin


Dose Admin


 


 Albuterol/


 Ipratropium


  (Albuterol/


 Ipratropium)  3 ml  Q4H  PRN


 HHN


 Shortness of Breath  2/8/20 23:45


 2/13/20 11:44   


 


 


 Dextrose/Sodium


 Chloride  1,000 ml @ 


 75 mls/hr  [id number]


 IV


   2/8/20 22:30


 3/8/20 09:59  2/11/20 03:35


 


 


 Docusate Sodium


  (Colace)  100 mg  THREE TIMES A  DAY


 ORAL


   2/9/20 09:00


 3/8/20 17:59  2/11/20 08:37


 


 


 Heparin Sodium


  (Porcine)


  (Heparin 5000


 units/ml)  5,000 units  EVERY 12  HOURS


 SUBQ


   2/9/20 09:00


 3/9/20 20:59  2/11/20 08:38


 


 


 Hydroxychloroquine


 Sulfate


  (Plaquenil)  200 mg  DAILY


 ORAL


   2/9/20 09:00


 3/9/20 08:59  2/11/20 08:37


 


 


 Pantoprazole


  (Protonix)  40 mg  EVERY 12  HOURS


 ORAL


   2/9/20 09:00


 3/8/20 20:59  2/11/20 08:37


 

















Geovany Mustafa MD Feb 11, 2020 12:02

## 2020-02-11 NOTE — NEPHROLOGY PROGRESS NOTE
Assessment/Plan


Problem List:  


(1) MIKA (acute kidney injury)


(2) CKD (chronic kidney disease)


(3) Severe anemia


(4) Severe protein-calorie malnutrition


Assessment:  BMI 17.2





Assessment





(1) MIKA (acute kidney injury)


(2) Renal failure (ARF), acute on chronic


(3) Severe anemia


(4) Protein calorie mal nutrition- BMI 17.2


(5) h/o UTI





Others:


h/o bronchitis


h/o abdominal pain and nausea, possibly enteritis ( by CT scan) 


RA


Plan





Plan


one dose IV Iron


Avoid nephrotoxics


hydrate-


Anemia workup





Subjective


ROS Limited/Unobtainable:  No


Constitutional:  Reports: malaise, weakness





Objective


Objective





Last 24 Hour Vital Signs








  Date Time  Temp Pulse Resp B/P (MAP) Pulse Ox O2 Delivery O2 Flow Rate FiO2


 


2/11/20 16:00 98.1 79 20 128/74 (92) 97   


 


2/11/20 12:00 99.1 75 20 118/60 (79) 95   


 


2/11/20 09:00      Room Air  


 


2/11/20 08:00 98.3 93 20 116/73 (87) 96   


 


2/11/20 04:00 99.4 77 20 126/63 (84) 96   


 


2/11/20 00:00 99.6 78 20 128/61 (83) 95   


 


2/10/20 20:00 99.5 77 18 123/70 (87) 95   


 


2/10/20 19:52      Room Air  

















Intake and Output  


 


 2/10/20 2/11/20





 19:00 07:00


 


Intake Total 825 ml 870 ml


 


Balance 825 ml 870 ml


 


  


 


Intake Oral 750 ml 120 ml


 


IV Total 75 ml 750 ml


 


# Voids 3 


 


# Bowel Movements 1 








Laboratory Tests


2/11/20 06:00: 


White Blood Count 8.3#, Red Blood Count 2.89L, Hemoglobin 9.0L, Hematocrit 26.5L

, Mean Corpuscular Volume 91, Mean Corpuscular Hemoglobin 31.0, Mean 

Corpuscular Hemoglobin Concent 33.9, Red Cell Distribution Width 13.0, Platelet 

Count 262, Mean Platelet Volume 5.4L, Neutrophils (%) (Auto) 81.2H, Lymphocytes 

(%) (Auto) 11.7L, Monocytes (%) (Auto) 6.0, Eosinophils (%) (Auto) 0.6, 

Basophils (%) (Auto) 0.6, Sodium Level 137, Potassium Level 4.1, Chloride Level 

108H, Carbon Dioxide Level 21, Anion Gap 8, Blood Urea Nitrogen 34H, Creatinine 

1.7H, Estimat Glomerular Filtration Rate , Glucose Level 104, Calcium Level 8.4L


Height (Feet):  5


Height (Inches):  0.00


Weight (Pounds):  88


General Appearance:  no apparent distress


Objective


no change











Demar Mayo MD Feb 11, 2020 17:09

## 2020-02-11 NOTE — GENERAL PROGRESS NOTE
Assessment/Plan


Problem List:  


(1) Anemia


ICD Codes:  D64.9 - Anemia, unspecified


SNOMED:  668761336


(2) Renal insufficiency


ICD Codes:  N28.9 - Disorder of kidney and ureter, unspecified


SNOMED:  380512095, 025002106


(3) Hyperkalemia


ICD Codes:  E87.5 - Hyperkalemia


SNOMED:  61213598


(4) CKD (chronic kidney disease)


ICD Codes:  N18.9 - Chronic kidney disease, unspecified


SNOMED:  704749302


(5) Emphysema lung


ICD Codes:  J43.9 - Emphysema, unspecified


SNOMED:  77967499


Status:  stable, progressing


Assessment/Plan:


pt diet o2 pulm tx prn neph f/u cbc bmp am gi heme eval





Subjective


Constitutional:  Reports: weakness


Allergies:  


Coded Allergies:  


     No Known Allergies (Unverified , 2/14/16)


All Systems:  reviewed and negative except above


Subjective


calm in bed





Objective





Last 24 Hour Vital Signs








  Date Time  Temp Pulse Resp B/P (MAP) Pulse Ox O2 Delivery O2 Flow Rate FiO2


 


2/11/20 12:00 99.1 75 20 118/60 (79) 95   


 


2/11/20 09:00      Room Air  


 


2/11/20 08:00 98.3 93 20 116/73 (87) 96   


 


2/11/20 04:00 99.4 77 20 126/63 (84) 96   


 


2/11/20 00:00 99.6 78 20 128/61 (83) 95   


 


2/10/20 20:00 99.5 77 18 123/70 (87) 95   


 


2/10/20 19:52      Room Air  


 


2/10/20 16:00 98.1 79 18 108/62 (77) 98   

















Intake and Output  


 


 2/10/20 2/11/20





 19:00 07:00


 


Intake Total 825 ml 870 ml


 


Balance 825 ml 870 ml


 


  


 


Intake Oral 750 ml 120 ml


 


IV Total 75 ml 750 ml


 


# Voids 3 


 


# Bowel Movements 1 








Laboratory Tests


2/11/20 06:00: 


White Blood Count 8.3#, Red Blood Count 2.89L, Hemoglobin 9.0L, Hematocrit 26.5L

, Mean Corpuscular Volume 91, Mean Corpuscular Hemoglobin 31.0, Mean 

Corpuscular Hemoglobin Concent 33.9, Red Cell Distribution Width 13.0, Platelet 

Count 262, Mean Platelet Volume 5.4L, Neutrophils (%) (Auto) 81.2H, Lymphocytes 

(%) (Auto) 11.7L, Monocytes (%) (Auto) 6.0, Eosinophils (%) (Auto) 0.6, 

Basophils (%) (Auto) 0.6, Sodium Level 137, Potassium Level 4.1, Chloride Level 

108H, Carbon Dioxide Level 21, Anion Gap 8, Blood Urea Nitrogen 34H, Creatinine 

1.7H, Estimat Glomerular Filtration Rate , Glucose Level 104, Calcium Level 8.4L


Height (Feet):  5


Height (Inches):  0.00


Weight (Pounds):  88


General Appearance:  lethargic


EENT:  normal ENT inspection


Neck:  normal alignment


Cardiovascular:  normal peripheral pulses, normal rate, regular rhythm


Respiratory/Chest:  chest wall non-tender, lungs clear, normal breath sounds


Abdomen:  normal bowel sounds, non tender, soft


Extremities:  normal inspection


Edema:  no edema noted Arm (L), no edema noted Arm (R), no edema noted Leg (L), 

no edema noted Leg (R), no edema noted Pedal (L), no edema noted Pedal (R), no 

edema noted Generalized


Neurologic:  motor weakness


Skin:  normal pigmentation, warm/dry











Flaco Stephens DO Feb 11, 2020 13:52

## 2020-02-11 NOTE — CONSULTATION
DATE OF CONSULTATION:  02/11/2020

CHIEF COMPLAINT:  Anemia.



HISTORY OF PRESENT ILLNESS:  This is a very pleasant 74-year-old female

known to me from prior admissions, actually last time I saw her was back

in September 2019.  She was admitted at that time, was found to be anemic.

Stool OB came back negative.  She also carries diagnosis of possible

multiple myeloma, renal insufficiency.  She was readmitted again, was

found to be anemic again.



PAST MEDICAL HISTORY:

1. Osteoporosis.

2. Rheumatoid arthritis.

3. Pulmonary MAC.

4. Multiple drug-resistant pneumonia.

5. Anemia.

6. Possible multiple myeloma.



ALLERGIES:  No known allergies.



MEDICATIONS:  Please see medication reconciliation list.



FAMILY HISTORY:  Noncontributory.



REVIEW OF SYSTEMS:  A 10-point review of systems was performed and

pertinent positives in HPI.



PHYSICAL EXAMINATION:

GENERAL:  This is a well-developed female in no acute distress.

VITAL SIGNS:  Temperature is 99.1, pulse 75, respirations 20, blood

pressure is 118/60.

HEENT:  Normocephalic and atraumatic.  Mild pale conjunctivae.

NECK:  Supple.  No evidence of obvious lymphadenopathy.

CARDIOVASCULAR:  Regular rhythm.  Plus S1 and S2.

LUNGS:  Decreased breath sounds bilaterally based on the supine exam.

ABDOMEN:  Soft, nontender.  No rebound.  No guarding.  No peritoneal sign.

 

EXTREMITIES:  No cyanosis, no clubbing, and no edema.



LABORATORY AND DIAGNOSTIC DATA:  Most recent white count is 8.3, hemoglobin

9, hematocrit 26, and platelet count is 262.  Chem 7, BUN 34, creatinine

is 1.7.  Stool for OB came back negative.



ASSESSMENT AND PLAN:  This is a 74-year-old female with anemia most

probably secondary to either multiple myeloma, chronic disease, or renal

insufficiency. Reviewing the chart showed back in September, she had two

negative stool OB.  Now here in this admission, she has one negative stool

OB, so I doubt that she does not have any active GI bleeding   ______

anemia.  At one point, she needs to get an endoscopy and colonoscopy.

Unfortunately, she is not compliant last time when she was discharged back

in September.  She never followed with us to get a colonoscopy done, so we

recommend the patient to get it here, but she does not want do it and she

wants to go home, so the patient was re-emphasized again that she needs to

have the procedure done and she needs to get follow up as an outpatient.



I want to thank,  Dr. Flaco Stephens, for this kind referral.









  ______________________________________________

  Dany Shepard M.D.





DR:  Uriah

D:  02/11/2020 15:00

T:  02/11/2020 17:13

JOB#:  [id number]

CC:  Flaco Stephens D.O.

## 2020-02-11 NOTE — NUR
NURSE NOTES:

Patient had bowel movement early this morning before stool OB order. Patient is aware of 
collecting stool for OB.

## 2020-02-11 NOTE — NUR
CASE MANAGEMENT:REVIEW



SI;RENAL INSUFFICIENCY.  CKD.   EMPHYSEMA LUNG.   ANEMIA.

99.6   93   20   128/61   65% ON RA

H/H 9.0/26.5   BUN 34   CREAT 1.7  CA 8.4



IS;PLAQUENIL PO QD

PROTONIX PO Q12 HRS

DUO NEB HHN Q4 HRS

IVF D5W @ 75 ML/HR



*******MED SURG STATUS*****



DCP;HOME WITH  [facility]

## 2020-02-11 NOTE — NUR
CASE MANAGEMENT:NOTE



COMMUNICATION WITH DR BOLANOS IN RE TO DC HOME TODAY WITH HOME [facility]



PER MD, WILL FOLLOW UP WITH PATIENT STATUS AT NOON BEFORE DECIDING ON DC

## 2020-02-11 NOTE — NUR
NURSE NOTES:

Received patient in bed, awake, AAOx4. Not in respiratory/cardiac distress. Denies any pain 
or discomfort @ this time. IV is intact, no s/s of infiltration with IVF. Call light and 
personnel items within reach. Bed is in lowest position and locked. Bed alarm is on. Will 
continue plan of care.

## 2020-02-12 VITALS — DIASTOLIC BLOOD PRESSURE: 61 MMHG | SYSTOLIC BLOOD PRESSURE: 105 MMHG

## 2020-02-12 VITALS — DIASTOLIC BLOOD PRESSURE: 64 MMHG | SYSTOLIC BLOOD PRESSURE: 121 MMHG

## 2020-02-12 VITALS — SYSTOLIC BLOOD PRESSURE: 118 MMHG | DIASTOLIC BLOOD PRESSURE: 61 MMHG

## 2020-02-12 VITALS — SYSTOLIC BLOOD PRESSURE: 136 MMHG | DIASTOLIC BLOOD PRESSURE: 70 MMHG

## 2020-02-12 VITALS — DIASTOLIC BLOOD PRESSURE: 55 MMHG | SYSTOLIC BLOOD PRESSURE: 103 MMHG

## 2020-02-12 LAB
% IRON SATURATION: 11 % (ref 15–50)
ADD MANUAL DIFF: NO
ANION GAP SERPL CALC-SCNC: 11 MMOL/L (ref 5–15)
BASOPHILS NFR BLD AUTO: 0.5 % (ref 0–2)
BUN SERPL-MCNC: 40 MG/DL (ref 7–18)
CALCIUM SERPL-MCNC: 8.8 MG/DL (ref 8.5–10.1)
CHLORIDE SERPL-SCNC: 108 MMOL/L (ref 98–107)
CO2 SERPL-SCNC: 20 MMOL/L (ref 21–32)
CREAT SERPL-MCNC: 1.9 MG/DL (ref 0.55–1.3)
EOSINOPHIL NFR BLD AUTO: 1.1 % (ref 0–3)
ERYTHROCYTE [DISTWIDTH] IN BLOOD BY AUTOMATED COUNT: 13.1 % (ref 11.6–14.8)
HCT VFR BLD CALC: 27.6 % (ref 37–47)
HGB BLD-MCNC: 9.3 G/DL (ref 12–16)
IRON SERPL-MCNC: 22 UG/DL (ref 50–175)
LYMPHOCYTES NFR BLD AUTO: 16.9 % (ref 20–45)
MCV RBC AUTO: 91 FL (ref 80–99)
MONOCYTES NFR BLD AUTO: 6.3 % (ref 1–10)
NEUTROPHILS NFR BLD AUTO: 75.3 % (ref 45–75)
PLATELET # BLD: 281 K/UL (ref 150–450)
POTASSIUM SERPL-SCNC: 4 MMOL/L (ref 3.5–5.1)
RBC # BLD AUTO: 3.04 M/UL (ref 4.2–5.4)
SODIUM SERPL-SCNC: 139 MMOL/L (ref 136–145)
TIBC SERPL-MCNC: 206 UG/DL (ref 250–450)
UNSATURATED IRON BINDING: 184 UG/DL (ref 112–346)
WBC # BLD AUTO: 7.1 K/UL (ref 4.8–10.8)

## 2020-02-12 RX ADMIN — DEXTROSE AND SODIUM CHLORIDE SCH MLS/HR: 5; .45 INJECTION, SOLUTION INTRAVENOUS at 14:05

## 2020-02-12 RX ADMIN — DOCUSATE SODIUM SCH MG: 100 CAPSULE, LIQUID FILLED ORAL at 17:40

## 2020-02-12 RX ADMIN — DOCUSATE SODIUM SCH MG: 100 CAPSULE, LIQUID FILLED ORAL at 08:46

## 2020-02-12 RX ADMIN — HEPARIN SODIUM SCH UNITS: 5000 INJECTION INTRAVENOUS; SUBCUTANEOUS at 08:47

## 2020-02-12 RX ADMIN — DEXTROSE AND SODIUM CHLORIDE SCH MLS/HR: 5; .45 INJECTION, SOLUTION INTRAVENOUS at 06:30

## 2020-02-12 RX ADMIN — DOCUSATE SODIUM SCH MG: 100 CAPSULE, LIQUID FILLED ORAL at 14:03

## 2020-02-12 NOTE — PULMONOLOGY PROGRESS NOTE
Assessment/Plan


Problems:  


(1) Emphysema lung


(2) Renal failure (ARF), acute on chronic


(3) Hyperkalemia


(4) Severe protein-calorie malnutrition


(5) LESLY (mycobacterium avium-intracellulare)


(6) Anemia


Assessment/Plan


on Venofer for iron deficiency


all reviewed


improving


check electrolytes


respiratory treatment


titrate fio2 to sat of 92%


dvt prophylaxis





Subjective


ROS Limited/Unobtainable:  No


Interval Events:  doing better


Allergies:  


Coded Allergies:  


     No Known Allergies (Unverified , 2/14/16)





Objective





Last 24 Hour Vital Signs








  Date Time  Temp Pulse Resp B/P (MAP) Pulse Ox O2 Delivery O2 Flow Rate FiO2


 


2/12/20 12:00 97.7 75 18 105/61 (76) 96   


 


2/12/20 09:00      Room Air  


 


2/12/20 08:00 98.1 82 18 103/55 (71) 96   


 


2/12/20 04:00 98.5 86 18 118/61 (80) 96   


 


2/12/20 00:00 99.6 82 18 121/64 (83) 92   


 


2/11/20 21:00      Room Air  


 


2/11/20 20:39 99.6 84 18 128/53 (78) 95   


 


2/11/20 16:00 98.1 79 20 128/74 (92) 97   

















Intake and Output  


 


 2/11/20 2/12/20





 19:00 07:00


 


Intake Total 1425 ml 795 ml


 


Balance 1425 ml 795 ml


 


  


 


Intake Oral 600 ml 120 ml


 


IV Total 825 ml 675 ml


 


# Voids 3 2


 


# Bowel Movements 1 








Objective


General Appearance:  morbidly obese


Lines, tubes and drains:  peripheral


HEENT:  normocephalic, atraumatic


Neck:  non-tender, normal alignment


Respiratory/Chest:  chest wall non-tender, rhonchi - right, expiratory wheezing


Breasts:  no masses


Cardiovascular/Chest:  normal peripheral pulses


Abdomen:  normal bowel sounds, non tender, hyperactive bowel sounds


Extremities:  normal range of motion


Laboratory Tests


2/12/20 07:10: 


White Blood Count 7.1, Red Blood Count 3.04L, Hemoglobin 9.3L, Hematocrit 27.6L

, Mean Corpuscular Volume 91, Mean Corpuscular Hemoglobin 30.5, Mean 

Corpuscular Hemoglobin Concent 33.6, Red Cell Distribution Width 13.1, Platelet 

Count 281, Mean Platelet Volume 4.9L, Neutrophils (%) (Auto) 75.3H, Lymphocytes 

(%) (Auto) 16.9L, Monocytes (%) (Auto) 6.3, Eosinophils (%) (Auto) 1.1, 

Basophils (%) (Auto) 0.5, Sodium Level 139, Potassium Level 4.0, Chloride Level 

108H, Carbon Dioxide Level 20L, Anion Gap 11, Blood Urea Nitrogen 40H, 

Creatinine 1.9H, Estimat Glomerular Filtration Rate 25.7, Glucose Level 88, 

Calcium Level 8.8, Iron Level 22L, Total Iron Binding Capacity 206L, Percent 

Iron Saturation 11L, Unsaturated Iron Binding 184, Vitamin B12 Level 452, 

Folate 17.9, Immunoglobulin G [Pending]





Current Medications








 Medications


  (Trade)  Dose


 Ordered  Sig/Naman


 Route


 PRN Reason  Start Time


 Stop Time Status Last Admin


Dose Admin


 


 Albuterol/


 Ipratropium


  (Albuterol/


 Ipratropium)  3 ml  Q4H  PRN


 HHN


 Shortness of Breath  2/8/20 23:45


 2/13/20 11:44   


 


 


 Dextrose/Sodium


 Chloride  1,000 ml @ 


 75 mls/hr  [id number]


 IV


   2/8/20 22:30


 3/8/20 09:59  2/11/20 15:55


 


 


 Docusate Sodium


  (Colace)  100 mg  THREE TIMES A  DAY


 ORAL


   2/9/20 09:00


 3/8/20 17:59  2/12/20 08:46


 


 


 Heparin Sodium


  (Porcine)


  (Heparin 5000


 units/ml)  5,000 units  EVERY 12  HOURS


 SUBQ


   2/9/20 09:00


 3/9/20 20:59  2/12/20 08:47


 


 


 Hydroxychloroquine


 Sulfate


  (Plaquenil)  200 mg  DAILY


 ORAL


   2/9/20 09:00


 3/9/20 08:59  2/12/20 08:46


 


 


 Iron Sucrose 200


 mg/Sodium Chloride  120 ml @ 


 240 mls/hr  ONCE  ONCE


 IV


   2/12/20 13:00


 2/12/20 13:29   


 


 


 Pantoprazole


  (Protonix)  40 mg  EVERY 12  HOURS


 ORAL


   2/9/20 09:00


 3/8/20 20:59  2/12/20 08:46


 

















Geovany Mustafa MD Feb 12, 2020 13:16

## 2020-02-12 NOTE — NUR
RD ASSESSMENT & RECOMMENDATIONS

SEE CARE ACTIVITY FOR COMPLETE ASSESSMENT



DAILY ESTIMATED NEEDS:

Needs based on Underweight/ 42kg 

30-35  kcals/kg 

8966-0865  total kcals

1-1.5  g protein/kg

42-63  g total protein 

25-30  mL/kg

6701-8789  total fluid mLs



NUTRITION DIAGNOSIS:

(1) Increased kcal/pro needs R/T underweight status as evidenced by

pt @ 92% IBW, underweight BMI per guidelines.

(2) Altered nutrition related lab values r/t acute kidney injury, clinical

condition as evidenced by elev K (5.8 upon adm -> now wnl), elev creat

(1.9)



CURRENT DIET: Renal    





PO DIET RECOMMENDATIONS:

LOW NA,  LOW POTASSIUM diet/ soft easy chew  







ADDITIONAL RECOMMENDATIONS:

* Weekly calibrated bedscale wt-> rec standing wt if able  

* Monitor K closely: 5.8 -> wnl 

* Nepro x 1 (425kcal/19g prot per selwyn)- vanilla flavor per pt request 

* Monitor for continued good PO intake 

. 

.

## 2020-02-12 NOTE — GI PROGRESS NOTE
Assessment/Plan


Problems:  


(1) Anemia


ICD Codes:  D64.9 - Anemia, unspecified


SNOMED:  151127490


(2) Severe protein-calorie malnutrition


ICD Codes:  E43 - Unspecified severe protein-calorie malnutrition


SNOMED:  853229043, 375852764, [id number]


Status:  stable


Status Narrative


Discussed with Dr. Shepard.


Assessment/Plan


This is a 74-year-old female with anemia most probably secondary to either 

multiple myeloma, chronic disease, or renal insufficiency. 





No plans for GI procedures at this time, patient refused as inpatient.


follow up OB stool to evaluate for GI bleed


recommend patient have outpatient colonoscopy


electrolyte correction


zofran prn


prn transfusions


ppi


dc planning





The patient was seen and examined at bedside and all new and available data was 

reviewed in the patients chart. I agree with the above findings, impression 

and plan.  (Patient seen earlier today. Signature stamp does not reflect 

patient encounter time.). - Dany Shepard MD





Subjective


Gastrointestinal/Abdominal:  Reports: no symptoms





Objective





Last 24 Hour Vital Signs








  Date Time  Temp Pulse Resp B/P (MAP) Pulse Ox O2 Delivery O2 Flow Rate FiO2


 


2/12/20 09:00      Room Air  


 


2/12/20 08:00 98.1 82 18 103/55 (71) 96   


 


2/12/20 04:00 98.5 86 18 118/61 (80) 96   


 


2/12/20 00:00 99.6 82 18 121/64 (83) 92   


 


2/11/20 21:00      Room Air  


 


2/11/20 20:39 99.6 84 18 128/53 (78) 95   


 


2/11/20 16:00 98.1 79 20 128/74 (92) 97   


 


2/11/20 12:00 99.1 75 20 118/60 (79) 95   

















Intake and Output  


 


 2/11/20 2/12/20





 19:00 07:00


 


Intake Total 1425 ml 795 ml


 


Balance 1425 ml 795 ml


 


  


 


Intake Oral 600 ml 120 ml


 


IV Total 825 ml 675 ml


 


# Voids 3 2


 


# Bowel Movements 1 











Laboratory Tests








Test


  2/12/20


07:10


 


White Blood Count


  7.1 K/UL


(4.8-10.8)


 


Red Blood Count


  3.04 M/UL


(4.20-5.40)  L


 


Hemoglobin


  9.3 G/DL


(12.0-16.0)  L


 


Hematocrit


  27.6 %


(37.0-47.0)  L


 


Mean Corpuscular Volume 91 FL (80-99)  


 


Mean Corpuscular Hemoglobin


  30.5 PG


(27.0-31.0)


 


Mean Corpuscular Hemoglobin


Concent 33.6 G/DL


(32.0-36.0)


 


Red Cell Distribution Width


  13.1 %


(11.6-14.8)


 


Platelet Count


  281 K/UL


(150-450)


 


Mean Platelet Volume


  4.9 FL


(6.5-10.1)  L


 


Neutrophils (%) (Auto)


  75.3 %


(45.0-75.0)  H


 


Lymphocytes (%) (Auto)


  16.9 %


(20.0-45.0)  L


 


Monocytes (%) (Auto)


  6.3 %


(1.0-10.0)


 


Eosinophils (%) (Auto)


  1.1 %


(0.0-3.0)


 


Basophils (%) (Auto)


  0.5 %


(0.0-2.0)


 


Sodium Level


  139 MMOL/L


(136-145)


 


Potassium Level


  4.0 MMOL/L


(3.5-5.1)


 


Chloride Level


  108 MMOL/L


()  H


 


Carbon Dioxide Level


  20 MMOL/L


(21-32)  L


 


Anion Gap


  11 mmol/L


(5-15)


 


Blood Urea Nitrogen


  40 mg/dL


(7-18)  H


 


Creatinine


  1.9 MG/DL


(0.55-1.30)  H


 


Estimat Glomerular Filtration


Rate 25.7 mL/min


(>60)


 


Glucose Level


  88 MG/DL


()


 


Calcium Level


  8.8 MG/DL


(8.5-10.1)


 


Iron Level


  22 ug/dL


()  L


 


Total Iron Binding Capacity


  206 ug/dL


(250-450)  L


 


Percent Iron Saturation 11 % (15-50)  L


 


Unsaturated Iron Binding


  184 ug/dL


(112-346)


 


Vitamin B12 Level


  452 PG/ML


(193-986)


 


Folate


  17.9 NG/ML


(8.6-58.9)


 


Immunoglobulin G Pending  








Height (Feet):  5


Height (Inches):  0.00


Weight (Pounds):  92


General Appearance:  WD/WN, no apparent distress, alert


Cardiovascular:  normal rate


Respiratory/Chest:  normal breath sounds, no respiratory distress


Abdominal Exam:  normal bowel sounds, non tender, soft


Extremities:  normal range of motion, non-tender











Jenny Rodriguez NP Feb 12, 2020 11:31

## 2020-02-12 NOTE — NUR
CHARGE NURSE NOTE:

BUN 40, creat.1.9. Pt has a discharge order.  was paged for nephro. clearance, 
message left.

## 2020-02-12 NOTE — NEPHROLOGY PROGRESS NOTE
Assessment/Plan


Problem List:  


(1) MIKA (acute kidney injury)


(2) CKD (chronic kidney disease)


(3) Severe anemia


(4) Severe protein-calorie malnutrition


Assessment:  BMI 17.2





Assessment





(1) MIKA (acute kidney injury)


(2) Renal failure (ARF), acute on chronic


(3) Severe anemia


(4) Protein calorie mal nutrition- BMI 17.2


(5) h/o UTI





Others:


h/o bronchitis


h/o abdominal pain and nausea, possibly enteritis ( by CT scan) 


RA


Plan








another  dose IV Iron


Avoid nephrotoxics


hydrate-


Anemia workup


Ok to DC from renal stand





Subjective


ROS Limited/Unobtainable:  No


Constitutional:  Reports: malaise, weakness





Objective


Objective





Last 24 Hour Vital Signs








  Date Time  Temp Pulse Resp B/P (MAP) Pulse Ox O2 Delivery O2 Flow Rate FiO2


 


2/12/20 09:00      Room Air  


 


2/12/20 08:00 98.1 82 18 103/55 (71) 96   


 


2/12/20 04:00 98.5 86 18 118/61 (80) 96   


 


2/12/20 00:00 99.6 82 18 121/64 (83) 92   


 


2/11/20 21:00      Room Air  


 


2/11/20 20:39 99.6 84 18 128/53 (78) 95   


 


2/11/20 16:00 98.1 79 20 128/74 (92) 97   


 


2/11/20 12:00 99.1 75 20 118/60 (79) 95   

















Intake and Output  


 


 2/11/20 2/12/20





 19:00 07:00


 


Intake Total 1425 ml 795 ml


 


Balance 1425 ml 795 ml


 


  


 


Intake Oral 600 ml 120 ml


 


IV Total 825 ml 675 ml


 


# Voids 3 2


 


# Bowel Movements 1 








Laboratory Tests


2/12/20 07:10: 


White Blood Count 7.1, Red Blood Count 3.04L, Hemoglobin 9.3L, Hematocrit 27.6L

, Mean Corpuscular Volume 91, Mean Corpuscular Hemoglobin 30.5, Mean 

Corpuscular Hemoglobin Concent 33.6, Red Cell Distribution Width 13.1, Platelet 

Count 281, Mean Platelet Volume 4.9L, Neutrophils (%) (Auto) 75.3H, Lymphocytes 

(%) (Auto) 16.9L, Monocytes (%) (Auto) 6.3, Eosinophils (%) (Auto) 1.1, 

Basophils (%) (Auto) 0.5, Sodium Level 139, Potassium Level 4.0, Chloride Level 

108H, Carbon Dioxide Level 20L, Anion Gap 11, Blood Urea Nitrogen 40H, 

Creatinine 1.9H, Estimat Glomerular Filtration Rate 25.7, Glucose Level 88, 

Calcium Level 8.8, Iron Level 22L, Total Iron Binding Capacity 206L, Percent 

Iron Saturation 11L, Unsaturated Iron Binding 184, Vitamin B12 Level 452, 

Folate 17.9, Immunoglobulin G [Pending]


Height (Feet):  5


Height (Inches):  0.00


Weight (Pounds):  92


General Appearance:  no apparent distress


Objective


no change











Demar Mayo MD Feb 12, 2020 11:24

## 2020-02-12 NOTE — GENERAL PROGRESS NOTE
Assessment/Plan


Problem List:  


(1) Anemia


ICD Codes:  D64.9 - Anemia, unspecified


SNOMED:  564142526


(2) Renal insufficiency


ICD Codes:  N28.9 - Disorder of kidney and ureter, unspecified


SNOMED:  881751215, 025766131


(3) Hyperkalemia


ICD Codes:  E87.5 - Hyperkalemia


SNOMED:  42539457


(4) CKD (chronic kidney disease)


ICD Codes:  N18.9 - Chronic kidney disease, unspecified


SNOMED:  964037904


(5) Emphysema lung


ICD Codes:  J43.9 - Emphysema, unspecified


SNOMED:  59103981


Status:  stable, progressing


Assessment/Plan:


pt diet o2 pulm tx prn neph f/u dc w hh if clear





Subjective


Constitutional:  Reports: weakness


Allergies:  


Coded Allergies:  


     No Known Allergies (Unverified , 2/14/16)


All Systems:  reviewed and negative except above


Subjective


calm in bed





Objective





Last 24 Hour Vital Signs








  Date Time  Temp Pulse Resp B/P (MAP) Pulse Ox O2 Delivery O2 Flow Rate FiO2


 


2/12/20 12:00 97.7 75 18 105/61 (76) 96   


 


2/12/20 09:00      Room Air  


 


2/12/20 08:00 98.1 82 18 103/55 (71) 96   


 


2/12/20 04:00 98.5 86 18 118/61 (80) 96   


 


2/12/20 00:00 99.6 82 18 121/64 (83) 92   


 


2/11/20 21:00      Room Air  


 


2/11/20 20:39 99.6 84 18 128/53 (78) 95   


 


2/11/20 16:00 98.1 79 20 128/74 (92) 97   

















Intake and Output  


 


 2/11/20 2/12/20





 19:00 07:00


 


Intake Total 1425 ml 795 ml


 


Balance 1425 ml 795 ml


 


  


 


Intake Oral 600 ml 120 ml


 


IV Total 825 ml 675 ml


 


# Voids 3 2


 


# Bowel Movements 1 








Laboratory Tests


2/12/20 07:10: 


White Blood Count 7.1, Red Blood Count 3.04L, Hemoglobin 9.3L, Hematocrit 27.6L

, Mean Corpuscular Volume 91, Mean Corpuscular Hemoglobin 30.5, Mean 

Corpuscular Hemoglobin Concent 33.6, Red Cell Distribution Width 13.1, Platelet 

Count 281, Mean Platelet Volume 4.9L, Neutrophils (%) (Auto) 75.3H, Lymphocytes 

(%) (Auto) 16.9L, Monocytes (%) (Auto) 6.3, Eosinophils (%) (Auto) 1.1, 

Basophils (%) (Auto) 0.5, Sodium Level 139, Potassium Level 4.0, Chloride Level 

108H, Carbon Dioxide Level 20L, Anion Gap 11, Blood Urea Nitrogen 40H, 

Creatinine 1.9H, Estimat Glomerular Filtration Rate 25.7, Glucose Level 88, 

Calcium Level 8.8, Iron Level 22L, Total Iron Binding Capacity 206L, Percent 

Iron Saturation 11L, Unsaturated Iron Binding 184, Vitamin B12 Level 452, 

Folate 17.9, Immunoglobulin G [Pending]


Height (Feet):  5


Height (Inches):  0.00


Weight (Pounds):  92


General Appearance:  alert


EENT:  normal ENT inspection


Neck:  normal alignment


Cardiovascular:  normal peripheral pulses, normal rate, regular rhythm


Respiratory/Chest:  chest wall non-tender, lungs clear, normal breath sounds


Abdomen:  normal bowel sounds, non tender, soft


Extremities:  normal inspection


Edema:  no edema noted Arm (L), no edema noted Arm (R), no edema noted Leg (L), 

no edema noted Leg (R), no edema noted Pedal (L), no edema noted Pedal (R), no 

edema noted Generalized


Neurologic:  responsive, motor weakness


Skin:  normal pigmentation, warm/dry











Flaco Stephens DO Feb 12, 2020 14:16

## 2020-02-12 NOTE — NUR
NURSE NOTES:

Patient discharged to home accompanied by patient's son Kike in stable condition. Discharge 
instruction given to the patient and son. about renal diet. Skin is intact. IV and ID were 
removed. No s/s of infiltration on IV removal site. All consults doctor cleared the patient 
for discharge. All belongings accounted for. Instructed son to follow up with [facility] to [facility] if no one follows up. Escorted patient to lobby.

## 2020-02-12 NOTE — CONSULTATION
History of Present Illness


General


Chief Complaint:  Abnormal Labs


Referring physician:  dr Stephens


Reason for Consultation:  hx of bronchtiis, hx of Mycobacterim PNA





Present Illness


Allergies:  


Coded Allergies:  


     No Known Allergies (Unverified , 2/14/16)





Medication History


Scheduled


Hydroxychloroquine Sulfate (Hydroxychloroquine Sulfate), 200 MG PO DAILY, (

Reported)





Scheduled PRN


Ibuprofen* (Advil*), 200 MG ORAL Q6HR PRN for Mild Pain/Temp > 100.5, (Reported)





Discontinued Medications


Aspirin* (Aspir 81*), 81 MG ORAL DAILY, (Reported)


   Discontinued Reason: Pt stopped taking med


Calcium Carbonate (Calcium), 500 MG PO DAILY, (Reported)


   Discontinued Reason: Pt stopped taking med


Calcium Carbonate/Vitamin D3 (Calcium + Vitamin D Tablet), 1 EACH PO BID, (

Reported)


   Discontinued Reason: Pt stopped taking med


Multivits-Min/Iron/FA/Lutein (Centrum Silver Women Tablet), 1 EACH PO DAILY, (

Reported)


   Discontinued Reason: Pt stopped taking med





Patient History


Healthcare decision maker





Resuscitation status


Full Code


Advanced Directive on File








Physical Exam





Last 24 Hour Vital Signs








  Date Time  Temp Pulse Resp B/P (MAP) Pulse Ox O2 Delivery O2 Flow Rate FiO2


 


2/12/20 09:00      Room Air  


 


2/12/20 08:00 98.1 82 18 103/55 (71) 96   


 


2/12/20 04:00 98.5 86 18 118/61 (80) 96   


 


2/12/20 00:00 99.6 82 18 121/64 (83) 92   


 


2/11/20 21:00      Room Air  


 


2/11/20 20:39 99.6 84 18 128/53 (78) 95   


 


2/11/20 16:00 98.1 79 20 128/74 (92) 97   


 


2/11/20 12:00 99.1 75 20 118/60 (79) 95   

















Intake and Output  


 


 2/11/20 2/12/20





 19:00 07:00


 


Intake Total 1425 ml 795 ml


 


Balance 1425 ml 795 ml


 


  


 


Intake Oral 600 ml 120 ml


 


IV Total 825 ml 675 ml


 


# Voids 3 2


 


# Bowel Movements 1 











Laboratory Tests








Test


  2/12/20


07:10


 


White Blood Count


  7.1 K/UL


(4.8-10.8)


 


Red Blood Count


  3.04 M/UL


(4.20-5.40)  L


 


Hemoglobin


  9.3 G/DL


(12.0-16.0)  L


 


Hematocrit


  27.6 %


(37.0-47.0)  L


 


Mean Corpuscular Volume 91 FL (80-99)  


 


Mean Corpuscular Hemoglobin


  30.5 PG


(27.0-31.0)


 


Mean Corpuscular Hemoglobin


Concent 33.6 G/DL


(32.0-36.0)


 


Red Cell Distribution Width


  13.1 %


(11.6-14.8)


 


Platelet Count


  281 K/UL


(150-450)


 


Mean Platelet Volume


  4.9 FL


(6.5-10.1)  L


 


Neutrophils (%) (Auto)


  75.3 %


(45.0-75.0)  H


 


Lymphocytes (%) (Auto)


  16.9 %


(20.0-45.0)  L


 


Monocytes (%) (Auto)


  6.3 %


(1.0-10.0)


 


Eosinophils (%) (Auto)


  1.1 %


(0.0-3.0)


 


Basophils (%) (Auto)


  0.5 %


(0.0-2.0)


 


Sodium Level


  139 MMOL/L


(136-145)


 


Potassium Level


  4.0 MMOL/L


(3.5-5.1)


 


Chloride Level


  108 MMOL/L


()  H


 


Carbon Dioxide Level


  20 MMOL/L


(21-32)  L


 


Anion Gap


  11 mmol/L


(5-15)


 


Blood Urea Nitrogen


  40 mg/dL


(7-18)  H


 


Creatinine


  1.9 MG/DL


(0.55-1.30)  H


 


Estimat Glomerular Filtration


Rate 25.7 mL/min


(>60)


 


Glucose Level


  88 MG/DL


()


 


Calcium Level


  8.8 MG/DL


(8.5-10.1)


 


Iron Level


  22 ug/dL


()  L


 


Total Iron Binding Capacity


  206 ug/dL


(250-450)  L


 


Percent Iron Saturation 11 % (15-50)  L


 


Unsaturated Iron Binding


  184 ug/dL


(112-346)


 


Vitamin B12 Level


  452 PG/ML


(193-986)


 


Folate


  17.9 NG/ML


(8.6-58.9)








Height (Feet):  5


Height (Inches):  0.00


Weight (Pounds):  92


Medications





Current Medications








 Medications


  (Trade)  Dose


 Ordered  Sig/Naman


 Route


 PRN Reason  Start Time


 Stop Time Status Last Admin


Dose Admin


 


 Albuterol/


 Ipratropium


  (Albuterol/


 Ipratropium)  3 ml  Q4H  PRN


 HHN


 Shortness of Breath  2/8/20 23:45


 2/13/20 11:44   


 


 


 Dextrose/Sodium


 Chloride  1,000 ml @ 


 75 mls/hr  [id number]


 IV


   2/8/20 22:30


 3/8/20 09:59  2/11/20 15:55


 


 


 Docusate Sodium


  (Colace)  100 mg  THREE TIMES A  DAY


 ORAL


   2/9/20 09:00


 3/8/20 17:59  2/12/20 08:46


 


 


 Heparin Sodium


  (Porcine)


  (Heparin 5000


 units/ml)  5,000 units  EVERY 12  HOURS


 SUBQ


   2/9/20 09:00


 3/9/20 20:59  2/12/20 08:47


 


 


 Hydroxychloroquine


 Sulfate


  (Plaquenil)  200 mg  DAILY


 ORAL


   2/9/20 09:00


 3/9/20 08:59  2/12/20 08:46


 


 


 Pantoprazole


  (Protonix)  40 mg  EVERY 12  HOURS


 ORAL


   2/9/20 09:00


 3/8/20 20:59  2/12/20 08:46


 











Assessment/Plan


Assessment/Plan:


Hematology/Oncology Consultation   


   


Requesting MD: Flaco Stephens   


Date of Service: 2/12/2020   


Reason for consultation: Leukopenia and Anemia





HPI: 


Asked by Dr. Stephens to eval. 


This is a 74 year-old female well known to me from prior admission, who lives 

at home, complaining of recent headache, and was recently admitted for 

abdominal pain, n/vt. Hematology/Oncology was consulted for Anemia and for 

elevated k and peaked t waves. Have seen her before and she had similar findings

, and send for a flow which was negative, no bone marrow biopsy was done, hep 

and hiv neg, as was abd us. Dr. Shepard was consulted as well, recs noted in 

addition to pulm, renal





PAST MEDICAL HISTORY:  Includes rheumatoid arthritis and neuropathy.





PAST SURGICAL HISTORY:  None.





MEDICATIONS:  Include cefepime, vancomycin, heparin, Zofran, morphine, Tylenol, 

and albuterol.





ALLERGIES:  Denies.





SOCIAL HISTORY:  No smoking.  No alcohol.  No intravenous drug abuse.





FAMILY HISTORY:  Noncontributory.





PHYSICAL EXAMINATION:


GENERAL:  Calm in bed, oriented x3, slight short of breath.


VITAL SIGNS: reviewed


CARDIOVASCULAR:  No murmur.


LUNGS:  Poor air exchange.


ABDOMEN:  Bowel sounds distant.


EXTREMITIES:  No cyanosis or edema.


NEUROLOGIC:  The patient moves all extremities, slightly weak.





LABORATORY AND DIAGNOSTIC DATA: 


10/2019 white count 3.6, hemoglobin and hematocrit 8.6/29, and platelets 173.  


2/12/20: wbc 7, hgb 9, plt 281k





ASSESSMENT/Recs


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

, cocerning for multiple myeloma. with LEUKOPENIA, decreased white blood cell 

count,  (leukopenia) - wbc under 4k, could also be related to infection v 

multiple myeloma


--> continue antibiotics with ID service, appreciate recs


--> medications have been reviewed


--> hepatitis and hiv in past negative


--> imaging of abdomen reviewed


--> Prior igg was 3961--> 2453 and spep was anbnormal concerning for myeloma


--> immunofixation of prior visit ++ for monoclonal protein with lambda light 

chain specificity


--> consider repeat igg


# Anemia of chronic disease due to underlying chronic medical issues, 

multifactorial 


--> Anemia workup has been ordered from pior adm, and reviewed, ferritin is >100


--> No evidence of hemolysis is noted, peripheral smear has been reviewed


--> Hgb goal >7. Transfuse prn.


--> Epogen or iron at this time is not particularly indicated


--> Medications have been reviewed


--> egd/colo per gi on prn basis


# Abdominal pain, hx enteritis


--> on abx


--> O2 and pulmonary treatment.


# Peaked t waves and elev k


--> per Dr. Mayo


# Headache, chronic


# Recent ashley


# Dvt ppx heparin sq


   


The timing of this note does not necessarily reflect the time of the patient 

was seen.   


   


Greatly appreciate consultation!











Agustin Giordano MD Feb 12, 2020 10:37

## 2020-02-13 NOTE — DISCHARGE SUMMARY
Discharge Summary


Discharge Summary


_


DATE OF ADMISSION: 2/7/2020





DATE OF DISCHARGE: 2/12/2020








DISCHARGED BY: Dr Stephens





REASON FOR ADMISSION: 


74 years old female with past medical history of rheumatoid arthritis, renal 

insufficiency, malnutrition, hyperkalemia, history of bronchitis, pneumonia 

with Mycobacterium avium complex, emphysema, presented for evaluation and found 

to have evidence of renal insufficiency and hyperkalemia.  


Patient denied chest pain ,shortness of breath and cough.  


Upon evaluation potassium was 5.8.  


BUN 50, creatinine 1.5.  


Glucose 108.  


No leukocytosis, WBC 4.0, hemoglobin 9.4, hematocrit 28.8, platelet count 278.  


Urinalysis revealed no evidence of urinary tract infection.  


Patient subsequently admitted for further management.


 


CONSULTANTS:


pulmonologist Dr. Mustafa


GI specialist Dr. Shepard


nephrologist Dr. Mayo


hematologist/oncologist Dr. Giordano


 


 


[facility] COURSE: 


Patient initially admitted to telemetry floor.  


Supplemental oxygen provided and titrated to keep pulse oximetry above 92%.


Pulmonary toilet with bronchodilator via HHN was on board as needed.





Pulse oximetry remained stable.


Chest x-ray demonstrated thickening of interstitial markings, but no acute 

cardiopulmonary pathology was noted.


DVT prophylaxis provided.


Patient started on the IV fluids with close monitoring of volumes and renal 

parameters.


Hyperkalemia was treated and resolved.


Nonsteroid anti-inflammatory medication were discontinued.


Patient will need other means for pain management for rheumatoid arthritis than 

nonsteroid anti-inflammatory medication.


Anemia work-up was consistent with anemia of chronic disease. 


Stable folate and B12.  


Stool for occult blood was negative.  


GI prophylaxis provided.  


Bowel regimen instituted.  


Plaquenil continued.








Hematologist /oncologist  followed.  


Patient had monoclonal   gammopathy .


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 

multiply myeloma with leukopenia.  


Hepatitis panel and HIV test in the past were all negative. 


IgG this time 2587 , trending down from prior 3961.  


Hematologist recommended outpatient follow-up for additional work-up.  


Protein supplements provided as per registered dietician recommendation.  


Stable TSH.  


Supportive care provided.





Patient clinically stabilized and was ready for discharge home.





FINAL DIAGNOSES: 


Hyperkalemia


Acute kidney injury on chronic kidney disease


COPD/emphysema


Anemia of chronic disease


Monoclonal gammopathy


Severe protein calorie malnutrition, BMI 17.2


Rheumatoid arthritis


History of Mycobacterium avium pneumonia





DISCHARGE MEDICATIONS:


See Medication Reconciliation list.





DISCHARGE INSTRUCTIONS:


Patient was discharged home with home health services.  


Follow up with primary care provider in one week.





I have been assigned to dictate discharge summary for this account. I was not 

involved in the patient's management.











Minal Barnes NP Feb 13, 2020 10:04

## 2020-02-20 ENCOUNTER — HOSPITAL ENCOUNTER (INPATIENT)
Dept: HOSPITAL 72 - EMR | Age: 75
LOS: 4 days | Discharge: HOME HEALTH | DRG: 871 | End: 2020-02-24
Payer: MEDICARE

## 2020-02-20 VITALS — SYSTOLIC BLOOD PRESSURE: 102 MMHG | DIASTOLIC BLOOD PRESSURE: 61 MMHG

## 2020-02-20 VITALS — SYSTOLIC BLOOD PRESSURE: 103 MMHG | DIASTOLIC BLOOD PRESSURE: 77 MMHG

## 2020-02-20 VITALS — SYSTOLIC BLOOD PRESSURE: 101 MMHG | DIASTOLIC BLOOD PRESSURE: 52 MMHG

## 2020-02-20 VITALS — SYSTOLIC BLOOD PRESSURE: 107 MMHG | DIASTOLIC BLOOD PRESSURE: 62 MMHG

## 2020-02-20 VITALS — WEIGHT: 87 LBS | HEIGHT: 60 IN | BODY MASS INDEX: 17.08 KG/M2

## 2020-02-20 DIAGNOSIS — E46: ICD-10-CM

## 2020-02-20 DIAGNOSIS — A41.9: Primary | ICD-10-CM

## 2020-02-20 DIAGNOSIS — N18.9: ICD-10-CM

## 2020-02-20 DIAGNOSIS — M19.90: ICD-10-CM

## 2020-02-20 DIAGNOSIS — D64.9: ICD-10-CM

## 2020-02-20 DIAGNOSIS — E43: ICD-10-CM

## 2020-02-20 DIAGNOSIS — J18.9: ICD-10-CM

## 2020-02-20 DIAGNOSIS — N17.9: ICD-10-CM

## 2020-02-20 DIAGNOSIS — M06.9: ICD-10-CM

## 2020-02-20 LAB
ADD MANUAL DIFF: YES
ALBUMIN SERPL-MCNC: 2.5 G/DL (ref 3.4–5)
ALBUMIN/GLOB SERPL: 0.4 {RATIO} (ref 1–2.7)
ALP SERPL-CCNC: 107 U/L (ref 46–116)
ALT SERPL-CCNC: 20 U/L (ref 12–78)
ANION GAP SERPL CALC-SCNC: 18 MMOL/L (ref 5–15)
APPEARANCE UR: (no result)
APTT PPP: (no result) S
AST SERPL-CCNC: 23 U/L (ref 15–37)
BILIRUB SERPL-MCNC: 0.3 MG/DL (ref 0.2–1)
BUN SERPL-MCNC: 78 MG/DL (ref 7–18)
CALCIUM SERPL-MCNC: 8.7 MG/DL (ref 8.5–10.1)
CHLORIDE SERPL-SCNC: 103 MMOL/L (ref 98–107)
CK MB SERPL-MCNC: 1.3 NG/ML (ref 0–3.6)
CK SERPL-CCNC: 56 U/L (ref 26–308)
CO2 SERPL-SCNC: 16 MMOL/L (ref 21–32)
CREAT SERPL-MCNC: 3 MG/DL (ref 0.55–1.3)
ERYTHROCYTE [DISTWIDTH] IN BLOOD BY AUTOMATED COUNT: 13.3 % (ref 11.6–14.8)
GLOBULIN SER-MCNC: 6 G/DL
GLUCOSE UR STRIP-MCNC: NEGATIVE MG/DL
HCT VFR BLD CALC: 25.6 % (ref 37–47)
HGB BLD-MCNC: 8.5 G/DL (ref 12–16)
KETONES UR QL STRIP: NEGATIVE
LEUKOCYTE ESTERASE UR QL STRIP: NEGATIVE
MCV RBC AUTO: 91 FL (ref 80–99)
NITRITE UR QL STRIP: NEGATIVE
PH UR STRIP: 5 [PH] (ref 4.5–8)
PLATELET # BLD: 373 K/UL (ref 150–450)
POTASSIUM SERPL-SCNC: 4.4 MMOL/L (ref 3.5–5.1)
PROT UR QL STRIP: (no result)
RBC # BLD AUTO: 2.82 M/UL (ref 4.2–5.4)
SODIUM SERPL-SCNC: 136 MMOL/L (ref 136–145)
SP GR UR STRIP: 1.01 (ref 1–1.03)
UROBILINOGEN UR-MCNC: NORMAL MG/DL (ref 0–1)
WBC # BLD AUTO: 11.3 K/UL (ref 4.8–10.8)

## 2020-02-20 PROCEDURE — 83550 IRON BINDING TEST: CPT

## 2020-02-20 PROCEDURE — 82607 VITAMIN B-12: CPT

## 2020-02-20 PROCEDURE — 93005 ELECTROCARDIOGRAM TRACING: CPT

## 2020-02-20 PROCEDURE — 81003 URINALYSIS AUTO W/O SCOPE: CPT

## 2020-02-20 PROCEDURE — 86140 C-REACTIVE PROTEIN: CPT

## 2020-02-20 PROCEDURE — 85025 COMPLETE CBC W/AUTO DIFF WBC: CPT

## 2020-02-20 PROCEDURE — 71045 X-RAY EXAM CHEST 1 VIEW: CPT

## 2020-02-20 PROCEDURE — 82550 ASSAY OF CK (CPK): CPT

## 2020-02-20 PROCEDURE — 87086 URINE CULTURE/COLONY COUNT: CPT

## 2020-02-20 PROCEDURE — 83735 ASSAY OF MAGNESIUM: CPT

## 2020-02-20 PROCEDURE — 84443 ASSAY THYROID STIM HORMONE: CPT

## 2020-02-20 PROCEDURE — 80061 LIPID PANEL: CPT

## 2020-02-20 PROCEDURE — 82746 ASSAY OF FOLIC ACID SERUM: CPT

## 2020-02-20 PROCEDURE — 86710 INFLUENZA VIRUS ANTIBODY: CPT

## 2020-02-20 PROCEDURE — 80053 COMPREHEN METABOLIC PANEL: CPT

## 2020-02-20 PROCEDURE — 83540 ASSAY OF IRON: CPT

## 2020-02-20 PROCEDURE — 80069 RENAL FUNCTION PANEL: CPT

## 2020-02-20 PROCEDURE — 83036 HEMOGLOBIN GLYCOSYLATED A1C: CPT

## 2020-02-20 PROCEDURE — 82728 ASSAY OF FERRITIN: CPT

## 2020-02-20 PROCEDURE — 82553 CREATINE MB FRACTION: CPT

## 2020-02-20 PROCEDURE — 84550 ASSAY OF BLOOD/URIC ACID: CPT

## 2020-02-20 PROCEDURE — 96365 THER/PROPH/DIAG IV INF INIT: CPT

## 2020-02-20 PROCEDURE — 84484 ASSAY OF TROPONIN QUANT: CPT

## 2020-02-20 PROCEDURE — 83880 ASSAY OF NATRIURETIC PEPTIDE: CPT

## 2020-02-20 PROCEDURE — 96375 TX/PRO/DX INJ NEW DRUG ADDON: CPT

## 2020-02-20 PROCEDURE — 82977 ASSAY OF GGT: CPT

## 2020-02-20 PROCEDURE — 99285 EMERGENCY DEPT VISIT HI MDM: CPT

## 2020-02-20 PROCEDURE — 84100 ASSAY OF PHOSPHORUS: CPT

## 2020-02-20 PROCEDURE — 36415 COLL VENOUS BLD VENIPUNCTURE: CPT

## 2020-02-20 PROCEDURE — 85007 BL SMEAR W/DIFF WBC COUNT: CPT

## 2020-02-20 NOTE — NUR
ED Nurse Note:

Patient is resting comfortably, ERMD informed of febrile status, orders pending 
assessment. Will continue to monitor.

## 2020-02-20 NOTE — NUR
ED Nurse Note:\

Patient resting in bed comfortably with son at bedside. patient is afebil post 
medication administration. vutal signs are stable and documented. Will continue 
to monitor.

## 2020-02-20 NOTE — NUR
ED Nurse Note:

Late entry: PT ambuluated with son to ER; flu like symptoms, no N/V/D reported; 
reports no hx of falls, coughing up yellowing phlegm, pain from coughing. Son 
at bedside, AAO x 3; will continue to monitor.

## 2020-02-20 NOTE — EMERGENCY ROOM REPORT
History of Present Illness


General


Chief Complaint:  Flu Like Symptoms


Source:  Family Member





Present Illness


HPI


Patient was recently here last week was discharged after diagnosis of acute 

kidney disease the patient's son reports that the day after


She was discharged she started having cold-like symptoms with runny nose and 

cough


This has persisted for the past week now


Patient also feeling generally weak and complained of mild headache





Patient has some increased nausea


Denies any chest pain denies any focal weakness


Allergies:  


Coded Allergies:  


     No Known Allergies (Unverified , 2/14/16)





Patient History


Past Medical History:  see triage record


Reviewed Nursing Documentation:  PMH: Agreed; PSxH: Agreed





Nursing Documentation-PMH


Hx Cardiac Problems:  No - RA


Hx Hypertension:  No


Hx Pacemaker:  No


Hx Asthma:  No


Hx COPD:  No - PNEUMONIA


Hx Diabetes:  No


Hx Cancer:  No


Hx Gastrointestinal Problems:  No - Pyelonephritis


Hx Dialysis:  No - "Kidney problem"


Hx Neurological Problems:  No


Hx Cerebrovascular Accident:  No


Hx Seizures:  No


Hx Headaches:  Yes





Review of Systems


All Other Systems:  negative except mentioned in HPI





Physical Exam





Vital Signs








  Date Time  Temp Pulse Resp B/P (MAP) Pulse Ox O2 Delivery O2 Flow Rate FiO2


 


2/20/20 13:24 99.0 87 17 102/61 (75) 93 Room Air  








Sp02 EP Interpretation:  reviewed, normal


General Appearance:  no apparent distress


Head:  normocephalic, atraumatic


Eyes:  bilateral eye PERRL, bilateral eye EOMI


ENT:  other - Clear rhinorrhea


Neck:  supple, no meningismus


Respiratory:  no respiratory distress, no retraction, crackles - Lower lobes


Cardiovascular #1:  regular rate, rhythm, no edema


Gastrointestinal:  non tender, soft


Genitourinary:  no CVA tenderness


Musculoskeletal:  normal inspection


Neurologic:  alert, oriented x3


Psychiatric:  normal inspection


Skin:  no rash


Lymphatic:  no adenopathy





Medical Decision Making


Diagnostic Impression:  


 Primary Impression:  


 Dyspnea


 Additional Impression:  


 Renal insufficiency


ER Course


Patient is a fairly complex patient with multiple differential to consideration 

including but not limited to cardiac cardiopulmonary and vascular emergencies





Patient is also showing some significant edema possibly from fluid hydration 

from previous visit








At this time the patient will require further intervention inpatient care


Patient has kidney disease and has to have subtle diuresis and admitted for 

further care





Labs








Test


  2/23/20


06:54 2/24/20


07:16


 


White Blood Count


  6.5 K/UL


(4.8-10.8) 5.9 K/UL


(4.8-10.8)


 


Red Blood Count


  2.79 M/UL


(4.20-5.40) 2.72 M/UL


(4.20-5.40)


 


Hemoglobin


  8.5 G/DL


(12.0-16.0) 8.3 G/DL


(12.0-16.0)


 


Hematocrit


  25.5 %


(37.0-47.0) 24.4 %


(37.0-47.0)


 


Mean Corpuscular Volume 91 FL (80-99)  90 FL (80-99) 


 


Mean Corpuscular Hemoglobin


  30.3 PG


(27.0-31.0) 30.4 PG


(27.0-31.0)


 


Mean Corpuscular Hemoglobin


Concent 33.2 G/DL


(32.0-36.0) 33.9 G/DL


(32.0-36.0)


 


Red Cell Distribution Width


  13.5 %


(11.6-14.8) 13.7 %


(11.6-14.8)


 


Platelet Count


  436 K/UL


(150-450) 427 K/UL


(150-450)


 


Mean Platelet Volume


  4.4 FL


(6.5-10.1) 4.5 FL


(6.5-10.1)


 


Neutrophils (%) (Auto)


  77.6 %


(45.0-75.0) 75.4 %


(45.0-75.0)


 


Lymphocytes (%) (Auto)


  14.2 %


(20.0-45.0) 17.5 %


(20.0-45.0)


 


Monocytes (%) (Auto)


  6.6 %


(1.0-10.0) 5.1 %


(1.0-10.0)


 


Eosinophils (%) (Auto)


  1.0 %


(0.0-3.0) 1.3 %


(0.0-3.0)


 


Basophils (%) (Auto)


  0.6 %


(0.0-2.0) 0.7 %


(0.0-2.0)


 


Sodium Level


  143 MMOL/L


(136-145) 144 MMOL/L


(136-145)


 


Potassium Level


  4.1 MMOL/L


(3.5-5.1) 4.2 MMOL/L


(3.5-5.1)


 


Chloride Level


  112 MMOL/L


() 115 MMOL/L


()


 


Carbon Dioxide Level


  17 MMOL/L


(21-32) 17 MMOL/L


(21-32)


 


Anion Gap


  14 mmol/L


(5-15) 12 mmol/L


(5-15)


 


Blood Urea Nitrogen


  55 mg/dL


(7-18) 44 mg/dL


(7-18)


 


Creatinine


  3.2 MG/DL


(0.55-1.30) 2.8 MG/DL


(0.55-1.30)


 


Estimat Glomerular Filtration


Rate 14.1 mL/min


(>60) 16.5 mL/min


(>60)


 


Glucose Level


  112 MG/DL


() 100 MG/DL


()


 


Uric Acid


  8.2 MG/DL


(2.6-7.2) 


 


 


Calcium Level


  8.6 MG/DL


(8.5-10.1) 8.7 MG/DL


(8.5-10.1)


 


Phosphorus Level


  4.0 MG/DL


(2.5-4.9) 4.2 MG/DL


(2.5-4.9)


 


Magnesium Level


  1.6 MG/DL


(1.8-2.4) 1.4 MG/DL


(1.8-2.4)


 


Total Bilirubin


  0.3 MG/DL


(0.2-1.0) 0.3 MG/DL


(0.2-1.0)


 


Aspartate Amino Transf


(AST/SGOT) 16 U/L (15-37) 


  17 U/L (15-37) 


 


 


Alanine Aminotransferase


(ALT/SGPT) 10 U/L (12-78) 


  8 U/L (12-78) 


 


 


Alkaline Phosphatase


  90 U/L


() 80 U/L


()


 


C-Reactive Protein,


Quantitative 16.6 mg/dL


(0.00-0.90) 


 


 


Pro-B-Type Natriuretic Peptide


  1876 pg/mL


(0-125) 


 


 


Total Protein


  7.5 G/DL


(6.4-8.2) 7.2 G/DL


(6.4-8.2)


 


Albumin


  2.0 G/DL


(3.4-5.0) 1.9 G/DL


(3.4-5.0)


 


Globulin 5.5 g/dL  5.3 g/dL 


 


Albumin/Globulin Ratio 0.4 (1.0-2.7)  0.4 (1.0-2.7) 








Rhythm Strip Diag. Results


EP Interpretation:  yes


Rate:  77


Rhythm:  NSR, no PVC's, no ectopy





Chest X-Ray Diagnostic Results


Chest X-Ray Diagnostic Results :  


   Chest X-Ray Ordered:  Yes


   # of Views/Limited/Complete:  1 View


   Indication:  Shortness of Breath


   EP Interpretation:  Yes


   Interpretation:  no consolidation, no effusion, other - Lateral patchy 

markings including possible edema versus infiltrate


   Impression:  Other - Pulmonary interstitial findings with congestion


   Electronically Signed by:  Joey Dent DO





Last Vital Signs








  Date Time  Temp Pulse Resp B/P (MAP) Pulse Ox O2 Delivery O2 Flow Rate FiO2


 


2/20/20 13:24 99.0 87 17 102/61 (75) 93 Room Air  








Status:  improved


Disposition:  ADMITTED AS INPATIENT


Condition:  Serious











Joey Dent DO Feb 20, 2020 13:46

## 2020-02-20 NOTE — NUR
ED Nurse Note:

Patient ambulated to restroom with minimal assist by son, will continue to 
monitor.

## 2020-02-20 NOTE — NUR
ED Nurse Note:

PT has fever, tylenol given; no difficulty swallow water or thin liquids, PT 
son at bedside, PT is AAOx3.

## 2020-02-20 NOTE — DIAGNOSTIC IMAGING REPORT
Indication: Shortness of breath

 

Technique: One view of the chest

 

Comparison: 2/8/2020

 

Findings: Interim development of bilateral interstitial and airspace infiltrates

versus edema. Pleural spaces are clear. Normal heart size

 

Impression: Bilateral interstitial and airspace infiltrates versus edema. Correlate

with clinical findings

## 2020-02-21 VITALS — DIASTOLIC BLOOD PRESSURE: 46 MMHG | SYSTOLIC BLOOD PRESSURE: 121 MMHG

## 2020-02-21 VITALS — SYSTOLIC BLOOD PRESSURE: 96 MMHG | DIASTOLIC BLOOD PRESSURE: 54 MMHG

## 2020-02-21 VITALS — SYSTOLIC BLOOD PRESSURE: 114 MMHG | DIASTOLIC BLOOD PRESSURE: 57 MMHG

## 2020-02-21 VITALS — DIASTOLIC BLOOD PRESSURE: 45 MMHG | SYSTOLIC BLOOD PRESSURE: 88 MMHG

## 2020-02-21 VITALS — DIASTOLIC BLOOD PRESSURE: 73 MMHG | SYSTOLIC BLOOD PRESSURE: 134 MMHG

## 2020-02-21 VITALS — DIASTOLIC BLOOD PRESSURE: 64 MMHG | SYSTOLIC BLOOD PRESSURE: 121 MMHG

## 2020-02-21 VITALS — SYSTOLIC BLOOD PRESSURE: 103 MMHG | DIASTOLIC BLOOD PRESSURE: 55 MMHG

## 2020-02-21 VITALS — DIASTOLIC BLOOD PRESSURE: 59 MMHG | SYSTOLIC BLOOD PRESSURE: 102 MMHG

## 2020-02-21 VITALS — DIASTOLIC BLOOD PRESSURE: 58 MMHG | SYSTOLIC BLOOD PRESSURE: 118 MMHG

## 2020-02-21 RX ADMIN — DEXTROSE MONOHYDRATE SCH MLS/HR: 50 INJECTION, SOLUTION INTRAVENOUS at 08:53

## 2020-02-21 RX ADMIN — HEPARIN SODIUM SCH UNITS: 5000 INJECTION INTRAVENOUS; SUBCUTANEOUS at 01:14

## 2020-02-21 RX ADMIN — HUMAN INSULIN SCH MLS/HR: 100 INJECTION, SOLUTION SUBCUTANEOUS at 13:45

## 2020-02-21 RX ADMIN — AZITHROMYCIN DIHYDRATE SCH MG: 250 TABLET, FILM COATED ORAL at 14:00

## 2020-02-21 RX ADMIN — DOCUSATE SODIUM SCH MG: 100 CAPSULE, LIQUID FILLED ORAL at 18:00

## 2020-02-21 RX ADMIN — HEPARIN SODIUM SCH UNITS: 5000 INJECTION INTRAVENOUS; SUBCUTANEOUS at 08:54

## 2020-02-21 RX ADMIN — HEPARIN SODIUM SCH UNITS: 5000 INJECTION INTRAVENOUS; SUBCUTANEOUS at 20:38

## 2020-02-21 NOTE — NUR
ED Nurse Note:

Patient sleeping, no s/s of acute distress. Will continue to monitor for 
admitting orders , once verified.

## 2020-02-21 NOTE — CONSULTATION
Consult Note


Consult Note





asked to eval at the request of Dr Stephens for worsening renal failure


Patient known to me from her previous admission and she was discharged recently 

from [facility].


Upon previous discharge her serum creatinine was 1.9 at this time her 

creatinine is 3.








Patient was recently here last week was discharged after diagnosis of acute 

kidney disease the patient's son reports that the day after


She was discharged she started having cold-like symptoms with runny nose and 

cough


This has persisted for the past week now


Patient also feeling generally weak and complained of mild headache





Patient has some increased nausea


Denies any chest pain denies any focal weakness





No Known Allergies (Unverified , 2/14/16)








Hx Cardiac Problems:  No - RA


Hx COPD:  No - PNEUMONIA


Hx Gastrointestinal Problems:  No - Pyelonephritis


Hx Dialysis:  No - "Kidney problem"


Hx Headaches:  Yes





examined


data reviewed


Assessment/Plan





Patient admitted with sepsis / Pneumonia








(1) MIKA (acute kidney injury)


(2) Renal failure (ARF), acute on chronic


(3) Severe anemia


(4) Protein calorie mal nutrition- BMI 17.2


(5) h/o UTI





Others:


h/o bronchitis


h/o abdominal pain and nausea, possibly enteritis ( by CT scan) 


RA 








Plan


previously given IV Iron


Avoid nephrotoxics


hydrate-


Anemia workup


per orders











Demar Mayo MD Feb 21, 2020 13:30

## 2020-02-21 NOTE — NUR
ED Nurse Note:

No return call nor pending orders to substitute maxipeme. will continue to 
monitor.

## 2020-02-21 NOTE — NUR
NURSE NOTES: Pt brought up  from ER via gurney ambulated to the bed, awake/alert breathing 
easily on room air, denies SOB and denies pain at this time. VS stable with SR @ 92 on 
monitor. IV access left wrist flushed with 10 ml NS and locked. Lungs clear bilat but 
diminished in bases. Abd soft/supple, no rigidity/masses/guarding. Moving all extrem well, 
strong/equal  and pedal pushes. Bed left in low position, side rails up x 2 and call 
light left near pt's hand.

## 2020-02-21 NOTE — NUR
ED Nurse Note:

Paged Dr. spence to reconsider maxipeme order, dose not on had within this 
faciity.

## 2020-02-21 NOTE — NUR
ED Nurse Note:

Patient able to ambulate with stedgait and minimal assist to the restroom. Will 
continue to monitor.

## 2020-02-21 NOTE — CONSULTATION
History of Present Illness


General


Date patient seen:  Feb 21, 2020


Chief Complaint:  Flu Like Symptoms





Present Illness


HPI





75 y/o F with hx of RA, pulmonary MAC, emphysema, osteoporosis, pyelonephritis 9 /2019, multiple episodes of pneumonia presented to ED on 2/20 with flu-like 

symptoms (runny nose, cough, weakness, mild headache, nausea).





Denied chest pain





Of note, patient was admitted here from 2/7-2/12/20 with MIKA and hyperkalemia








Allergies:  


Coded Allergies:  


     No Known Allergies (Unverified , 2/14/16)





Medication History


Scheduled


Hydroxychloroquine Sulfate (Hydroxychloroquine Sulfate), 200 MG PO DAILY, (

Reported)





Discontinued Medications


Docusate Sodium* (Docusate Sodium*), 100 MG ORAL THREE TIMES A DAY, (Reported)


   Discontinued Reason: Therapy completed


Ibuprofen* (Advil*), 200 MG ORAL Q6HR PRN for Mild Pain/Temp > 100.5, (Reported)


   Discontinued Reason: Therapy completed


Pantoprazole* (Protonix*), 40 MG ORAL DAILY, (Reported)


   Discontinued Reason: Therapy completed





Patient History


Healthcare decision maker





Resuscitation status





Advanced Directive on File





Patient History Narrative








Pmhx: as above





Shx: reviewed





Fhx: non contributory





Review of Systems


All Other Systems:  negative except mentioned in HPI





Physical Exam


Physical Exam Narrative


General Appearance:  no apparent distress


Head:  normocephalic, atraumatic


Eyes:  bilateral eye PERRL, bilateral eye EOMI


ENT:  other - Clear rhinorrhea


Neck:  supple, no meningismus


Respiratory:  no respiratory distress, no retraction, crackles - Lower lobes


Cardiovascular :  regular rate, rhythm, no edema


Gastrointestinal:  non tender, soft


Genitourinary:  no CVA tenderness


Musculoskeletal:  normal inspection


Neurologic:  alert, oriented x3


Skin:  no rash





Last 24 Hour Vital Signs








  Date Time  Temp Pulse Resp B/P (MAP) Pulse Ox O2 Delivery O2 Flow Rate FiO2


 


2/21/20 09:08 99.4 98 21 103/55 94 Room Air  


 


2/21/20 08:02 99.4 87 21 96/54 94 Room Air  


 


2/21/20 05:50 99.4 95 24 121/46 92 Room Air  


 


2/21/20 03:17 99.2 88 25 134/73 100   


 


2/21/20 01:38 98.5 84 20 114/57 94 Room Air  


 


2/21/20 00:15 98.5 85 22 88/45 94 Room Air  


 


2/20/20 22:55 98.5 85 20 103/77 94 Room Air  


 


2/20/20 21:30 98.5 95 23 101/52 93 Room Air  


 


2/20/20 20:17 98.5 92 23 107/62 95 Room Air  


 


2/20/20 19:49 98.5       


 


2/20/20 14:10  87 17   Room Air  


 


2/20/20 14:10 99.0 91 17 102/61 93 Room Air  


 


2/20/20 13:24 99.0 87 17 102/61 (75) 93 Room Air  











Laboratory Tests








Test


  2/20/20


14:33 2/20/20


15:00


 


White Blood Count


  11.3 K/UL


(4.8-10.8)  H 


 


 


Red Blood Count


  2.82 M/UL


(4.20-5.40)  L 


 


 


Hemoglobin


  8.5 G/DL


(12.0-16.0)  L 


 


 


Hematocrit


  25.6 %


(37.0-47.0)  L 


 


 


Mean Corpuscular Volume 91 FL (80-99)   


 


Mean Corpuscular Hemoglobin


  30.3 PG


(27.0-31.0) 


 


 


Mean Corpuscular Hemoglobin


Concent 33.3 G/DL


(32.0-36.0) 


 


 


Red Cell Distribution Width


  13.3 %


(11.6-14.8) 


 


 


Platelet Count


  373 K/UL


(150-450) 


 


 


Mean Platelet Volume


  5.1 FL


(6.5-10.1)  L 


 


 


Neutrophils (%) (Auto)


  % (45.0-75.0)


  


 


 


Lymphocytes (%) (Auto)


  % (20.0-45.0)


  


 


 


Monocytes (%) (Auto)  % (1.0-10.0)   


 


Eosinophils (%) (Auto)  % (0.0-3.0)   


 


Basophils (%) (Auto)  % (0.0-2.0)   


 


Differential Total Cells


Counted 100  


  


 


 


Neutrophils % (Manual) 85 % (45-75)  H 


 


Lymphocytes % (Manual) 10 % (20-45)  L 


 


Monocytes % (Manual) 4 % (1-10)   


 


Eosinophils % (Manual) 1 % (0-3)   


 


Basophils % (Manual) 0 % (0-2)   


 


Band Neutrophils 0 % (0-8)   


 


Platelet Estimate Adequate   


 


Platelet Morphology Normal   


 


Hypochromasia 1+   


 


Anisocytosis 1+   


 


Sodium Level


  136 MMOL/L


(136-145) 


 


 


Potassium Level


  4.4 MMOL/L


(3.5-5.1) 


 


 


Chloride Level


  103 MMOL/L


() 


 


 


Carbon Dioxide Level


  16 MMOL/L


(21-32)  L 


 


 


Anion Gap


  18 mmol/L


(5-15)  H 


 


 


Blood Urea Nitrogen


  78 mg/dL


(7-18)  H 


 


 


Creatinine


  3.0 MG/DL


(0.55-1.30)  H 


 


 


Estimat Glomerular Filtration


Rate 15.2 mL/min


(>60) 


 


 


Glucose Level


  115 MG/DL


()  H 


 


 


Calcium Level


  8.7 MG/DL


(8.5-10.1) 


 


 


Total Bilirubin


  0.3 MG/DL


(0.2-1.0) 


 


 


Aspartate Amino Transf


(AST/SGOT) 23 U/L (15-37)


  


 


 


Alanine Aminotransferase


(ALT/SGPT) 20 U/L (12-78)


  


 


 


Alkaline Phosphatase


  107 U/L


() 


 


 


Total Creatine Kinase


  56 U/L


() 


 


 


Creatine Kinase MB


  1.3 NG/ML


(0.0-3.6) 


 


 


Creatine Kinase MB Relative


Index 2.3  


  


 


 


Troponin I


  0.000 ng/mL


(0.000-0.056) 


 


 


Total Protein


  8.5 G/DL


(6.4-8.2)  H 


 


 


Albumin


  2.5 G/DL


(3.4-5.0)  L 


 


 


Globulin 6.0 g/dL   


 


Albumin/Globulin Ratio


  0.4 (1.0-2.7)


L 


 


 


Urine Color  Pale yellow  


 


Urine Appearance


  


  Slightly


cloudy


 


Urine pH  5 (4.5-8.0)  


 


Urine Specific Gravity


  


  1.015


(1.005-1.035)


 


Urine Protein


  


  3+ (NEGATIVE)


H


 


Urine Glucose (UA)


  


  Negative


(NEGATIVE)


 


Urine Ketones


  


  Negative


(NEGATIVE)


 


Urine Blood


  


  5+ (NEGATIVE)


H


 


Urine Nitrite


  


  Negative


(NEGATIVE)


 


Urine Bilirubin


  


  Negative


(NEGATIVE)


 


Urine Urobilinogen


  


  Normal MG/DL


(0.0-1.0)


 


Urine Leukocyte Esterase


  


  Negative


(NEGATIVE)


 


Urine RBC


  


  Tntc /HPF (0 -


2)  H


 


Urine WBC


  


  2-4 /HPF (0 -


2)


 


Urine Squamous Epithelial


Cells 


  Moderate /LPF


(NONE/OCC)  H


 


Urine Bacteria


  


  Moderate /HPF


(NONE)  H


 


Urine Granular Casts


  


  0-2 /LPF


(NONE)  H











Microbiology








 Date/Time


Source Procedure


Growth Status


 


 


 2/20/20 15:00


Urine,Clean Catch Urine Culture - Preliminary


NO GROWTH Resulted








Height (Feet):  5


Weight (Pounds):  88


Medications





Current Medications








 Medications


  (Trade)  Dose


 Ordered  Sig/Naman


 Route


 PRN Reason  Start Time


 Stop Time Status Last Admin


Dose Admin


 


 Acetaminophen


  (Tylenol)  650 mg  Q4H  PRN


 ORAL


 FEVER  2/20/20 16:00


 3/21/20 15:59  2/20/20 19:04


 


 


 Albuterol/


 Ipratropium


  (Albuterol/


 Ipratropium)  3 ml  Q4H  PRN


 HHN


 Shortness of Breath  2/20/20 16:00


 2/25/20 15:59   


 


 


 Cefepime HCl 500


 mg/Dextrose  55 ml @ 


 110 mls/hr  Q24H


 IVPB


   2/21/20 09:00


 2/28/20 08:59   


 


 


 Dextrose


  (Dextrose 50%)  25 ml  Q30M  PRN


 IV


 Hypoglycemia  2/20/20 16:00


 3/21/20 15:59   


 


 


 Dextrose


  (Dextrose 50%)  50 ml  Q30M  PRN


 IV


 Hypoglycemia  2/20/20 16:00


 3/21/20 15:59   


 


 


 Heparin Sodium


  (Porcine)


  (Heparin 5000


 units/ml)  5,000 units  EVERY 12  HOURS


 SUBQ


   2/20/20 21:00


 3/21/20 20:59  2/21/20 08:54


 


 


 Ondansetron HCl


  (Zofran)  4 mg  Q6H  PRN


 IVP


 Nausea & Vomiting  2/20/20 16:00


 3/21/20 15:59   


 


 


 Polyethylene


 Glycol


  (Miralax)  17 gm  DAILYPRN  PRN


 ORAL


 Constipation  2/20/20 16:00


 3/21/20 15:59   


 


 


 Sodium Chloride  1,000 ml @ 


 200 mls/hr  Q5H


 IV


   2/21/20 09:45


 3/22/20 09:44  2/21/20 09:54


 


 


 Vancomycin HCl


  (Vanco rx to


 dose)  1 ea  DAILY  PRN


 MISC


 Per rx protocol  2/21/20 02:00


 3/22/20 01:59   


 











Assessment/Plan


Assessment/Plan:


Abx:


Ceftriaxone x1 2/20


IV Vancomycin 2/20-


Cefepime 2/20-





Assessment:


Sepsis


Probable PNA


  -CXR Bilateral interstitial and airspace infiltrates versus edema. Correlate 

with clinical findings





Mild leukocytosis


Afebrile


  -ua no pyuria; ucx NTD





MIKA on CKD





RA


hx of pulmonary MAC


emphysema


 osteoporosis


hx of pyelonephritis 9/2019


hx of  multiple episodes of pneumonia





Plan:


-Switch empiric IV Vancomycin #2 to PO linezolid given MIKA


-continue empiric #2


-add empiric Azithromycin for atypical coverage


-f/u cx


-Monitor CBC/CMP, temperatures


-f/u influenza sc


-aspiration precautions


-Renal f/u


-legionella ag urine





Thank you for this consultation. Will continue to follow along with you.





Discussed with Prachi Alonzo M.D. Feb 21, 2020 12:59

## 2020-02-21 NOTE — NUR
ED Nurse Note:



patient transferred to 2E with all of her belongings, her son took 1 belongings 
bags, instructed son to go up to 2E with new visitor pass. patient transferred 
on a [place] bed, on ACLS protocol, endorsed patient to Dion SCHWARTZ, endorsed all 
plan of care.

## 2020-02-21 NOTE — HISTORY AND PHYSICAL REPORT
DATE OF ADMISSION:  02/21/2020

TIME SEEN:  At 9 a.m.



CONSULTANTS:

1. Geovany Mustafa M.D.

2. Jasson Domingo M.D.

3. Demar Mayo M.D.



CHIEF COMPLAINT:  Cough, phlegm, short of breath.



BRIEF HISTORY:  This is a 74-year-old female, who lives at home, last week

was hospitalized at [facility] with the above-mentioned diagnosis,

sent home, cough got worse and started spitting up phlegm, came back to

[place] ER, diagnosed with the above, pneumonia, sepsis, and in the ER

currently being admitted _____.  Currently slight short of breath in bed,

slightly anxious, no complaint.



REVIEW OF SYSTEMS:  No chest pain.  Slight short of breath.  No nausea,

vomiting, or diarrhea.



PAST MEDICAL HISTORY:  Includes MIKA, CKD, weakness, and

malnutrition.



PAST SURGICAL HISTORY:  None.



MEDICATIONS:  Include cefepime, vancomycin, heparin, Zofran, albuterol,

furosemide, ceftriaxone.



ALLERGIES:  Denies.



SOCIAL HISTORY:  No smoking.  No alcohol.  No intravenous drug use.



FAMILY HISTORY:  Noncontributory.



PHYSICAL EXAMINATION:

GENERAL:  Slightly anxious in bed, oriented x3, no acute distress.

 

VITAL SIGNS:  Temperature 99, pulse 98, respirations 20, blood pressure

103/55.

CARDIOVASCULAR:  No murmurs.

LUNGS:  Poor air exchange.

ABDOMEN:  Bowel sounds distant.

EXTREMITIES:  No cyanosis, clubbing, or edema.

NEUROLOGIC:  The patient moves all extremities, slightly weak.



LABORATORY AND DIAGNOSTIC DATA:  Labs at this time show white count 11.3,

hemoglobin and hematocrit 8.5 and 25, otherwise CBC is normal.  BMP shows

CO2 16, BUN and creatinine 78 and 3.0, glucose 115.  Troponin 0.00.

Albumin 2.5.  Urinalysis show 5+ blood, 3+ protein.



ASSESSMENT:

1. Pneumonia.

2. Shortness of breath.

3. Sepsis.

4. Cough.

5. MIKA.

6. Weakness.

7. Malnutrition.

8. Pulmonary congestion.

9. Anemia.



PLAN:

1. _____.

2. Antibiotics per Infectious Disease.

3. Nephrology followup.

4. Dietary followup.

5. IV fluid.

6. PT and dietary evaluation.

7. CBC and BMP in the morning.









  ______________________________________________

  Flaco Stephens D.O.





DR:  OTONIEL

D:  02/21/2020 09:23

T:  02/22/2020 02:33

JOB#:  [id number]

CC:

## 2020-02-21 NOTE — NUR
ED Nurse Note:



DR Stephens at bedside, gave verbal order for regular diet for patient's 
breakfast.

## 2020-02-21 NOTE — NUR
ED Nurse Note:

Patient tolerated subQ injection well. Vuital signs are within normal range and 
documented. Will continue to monitor.

## 2020-02-21 NOTE — NUR
ED Nurse Note:



Received patient in bed, patient is alert awake x4 resting in bed, on a cardiac 
monitor, vitals stable.

## 2020-02-21 NOTE — NUR
ED Nurse Note:

Patient resting comfortably, vital signs stable and documented. Will continue 
to monitor for any changes.

## 2020-02-22 VITALS — SYSTOLIC BLOOD PRESSURE: 106 MMHG | DIASTOLIC BLOOD PRESSURE: 66 MMHG

## 2020-02-22 VITALS — DIASTOLIC BLOOD PRESSURE: 67 MMHG | SYSTOLIC BLOOD PRESSURE: 129 MMHG

## 2020-02-22 VITALS — SYSTOLIC BLOOD PRESSURE: 118 MMHG | DIASTOLIC BLOOD PRESSURE: 66 MMHG

## 2020-02-22 VITALS — SYSTOLIC BLOOD PRESSURE: 112 MMHG | DIASTOLIC BLOOD PRESSURE: 66 MMHG

## 2020-02-22 VITALS — SYSTOLIC BLOOD PRESSURE: 102 MMHG | DIASTOLIC BLOOD PRESSURE: 65 MMHG

## 2020-02-22 VITALS — DIASTOLIC BLOOD PRESSURE: 61 MMHG | SYSTOLIC BLOOD PRESSURE: 120 MMHG

## 2020-02-22 LAB
% IRON SATURATION: 15 % (ref 15–50)
ADD MANUAL DIFF: NO
ALBUMIN SERPL-MCNC: 2.1 G/DL (ref 3.4–5)
ALBUMIN/GLOB SERPL: 0.4 {RATIO} (ref 1–2.7)
ALP SERPL-CCNC: 103 U/L (ref 46–116)
ALT SERPL-CCNC: 20 U/L (ref 12–78)
ANION GAP SERPL CALC-SCNC: 19 MMOL/L (ref 5–15)
AST SERPL-CCNC: 15 U/L (ref 15–37)
BASOPHILS NFR BLD AUTO: 0.3 % (ref 0–2)
BILIRUB SERPL-MCNC: 0.2 MG/DL (ref 0.2–1)
BUN SERPL-MCNC: 58 MG/DL (ref 7–18)
CALCIUM SERPL-MCNC: 8.5 MG/DL (ref 8.5–10.1)
CHLORIDE SERPL-SCNC: 108 MMOL/L (ref 98–107)
CHOLEST SERPL-MCNC: 87 MG/DL (ref ?–200)
CO2 SERPL-SCNC: 14 MMOL/L (ref 21–32)
CREAT SERPL-MCNC: 2.9 MG/DL (ref 0.55–1.3)
EOSINOPHIL NFR BLD AUTO: 0.5 % (ref 0–3)
ERYTHROCYTE [DISTWIDTH] IN BLOOD BY AUTOMATED COUNT: 13.1 % (ref 11.6–14.8)
FERRITIN SERPL-MCNC: 393 NG/ML (ref 8–388)
GAMMA GLUTAMYL TRANSPEPTIDASE: 24 U/L (ref 5–85)
GLOBULIN SER-MCNC: 5.8 G/DL
HCT VFR BLD CALC: 26.8 % (ref 37–47)
HDLC SERPL-MCNC: 21 MG/DL (ref 40–60)
HGB BLD-MCNC: 8.9 G/DL (ref 12–16)
IRON SERPL-MCNC: 24 UG/DL (ref 50–175)
LYMPHOCYTES NFR BLD AUTO: 10.9 % (ref 20–45)
MCV RBC AUTO: 91 FL (ref 80–99)
MONOCYTES NFR BLD AUTO: 5.5 % (ref 1–10)
NEUTROPHILS NFR BLD AUTO: 82.9 % (ref 45–75)
PHOSPHATE SERPL-MCNC: 3.8 MG/DL (ref 2.5–4.9)
PLATELET # BLD: 404 K/UL (ref 150–450)
POTASSIUM SERPL-SCNC: 3.8 MMOL/L (ref 3.5–5.1)
RBC # BLD AUTO: 2.95 M/UL (ref 4.2–5.4)
SODIUM SERPL-SCNC: 141 MMOL/L (ref 136–145)
TIBC SERPL-MCNC: 156 UG/DL (ref 250–450)
TRIGL SERPL-MCNC: 119 MG/DL (ref 30–150)
UNSATURATED IRON BINDING: 132 UG/DL (ref 112–346)
WBC # BLD AUTO: 9 K/UL (ref 4.8–10.8)

## 2020-02-22 RX ADMIN — HUMAN INSULIN SCH MLS/HR: 100 INJECTION, SOLUTION SUBCUTANEOUS at 12:15

## 2020-02-22 RX ADMIN — HEPARIN SODIUM SCH UNITS: 5000 INJECTION INTRAVENOUS; SUBCUTANEOUS at 09:04

## 2020-02-22 RX ADMIN — DOCUSATE SODIUM SCH MG: 100 CAPSULE, LIQUID FILLED ORAL at 17:50

## 2020-02-22 RX ADMIN — HEPARIN SODIUM SCH UNITS: 5000 INJECTION INTRAVENOUS; SUBCUTANEOUS at 21:39

## 2020-02-22 RX ADMIN — AZITHROMYCIN DIHYDRATE SCH MG: 250 TABLET, FILM COATED ORAL at 08:56

## 2020-02-22 RX ADMIN — HUMAN INSULIN SCH MLS/HR: 100 INJECTION, SOLUTION SUBCUTANEOUS at 07:40

## 2020-02-22 RX ADMIN — DOCUSATE SODIUM SCH MG: 100 CAPSULE, LIQUID FILLED ORAL at 08:56

## 2020-02-22 RX ADMIN — DEXTROSE MONOHYDRATE SCH MLS/HR: 50 INJECTION, SOLUTION INTRAVENOUS at 09:05

## 2020-02-22 NOTE — NUR
NURSE NOTES: Pt awake/alert breathing easily on room air, denies SOB and denies pain at this 
time. VS stable with SR @ 84 on monitor. IV access left wrist, with D5NS @ 50 ml/hr. Bed 
left in low position, side rails up x 2 and call light left near pt's hand.

## 2020-02-22 NOTE — GENERAL PROGRESS NOTE
Assessment/Plan


Problem List:  


(1) Anemia


ICD Codes:  D64.9 - Anemia, unspecified


SNOMED:  248710752


(2) Renal insufficiency


ICD Codes:  N28.9 - Disorder of kidney and ureter, unspecified


SNOMED:  763423582, 766707543


(3) Dyspnea


ICD Codes:  R06.00 - Dyspnea, unspecified


SNOMED:  164201868


(4) Pulmonary congestion


ICD Codes:  R09.89 - Other specified symptoms and signs involving the 

circulatory and respiratory systems


SNOMED:  50013989


(5) Pneumonia


ICD Codes:  J18.9 - Pneumonia, unspecified organism


SNOMED:  398565507


Status:  stable, progressing


Assessment/Plan:


o2 pulm tx abx pt diet cbc bmp am





Subjective


Constitutional:  Reports: weakness


Respiratory:  Reports: shortness of breath


Allergies:  


Coded Allergies:  


     No Known Allergies (Unverified , 2/14/16)


All Systems:  reviewed and negative except above


Subjective


sitting calm





Objective





Last 24 Hour Vital Signs








  Date Time  Temp Pulse Resp B/P (MAP) Pulse Ox O2 Delivery O2 Flow Rate FiO2


 


2/22/20 08:29      Room Air  


 


2/22/20 08:00 97.7 87 18 120/61 (80) 98   


 


2/22/20 04:00 98.1 79 20 118/66 (83) 95   


 


2/22/20 04:00  73      


 


2/22/20 00:00  92      


 


2/22/20 00:00 98.4 92 20 106/66 (79) 95   


 


2/21/20 21:00  90      


 


2/21/20 20:00 98.1 95 18 121/64 (83) 95   


 


2/21/20 19:55      Room Air  


 


2/21/20 16:00 98.8 94 20 118/58 (78) 93   


 


2/21/20 16:00  98      


 


2/21/20 14:29      Room Air  


 


2/21/20 13:47 98.8 95 28 102/59 100 Room Air  


 


2/21/20 13:33 98.8 95 28 102/59 100 Room Air  

















Intake and Output  


 


 2/21/20 2/22/20





 19:00 07:00


 


Intake Total 320 ml 


 


Output Total  1000 ml


 


Balance 320 ml -1000 ml


 


  


 


Intake Oral 120 ml 


 


IV Total 200 ml 


 


Output Urine Total  1000 ml








Laboratory Tests


2/22/20 08:18: 


White Blood Count 9.0, Red Blood Count 2.95L, Hemoglobin 8.9L, Hematocrit 26.8L

, Mean Corpuscular Volume 91, Mean Corpuscular Hemoglobin 30.3, Mean 

Corpuscular Hemoglobin Concent 33.4, Red Cell Distribution Width 13.1, Platelet 

Count 404, Mean Platelet Volume 4.9L, Neutrophils (%) (Auto) 82.9H, Lymphocytes 

(%) (Auto) 10.9L, Monocytes (%) (Auto) 5.5, Eosinophils (%) (Auto) 0.5, 

Basophils (%) (Auto) 0.3, Sodium Level [Pending], Potassium Level [Pending], 

Chloride Level [Pending], Carbon Dioxide Level [Pending], Blood Urea Nitrogen [

Pending], Creatinine [Pending], Estimat Glomerular Filtration Rate [Pending], 

Glucose Level [Pending], Hemoglobin A1c 5.9, Uric Acid 8.0H, Calcium Level [

Pending], Phosphorus Level [Pending], Magnesium Level 1.7L, Iron Level 24L, 

Total Iron Binding Capacity 156L, Percent Iron Saturation 15, Unsaturated Iron 

Binding 132, Ferritin [Pending], Total Bilirubin [Pending], Gamma Glutamyl 

Transpeptidase 24, Aspartate Amino Transf (AST/SGOT) [Pending], Alanine 

Aminotransferase (ALT/SGPT) [Pending], Alkaline Phosphatase [Pending], Troponin 

I 0.000, C-Reactive Protein, Quantitative 22.1H, Pro-B-Type Natriuretic Peptide 

1420H, Total Protein [Pending], Albumin [Pending], Globulin [Pending], 

Triglycerides Level [Pending], Cholesterol Level [Pending], LDL Cholesterol [

Pending], HDL Cholesterol [Pending], Cholesterol/HDL Ratio [Pending], Vitamin 

B12 Level [Pending], Folate [Pending], Thyroid Stimulating Hormone (TSH) [

Pending]


Height (Feet):  5


Height (Inches):  0.00


Weight (Pounds):  87


General Appearance:  lethargic


EENT:  normal ENT inspection


Neck:  normal alignment


Cardiovascular:  normal peripheral pulses, normal rate, regular rhythm


Respiratory/Chest:  chest wall non-tender, lungs clear, normal breath sounds


Abdomen:  normal bowel sounds, non tender, soft


Extremities:  normal inspection


Edema:  no edema noted Arm (L), no edema noted Arm (R), no edema noted Leg (L), 

no edema noted Leg (R), no edema noted Pedal (L), no edema noted Pedal (R), no 

edema noted Generalized


Neurologic:  responsive, motor weakness


Skin:  normal pigmentation, warm/dry











Flaco Stephens DO Feb 22, 2020 10:03

## 2020-02-22 NOTE — NUR
RD ASSESSMENT & RECOMMENDATIONS

SEE CARE ACTIVITY FOR COMPLETE ASSESSMENT



DAILY ESTIMATED NEEDS:

Needs based on Underweight / 42.7kg 

30-35  kcals/kg 

8529-2400  total kcals

1-1.5  g protein/kg

43-64  g total protein 

25-30  mL/kg

9060-7776  total fluid mLs



NUTRITION DIAGNOSIS:

(1) Increased kcal/pro needs R/T underweight status as evidenced by

pt @ 94% IBW, underweight BMI per guidelines.





CURRENT DIET:REGULAR    



 



PO DIET RECOMMENDATIONS:

LOW NA/ texture as tolerated 



 



ADDITIONAL RECOMMENDATIONS:

* Weekly calibrated bedscale wt-> rec standing wt if able  

* Monitor K, need for K restriction (h/o hyperkalemia) 

* Ensure Enlive once daily (350kcal/ 20g prot per bottle)

## 2020-02-22 NOTE — NUR
NURSE NOTES: Pt awake/alert in bed eating breakfast, breathing easily on room air, denies 
SOB and denies pain at this time. VS stable with SR @ 95 on monitor. IV access left wrist 
with D5NS running at 50 ml/hr. Bed left in low position, side rails up x 2 and call light 
left near pt's hand.

## 2020-02-22 NOTE — NEPHROLOGY PROGRESS NOTE
Assessment/Plan


Problem List:  


(1) Renal failure (ARF), acute on chronic


(2) Severe protein-calorie malnutrition


(3) Severe anemia


Assessment





Patient admitted with sepsis / Pneumonia





(1) MIKA (acute kidney injury)


(2) Renal failure (ARF), acute on chronic


(3) Severe anemia


(4) Protein calorie mal nutrition- BMI 17.2


(5) h/o UTI





Others:


h/o bronchitis


h/o abdominal pain and nausea, possibly enteritis ( by CT scan) 


RA


Plan





previously given IV Iron, will give more


EPO SQ


Avoid nephrotoxics


hydrate-


Anemia workup


per orders





Subjective


ROS Limited/Unobtainable:  No


Constitutional:  Reports: malaise, weakness





Objective


Objective





Last 24 Hour Vital Signs








  Date Time  Temp Pulse Resp B/P (MAP) Pulse Ox O2 Delivery O2 Flow Rate FiO2


 


2/22/20 08:29      Room Air  


 


2/22/20 08:00  89      


 


2/22/20 08:00 97.7 87 18 120/61 (80) 98   


 


2/22/20 04:00 98.1 79 20 118/66 (83) 95   


 


2/22/20 04:00  73      


 


2/22/20 00:00  92      


 


2/22/20 00:00 98.4 92 20 106/66 (79) 95   


 


2/21/20 21:00  90      


 


2/21/20 20:00 98.1 95 18 121/64 (83) 95   


 


2/21/20 19:55      Room Air  


 


2/21/20 16:00 98.8 94 20 118/58 (78) 93   


 


2/21/20 16:00  98      


 


2/21/20 14:29      Room Air  


 


2/21/20 13:47 98.8 95 28 102/59 100 Room Air  


 


2/21/20 13:33 98.8 95 28 102/59 100 Room Air  

















Intake and Output  


 


 2/21/20 2/22/20





 19:00 07:00


 


Intake Total 320 ml 


 


Output Total  1000 ml


 


Balance 320 ml -1000 ml


 


  


 


Intake Oral 120 ml 


 


IV Total 200 ml 


 


Output Urine Total  1000 ml











Current Medications








 Medications


  (Trade)  Dose


 Ordered  Sig/Naman


 Route


 PRN Reason  Start Time


 Stop Time Status Last Admin


Dose Admin


 


 Acetaminophen


  (Tylenol)  650 mg  Q4H  PRN


 ORAL


 FEVER  2/20/20 16:00


 3/21/20 15:59  2/20/20 19:04


 


 


 Albuterol/


 Ipratropium


  (Albuterol/


 Ipratropium)  3 ml  Q4H  PRN


 HHN


 Shortness of Breath  2/20/20 16:00


 2/25/20 15:59   


 


 


 Azithromycin


  (Zithromax)  500 mg  DAILY


 ORAL


   2/21/20 14:00


 2/28/20 13:59  2/22/20 08:56


 


 


 Cefepime HCl 500


 mg/Dextrose  55 ml @ 


 110 mls/hr  Q24H


 IVPB


   2/21/20 09:00


 2/28/20 08:59  2/22/20 09:05


 


 


 Dextrose


  (Dextrose 50%)  25 ml  Q30M  PRN


 IV


 Hypoglycemia  2/20/20 16:00


 3/21/20 15:59   


 


 


 Dextrose


  (Dextrose 50%)  50 ml  Q30M  PRN


 IV


 Hypoglycemia  2/20/20 16:00


 3/21/20 15:59   


 


 


 Dextrose/Sodium


 Chloride  1,000 ml @ 


 50 mls/hr  Q20H


 IV


   2/21/20 13:45


 3/22/20 13:44  2/22/20 07:40


 


 


 Docusate Sodium


  (Colace)  100 mg  TWICE A  DAY


 ORAL


   2/21/20 18:00


 3/22/20 17:59  2/22/20 08:56


 


 


 Heparin Sodium


  (Porcine)


  (Heparin 5000


 units/ml)  5,000 units  EVERY 12  HOURS


 SUBQ


   2/20/20 21:00


 3/21/20 20:59  2/22/20 09:04


 


 


 Linezolid


  (Zyvox)  600 mg  EVERY 12  HOURS


 ORAL


   2/21/20 21:00


 2/26/20 20:59  2/22/20 08:56


 


 


 Ondansetron HCl


  (Zofran)  4 mg  Q6H  PRN


 IVP


 Nausea & Vomiting  2/20/20 16:00


 3/21/20 15:59   


 


 


 Pantoprazole


  (Protonix)  40 mg  DAILY


 ORAL


   2/21/20 13:45


 3/22/20 13:44  2/22/20 08:55


 


 


 Polyethylene


 Glycol


  (Miralax)  17 gm  DAILYPRN  PRN


 ORAL


 Constipation  2/20/20 16:00


 3/21/20 15:59   


 





Laboratory Tests


2/22/20 08:18: 


White Blood Count 9.0, Red Blood Count 2.95L, Hemoglobin 8.9L, Hematocrit 26.8L

, Mean Corpuscular Volume 91, Mean Corpuscular Hemoglobin 30.3, Mean 

Corpuscular Hemoglobin Concent 33.4, Red Cell Distribution Width 13.1, Platelet 

Count 404, Mean Platelet Volume 4.9L, Neutrophils (%) (Auto) 82.9H, Lymphocytes 

(%) (Auto) 10.9L, Monocytes (%) (Auto) 5.5, Eosinophils (%) (Auto) 0.5, 

Basophils (%) (Auto) 0.3, Sodium Level 141, Potassium Level 3.8, Chloride Level 

108H, Carbon Dioxide Level 14L, Anion Gap 19H, Blood Urea Nitrogen 58H, 

Creatinine 2.9H, Estimat Glomerular Filtration Rate 15.8, Glucose Level 152H, 

Hemoglobin A1c 5.9, Uric Acid 8.0H, Calcium Level 8.5, Phosphorus Level 3.8, 

Magnesium Level 1.7L, Iron Level 24L, Total Iron Binding Capacity 156L, Percent 

Iron Saturation 15, Unsaturated Iron Binding 132, Ferritin 393H, Total 

Bilirubin 0.2, Gamma Glutamyl Transpeptidase 24, Aspartate Amino Transf (AST/

SGOT) 15, Alanine Aminotransferase (ALT/SGPT) 20, Alkaline Phosphatase 103, 

Troponin I 0.000, C-Reactive Protein, Quantitative 22.1H, Pro-B-Type 

Natriuretic Peptide 1420H, Total Protein 7.9, Albumin 2.1L, Globulin 5.8, 

Albumin/Globulin Ratio 0.4L, Triglycerides Level 119, Cholesterol Level 87, LDL 

Cholesterol 46, HDL Cholesterol 21L, Cholesterol/HDL Ratio 4.1, Vitamin B12 

Level [Pending], Folate [Pending], Thyroid Stimulating Hormone (TSH) 1.730


Height (Feet):  5


Height (Inches):  0.00


Weight (Pounds):  87


General Appearance:  no apparent distress


Cardiovascular:  tachycardia


Respiratory/Chest:  decreased breath sounds


Abdomen:  soft











Demar Mayo MD Feb 22, 2020 11:11

## 2020-02-22 NOTE — NUR
NURSE NOTES:

Received report from LANA Ashley. Patient is in bed, awake and responsive. Breathing regular and 
unlabored with no S/S of SOB or chest pain noted. Patient is primarily [place] speaking, but 
able to make needs known. Son is currently at bed side. IV access is on the RFA, 22G, 
patent, intact, and running fluids at prescribed rate. Patient denies any pain or discomfort 
at this time. Bed remains in the lowest position, breaks engaged, and call light is within 
reach at all times. All other needs attended to, patient remains stable, will continue to 
monitor.

## 2020-02-22 NOTE — INFECTIOUS DISEASES PROG NOTE
Assessment/Plan


Assessment/Plan


Abx:


Ceftriaxone x1 2/20


IV Vancomycin 2/20-


Cefepime 2/20-





Assessment:


Sepsis


Probable PNA


  -CXR Bilateral interstitial and airspace infiltrates versus edema. Correlate 

with clinical findings





Mild leukocytosis


Afebrile


  -ua no pyuria; ucx NTD





MIKA on CKD





RA


hx of pulmonary MAC


emphysema


 osteoporosis


hx of pyelonephritis 9/2019


hx of  multiple episodes of pneumonia





Plan:


-Continue  PO linezolid #1 given MIKA


-continue empiric  Cefepiem #2 and Azithromycin #1 for atypical coverage





     - 2/21/20 SP Vancomycin #2





-f/u cx


-Monitor CBC/CMP, temperatures


-f/u influenza sc


-aspiration precautions


-Renal f/u


-legionella ag urine





Thank you for this consultation. Will continue to follow along with you.





Subjective


Allergies:  


Coded Allergies:  


     No Known Allergies (Unverified , 2/14/16)


Subjective


Afebrile


Leukocytosis resolved


On RA





Objective


Vital Signs





Last 24 Hour Vital Signs








  Date Time  Temp Pulse Resp B/P (MAP) Pulse Ox O2 Delivery O2 Flow Rate FiO2


 


2/22/20 08:29      Room Air  


 


2/22/20 08:00 97.7 87 18 120/61 (80) 98   


 


2/22/20 04:00 98.1 79 20 118/66 (83) 95   


 


2/22/20 04:00  73      


 


2/22/20 00:00  92      


 


2/22/20 00:00 98.4 92 20 106/66 (79) 95   


 


2/21/20 21:00  90      


 


2/21/20 20:00 98.1 95 18 121/64 (83) 95   


 


2/21/20 19:55      Room Air  


 


2/21/20 16:00 98.8 94 20 118/58 (78) 93   


 


2/21/20 16:00  98      


 


2/21/20 14:29      Room Air  


 


2/21/20 13:47 98.8 95 28 102/59 100 Room Air  


 


2/21/20 13:33 98.8 95 28 102/59 100 Room Air  








Height (Feet):  5


Height (Inches):  0.00


Weight (Pounds):  87


Objective


General Appearance:  no apparent distress


Head:  normocephalic, atraumatic


Respiratory:  no respiratory distress, no retraction, crackles - Lower lobes


Cardiovascular :  regular rate, rhythm, no edema


Gastrointestinal:  non tender, soft








Microbiology








 Date/Time


Source Procedure


Growth Status


 


 


 2/20/20 15:00


Urine,Clean Catch Urine Culture - Final


Mixed Gram Positive Organism Complete











Laboratory Tests








Test


  2/22/20


08:18


 


White Blood Count


  9.0 K/UL


(4.8-10.8)


 


Red Blood Count


  2.95 M/UL


(4.20-5.40)  L


 


Hemoglobin


  8.9 G/DL


(12.0-16.0)  L


 


Hematocrit


  26.8 %


(37.0-47.0)  L


 


Mean Corpuscular Volume 91 FL (80-99)  


 


Mean Corpuscular Hemoglobin


  30.3 PG


(27.0-31.0)


 


Mean Corpuscular Hemoglobin


Concent 33.4 G/DL


(32.0-36.0)


 


Red Cell Distribution Width


  13.1 %


(11.6-14.8)


 


Platelet Count


  404 K/UL


(150-450)


 


Mean Platelet Volume


  4.9 FL


(6.5-10.1)  L


 


Neutrophils (%) (Auto)


  82.9 %


(45.0-75.0)  H


 


Lymphocytes (%) (Auto)


  10.9 %


(20.0-45.0)  L


 


Monocytes (%) (Auto)


  5.5 %


(1.0-10.0)


 


Eosinophils (%) (Auto)


  0.5 %


(0.0-3.0)


 


Basophils (%) (Auto)


  0.3 %


(0.0-2.0)


 


Sodium Level Pending  


 


Potassium Level Pending  


 


Chloride Level Pending  


 


Carbon Dioxide Level Pending  


 


Blood Urea Nitrogen Pending  


 


Creatinine Pending  


 


Estimat Glomerular Filtration


Rate Pending  


 


 


Glucose Level Pending  


 


Hemoglobin A1c


  5.9 %


(4.3-6.0)


 


Uric Acid


  8.0 MG/DL


(2.6-7.2)  H


 


Calcium Level Pending  


 


Phosphorus Level Pending  


 


Magnesium Level


  1.7 MG/DL


(1.8-2.4)  L


 


Iron Level


  24 ug/dL


()  L


 


Total Iron Binding Capacity


  156 ug/dL


(250-450)  L


 


Percent Iron Saturation 15 % (15-50)  


 


Unsaturated Iron Binding


  132 ug/dL


(112-346)


 


Ferritin Pending  


 


Total Bilirubin Pending  


 


Gamma Glutamyl Transpeptidase 24 U/L (5-85)  


 


Aspartate Amino Transf


(AST/SGOT) Pending  


 


 


Alanine Aminotransferase


(ALT/SGPT) Pending  


 


 


Alkaline Phosphatase Pending  


 


Troponin I


  0.000 ng/mL


(0.000-0.056)


 


C-Reactive Protein,


Quantitative 22.1 mg/dL


(0.00-0.90)  H


 


Pro-B-Type Natriuretic Peptide


  1420 pg/mL


(0-125)  H


 


Total Protein Pending  


 


Albumin Pending  


 


Globulin Pending  


 


Triglycerides Level Pending  


 


Cholesterol Level Pending  


 


LDL Cholesterol Pending  


 


HDL Cholesterol Pending  


 


Cholesterol/HDL Ratio Pending  


 


Vitamin B12 Level Pending  


 


Folate Pending  


 


Thyroid Stimulating Hormone


(TSH) Pending  


 











Current Medications








 Medications


  (Trade)  Dose


 Ordered  Sig/Naman


 Route


 PRN Reason  Start Time


 Stop Time Status Last Admin


Dose Admin


 


 Acetaminophen


  (Tylenol)  650 mg  Q4H  PRN


 ORAL


 FEVER  2/20/20 16:00


 3/21/20 15:59  2/20/20 19:04


 


 


 Albuterol/


 Ipratropium


  (Albuterol/


 Ipratropium)  3 ml  Q4H  PRN


 HHN


 Shortness of Breath  2/20/20 16:00


 2/25/20 15:59   


 


 


 Azithromycin


  (Zithromax)  500 mg  DAILY


 ORAL


   2/21/20 14:00


 2/28/20 13:59  2/22/20 08:56


 


 


 Cefepime HCl 500


 mg/Dextrose  55 ml @ 


 110 mls/hr  Q24H


 IVPB


   2/21/20 09:00


 2/28/20 08:59  2/22/20 09:05


 


 


 Dextrose


  (Dextrose 50%)  25 ml  Q30M  PRN


 IV


 Hypoglycemia  2/20/20 16:00


 3/21/20 15:59   


 


 


 Dextrose


  (Dextrose 50%)  50 ml  Q30M  PRN


 IV


 Hypoglycemia  2/20/20 16:00


 3/21/20 15:59   


 


 


 Dextrose/Sodium


 Chloride  1,000 ml @ 


 50 mls/hr  Q20H


 IV


   2/21/20 13:45


 3/22/20 13:44  2/22/20 07:40


 


 


 Docusate Sodium


  (Colace)  100 mg  TWICE A  DAY


 ORAL


   2/21/20 18:00


 3/22/20 17:59  2/22/20 08:56


 


 


 Heparin Sodium


  (Porcine)


  (Heparin 5000


 units/ml)  5,000 units  EVERY 12  HOURS


 SUBQ


   2/20/20 21:00


 3/21/20 20:59  2/22/20 09:04


 


 


 Linezolid


  (Zyvox)  600 mg  EVERY 12  HOURS


 ORAL


   2/21/20 21:00


 2/26/20 20:59  2/22/20 08:56


 


 


 Ondansetron HCl


  (Zofran)  4 mg  Q6H  PRN


 IVP


 Nausea & Vomiting  2/20/20 16:00


 3/21/20 15:59   


 


 


 Pantoprazole


  (Protonix)  40 mg  DAILY


 ORAL


   2/21/20 13:45


 3/22/20 13:44  2/22/20 08:55


 


 


 Polyethylene


 Glycol


  (Miralax)  17 gm  DAILYPRN  PRN


 ORAL


 Constipation  2/20/20 16:00


 3/21/20 15:59   


 

















Van Song MD Feb 22, 2020 10:14

## 2020-02-23 VITALS — DIASTOLIC BLOOD PRESSURE: 74 MMHG | SYSTOLIC BLOOD PRESSURE: 125 MMHG

## 2020-02-23 VITALS — DIASTOLIC BLOOD PRESSURE: 66 MMHG | SYSTOLIC BLOOD PRESSURE: 117 MMHG

## 2020-02-23 VITALS — DIASTOLIC BLOOD PRESSURE: 65 MMHG | SYSTOLIC BLOOD PRESSURE: 126 MMHG

## 2020-02-23 VITALS — DIASTOLIC BLOOD PRESSURE: 65 MMHG | SYSTOLIC BLOOD PRESSURE: 114 MMHG

## 2020-02-23 VITALS — SYSTOLIC BLOOD PRESSURE: 118 MMHG | DIASTOLIC BLOOD PRESSURE: 70 MMHG

## 2020-02-23 VITALS — SYSTOLIC BLOOD PRESSURE: 124 MMHG | DIASTOLIC BLOOD PRESSURE: 67 MMHG

## 2020-02-23 LAB
ADD MANUAL DIFF: NO
ALBUMIN SERPL-MCNC: 2 G/DL (ref 3.4–5)
ALBUMIN/GLOB SERPL: 0.4 {RATIO} (ref 1–2.7)
ALP SERPL-CCNC: 90 U/L (ref 46–116)
ALT SERPL-CCNC: 10 U/L (ref 12–78)
ANION GAP SERPL CALC-SCNC: 14 MMOL/L (ref 5–15)
AST SERPL-CCNC: 16 U/L (ref 15–37)
BASOPHILS NFR BLD AUTO: 0.6 % (ref 0–2)
BILIRUB SERPL-MCNC: 0.3 MG/DL (ref 0.2–1)
BUN SERPL-MCNC: 55 MG/DL (ref 7–18)
CALCIUM SERPL-MCNC: 8.6 MG/DL (ref 8.5–10.1)
CHLORIDE SERPL-SCNC: 112 MMOL/L (ref 98–107)
CO2 SERPL-SCNC: 17 MMOL/L (ref 21–32)
CREAT SERPL-MCNC: 3.2 MG/DL (ref 0.55–1.3)
EOSINOPHIL NFR BLD AUTO: 1 % (ref 0–3)
ERYTHROCYTE [DISTWIDTH] IN BLOOD BY AUTOMATED COUNT: 13.5 % (ref 11.6–14.8)
GLOBULIN SER-MCNC: 5.5 G/DL
HCT VFR BLD CALC: 25.5 % (ref 37–47)
HGB BLD-MCNC: 8.5 G/DL (ref 12–16)
LYMPHOCYTES NFR BLD AUTO: 14.2 % (ref 20–45)
MCV RBC AUTO: 91 FL (ref 80–99)
MONOCYTES NFR BLD AUTO: 6.6 % (ref 1–10)
NEUTROPHILS NFR BLD AUTO: 77.6 % (ref 45–75)
PHOSPHATE SERPL-MCNC: 4 MG/DL (ref 2.5–4.9)
PLATELET # BLD: 436 K/UL (ref 150–450)
POTASSIUM SERPL-SCNC: 4.1 MMOL/L (ref 3.5–5.1)
RBC # BLD AUTO: 2.79 M/UL (ref 4.2–5.4)
SODIUM SERPL-SCNC: 143 MMOL/L (ref 136–145)
WBC # BLD AUTO: 6.5 K/UL (ref 4.8–10.8)

## 2020-02-23 RX ADMIN — AZITHROMYCIN DIHYDRATE SCH MG: 250 TABLET, FILM COATED ORAL at 08:51

## 2020-02-23 RX ADMIN — HEPARIN SODIUM SCH UNITS: 5000 INJECTION INTRAVENOUS; SUBCUTANEOUS at 21:34

## 2020-02-23 RX ADMIN — HUMAN INSULIN SCH MLS/HR: 100 INJECTION, SOLUTION SUBCUTANEOUS at 14:29

## 2020-02-23 RX ADMIN — DEXTROSE MONOHYDRATE SCH MLS/HR: 50 INJECTION, SOLUTION INTRAVENOUS at 09:00

## 2020-02-23 RX ADMIN — HEPARIN SODIUM SCH UNITS: 5000 INJECTION INTRAVENOUS; SUBCUTANEOUS at 08:59

## 2020-02-23 RX ADMIN — DOCUSATE SODIUM SCH MG: 100 CAPSULE, LIQUID FILLED ORAL at 17:22

## 2020-02-23 RX ADMIN — HUMAN INSULIN SCH MLS/HR: 100 INJECTION, SOLUTION SUBCUTANEOUS at 02:03

## 2020-02-23 RX ADMIN — DOCUSATE SODIUM SCH MG: 100 CAPSULE, LIQUID FILLED ORAL at 08:52

## 2020-02-23 NOTE — GENERAL PROGRESS NOTE
Assessment/Plan


Problem List:  


(1) Anemia


ICD Codes:  D64.9 - Anemia, unspecified


SNOMED:  510485551


(2) Renal insufficiency


ICD Codes:  N28.9 - Disorder of kidney and ureter, unspecified


SNOMED:  213050682, 412532715


(3) Dyspnea


ICD Codes:  R06.00 - Dyspnea, unspecified


SNOMED:  475109077


(4) Pulmonary congestion


ICD Codes:  R09.89 - Other specified symptoms and signs involving the 

circulatory and respiratory systems


SNOMED:  28571458


(5) Pneumonia


ICD Codes:  J18.9 - Pneumonia, unspecified organism


SNOMED:  459482716


Status:  stable, progressing


Assessment/Plan:


o2 pulm tx abx pt diet cbc bmp am aru eval





Subjective


Constitutional:  Reports: weakness


Allergies:  


Coded Allergies:  


     No Known Allergies (Unverified , 2/14/16)


All Systems:  reviewed and negative except above


Subjective


sitting calm





Objective





Last 24 Hour Vital Signs








  Date Time  Temp Pulse Resp B/P (MAP) Pulse Ox O2 Delivery O2 Flow Rate FiO2


 


2/23/20 08:15      Room Air  


 


2/23/20 08:00  85      


 


2/23/20 04:00 98.3 85 16 114/65 (81) 94   


 


2/23/20 04:00  76      


 


2/23/20 00:00  83      


 


2/23/20 00:00 97.2 92 18 118/70 (86) 93   


 


2/22/20 21:00      Room Air  


 


2/22/20 20:00  102      


 


2/22/20 20:00 98.5 103 20 129/67 (87) 93   


 


2/22/20 16:00 98.4 84 20 112/66 (81) 97   


 


2/22/20 15:59  85      


 


2/22/20 12:00 97.6 87 18 102/65 (77) 100   


 


2/22/20 12:00  87      

















Intake and Output  


 


 2/22/20 2/23/20





 19:00 07:00


 


Intake Total 140 ml 


 


Output Total 1400 ml 


 


Balance -1260 ml 


 


  


 


Intake Oral 140 ml 


 


Output Urine Total 1400 ml 


 


# Voids 3 2








Laboratory Tests


2/23/20 06:54: 


White Blood Count 6.5, Red Blood Count 2.79L, Hemoglobin 8.5L, Hematocrit 25.5L

, Mean Corpuscular Volume 91, Mean Corpuscular Hemoglobin 30.3, Mean 

Corpuscular Hemoglobin Concent 33.2, Red Cell Distribution Width 13.5, Platelet 

Count 436, Mean Platelet Volume 4.4L, Neutrophils (%) (Auto) 77.6H, Lymphocytes 

(%) (Auto) 14.2L, Monocytes (%) (Auto) 6.6, Eosinophils (%) (Auto) 1.0, 

Basophils (%) (Auto) 0.6, Sodium Level [Pending], Potassium Level [Pending], 

Chloride Level [Pending], Carbon Dioxide Level [Pending], Blood Urea Nitrogen [

Pending], Creatinine [Pending], Estimat Glomerular Filtration Rate [Pending], 

Glucose Level [Pending], Uric Acid [Pending], Calcium Level [Pending], 

Phosphorus Level [Pending], Magnesium Level [Pending], Total Bilirubin [Pending]

, Aspartate Amino Transf (AST/SGOT) [Pending], Alanine Aminotransferase (ALT/

SGPT) [Pending], Alkaline Phosphatase [Pending], C-Reactive Protein, 

Quantitative [Pending], Pro-B-Type Natriuretic Peptide [Pending], Total Protein 

[Pending], Albumin [Pending], Globulin [Pending]


Height (Feet):  5


Height (Inches):  0.00


Weight (Pounds):  87


General Appearance:  lethargic


EENT:  normal ENT inspection


Neck:  normal alignment


Cardiovascular:  normal peripheral pulses, normal rate, regular rhythm


Respiratory/Chest:  chest wall non-tender, lungs clear, normal breath sounds


Abdomen:  normal bowel sounds, non tender, soft


Extremities:  normal inspection


Edema:  no edema noted Arm (L), no edema noted Arm (R), no edema noted Leg (L), 

no edema noted Leg (R), no edema noted Pedal (L), no edema noted Pedal (R), no 

edema noted Generalized


Neurologic:  responsive, motor weakness


Skin:  normal pigmentation, warm/dry











Flaco Stephens DO Feb 23, 2020 09:14

## 2020-02-23 NOTE — NUR
NURSE NOTES:

Received report from LANA Ashley. Patient is in bed, awake, alert, and responsive. Breathing 
regular and unlabored with no s/s of SOB noted. Patient denies any pain or discomfort at 
this time. Patient's son is at bed-side. IV access is intact, patent, and running fluids at 
prescribed rate. Bed remains in lowest position, breaks engaged, and call light is within 
reach at all times. All other needs attended to, patient remains stable, will continue to 
monitor.

## 2020-02-23 NOTE — NEPHROLOGY PROGRESS NOTE
Assessment/Plan


Problem List:  


(1) Renal failure (ARF), acute on chronic


Assessment:  Cr higher





(2) Severe protein-calorie malnutrition


(3) Severe anemia


Assessment





Patient admitted with sepsis / Pneumonia





(1) MIKA (acute kidney injury)


(2) Renal failure (ARF), acute on chronic


(3) Severe anemia


(4) Protein calorie mal nutrition- BMI 17.2


(5) h/o UTI





Others:


h/o bronchitis


h/o abdominal pain and nausea, possibly enteritis ( by CT scan) 


RA


Plan





previously given IV Iron, will give more


EPO SQ


Avoid nephrotoxics


hydrate-


Anemia workup


per orders





Subjective


ROS Limited/Unobtainable:  No


Constitutional:  Reports: malaise





Objective


Objective





Last 24 Hour Vital Signs








  Date Time  Temp Pulse Resp B/P (MAP) Pulse Ox O2 Delivery O2 Flow Rate FiO2


 


2/23/20 08:15      Room Air  


 


2/23/20 08:00  85      


 


2/23/20 08:00 98.0 80 18 117/66 (83) 94   


 


2/23/20 04:00 98.3 85 16 114/65 (81) 94   


 


2/23/20 04:00  76      


 


2/23/20 00:00  83      


 


2/23/20 00:00 97.2 92 18 118/70 (86) 93   


 


2/22/20 21:00      Room Air  


 


2/22/20 20:00  102      


 


2/22/20 20:00 98.5 103 20 129/67 (87) 93   


 


2/22/20 16:00 98.4 84 20 112/66 (81) 97   


 


2/22/20 15:59  85      


 


2/22/20 12:00 97.6 87 18 102/65 (77) 100   


 


2/22/20 12:00  87      

















Intake and Output  


 


 2/22/20 2/23/20





 19:00 07:00


 


Intake Total 140 ml 


 


Output Total 1400 ml 


 


Balance -1260 ml 


 


  


 


Intake Oral 140 ml 


 


Output Urine Total 1400 ml 


 


# Voids 3 2








Laboratory Tests


2/23/20 06:54: 


White Blood Count 6.5, Red Blood Count 2.79L, Hemoglobin 8.5L, Hematocrit 25.5L

, Mean Corpuscular Volume 91, Mean Corpuscular Hemoglobin 30.3, Mean 

Corpuscular Hemoglobin Concent 33.2, Red Cell Distribution Width 13.5, Platelet 

Count 436, Mean Platelet Volume 4.4L, Neutrophils (%) (Auto) 77.6H, Lymphocytes 

(%) (Auto) 14.2L, Monocytes (%) (Auto) 6.6, Eosinophils (%) (Auto) 1.0, 

Basophils (%) (Auto) 0.6, Sodium Level 143, Potassium Level 4.1, Chloride Level 

112H, Carbon Dioxide Level 17L, Anion Gap 14, Blood Urea Nitrogen 55H, 

Creatinine 3.2H, Estimat Glomerular Filtration Rate 14.1, Glucose Level 112H, 

Uric Acid 8.2H, Calcium Level 8.6, Phosphorus Level 4.0, Magnesium Level 1.6L, 

Total Bilirubin 0.3, Aspartate Amino Transf (AST/SGOT) 16, Alanine 

Aminotransferase (ALT/SGPT) 10L, Alkaline Phosphatase 90, C-Reactive Protein, 

Quantitative 16.6H, Pro-B-Type Natriuretic Peptide 1876H, Total Protein 7.5, 

Albumin 2.0L, Globulin 5.5, Albumin/Globulin Ratio 0.4L


Height (Feet):  5


Height (Inches):  0.00


Weight (Pounds):  87


General Appearance:  no apparent distress


Cardiovascular:  tachycardia


Respiratory/Chest:  decreased breath sounds


Abdomen:  soft











Demar Mayo MD Feb 23, 2020 11:56

## 2020-02-23 NOTE — NUR
PT Note

PT darin completed, treatment initiated. Patient has muscle weakness and decreased standing 
balance. Patient can benefit from PT services to increase her muscle strength and balance to 
improve her safety in mobility and gait.


-------------------------------------------------------------------------------

Addendum: 02/23/20 at 1007 by MANNIE WILKS PT

-------------------------------------------------------------------------------

Amended: Links added.

## 2020-02-23 NOTE — NUR
NURSE NOTES: Pt dozing in bed, awoke to soft voice, breathing easily on room air, denies SOB 
and denies pain at this time. VS stable with SR @ 82 on monitor. IV access right hand with 
D5NS running at 75 ml/hr.  Bed left in low position, side rails up x 2 and call light left 
near pt's hand.

## 2020-02-24 VITALS — SYSTOLIC BLOOD PRESSURE: 123 MMHG | DIASTOLIC BLOOD PRESSURE: 67 MMHG

## 2020-02-24 VITALS — SYSTOLIC BLOOD PRESSURE: 123 MMHG | DIASTOLIC BLOOD PRESSURE: 68 MMHG

## 2020-02-24 VITALS — DIASTOLIC BLOOD PRESSURE: 63 MMHG | SYSTOLIC BLOOD PRESSURE: 130 MMHG

## 2020-02-24 VITALS — DIASTOLIC BLOOD PRESSURE: 60 MMHG | SYSTOLIC BLOOD PRESSURE: 111 MMHG

## 2020-02-24 VITALS — DIASTOLIC BLOOD PRESSURE: 75 MMHG | SYSTOLIC BLOOD PRESSURE: 125 MMHG

## 2020-02-24 LAB
ADD MANUAL DIFF: NO
ALBUMIN SERPL-MCNC: 1.9 G/DL (ref 3.4–5)
ALBUMIN/GLOB SERPL: 0.4 {RATIO} (ref 1–2.7)
ALP SERPL-CCNC: 80 U/L (ref 46–116)
ALT SERPL-CCNC: 8 U/L (ref 12–78)
ANION GAP SERPL CALC-SCNC: 12 MMOL/L (ref 5–15)
AST SERPL-CCNC: 17 U/L (ref 15–37)
BASOPHILS NFR BLD AUTO: 0.7 % (ref 0–2)
BILIRUB SERPL-MCNC: 0.3 MG/DL (ref 0.2–1)
BUN SERPL-MCNC: 44 MG/DL (ref 7–18)
CALCIUM SERPL-MCNC: 8.7 MG/DL (ref 8.5–10.1)
CHLORIDE SERPL-SCNC: 115 MMOL/L (ref 98–107)
CO2 SERPL-SCNC: 17 MMOL/L (ref 21–32)
CREAT SERPL-MCNC: 2.8 MG/DL (ref 0.55–1.3)
EOSINOPHIL NFR BLD AUTO: 1.3 % (ref 0–3)
ERYTHROCYTE [DISTWIDTH] IN BLOOD BY AUTOMATED COUNT: 13.7 % (ref 11.6–14.8)
GLOBULIN SER-MCNC: 5.3 G/DL
HCT VFR BLD CALC: 24.4 % (ref 37–47)
HGB BLD-MCNC: 8.3 G/DL (ref 12–16)
LYMPHOCYTES NFR BLD AUTO: 17.5 % (ref 20–45)
MCV RBC AUTO: 90 FL (ref 80–99)
MONOCYTES NFR BLD AUTO: 5.1 % (ref 1–10)
NEUTROPHILS NFR BLD AUTO: 75.4 % (ref 45–75)
PHOSPHATE SERPL-MCNC: 4.2 MG/DL (ref 2.5–4.9)
PLATELET # BLD: 427 K/UL (ref 150–450)
POTASSIUM SERPL-SCNC: 4.2 MMOL/L (ref 3.5–5.1)
RBC # BLD AUTO: 2.72 M/UL (ref 4.2–5.4)
SODIUM SERPL-SCNC: 144 MMOL/L (ref 136–145)
WBC # BLD AUTO: 5.9 K/UL (ref 4.8–10.8)

## 2020-02-24 RX ADMIN — DOCUSATE SODIUM SCH MG: 100 CAPSULE, LIQUID FILLED ORAL at 09:37

## 2020-02-24 RX ADMIN — HUMAN INSULIN SCH MLS/HR: 100 INJECTION, SOLUTION SUBCUTANEOUS at 17:20

## 2020-02-24 RX ADMIN — HEPARIN SODIUM SCH UNITS: 5000 INJECTION INTRAVENOUS; SUBCUTANEOUS at 09:38

## 2020-02-24 RX ADMIN — HUMAN INSULIN SCH MLS/HR: 100 INJECTION, SOLUTION SUBCUTANEOUS at 04:51

## 2020-02-24 RX ADMIN — DOCUSATE SODIUM SCH MG: 100 CAPSULE, LIQUID FILLED ORAL at 17:54

## 2020-02-24 RX ADMIN — DEXTROSE MONOHYDRATE SCH MLS/HR: 50 INJECTION, SOLUTION INTRAVENOUS at 09:37

## 2020-02-24 RX ADMIN — AZITHROMYCIN DIHYDRATE SCH MG: 250 TABLET, FILM COATED ORAL at 09:36

## 2020-02-24 NOTE — NUR
NURSE NOTES:



Spoke to Dr. Domingo regarding patient's discharge status. Per MD, instruct patient and 
patient's family to inform pharmacy to contact him directly for antibiotics. Will educate 
patient and family.

## 2020-02-24 NOTE — GENERAL PROGRESS NOTE
Assessment/Plan


Problem List:  


(1) Anemia


ICD Codes:  D64.9 - Anemia, unspecified


SNOMED:  808018394


(2) Renal insufficiency


ICD Codes:  N28.9 - Disorder of kidney and ureter, unspecified


SNOMED:  583752977, 795228051


(3) Dyspnea


ICD Codes:  R06.00 - Dyspnea, unspecified


SNOMED:  771523417


(4) Pulmonary congestion


ICD Codes:  R09.89 - Other specified symptoms and signs involving the 

circulatory and respiratory systems


SNOMED:  34264077


(5) Pneumonia


ICD Codes:  J18.9 - Pneumonia, unspecified organism


SNOMED:  571751983


Status:  stable, progressing


Assessment/Plan:


o2 pulm tx abx pt diet cbc bmp am dc to [facility]





Subjective


Constitutional:  Reports: weakness


Respiratory:  Reports: shortness of breath


Allergies:  


Coded Allergies:  


     No Known Allergies (Unverified , 2/14/16)


All Systems:  reviewed and negative except above


Subjective


sitting calm





Objective





Last 24 Hour Vital Signs








  Date Time  Temp Pulse Resp B/P (MAP) Pulse Ox O2 Delivery O2 Flow Rate FiO2


 


2/24/20 08:33 97.9 93 18 123/68 (86) 95   


 


2/24/20 04:00  77      


 


2/24/20 04:00 98.1 70 18 130/63 (85) 95   


 


2/24/20 00:00  76      


 


2/24/20 00:00 97.7 83 18 123/67 (85) 95   


 


2/23/20 21:00      Room Air  


 


2/23/20 20:00 97.3 78 17 124/67 (86) 95   


 


2/23/20 20:00  85      


 


2/23/20 16:23  78      


 


2/23/20 16:00 97.9 80 19 125/74 (91) 98   


 


2/23/20 12:00  61      


 


2/23/20 12:00 97.7 79 19 126/65 (85) 96   

















Intake and Output  


 


 2/23/20 2/24/20





 19:00 07:00


 


Intake Total 950 ml 


 


Balance 950 ml 


 


  


 


Intake Oral 950 ml 


 


# Voids 4 3








Laboratory Tests


2/24/20 07:16: 


White Blood Count 5.9, Red Blood Count 2.72L, Hemoglobin 8.3L, Hematocrit 24.4L

, Mean Corpuscular Volume 90, Mean Corpuscular Hemoglobin 30.4, Mean 

Corpuscular Hemoglobin Concent 33.9, Red Cell Distribution Width 13.7, Platelet 

Count 427, Mean Platelet Volume 4.5L, Neutrophils (%) (Auto) 75.4H, Lymphocytes 

(%) (Auto) 17.5L, Monocytes (%) (Auto) 5.1, Eosinophils (%) (Auto) 1.3, 

Basophils (%) (Auto) 0.7, Sodium Level 144, Potassium Level 4.2, Chloride Level 

115H, Carbon Dioxide Level 17L, Anion Gap 12, Blood Urea Nitrogen 44H, 

Creatinine 2.8H, Estimat Glomerular Filtration Rate 16.5, Glucose Level 100, 

Calcium Level 8.7, Phosphorus Level 4.2, Magnesium Level 1.4L, Total Bilirubin 

0.3, Aspartate Amino Transf (AST/SGOT) 17, Alanine Aminotransferase (ALT/SGPT) 

8L, Alkaline Phosphatase 80, Total Protein 7.2, Albumin 1.9L, Globulin 5.3, 

Albumin/Globulin Ratio 0.4L


Height (Feet):  5


Height (Inches):  0.00


Weight (Pounds):  87


General Appearance:  lethargic


EENT:  normal ENT inspection


Neck:  normal alignment


Cardiovascular:  normal peripheral pulses, normal rate, regular rhythm


Respiratory/Chest:  chest wall non-tender, lungs clear, normal breath sounds


Abdomen:  normal bowel sounds, non tender, soft


Extremities:  normal inspection


Edema:  no edema noted Arm (L), no edema noted Arm (R), no edema noted Leg (L), 

no edema noted Leg (R), no edema noted Pedal (L), no edema noted Pedal (R), no 

edema noted Generalized


Neurologic:  responsive, motor weakness


Skin:  normal pigmentation, warm/dry











Flaco Stephens DO Feb 24, 2020 10:38

## 2020-02-24 NOTE — INFECTIOUS DISEASES PROG NOTE
Assessment/Plan


Assessment/Plan


Assessment:


Sepsis


Probable PNA


  -CXR Bilateral interstitial and airspace infiltrates versus edema. Correlate 

with clinical findings





Mild leukocytosis


Afebrile


  -ua no pyuria; ucx NTD





MIKA on CKD





RA


hx of pulmonary MAC


emphysema


 osteoporosis


hx of pyelonephritis 9/2019


hx of  multiple episodes of pneumonia





Plan:


-Continue  PO linezolid # 3/5 given MIKA


-continue empiric  Cefepiem #4/7 and Azithromycin # 3/5 for atypical coverage





     - 2/21/20 SP Vancomycin #2





-f/u cx


-Monitor CBC/CMP, temperatures


-f/u influenza sc


-aspiration precautions


-Renal f/u


-legionella ag urine





Thank you for this consultation. Will continue to follow along with you.





Subjective


Allergies:  


Coded Allergies:  


     No Known Allergies (Unverified , 2/14/16)


Subjective


comfortable 


no new complain





Objective


Vital Signs





Last 24 Hour Vital Signs








  Date Time  Temp Pulse Resp B/P (MAP) Pulse Ox O2 Delivery O2 Flow Rate FiO2


 


2/24/20 08:33 97.9 93 18 123/68 (86) 95   


 


2/24/20 04:00  77      


 


2/24/20 04:00 98.1 70 18 130/63 (85) 95   


 


2/24/20 00:00  76      


 


2/24/20 00:00 97.7 83 18 123/67 (85) 95   


 


2/23/20 21:00      Room Air  


 


2/23/20 20:00 97.3 78 17 124/67 (86) 95   


 


2/23/20 20:00  85      


 


2/23/20 16:23  78      


 


2/23/20 16:00 97.9 80 19 125/74 (91) 98   


 


2/23/20 12:00  61      


 


2/23/20 12:00 97.7 79 19 126/65 (85) 96   








Height (Feet):  5


Height (Inches):  0.00


Weight (Pounds):  87


Respiratory/Chest:  lungs clear


Cardiovascular:  regular rhythm


Abdomen:  soft, non tender





Microbiology








 Date/Time


Source Procedure


Growth Status


 


 


 2/22/20 10:45


Nasal Nares - Final Complete


 


 2/22/20 10:45


Nasal Nares - Final Complete











Laboratory Tests








Test


  2/24/20


07:16


 


White Blood Count


  5.9 K/UL


(4.8-10.8)


 


Red Blood Count


  2.72 M/UL


(4.20-5.40)  L


 


Hemoglobin


  8.3 G/DL


(12.0-16.0)  L


 


Hematocrit


  24.4 %


(37.0-47.0)  L


 


Mean Corpuscular Volume 90 FL (80-99)  


 


Mean Corpuscular Hemoglobin


  30.4 PG


(27.0-31.0)


 


Mean Corpuscular Hemoglobin


Concent 33.9 G/DL


(32.0-36.0)


 


Red Cell Distribution Width


  13.7 %


(11.6-14.8)


 


Platelet Count


  427 K/UL


(150-450)


 


Mean Platelet Volume


  4.5 FL


(6.5-10.1)  L


 


Neutrophils (%) (Auto)


  75.4 %


(45.0-75.0)  H


 


Lymphocytes (%) (Auto)


  17.5 %


(20.0-45.0)  L


 


Monocytes (%) (Auto)


  5.1 %


(1.0-10.0)


 


Eosinophils (%) (Auto)


  1.3 %


(0.0-3.0)


 


Basophils (%) (Auto)


  0.7 %


(0.0-2.0)


 


Sodium Level


  144 MMOL/L


(136-145)


 


Potassium Level


  4.2 MMOL/L


(3.5-5.1)


 


Chloride Level


  115 MMOL/L


()  H


 


Carbon Dioxide Level


  17 MMOL/L


(21-32)  L


 


Anion Gap


  12 mmol/L


(5-15)


 


Blood Urea Nitrogen


  44 mg/dL


(7-18)  H


 


Creatinine


  2.8 MG/DL


(0.55-1.30)  H


 


Estimat Glomerular Filtration


Rate 16.5 mL/min


(>60)


 


Glucose Level


  100 MG/DL


()


 


Calcium Level


  8.7 MG/DL


(8.5-10.1)


 


Phosphorus Level


  4.2 MG/DL


(2.5-4.9)


 


Magnesium Level


  1.4 MG/DL


(1.8-2.4)  L


 


Total Bilirubin


  0.3 MG/DL


(0.2-1.0)


 


Aspartate Amino Transf


(AST/SGOT) 17 U/L (15-37)


 


 


Alanine Aminotransferase


(ALT/SGPT) 8 U/L (12-78)


L


 


Alkaline Phosphatase


  80 U/L


()


 


Total Protein


  7.2 G/DL


(6.4-8.2)


 


Albumin


  1.9 G/DL


(3.4-5.0)  L


 


Globulin 5.3 g/dL  


 


Albumin/Globulin Ratio


  0.4 (1.0-2.7)


L











Current Medications








 Medications


  (Trade)  Dose


 Ordered  Sig/Naman


 Route


 PRN Reason  Start Time


 Stop Time Status Last Admin


Dose Admin


 


 Acetaminophen


  (Tylenol)  650 mg  Q4H  PRN


 ORAL


 FEVER  2/20/20 16:00


 3/21/20 15:59  2/20/20 19:04


 


 


 Albuterol/


 Ipratropium


  (Albuterol/


 Ipratropium)  3 ml  Q4H  PRN


 HHN


 Shortness of Breath  2/20/20 16:00


 2/25/20 15:59   


 


 


 Azithromycin


  (Zithromax)  500 mg  DAILY


 ORAL


   2/21/20 14:00


 2/28/20 13:59  2/24/20 09:36


 


 


 Cefepime HCl 500


 mg/Dextrose  55 ml @ 


 110 mls/hr  Q24H


 IVPB


   2/21/20 09:00


 2/28/20 08:59  2/24/20 09:37


 


 


 Dextrose


  (Dextrose 50%)  25 ml  Q30M  PRN


 IV


 Hypoglycemia  2/20/20 16:00


 3/21/20 15:59   


 


 


 Dextrose


  (Dextrose 50%)  50 ml  Q30M  PRN


 IV


 Hypoglycemia  2/20/20 16:00


 3/21/20 15:59   


 


 


 Dextrose/Sodium


 Chloride  1,000 ml @ 


 75 mls/hr  [id number]


 IV


   2/22/20 12:00


 3/22/20 11:59  2/24/20 04:51


 


 


 Docusate Sodium


  (Colace)  100 mg  TWICE A  DAY


 ORAL


   2/21/20 18:00


 3/22/20 17:59  2/24/20 09:37


 


 


 Epoetin Matty


  (Epoetin


 Matty-EPBX(NON


 ESRD))  10,000 unit  MON-WED-FRI


 SUBQ


   2/24/20 21:00


 3/25/20 20:59   


 


 


 Heparin Sodium


  (Porcine)


  (Heparin 5000


 units/ml)  5,000 units  EVERY 12  HOURS


 SUBQ


   2/20/20 21:00


 3/21/20 20:59  2/24/20 09:38


 


 


 Linezolid


  (Zyvox)  600 mg  EVERY 12  HOURS


 ORAL


   2/21/20 21:00


 2/26/20 20:59  2/24/20 09:37


 


 


 Magnesium Sulfate  100 ml @ 


 100 mls/hr  Q1H


 IVPB


   2/24/20 09:00


 2/24/20 12:59  2/24/20 09:37


 


 


 Ondansetron HCl


  (Zofran)  4 mg  Q6H  PRN


 IVP


 Nausea & Vomiting  2/20/20 16:00


 3/21/20 15:59  2/23/20 17:54


 


 


 Pantoprazole


  (Protonix)  40 mg  DAILY


 ORAL


   2/21/20 13:45


 3/22/20 13:44  2/24/20 09:37


 


 


 Polyethylene


 Glycol


  (Miralax)  17 gm  DAILYPRN  PRN


 ORAL


 Constipation  2/20/20 16:00


 3/21/20 15:59   


 

















Jasson Domingo MD Feb 24, 2020 10:57

## 2020-02-24 NOTE — NUR
NURSE NOTES:



Patient is picked up by family member. Patient is in stable condition. IV catheter removed 
and intact. Skin is intact. Vital signs are stable. Belongings checked and given back to the 
patient. Educated patient and family member regarding post discharge care, patient and 
family members verbalized understanding. Cardiac monitor removed and given back to the 
cardiac monitor.

## 2020-02-24 NOTE — NEPHROLOGY PROGRESS NOTE
Assessment/Plan


Problem List:  


(1) Renal failure (ARF), acute on chronic


Assessment:  Cr higher





(2) Severe protein-calorie malnutrition


(3) Severe anemia


Assessment





Patient admitted with sepsis / Pneumonia





(1) MIKA (acute kidney injury)


(2) Renal failure (ARF), acute on chronic


(3) Severe anemia


(4) Protein calorie mal nutrition- BMI 17.2


(5) h/o UTI





Others:


h/o bronchitis


h/o abdominal pain and nausea, possibly enteritis ( by CT scan) 


RA


Plan





previously given IV Iron, will give more


EPO SQ


Avoid nephrotoxics


hydrate-


Anemia workup


per orders





Subjective


ROS Limited/Unobtainable:  No


Constitutional:  Reports: malaise





Objective


Objective





Last 24 Hour Vital Signs








  Date Time  Temp Pulse Resp B/P (MAP) Pulse Ox O2 Delivery O2 Flow Rate FiO2


 


2/24/20 09:00      Room Air  


 


2/24/20 08:33 97.9 93 18 123/68 (86) 95   


 


2/24/20 04:00  77      


 


2/24/20 04:00 98.1 70 18 130/63 (85) 95   


 


2/24/20 00:00  76      


 


2/24/20 00:00 97.7 83 18 123/67 (85) 95   


 


2/23/20 21:00      Room Air  


 


2/23/20 20:00 97.3 78 17 124/67 (86) 95   


 


2/23/20 20:00  85      


 


2/23/20 16:23  78      


 


2/23/20 16:00 97.9 80 19 125/74 (91) 98   


 


2/23/20 12:00  61      


 


2/23/20 12:00 97.7 79 19 126/65 (85) 96   

















Intake and Output  


 


 2/23/20 2/24/20





 19:00 07:00


 


Intake Total 950 ml 


 


Balance 950 ml 


 


  


 


Intake Oral 950 ml 


 


# Voids 4 3











Current Medications








 Medications


  (Trade)  Dose


 Ordered  Sig/Naman


 Route


 PRN Reason  Start Time


 Stop Time Status Last Admin


Dose Admin


 


 Acetaminophen


  (Tylenol)  650 mg  Q4H  PRN


 ORAL


 FEVER  2/20/20 16:00


 3/21/20 15:59  2/20/20 19:04


 


 


 Albuterol/


 Ipratropium


  (Albuterol/


 Ipratropium)  3 ml  Q4H  PRN


 HHN


 Shortness of Breath  2/20/20 16:00


 2/25/20 15:59   


 


 


 Azithromycin


  (Zithromax)  500 mg  DAILY


 ORAL


   2/21/20 14:00


 2/28/20 13:59  2/24/20 09:36


 


 


 Cefepime HCl 500


 mg/Dextrose  55 ml @ 


 110 mls/hr  Q24H


 IVPB


   2/21/20 09:00


 2/28/20 08:59  2/24/20 09:37


 


 


 Dextrose


  (Dextrose 50%)  25 ml  Q30M  PRN


 IV


 Hypoglycemia  2/20/20 16:00


 3/21/20 15:59   


 


 


 Dextrose


  (Dextrose 50%)  50 ml  Q30M  PRN


 IV


 Hypoglycemia  2/20/20 16:00


 3/21/20 15:59   


 


 


 Dextrose/Sodium


 Chloride  1,000 ml @ 


 75 mls/hr  [id number]


 IV


   2/22/20 12:00


 3/22/20 11:59  2/24/20 04:51


 


 


 Docusate Sodium


  (Colace)  100 mg  TWICE A  DAY


 ORAL


   2/21/20 18:00


 3/22/20 17:59  2/24/20 09:37


 


 


 Epoetin Matty


  (Epoetin


 Matty-EPBX(NON


 ESRD))  10,000 unit  MON-WED-FRI


 SUBQ


   2/24/20 21:00


 3/25/20 20:59   


 


 


 Heparin Sodium


  (Porcine)


  (Heparin 5000


 units/ml)  5,000 units  EVERY 12  HOURS


 SUBQ


   2/20/20 21:00


 3/21/20 20:59  2/24/20 09:38


 


 


 Linezolid


  (Zyvox)  600 mg  EVERY 12  HOURS


 ORAL


   2/21/20 21:00


 2/26/20 20:59  2/24/20 09:37


 


 


 Magnesium Sulfate  100 ml @ 


 100 mls/hr  Q1H


 IVPB


   2/24/20 09:00


 2/24/20 12:59  2/24/20 11:07


 


 


 Ondansetron HCl


  (Zofran)  4 mg  Q6H  PRN


 IVP


 Nausea & Vomiting  2/20/20 16:00


 3/21/20 15:59  2/23/20 17:54


 


 


 Pantoprazole


  (Protonix)  40 mg  DAILY


 ORAL


   2/21/20 13:45


 3/22/20 13:44  2/24/20 09:37


 


 


 Polyethylene


 Glycol


  (Miralax)  17 gm  DAILYPRN  PRN


 ORAL


 Constipation  2/20/20 16:00


 3/21/20 15:59   


 





Laboratory Tests


2/24/20 07:16: 


White Blood Count 5.9, Red Blood Count 2.72L, Hemoglobin 8.3L, Hematocrit 24.4L

, Mean Corpuscular Volume 90, Mean Corpuscular Hemoglobin 30.4, Mean 

Corpuscular Hemoglobin Concent 33.9, Red Cell Distribution Width 13.7, Platelet 

Count 427, Mean Platelet Volume 4.5L, Neutrophils (%) (Auto) 75.4H, Lymphocytes 

(%) (Auto) 17.5L, Monocytes (%) (Auto) 5.1, Eosinophils (%) (Auto) 1.3, 

Basophils (%) (Auto) 0.7, Sodium Level 144, Potassium Level 4.2, Chloride Level 

115H, Carbon Dioxide Level 17L, Anion Gap 12, Blood Urea Nitrogen 44H, 

Creatinine 2.8H, Estimat Glomerular Filtration Rate 16.5, Glucose Level 100, 

Calcium Level 8.7, Phosphorus Level 4.2, Magnesium Level 1.4L, Total Bilirubin 

0.3, Aspartate Amino Transf (AST/SGOT) 17, Alanine Aminotransferase (ALT/SGPT) 

8L, Alkaline Phosphatase 80, Total Protein 7.2, Albumin 1.9L, Globulin 5.3, 

Albumin/Globulin Ratio 0.4L


Height (Feet):  5


Height (Inches):  0.00


Weight (Pounds):  87


General Appearance:  no apparent distress


Cardiovascular:  normal rate


Respiratory/Chest:  decreased breath sounds


Abdomen:  soft


Objective


no change











Demar Mayo MD Feb 24, 2020 11:17

## 2020-02-24 NOTE — NUR
*-*DISCHARGE PLANNING*-*



PATIENT HAS BEEN REFERRED TO:



[facility]

 P: [phone]

 F: [phone]

 

*-*CLINICALS FAXED*-*

## 2020-02-24 NOTE — NUR
NURSE NOTES:



Report received by Laurie SCHWARTZ. Patient is observed in bed, awake, alert, oriented, and able 
to make needs known. Respiratory even and unlabored. Denies pain at this time. IV site is 
asymptomatic, patent, and intact. Bed is in lowest position with side rails up x2, and 
brakes are engaged. Bed alarm is on. Encouraged patient to use call light when in need of 
assistance, pt verbalized understanding. Will continue to monitor.

## 2020-02-24 NOTE — NUR
***DISCHARGE PLANNED:



PATIENT HAS BEEN ACCEPTED TO 

[facility] 

T: [phone]

## 2020-02-24 NOTE — NUR
CASE MANAGEMENT: INITIAL REVIEW



74YR OLD FEMALE FROM HOME 



CC: FLU LIKE SYMPTOMS 



SI: PULMONARY CONGESTION / DYSPNEA 

99.0   87   17   102/61  93% ON RA

WBC 11.3  H/H 8.5/25.6  CO2-16  ANION GAP 18  BUN 78  CREAT 3.0 



IS:IV ROCEPHIN X1

IV LASIX X1

CHEST X-RAY- Bilateral interstitial and airspace infiltrates versus edema





\**: 2E TELE UNIT 





CASE MANAGEMENT:REVIEW



02/21/20



SI: PULMONARY CONGESTION / DYSPNEA 

99.4   98   21  103/55   94% ON RA



IS:IV D5@75ML/HR

IV CEFEPIME Q24HR

ZYVOX PO QD

ZITHROMAX PO QD

HEPARIN SQ BID





\**: 2E TELE UNIT 





CASE MANAGEMENT:REVIEW



02/24/20



SI: PULMONARY CONGESTION / DYSPNEA 

98.1   77    18   130/63   95% ON RA

H/H 8.3/24.4  CL-115 CO2-17 BUN 44 CREAT 2.8  MG 1.4 



IS:IV MG SULFATE X4 BAGS

IV D5@75ML/HR

IV CEFEPIME Q24HR

ZYVOX PO QD

ZITHROMAX PO QD

IV MG SULFATE X4 BAGS

HEPARIN SQ BID





\**: 2E TELE UNIT 

PLAN:

[facility]

## 2020-02-24 NOTE — NUR
NURSE NOTES:



per Dr. Stephens's order, patient's ok to dc if cleared by pulmo MD and ID MD. Contacted Dr. Mustafa and Dr. Domingo, awaiting call back.

## 2020-02-25 NOTE — DISCHARGE SUMMARY
Discharge Summary


Discharge Summary


_


DATE OF ADMISSION: 2/20/2020





DATE OF DISCHARGE: 2/24/2020








DISCHARGED BY: Dr. Stephens





REASON FOR ADMISSION: 


74 years old female with past medical history of kidney disease pyelonephritis 

,  rheumatoid arthritis, osteoarthritis, osteoporosis, presented  with  cold-

like symptoms.


She reported runny nose and cough for the last week.


Patient reported generalized weakness and mild headache.


She also reported some nausea sensation, but no vomiting.


No chest pain or shortness of breath, no focal weakness.


Upon evaluation vital signs were stable.  


Laboratory work-up revealed mild leukocytosis with, WBC 11.3, hemoglobin 8.5 

hematocrit 25.6.


BUN 78, creatinine 3.0.  


Glucose 115.   


Troponin negative.  


Stable LFT.  


Albumin 2.5.


Urinalysis revealed no pyuria ,moderate bacteria ,  +3 protein.  


Chest x-ray demonstrated  bilateral interstitial and airspace infiltrates 

versus edema.  


Patient admitted for possible pneumonia. 


 


CONSULTANTS:


ID specialist Dr. Díaz 


nephrologist Dr. Mayo


 


 


HOSPITAL COURSE: 


Patient admitted and started on empiric antibiotics.  


Supplemental oxygen provided and titrated to keep oximetry above 92%.  


Pulmonary toilet with bronchodilator via HHN provided.  


Influenza screen was negative.  


Urine culture revealed mixed gram-positive organisms.  


Antibiotic provided  as per ID recommendation.  


Leukocytosis resolved.  


Patient will need to  continue  antibiotics on discharge to complete the course.


 


Nephrologist followed.  


Patient was hydrated.  Renal parameters and  electrolytes were closely monitored

,  electrolytes corrected as needed , and nephrotoxins were avoided.  


Hemoglobin and hematocrit were closely monitored  with  goal to keep hemoglobin 

above 7.  


Anemia work-up was consistent with anemia of chronic disease.  


Iron low,  ferritin 393.  


Patient started on Epogen.  Patient received 1 dose of IV iron.  


Prior to discharge hemoglobin 8.3,  hematocrit 24.4 .


DVT prophylaxis provided.  


Magnesium was replaced.


Protein supplements implemented in plan of care as per registered dietician 

recommendation.


GI prophylaxis provided.  


Bowel regimen instituted.


Supportive care provided.  


Patient was working with physical therapist.  


Fall precaution maintained.


Creatinine trending down.  


Patient clinically stabilized.  


Patient was ready for discharge home with home health services to follow.





FINAL DIAGNOSES: 


Sepsis


Probable pneumonia


Renal failure, acute on chronic


Severe protein calorie malnutrition


Severe anemia





DISCHARGE MEDICATIONS:


See Medication Reconciliation list.





DISCHARGE INSTRUCTIONS:


Patient was discharged home with home health services.  


Follow up with primary care provider in one week.





I have been assigned to dictate discharge summary for this account. 


I was not involved in the patient's management.











Minal Barnes NP Feb 25, 2020 08:28

## 2020-07-26 ENCOUNTER — HOSPITAL ENCOUNTER (INPATIENT)
Dept: HOSPITAL 72 - EMR | Age: 75
LOS: 2 days | Discharge: HOME | DRG: 699 | End: 2020-07-28
Payer: MEDICARE

## 2020-07-26 VITALS — HEIGHT: 61 IN | WEIGHT: 88 LBS | BODY MASS INDEX: 16.62 KG/M2

## 2020-07-26 VITALS — SYSTOLIC BLOOD PRESSURE: 112 MMHG | DIASTOLIC BLOOD PRESSURE: 65 MMHG

## 2020-07-26 VITALS — DIASTOLIC BLOOD PRESSURE: 64 MMHG | SYSTOLIC BLOOD PRESSURE: 118 MMHG

## 2020-07-26 VITALS — SYSTOLIC BLOOD PRESSURE: 132 MMHG | DIASTOLIC BLOOD PRESSURE: 68 MMHG

## 2020-07-26 DIAGNOSIS — N18.4: ICD-10-CM

## 2020-07-26 DIAGNOSIS — M19.90: ICD-10-CM

## 2020-07-26 DIAGNOSIS — E46: ICD-10-CM

## 2020-07-26 DIAGNOSIS — I12.0: ICD-10-CM

## 2020-07-26 DIAGNOSIS — N01.7: Primary | ICD-10-CM

## 2020-07-26 DIAGNOSIS — G62.9: ICD-10-CM

## 2020-07-26 DIAGNOSIS — N18.6: ICD-10-CM

## 2020-07-26 DIAGNOSIS — M79.671: ICD-10-CM

## 2020-07-26 LAB
ADD MANUAL DIFF: NO
ALBUMIN SERPL-MCNC: 3.2 G/DL (ref 3.4–5)
ALBUMIN/GLOB SERPL: 0.8 {RATIO} (ref 1–2.7)
ALP SERPL-CCNC: 133 U/L (ref 46–116)
ALT SERPL-CCNC: 32 U/L (ref 12–78)
ANION GAP SERPL CALC-SCNC: 9 MMOL/L (ref 5–15)
APPEARANCE UR: CLEAR
APTT BLD: 26 SEC (ref 23–33)
APTT PPP: (no result) S
AST SERPL-CCNC: 35 U/L (ref 15–37)
BASOPHILS NFR BLD AUTO: 1.3 % (ref 0–2)
BILIRUB SERPL-MCNC: 0.1 MG/DL (ref 0.2–1)
BUN SERPL-MCNC: 57 MG/DL (ref 7–18)
CALCIUM SERPL-MCNC: 8.9 MG/DL (ref 8.5–10.1)
CHLORIDE SERPL-SCNC: 108 MMOL/L (ref 98–107)
CO2 SERPL-SCNC: 24 MMOL/L (ref 21–32)
CREAT SERPL-MCNC: 2 MG/DL (ref 0.55–1.3)
EOSINOPHIL NFR BLD AUTO: 1.1 % (ref 0–3)
ERYTHROCYTE [DISTWIDTH] IN BLOOD BY AUTOMATED COUNT: 14.3 % (ref 11.6–14.8)
GLOBULIN SER-MCNC: 4.1 G/DL
GLUCOSE UR STRIP-MCNC: NEGATIVE MG/DL
HCT VFR BLD CALC: 34.6 % (ref 37–47)
HGB BLD-MCNC: 11.1 G/DL (ref 12–16)
INR PPP: 1 (ref 0.9–1.1)
KETONES UR QL STRIP: NEGATIVE
LEUKOCYTE ESTERASE UR QL STRIP: NEGATIVE
LYMPHOCYTES NFR BLD AUTO: 33.5 % (ref 20–45)
MCV RBC AUTO: 98 FL (ref 80–99)
MONOCYTES NFR BLD AUTO: 10.3 % (ref 1–10)
NEUTROPHILS NFR BLD AUTO: 53.8 % (ref 45–75)
NITRITE UR QL STRIP: NEGATIVE
PH UR STRIP: 6 [PH] (ref 4.5–8)
PLATELET # BLD: 436 K/UL (ref 150–450)
POTASSIUM SERPL-SCNC: 4.2 MMOL/L (ref 3.5–5.1)
PROT UR QL STRIP: (no result)
RBC # BLD AUTO: 3.54 M/UL (ref 4.2–5.4)
SODIUM SERPL-SCNC: 141 MMOL/L (ref 136–145)
SP GR UR STRIP: 1.01 (ref 1–1.03)
UROBILINOGEN UR-MCNC: NORMAL MG/DL (ref 0–1)
WBC # BLD AUTO: 6.4 K/UL (ref 4.8–10.8)

## 2020-07-26 PROCEDURE — 85730 THROMBOPLASTIN TIME PARTIAL: CPT

## 2020-07-26 PROCEDURE — 36415 COLL VENOUS BLD VENIPUNCTURE: CPT

## 2020-07-26 PROCEDURE — 81003 URINALYSIS AUTO W/O SCOPE: CPT

## 2020-07-26 PROCEDURE — 85025 COMPLETE CBC W/AUTO DIFF WBC: CPT

## 2020-07-26 PROCEDURE — 93925 LOWER EXTREMITY STUDY: CPT

## 2020-07-26 PROCEDURE — 99285 EMERGENCY DEPT VISIT HI MDM: CPT

## 2020-07-26 PROCEDURE — 83880 ASSAY OF NATRIURETIC PEPTIDE: CPT

## 2020-07-26 PROCEDURE — 96374 THER/PROPH/DIAG INJ IV PUSH: CPT

## 2020-07-26 PROCEDURE — 93970 EXTREMITY STUDY: CPT

## 2020-07-26 PROCEDURE — 71045 X-RAY EXAM CHEST 1 VIEW: CPT

## 2020-07-26 PROCEDURE — 80053 COMPREHEN METABOLIC PANEL: CPT

## 2020-07-26 PROCEDURE — 80048 BASIC METABOLIC PNL TOTAL CA: CPT

## 2020-07-26 PROCEDURE — 93005 ELECTROCARDIOGRAM TRACING: CPT

## 2020-07-26 PROCEDURE — 85610 PROTHROMBIN TIME: CPT

## 2020-07-26 NOTE — EMERGENCY ROOM REPORT
History of Present Illness


General


Chief Complaint:  Edema


Source:  Patient





Present Illness


HPI


Disclaimer: Please note that this report is being documented using DRAGON 

technology. This can lead to erroneous entry secondary to incorrect 

interpretation by the dictating instrument.





HPI: 74-year-old female history of chronic kidney disease, osteoarthritis, 

history of poor circulation, presents for bilateral lower extremity pain and 

edema.  Symptoms present for the past 10 days or so.  She denies any chest pain 

shortness of breath nausea vomiting or fevers.  Pain is worse in the right 

lower extremity about 8 out of 10 aching in nature nonradiating. Nephrologist: 

Waylon Adrian.  Patient states she recently had a biopsy at [facility] showing 

inflammatory changes within her kidney.  Denies urinary complaint.


 


PMH: As above


 


PSH: Reviewed


 


 Social Hx: No smoking or illicit drug use


Allergies:  


Coded Allergies:  


     No Known Allergies (Unverified , 2/14/16)





COVID-19 Screening


Contact w/high risk pt:  No


Experienced COVID-19 symptoms?:  No


COVID-19 Testing performed PTA:  Yes


COVID-19 Screening:  Negative COVID-19


COVID-19 Testing Source:  2 months ago.





Patient History


Reviewed Nursing Documentation:  PMH: Agreed; PSxH: Agreed





Nursing Documentation-PMH


Past Medical History:  No History, Except For


Hx Cardiac Problems:  No - RA


Hx Hypertension:  No


Hx Pacemaker:  No


Hx Asthma:  No


Hx COPD:  No - PNEUMONIA


Hx Diabetes:  No


Hx Cancer:  No


Hx Gastrointestinal Problems:  No - Pyelonephritis


Hx Dialysis:  No - "Kidney problem"


Hx Neurological Problems:  No


Hx Cerebrovascular Accident:  No


Hx Seizures:  No


Hx Headaches:  Yes





Review of Systems


All Other Systems:  negative except mentioned in HPI





Physical Exam





Vital Signs








  Date Time  Temp Pulse Resp B/P (MAP) Pulse Ox O2 Delivery O2 Flow Rate FiO2


 


7/26/20 15:43 98.4 70 15 112/65 (81) 98 Room Air  








Sp02 EP Interpretation:  reviewed, normal


General Appearance:  no apparent distress, other - Elderly-appearing


Head:  normocephalic, atraumatic


Eyes:  bilateral eye PERRL, bilateral eye EOMI


ENT:  hearing grossly normal, moist mucus membranes


Neck:  full range of motion, supple


Respiratory:  lungs clear, normal breath sounds, no rhonchi, no respiratory 

distress, no retraction, no wheezing


Cardiovascular #1:  normal peripheral pulses, regular rate, rhythm, no murmur


Gastrointestinal:  non tender, soft, non-distended, no guarding


Musculoskeletal:  other - Bilateral lower extremity edema noted 3+ up to the 

knees bilaterally.  Mild erythema noted of right foot,


Neurologic:  alert, oriented x3, no focal defects


Skin:  normal color, warm/dry





Medical Decision Making


Diagnostic Impression:  


 Primary Impression:  


 DVT (deep venous thrombosis)


 Additional Impression:  


 CKD (chronic kidney disease)


ER Course


MDM: Differential diagnosis included but not limited to dependent edema, 

chronic kidney disease, vascular disease, cellulitis to name a few rtery disease

,





Clinical course-laboratory studies, venous and arterial ultrasound ordered.  

Laboratory studies showed no leukocytosis.  Patient did have bilateral lower 

extremity edema.  Was found to have a DVT in the left lower extremity.  

Arterial ultrasound demonstrated arterial flow through both lower extremities.  

Due to the findings of DVT I spoke with patient's nephrologist, who agreed to 

admit the patient, Dr. Nguyen


Labs - 


Laboratory Tests








Test


  7/26/20


16:19


 


White Blood Count


  6.4 K/UL


(4.8-10.8)


 


Red Blood Count


  3.54 M/UL


(4.20-5.40)  L


 


Hemoglobin


  11.1 G/DL


(12.0-16.0)  L


 


Hematocrit


  34.6 %


(37.0-47.0)  L


 


Mean Corpuscular Volume 98 FL (80-99)  


 


Mean Corpuscular Hemoglobin


  31.5 PG


(27.0-31.0)  H


 


Mean Corpuscular Hemoglobin


Concent 32.1 G/DL


(32.0-36.0)


 


Red Cell Distribution Width


  14.3 %


(11.6-14.8)


 


Platelet Count


  436 K/UL


(150-450)


 


Mean Platelet Volume


  5.7 FL


(6.5-10.1)  L


 


Neutrophils (%) (Auto)


  53.8 %


(45.0-75.0)


 


Lymphocytes (%) (Auto)


  33.5 %


(20.0-45.0)


 


Monocytes (%) (Auto)


  10.3 %


(1.0-10.0)  H


 


Eosinophils (%) (Auto)


  1.1 %


(0.0-3.0)


 


Basophils (%) (Auto)


  1.3 %


(0.0-2.0)


 


Prothrombin Time


  10.9 SEC


(9.30-11.50)


 


Prothrombin Time INR 1.0 (0.9-1.1)  


 


Activated Partial


Thromboplast Time 26 SEC (23-33)


 


 


Urine Color Pale yellow  


 


Urine Appearance Clear  


 


Urine pH 6 (4.5-8.0)  


 


Urine Specific Gravity


  1.010


(1.005-1.035)


 


Urine Protein


  3+ (NEGATIVE)


H


 


Urine Glucose (UA)


  Negative


(NEGATIVE)


 


Urine Ketones


  Negative


(NEGATIVE)


 


Urine Blood


  4+ (NEGATIVE)


H


 


Urine Nitrite


  Negative


(NEGATIVE)


 


Urine Bilirubin


  Negative


(NEGATIVE)


 


Urine Urobilinogen


  Normal MG/DL


(0.0-1.0)


 


Urine Leukocyte Esterase


  Negative


(NEGATIVE)


 


Urine RBC


  10-15 /HPF (0


- 2)  H


 


Urine WBC


  2-4 /HPF (0 -


2)


 


Urine Squamous Epithelial


Cells None /LPF


(NONE/OCC)


 


Urine Bacteria


  None /HPF


(NONE)


 


Sodium Level


  141 MMOL/L


(136-145)


 


Potassium Level


  4.2 MMOL/L


(3.5-5.1)


 


Chloride Level


  108 MMOL/L


()  H


 


Carbon Dioxide Level


  24 MMOL/L


(21-32)


 


Anion Gap


  9 mmol/L


(5-15)


 


Blood Urea Nitrogen


  57 mg/dL


(7-18)  H


 


Creatinine


  2.0 MG/DL


(0.55-1.30)  H


 


Estimated Glomerular


Filtration Rate 24.3 mL/min


(>60)


 


Glucose Level


  121 MG/DL


()  H


 


Calcium Level


  8.9 MG/DL


(8.5-10.1)


 


Total Bilirubin


  0.1 MG/DL


(0.2-1.0)  L


 


Aspartate Amino Transferase


(AST) 35 U/L (15-37)


 


 


Alanine Aminotransferase (ALT)


  32 U/L (12-78)


 


 


Alkaline Phosphatase


  133 U/L


()  H


 


Pro-B-Type Natriuretic Peptide


  850 pg/mL


(0-125)  H


 


Total Protein


  7.3 G/DL


(6.4-8.2)


 


Albumin


  3.2 G/DL


(3.4-5.0)  L


 


Globulin 4.1 g/dL  


 


Albumin/Globulin Ratio


  0.8 (1.0-2.7)


L











Plan to admit patient to the medical floor.  Heparin drip ordered.


CT/MRI/US Diagnostic Results


CT/MRI/US Diagnostic Results :  


   Imaging Test Ordered:  Ultrasound left lower extremity


   Impression


DVT left lower extremity





Last Vital Signs








  Date Time  Temp Pulse Resp B/P (MAP) Pulse Ox O2 Delivery O2 Flow Rate FiO2


 


7/26/20 15:57 98.4  15 112/65 98 Room Air  


 


7/26/20 15:57  70      








Disposition:  ADMITTED AS INPATIENT


Condition:  Serious


Referrals:  


NON PHYSICIAN (PCP)











Maximino Barrios M.D. Jul 26, 2020 16:29

## 2020-07-26 NOTE — DIAGNOSTIC IMAGING REPORT
EXAM:

  US Duplex Bilateral Lower Extremities Veins

 

CLINICAL HISTORY:

  PAIN

 

TECHNIQUE:

  Real-time duplex ultrasound scan of the bilateral lower extremity veins 

integrating B-mode two-dimensional vascular structure, Doppler spectral 

analysis, color flow Doppler imaging and compression.

 

COMPARISON:

  No relevant prior studies available.

 

FINDINGS:

  Right deep veins:  Unremarkable.  No DVT in the right common femoral, 

femoral, proximal deep femoral or popliteal veins.  The veins demonstrate 

normal color flow, are normally compressible, with normal phasic flow 

and/or augmentation response.

  Right superficial veins:  Unremarkable.  No thrombus in the visualized 

right great saphenous vein.

 

  Left deep veins:  Unremarkable.  No DVT in the left common femoral, 

femoral, proximal deep femoral or popliteal veins.  The veins demonstrate 

normal color flow, are normally compressible, with normal phasic flow 

and/or augmentation response.

  Left superficial veins:  Unremarkable.  No thrombus in the visualized 

left great saphenous vein.

 

  Soft tissues:  Right lower extremity soft tissue edema.  No popliteal 

cyst.

 

IMPRESSION:     

  No acute findings in the bilateral lower extremity veins.

## 2020-07-26 NOTE — DIAGNOSTIC IMAGING REPORT
EXAM:

  XR Chest, 1 View

 

CLINICAL HISTORY:

  SOB

 

TECHNIQUE:

  Frontal view of the chest.

 

COMPARISON:

  February 20, 2020

 

FINDINGS:

  Lungs: Chronic hyperinflation.  No consolidation.

  Pleural space:  Unremarkable.  No pneumothorax.

  Heart:  Unremarkable.  No cardiomegaly.

  Mediastinum:  Unremarkable.

  Bones/joints:  No acute abnormality

 

IMPRESSION:     

1.  No acute cardiopulmonary disease.

2.  Chronic hyperinflation.

3.  If there is continued concern recommend frontal and lateral chest 

radiographs or CT.

4.  Recommend evaluation to determine if this patient would meet criteria 

for inclusion in an annual low dose CT chest lung cancer screening 

protocol.

## 2020-07-27 VITALS — SYSTOLIC BLOOD PRESSURE: 100 MMHG | DIASTOLIC BLOOD PRESSURE: 58 MMHG

## 2020-07-27 VITALS — DIASTOLIC BLOOD PRESSURE: 59 MMHG | SYSTOLIC BLOOD PRESSURE: 95 MMHG

## 2020-07-27 VITALS — SYSTOLIC BLOOD PRESSURE: 121 MMHG | DIASTOLIC BLOOD PRESSURE: 71 MMHG

## 2020-07-27 VITALS — SYSTOLIC BLOOD PRESSURE: 104 MMHG | DIASTOLIC BLOOD PRESSURE: 63 MMHG

## 2020-07-27 VITALS — SYSTOLIC BLOOD PRESSURE: 144 MMHG | DIASTOLIC BLOOD PRESSURE: 54 MMHG

## 2020-07-27 VITALS — DIASTOLIC BLOOD PRESSURE: 52 MMHG | SYSTOLIC BLOOD PRESSURE: 87 MMHG

## 2020-07-27 LAB
ANION GAP SERPL CALC-SCNC: 8 MMOL/L (ref 5–15)
BUN SERPL-MCNC: 53 MG/DL (ref 7–18)
CALCIUM SERPL-MCNC: 9 MG/DL (ref 8.5–10.1)
CHLORIDE SERPL-SCNC: 109 MMOL/L (ref 98–107)
CO2 SERPL-SCNC: 26 MMOL/L (ref 21–32)
CREAT SERPL-MCNC: 1.9 MG/DL (ref 0.55–1.3)
POTASSIUM SERPL-SCNC: 4 MMOL/L (ref 3.5–5.1)
SODIUM SERPL-SCNC: 143 MMOL/L (ref 136–145)

## 2020-07-27 RX ADMIN — HEPARIN SODIUM SCH UNITS: 5000 INJECTION INTRAVENOUS; SUBCUTANEOUS at 09:20

## 2020-07-27 RX ADMIN — HEPARIN SODIUM SCH UNITS: 5000 INJECTION INTRAVENOUS; SUBCUTANEOUS at 21:40

## 2020-07-27 NOTE — HISTORY AND PHYSICAL REPORT
DATE OF ADMISSION:  07/26/2020

REASON FOR ADMISSION:

1. Lower extremity edema.

2. Right foot pain.



HISTORY OF PRESENT ILLNESS:  The patient is a pleasant 74-year-old female,

well known to my nephrological service from an outpatient setting due to

worsening renal function and hematuria, had been recently admitted within

the last month to [facility] and had her kidney biopsy.  Kidney biopsy

at that time showed pauci immune GN, active, with crescents.  The patient 

was started on bolus of IV steroids and then over the last month has received 
doses once a week of Rituxan.  She

presented last night to the emergency room complaining of right lower extremity 
foot pain and swelling.  Doppler ultrasound was negative for any DVT.  She has 
been on prednisone 40 mg daily since discharge.



ALLERGIES:  No known drug allergies.



PAST MEDICAL HISTORY:

1. CKD, stage 4.  Baseline creatinine 2.

2. Crescentic leukocytoclastic glomerulonephritis.

3. Osteoarthritis.

4. Arthritis.



PAST SURGICAL HISTORY:  Renal biopsy.



SOCIAL HISTORY:  No tobacco, alcohol, or illicit drug use.



REVIEW OF SYSTEMS:  NEUROLOGIC:  The patient denies headache, change in

vision, syncope, or presyncopal episodes.    CARDIOVASCULAR:  No current

chest pain, palpitations, or angina.  PULMONARY:  No difficulty breathing,

productive cough, or sputum.    GASTROINTESTINAL/GENITOURINARY:  No change

in urinary or bowel habits.  No nausea or vomiting.    ENDOCRINOLOGY:  No

night sweats, fevers, or chills.    MUSCULOSKELETAL:  The patient

complaining of right foot pain.



PHYSICAL EXAMINATION:

VITAL SIGNS:  Blood pressure 121/78, respiratory rate 16, pulse 69,

temperature 96.8.

GENERAL:  The patient awake, alert, not otherwise in distress.

HEENT:  Extraocular muscles intact.  No lymphadenopathy noted.

CARDIOVASCULAR:  S1, S2.  No rubs or gallops.

PULMONARY:  Clear to auscultation bilaterally.  No rales, rhonchi or

wheezes.

ABDOMEN:  Nondistended and nontender.

EXTREMITIES:  A 2+ pitting edema right lower extremity foot worse



ASSESSMENT AND PLAN:

1. Right foot pain and lower extremity edema.  At this time, DVT was

negative.  We will try diuresing the patient carefully.  The patient had

been on prednisone.  X-ray of the left foot to ensure no fractures.  We

will also call the podiatrist or an orthopedist to evaluate the foot.

2. 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 
patient

has completed her Rituxan.  We will continue prednisone and taper over

three months.

3. Lower extremity edema.  We will try diuresing the patient

carefully.

4. DVT prophylaxis with heparin subcutaneous.

5. GI prophylaxis with Pepcid.









  ______________________________________________

  Brennan Nguyen MD





DR:  HDP/PSM

D:  07/27/2020 08:22

T:  07/28/2020 01:44

JOB#:  [id number]

CC:



JEFFREY

## 2020-07-27 NOTE — DIAGNOSTIC IMAGING REPORT
EXAM: X-RAY XRAY Foot 2v R

 

CLINICAL HISTORY: Foot pain. 

 

COMPARISON:  None

 

FINDINGS:  Total of 3 views of the right foot were obtained. 

 

There is generalized osteopenia. There is no fracture, bony lesions or erosions.

Joint spaces are unremarkable. Soft tissue swelling noted at the dorsum of the

forefoot and midfoot.

 

IMPRESSION:

 

SOFT TISSUE SWELLING. NO ACUTE BONY ABNORMALITY.

## 2020-07-27 NOTE — DIAGNOSTIC IMAGING REPORT
EXAM: Arterial Duplex Scan Bilat Leg

 

CLINICAL HISTORY: Leg pain.

 

COMPARISON:  None

 

TECHNIQUE:  Doppler examination include grayscale images, and color and spectral

doppler analysis. Segmental pressures and ABIs were not obtained.

 

FINDINGS:  

 

Grayscale images demonstrates scattered calcified plaques bilaterally. There is

biphasic flow noted in the left lower extremity. Right lower extremity has dampened

monophasic flow throughout. 

 

VELOCITIES: 

 

RIGHT

 

Common femoral artery: 73 cm/s

Prox SFA: 65 cm/s

Mid SFA: 87 cm/s

Distal SFA: 91 cm/s

Popliteal artery: 75 cm/s

Posterior tibial artery: 59 cm/s

Anterior tibial artery: Not visualized.

 

LEFT



Common femoral artery: 83 cm/s

Prox SFA: 62 cm/s

Mid SFA: 43 cm/s

Distal SFA: 51 cm/s

Popliteal artery: 54 cm/s

Posterior tibial artery: 54 cm/s

Anterior tibial artery: Not visualized.

 

 

IMPRESSION:

 

BIPHASIC FLOW IN THE LEFT LOWER EXTREMITY. DAMPENED MONOPHASIC FLOW IN THE RIGHT

LOWER EXTREMITY WHICH MAY BE SECONDARY TO DECREASED COMPLIANCE OF THE VESSELS.

VELOCITIES ARE IN THE NORMAL RANGE. CONSIDER CORRELATION WITH SEGMENTAL PRESSURES AND

ABIs.

 

 

REFERENCE VALUES:

Normal velocity ranges (in Cm/sec) are as follows:

CFA 

SFA 

popliteal 54-84

tibial 41-81

 

Stenosis categories: 1.5-2 X normal is 30-49%

2-4 x normal  = 50-75% stenosis

> 4 x normal  = > 75% stenosis

## 2020-07-28 VITALS — DIASTOLIC BLOOD PRESSURE: 60 MMHG | SYSTOLIC BLOOD PRESSURE: 99 MMHG

## 2020-07-28 VITALS — DIASTOLIC BLOOD PRESSURE: 72 MMHG | SYSTOLIC BLOOD PRESSURE: 125 MMHG

## 2020-07-28 VITALS — SYSTOLIC BLOOD PRESSURE: 95 MMHG | DIASTOLIC BLOOD PRESSURE: 54 MMHG

## 2020-07-28 VITALS — SYSTOLIC BLOOD PRESSURE: 113 MMHG | DIASTOLIC BLOOD PRESSURE: 67 MMHG

## 2020-07-28 LAB
ANION GAP SERPL CALC-SCNC: 8 MMOL/L (ref 5–15)
BUN SERPL-MCNC: 65 MG/DL (ref 7–18)
CALCIUM SERPL-MCNC: 8.2 MG/DL (ref 8.5–10.1)
CHLORIDE SERPL-SCNC: 108 MMOL/L (ref 98–107)
CO2 SERPL-SCNC: 24 MMOL/L (ref 21–32)
CREAT SERPL-MCNC: 2.3 MG/DL (ref 0.55–1.3)
POTASSIUM SERPL-SCNC: 3.8 MMOL/L (ref 3.5–5.1)
SODIUM SERPL-SCNC: 140 MMOL/L (ref 136–145)

## 2020-07-28 RX ADMIN — HEPARIN SODIUM SCH UNITS: 5000 INJECTION INTRAVENOUS; SUBCUTANEOUS at 08:34

## 2020-07-28 NOTE — DISCHARGE INSTRUCTIONS
Discharge Instructions


Discharge Instructions


Services at Discharge:  day care


Resume Normal Activity?:  Yes


Activity:  light activity


Follow Up Orders


Follow up [address] at [address] with Dr Nguyen





For Congestive Heart Failure


Reminder


Report to your physician any weight gain of 5 pounds or more in one week.











Brennan Nguyen MD Jul 28, 2020 12:14

## 2020-07-28 NOTE — NEPHROLOGY PROGRESS NOTE
Assessment/Plan


Assessment/Plan:


A/P





1) RLE Edema- improved


     - DC on po lasix


     -- Gabapentin for neuropathy





2) CKD 4- on Rituxan and prednisone for pauci immune GN





3) Neuropathy- gabapentin





Patient already has HH





Subjective


Date patient seen:  Jul 28, 2020


Time patient seen:  12:10


ROS Limited/Unobtainable:  No


Allergies:  


Coded Allergies:  


     No Known Allergies (Unverified , 2/14/16)


Subjective


Patient feels much better.





Objective





Last 24 Hour Vital Signs








  Date Time  Temp Pulse Resp B/P (MAP) Pulse Ox O2 Delivery O2 Flow Rate FiO2


 


7/28/20 12:09 97.7 66 18 99/60 (73) 99   


 


7/28/20 08:00 97.7 65 18 125/72 (89) 95   


 


7/28/20 04:00 97.3 65 16 95/54 (68) 95   


 


7/28/20 00:00 98.2 81 16 113/67 (82) 96   


 


7/27/20 21:00      Room Air  


 


7/27/20 20:00 98.1 71 16 100/58 (72) 97   


 


7/27/20 16:00 98.1 80 18 95/59 (71) 95   

















Intake and Output  


 


 7/27/20 7/28/20





 19:00 07:00


 


Intake Total 820 ml 


 


Output Total 600 ml 


 


Balance 220 ml 


 


  


 


Intake Oral 820 ml 


 


Output Urine Total 600 ml 


 


# Voids 1 3








Laboratory Tests


7/28/20 06:37: 


Sodium Level 140, Potassium Level 3.8, Chloride Level 108H, Carbon Dioxide 

Level 24, Anion Gap 8, Blood Urea Nitrogen 65H, Creatinine 2.3H, Estimat 

Glomerular Filtration Rate 20.7, Glucose Level 107H, Calcium Level 8.2L


Height (Feet):  5


Height (Inches):  1.00


Weight (Pounds):  88


General Appearance:  no apparent distress


EENT:  normal ENT inspection


Neck:  normal alignment


Cardiovascular:  normal rate


Respiratory/Chest:  lungs clear


Abdomen:  non tender, soft


Edema:  1+ Leg (R)











Brennan Nguyen MD Jul 28, 2020 12:13

## 2020-07-28 NOTE — CDS PHYSICIAN QUERY
Clarification is required for compliance, coding accuracy, and to reflect 
severity of illness for this patient



Dear Dr. Brennan Nguyen                       Date: 7/28/2020

/CDS Name: Olivia Enrique            



Clinical Documentation states: HNP: 74-year-old female, well known to my 
nephrological service from an outpatient setting due to worsening renal 
function and hematuria...Right foot pain and lower extremity edema.  At this 
time, DVT was negative.  We will try diuresing the patient carefully...CKD, 
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



RD note: Increased kcal/pro needs R/T underweight status as evidenced by pt @88
% IBW, underweight BMI per guidelines.



BMI 16.6, Albumin 3.2



Please select the most appropriate option:



[] Protein/Calorie Malnutrition  

               [] Mild       

               [] Moderate        

               [] Severe

[] Hypoalbuminemia

[] Cachexia

[] Underweight

[] Intestinal malabsorption

[] Other _____________

[] Unable to determine

[] Not Applicable



Present on Admission:  []  Yes          []  No         []  Clinically 
Undetermined



_________________                                    _____________

Physician signature                                       Date



Please also document in your Progress Notes and/or Discharge Summary and 
indicate if the condition was present on admission.
MTDD

## 2020-07-30 NOTE — DISCHARGE SUMMARY
Discharge Summary


Discharge Summary


_


DATE OF ADMISSION: 7/26/2020





DATE OF DISCHARGE:  7/28/2020








DISCHARGED BY: Dr.De Adrian





REASON FOR ADMISSION: 


74 years old female with past medical history of chronic kidney disease stage IV

, glomerulonephritis, osteoarthritis, presented with right lower extremity foot 

pain and swelling.  


Patient recently was admitted to [facility]  for  worsening 

renal function and hematuria and undergone kidney biopsy.  


The biopsy at that time revealed pauci immune glomerulonephritis, active, with 

crescents.  


Patient started on IV steroids and was discharged on oral steroids. 


Patient had been on prednisone 40 mg daily since discharge. 


Patient also over the last month was receiving weekly dose of Rituxan. 


Upon evaluation no leukocytosis ,hemoglobin 11.1 ,hematocrit 34.6, platelet 

count 436.  


BUN 57, creatinine 2.0.  


Glucose 121.  


Pro  , 


Stable LFT.


Albumin 3.2.


Urinalysis revealed +3 protein,  no evidence of urinary tract infection 


Chest x-ray demonstrated no acute cardiopulmonary disease.  Chronic 

hyperinflation .


Patient medicated for pain , started on heparin drip and admitted for further 

management. 


 


 


 


HOSPITAL COURSE: 


Patient admitted to medical surgical floor .


Venous duplex bilateral lower extremity revealed no evidence of acute DVT .


Heparin drip was discontinued. 


X-ray of the left foot revealed no evidence of fracture.  


Patient was carefully diuresed with close monitoring of renal parameters and 

volumes.  


Prednisone was continued with plan  to  taper over  the 3 months.  


DVT and GI  prophylaxis provided


Patient was at high  nutritional risk.  


Protein supplements provided as per registered dietitian recommendation  with 

daily  Nephro.


Pain management was addressed. Neurontin continued. 


Patient clinically stabilized and was ready for discharge. 








FINAL DIAGNOSES: 


Right foot pain with lower extremity edema


Chronic kidney disease stage IV


Neuropathy


Protein calorie malnutrition








DISCHARGE MEDICATIONS:


See Medication Reconciliation list.





DISCHARGE INSTRUCTIONS:


Patient was discharged home.  


Follow-up with the nephrologist as recommended





I have been assigned to dictate discharge summary for this account. 


I was not involved in the patient's management.











Minal Barnes NP Jul 30, 2020 08:27

## 2020-08-27 ENCOUNTER — HOSPITAL ENCOUNTER (INPATIENT)
Dept: HOSPITAL 72 - EMR | Age: 75
LOS: 3 days | Discharge: HOME | DRG: 602 | End: 2020-08-30
Payer: MEDICARE

## 2020-08-27 VITALS — DIASTOLIC BLOOD PRESSURE: 63 MMHG | SYSTOLIC BLOOD PRESSURE: 140 MMHG

## 2020-08-27 VITALS — DIASTOLIC BLOOD PRESSURE: 70 MMHG | SYSTOLIC BLOOD PRESSURE: 117 MMHG

## 2020-08-27 VITALS — WEIGHT: 102.25 LBS | BODY MASS INDEX: 18.82 KG/M2 | HEIGHT: 62 IN

## 2020-08-27 VITALS — SYSTOLIC BLOOD PRESSURE: 113 MMHG | DIASTOLIC BLOOD PRESSURE: 76 MMHG

## 2020-08-27 DIAGNOSIS — I10: ICD-10-CM

## 2020-08-27 DIAGNOSIS — J43.9: ICD-10-CM

## 2020-08-27 DIAGNOSIS — L03.115: Primary | ICD-10-CM

## 2020-08-27 DIAGNOSIS — N01.7: ICD-10-CM

## 2020-08-27 DIAGNOSIS — M06.9: ICD-10-CM

## 2020-08-27 DIAGNOSIS — M81.0: ICD-10-CM

## 2020-08-27 DIAGNOSIS — T38.0X5A: ICD-10-CM

## 2020-08-27 DIAGNOSIS — N18.4: ICD-10-CM

## 2020-08-27 DIAGNOSIS — D72.829: ICD-10-CM

## 2020-08-27 LAB
ADD MANUAL DIFF: YES
ALBUMIN SERPL-MCNC: 3.3 G/DL (ref 3.4–5)
ALBUMIN/GLOB SERPL: 0.8 {RATIO} (ref 1–2.7)
ALP SERPL-CCNC: 117 U/L (ref 46–116)
ALT SERPL-CCNC: 18 U/L (ref 12–78)
ANION GAP SERPL CALC-SCNC: 10 MMOL/L (ref 5–15)
APPEARANCE UR: CLEAR
APTT BLD: 24 SEC (ref 23–33)
APTT PPP: (no result) S
AST SERPL-CCNC: 20 U/L (ref 15–37)
BILIRUB SERPL-MCNC: 0.4 MG/DL (ref 0.2–1)
BUN SERPL-MCNC: 64 MG/DL (ref 7–18)
CALCIUM SERPL-MCNC: 9.1 MG/DL (ref 8.5–10.1)
CHLORIDE SERPL-SCNC: 106 MMOL/L (ref 98–107)
CO2 SERPL-SCNC: 23 MMOL/L (ref 21–32)
CREAT SERPL-MCNC: 1.9 MG/DL (ref 0.55–1.3)
ERYTHROCYTE [DISTWIDTH] IN BLOOD BY AUTOMATED COUNT: 13.7 % (ref 11.6–14.8)
GLOBULIN SER-MCNC: 4.3 G/DL
GLUCOSE UR STRIP-MCNC: NEGATIVE MG/DL
HCT VFR BLD CALC: 39.5 % (ref 37–47)
HGB BLD-MCNC: 12.7 G/DL (ref 12–16)
INR PPP: 0.9 (ref 0.9–1.1)
KETONES UR QL STRIP: NEGATIVE
LEUKOCYTE ESTERASE UR QL STRIP: (no result)
MCV RBC AUTO: 96 FL (ref 80–99)
NITRITE UR QL STRIP: NEGATIVE
PH UR STRIP: 6 [PH] (ref 4.5–8)
PLATELET # BLD: 204 K/UL (ref 150–450)
POTASSIUM SERPL-SCNC: 4.4 MMOL/L (ref 3.5–5.1)
PROT UR QL STRIP: (no result)
RBC # BLD AUTO: 4.11 M/UL (ref 4.2–5.4)
SODIUM SERPL-SCNC: 139 MMOL/L (ref 136–145)
SP GR UR STRIP: 1.01 (ref 1–1.03)
UROBILINOGEN UR-MCNC: NORMAL MG/DL (ref 0–1)
WBC # BLD AUTO: 16.3 K/UL (ref 4.8–10.8)

## 2020-08-27 PROCEDURE — 99285 EMERGENCY DEPT VISIT HI MDM: CPT

## 2020-08-27 PROCEDURE — 83880 ASSAY OF NATRIURETIC PEPTIDE: CPT

## 2020-08-27 PROCEDURE — 36415 COLL VENOUS BLD VENIPUNCTURE: CPT

## 2020-08-27 PROCEDURE — 84484 ASSAY OF TROPONIN QUANT: CPT

## 2020-08-27 PROCEDURE — 80053 COMPREHEN METABOLIC PANEL: CPT

## 2020-08-27 PROCEDURE — 85025 COMPLETE CBC W/AUTO DIFF WBC: CPT

## 2020-08-27 PROCEDURE — 96365 THER/PROPH/DIAG IV INF INIT: CPT

## 2020-08-27 PROCEDURE — 80048 BASIC METABOLIC PNL TOTAL CA: CPT

## 2020-08-27 PROCEDURE — 87040 BLOOD CULTURE FOR BACTERIA: CPT

## 2020-08-27 PROCEDURE — 93005 ELECTROCARDIOGRAM TRACING: CPT

## 2020-08-27 PROCEDURE — 93970 EXTREMITY STUDY: CPT

## 2020-08-27 PROCEDURE — 85007 BL SMEAR W/DIFF WBC COUNT: CPT

## 2020-08-27 PROCEDURE — 85610 PROTHROMBIN TIME: CPT

## 2020-08-27 PROCEDURE — 85730 THROMBOPLASTIN TIME PARTIAL: CPT

## 2020-08-27 PROCEDURE — 83690 ASSAY OF LIPASE: CPT

## 2020-08-27 PROCEDURE — 81003 URINALYSIS AUTO W/O SCOPE: CPT

## 2020-08-27 RX ADMIN — DOXYCYCLINE SCH MG: 100 CAPSULE ORAL at 21:07

## 2020-08-27 RX ADMIN — TRAMADOL HYDROCHLORIDE PRN MG: 50 TABLET, FILM COATED ORAL at 14:51

## 2020-08-27 RX ADMIN — HEPARIN SODIUM SCH UNITS: 5000 INJECTION INTRAVENOUS; SUBCUTANEOUS at 21:08

## 2020-08-27 NOTE — NUR
NURSE NOTES:

PT ARRIVED ON FLOOR, IN STABLE CONDITION. RLE SWOLLEN, RED, TENDER TO TOUCH. RIGHT FOOT 
PITTING EDEMA 3+. PT WAS REPOSITIONED TO BED, WITH RLE ELEVATED ON PILLOW. RN USED [facility] 
 [id number] TO SPEAK WITH PT. PT MADE AWARE OF PLAN OF CARE AND FALL PRECAUTIONS. PT 
EDUCATED NOT TO WALK BY HERSELF BUT USE CALL LIGHT FOR ASSISTANCE. PT VERBALIZED 
UNDERSTANDING. CALL LIGHT WITHIN REACH. BED IN LOWEST POSITION WITH BEDSIDE RAILS X2 RAISED. 
PT WITH FACIAL GRIMACING AND MOANING DUE TO PAIN OF RLE. PT RATES PAIN AS 10/10. RN LEFT 
MESSAGE FOR DR ROSENBERG REGARDING PRN PAIN MEDICATION.

## 2020-08-27 NOTE — NUR
NURSE NOTES:

PT RESTING IN BED. STATES PAIN IS MINIMAL AT THIS TIME. PT HAS RIGHT LEG ELEVATED WITH 
PILLOW. WILL CONTINUE TO MONITOR.

## 2020-08-27 NOTE — NUR
ED Nurse Note:



called pharmacy to ask about verifying nitro-bid. will wait for med order to be 
verified to administer

## 2020-08-27 NOTE — NUR
NURSE NOTES:

DR ROSENBERG AT BEDSIDE AND MADE AWARE OF PT'S PAIN. PER MD, PT WILL BE FINE ON ORDERED PRN 
TRAMADOL 50MG. RN TO CALL MD IF TRAMADOL BAEZ SNOT RELIEVE PAIN. DR ROSENBERG TO START PT ON 
PO DOXYCYCLINE MONOHYDRATE. RN ASKED DR ROSENBERG IF HE WOULD LIKE A WOUND CULTURE OF SMALL 
WOUND ON RIGHT LATERAL FOOT (0.5CM LENGTH  X 0.5CM WIDTH X 0.1 DEPTH). MD DOES NOT WANT TO 
ORDER CULTURE. PT WAS ADMINISTERED ORDERED IV LASIX AND PO TRAMADOL. WILL CONTINUE TO 
MONITOR.

## 2020-08-27 NOTE — DIAGNOSTIC IMAGING REPORT
Indication: Bilateral leg edema bilateral leg pain

 

Technique: Grayscale and duplex images of the  bilateral lower extremity veins

 

Comparison:

 

Findings: Bilaterally,   grayscale and duplex images demonstrate no evidence of

intraluminal thrombus. Normal phasic Doppler waveforms, demonstrating normal

augmentation response and no evidence of valvular insufficiency. Greater saphenous

vein(s) and tibial veins are patent. Normal compressibility. 

 

Impression: Negative for evidence of lower extremity deep venous thrombosis 

bilaterally

## 2020-08-27 NOTE — NUR
NURSE NOTES:

Received report from Young RN,The patient is alert and oriented x4 and does not seem to be 
in any distress at this time. The Resp is even and unlabored and the bilateral lung sounds 
are all cleared on auscultation. The patient when asked if she is in pain, she  said" no 
pain". She is on bed rest and has a bedside commode. She need partial assistance with her 
ADL's. On skin assessment, she has a RLE swelling as a result of cellulites. IV line is 
noted in the Right AC 20g that is patent and asymptomatic and was flushed with NS well 
tolerated. The bed in low and locked position with call light within easy reach, siderails 
up x2. Will continue to monitor as indicated

## 2020-08-27 NOTE — NUR
NURSE HAND-OFF: 



Important Events on Shift: RLE SWELLING, SEEN BY DR ROSENBERG AT BEDSIDE. 

Patient Status: STABLE

Diet: REGULAR



Pending Orders: N/A

Pending Results/Labs:N/A

Pending MD notification:N/A



Latest Vital Signs: Temperature 97.7 , Pulse 78 , B/P 117 /70 , Respiratory Rate 20 , O2 SAT 
97 , Room Air, O2 Flow Rate .  

Vital Sign Comment: STABLE



Latest Coombs Fall Score: 70  

Fall Risk: High Risk 

Safety Measures: Call light Within Reach, Bed Alarm Zone 1, Side Rails Side Rails x2, Bed 
position Low and Locked.

Fall Precautions: 

Yellow Socks

Yellow Gown

Door Sign

Patient Fall Education



Report given to TE CONRAD RN

## 2020-08-28 VITALS — SYSTOLIC BLOOD PRESSURE: 112 MMHG | DIASTOLIC BLOOD PRESSURE: 63 MMHG

## 2020-08-28 VITALS — DIASTOLIC BLOOD PRESSURE: 65 MMHG | SYSTOLIC BLOOD PRESSURE: 144 MMHG

## 2020-08-28 VITALS — DIASTOLIC BLOOD PRESSURE: 55 MMHG | SYSTOLIC BLOOD PRESSURE: 95 MMHG

## 2020-08-28 LAB
ANION GAP SERPL CALC-SCNC: 9 MMOL/L (ref 5–15)
BUN SERPL-MCNC: 60 MG/DL (ref 7–18)
CALCIUM SERPL-MCNC: 8.8 MG/DL (ref 8.5–10.1)
CHLORIDE SERPL-SCNC: 106 MMOL/L (ref 98–107)
CO2 SERPL-SCNC: 26 MMOL/L (ref 21–32)
CREAT SERPL-MCNC: 2.1 MG/DL (ref 0.55–1.3)
POTASSIUM SERPL-SCNC: 4.1 MMOL/L (ref 3.5–5.1)
SODIUM SERPL-SCNC: 140 MMOL/L (ref 136–145)

## 2020-08-28 RX ADMIN — HEPARIN SODIUM SCH UNITS: 5000 INJECTION INTRAVENOUS; SUBCUTANEOUS at 08:14

## 2020-08-28 RX ADMIN — MORPHINE SULFATE PRN MG: 2 INJECTION, SOLUTION INTRAMUSCULAR; INTRAVENOUS at 17:56

## 2020-08-28 RX ADMIN — DOXYCYCLINE SCH MG: 100 CAPSULE ORAL at 20:37

## 2020-08-28 RX ADMIN — MORPHINE SULFATE PRN MG: 2 INJECTION, SOLUTION INTRAMUSCULAR; INTRAVENOUS at 12:00

## 2020-08-28 RX ADMIN — DOXYCYCLINE SCH MG: 100 CAPSULE ORAL at 08:12

## 2020-08-28 RX ADMIN — CEFAZOLIN SODIUM SCH MLS/HR: 500 POWDER, FOR SOLUTION INTRAMUSCULAR; INTRAVENOUS at 18:05

## 2020-08-28 RX ADMIN — HEPARIN SODIUM SCH UNITS: 5000 INJECTION INTRAVENOUS; SUBCUTANEOUS at 20:38

## 2020-08-28 RX ADMIN — TRAMADOL HYDROCHLORIDE PRN MG: 50 TABLET, FILM COATED ORAL at 03:29

## 2020-08-28 NOTE — NEPHROLOGY PROGRESS NOTE
Assessment/Plan


Assessment/Plan:


A/P





1) RLE Cellulitis/Edema- vastly improved and pain almost resolved


       - DC home Saturday on 1 week of Doxy





2) CKD 4- pauci immune GN, biopsy proved


      - s/p Rituxan. Continue prednisone and atovaquone prophylaxis





Subjective


Date patient seen:  Aug 28, 2020


Time patient seen:  12:00


ROS Limited/Unobtainable:  No


Allergies:  


Coded Allergies:  


     No Known Allergies (Unverified , 2/14/16)


Subjective


Patient feels much better





Objective





Last 24 Hour Vital Signs








  Date Time  Temp Pulse Resp B/P (MAP) Pulse Ox O2 Delivery O2 Flow Rate FiO2


 


8/28/20 12:00 98.2 87 18 112/63 (79) 94   


 


8/28/20 09:00      Room Air  


 


8/28/20 08:00 97.2 87 18 95/55 (68) 95   


 


8/27/20 21:00      Room Air  


 


8/27/20 17:16      Room Air  

















Intake and Output  


 


 8/27/20 8/28/20





 19:00 07:00


 


  


 


# Voids 1 








Laboratory Tests


8/28/20 05:20: 


Sodium Level 140, Potassium Level 4.1, Chloride Level 106, Carbon Dioxide Level 

26, Anion Gap 9, Blood Urea Nitrogen 60H, Creatinine 2.1H, Estimat Glomerular 

Filtration Rate 22.9, Glucose Level 123H, Calcium Level 8.8


Height (Feet):  5


Height (Inches):  2.00


Weight (Pounds):  102


General Appearance:  no apparent distress


EENT:  normal ENT inspection


Neck:  normal alignment, supple


Cardiovascular:  normal rate, regular rhythm


Respiratory/Chest:  lungs clear, normal breath sounds


Edema:  1+ Leg (R)











Brennan Nguyen MD Aug 28, 2020 16:32

## 2020-08-28 NOTE — CONSULTATION
History of Present Illness


General


Date patient seen:  Aug 28, 2020


Chief Complaint:  Edema





Present Illness


HPI





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 

prednisone and Rituxan presented to ED on 8/27/20 with worsening R LE pain, 

swelling and weeping





Denied headache, CP, SOB, cough, n/v/d, trauma.





Patient last seen here in Feb 2020 for PNA.


Allergies:  


Coded Allergies:  


     No Known Allergies (Unverified , 2/14/16)





Medication History


Scheduled


Furosemide* (Lasix*), 20 MG ORAL DAILY, (Reported)


Furosemide* (Lasix*), 20 MG ORAL BID, (Reported)


Gabapentin* (Gabapentin*), 300 MG ORAL BID, (Reported)


Hydroxychloroquine Sulfate (Hydroxychloroquine Sulfate), 200 MG PO DAILY, (

Reported)


Iron,Fum&Ps/Fa/Vit B&C#18/L.ca (Fusion Plus Capsule), 1 EACH PO DAILY, (Reported

)


Iron,Fum&Ps/Fa/Vit B&C#18/L.ca (Fusion Plus Capsule), 1 EACH PO DAILY, (Reported

)


Prednisone* (Prednisone*), 30 MG ORAL DAILY, (Reported)





Patient History


Healthcare decision maker





Resuscitation status





Advanced Directive on File





Patient History Narrative








Pmhx: as above





Shx: No tobacco, alcohol, or illicit drug use.





Fhx: non contributory





Review of Systems


All Other Systems:  negative except mentioned in HPI





Physical Exam


Physical Exam Narrative





GENERAL:  Patient is awake, alert, in mild distress.


HEENT:  Extraocular muscles intact.  No lymphadenopathy noted.


CARDIOVASCULAR:  S1, S2.  No rubs or gallops.


PULMONARY:  Clear to auscultation bilaterally.  No rales, rhonchi, or


wheezes.


ABDOMEN:  Nondistended, nontender.


EXTREMITIES:  2+ pitting edema, right greater than left with right lower


extremity erythema.





Last 24 Hour Vital Signs








  Date Time  Temp Pulse Resp B/P (MAP) Pulse Ox O2 Delivery O2 Flow Rate FiO2


 


8/28/20 09:00      Room Air  


 


8/28/20 08:00 97.2 87 18 95/55 (68) 95   


 


8/27/20 21:00      Room Air  


 


8/27/20 17:16      Room Air  


 


8/27/20 16:00 97.7 78 20 117/70 (86) 97   


 


8/27/20 13:45 97.5 73 16 131/72 98 Room Air  


 


8/27/20 12:50    141/68    


 


8/27/20 12:35  69 12 140/63 99 Room Air  

















Intake and Output  


 


 8/27/20 8/28/20





 19:00 07:00


 


  


 


# Voids 1 











Laboratory Tests








Test


  8/28/20


05:20


 


Sodium Level


  140 MMOL/L


(136-145)


 


Potassium Level


  4.1 MMOL/L


(3.5-5.1)


 


Chloride Level


  106 MMOL/L


()


 


Carbon Dioxide Level


  26 MMOL/L


(21-32)


 


Anion Gap


  9 mmol/L


(5-15)


 


Blood Urea Nitrogen


  60 mg/dL


(7-18)  H


 


Creatinine


  2.1 MG/DL


(0.55-1.30)  H


 


Estimat Glomerular Filtration


Rate 22.9 mL/min


(>60)


 


Glucose Level


  123 MG/DL


()  H


 


Calcium Level


  8.8 MG/DL


(8.5-10.1)








Height (Feet):  5


Height (Inches):  2.00


Weight (Pounds):  102


Medications





Current Medications








 Medications


  (Trade)  Dose


 Ordered  Sig/Naman


 Route


 PRN Reason  Start Time


 Stop Time Status Last Admin


Dose Admin


 


 Acetaminophen


  (Tylenol)  650 mg  Q4H  PRN


 ORAL


 Mild Pain (Pain Scale 1-3)  8/27/20 14:00


 9/26/20 13:59   


 


 


 Dextrose


  (Dextrose 50%)  25 ml  Q30M  PRN


 IV


 Hypoglycemia  8/27/20 14:00


 11/25/20 13:59   


 


 


 Dextrose


  (Dextrose 50%)  50 ml  Q30M  PRN


 IV


 Hypoglycemia  8/27/20 14:00


 11/25/20 13:59   


 


 


 Doxycycline


 Monohydrate


  (Doxycycline


 Monohydrate)  100 mg  Q12HR


 ORAL


   8/27/20 21:00


 9/3/20 20:59  8/28/20 08:12


 


 


 Famotidine


  (Pepcid)  40 mg  DAILY


 ORAL


   8/28/20 09:00


 11/26/20 08:59  8/28/20 08:13


 


 


 Furosemide


  (Lasix)  40 mg  TID


 IV


   8/27/20 14:00


 9/26/20 13:59  8/28/20 08:13


 


 


 Heparin Sodium


  (Porcine)


  (Heparin 5000


 units/ml)  5,000 units  EVERY 12  HOURS


 SUBQ


   8/27/20 21:00


 10/11/20 20:59  8/28/20 08:14


 


 


 Morphine Sulfate


  (Morphine


 Sulfate)  1 mg  Q3H  PRN


 IVP


 Severe Pain (Pain Scale 7-10)  8/27/20 15:00


 9/3/20 14:59   


 


 


 Prednisone


  (predniSONE)  30 mg  DAILY


 ORAL


   8/28/20 09:00


 9/27/20 08:59  8/28/20 08:13


 


 


 Tramadol HCl


  (Ultram)  50 mg  TID  PRN


 ORAL


 Moderate Breakthru Pain (5-7)  8/27/20 14:00


 9/3/20 13:59  8/28/20 03:29


 











Assessment/Plan


Assessment/Plan:





Abx:


Ceftriaxone x1 8/27


PO Doxycycline 8/27-





Assessment:


COVID neg x1 (8/27 rapid COVID PCR neg)





R LE cellulitis; improving


  -v. duplex: no DVT





Afebrile


Leukocytosis (on prednisone)





 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





RA


hx of pulmonary MAC


emphysema


 osteoporosis


hx of pyelonephritis 9/2019


hx of  multiple episodes of pneumonia


 HTN


 





Plan:


-Continue PO Doxycycline #2 and add IV Ancef


-f/u cx 


-Monitor CBC/CMP, temperatures


-leg elevation





Thank you for this consultation. Will continue to follow along with you.


Discussed with Prachi Alonzo M.D. Aug 28, 2020 11:33

## 2020-08-28 NOTE — NUR
NURSE NOTES:

Received report from LANA Celeste. Patient seen in bed AAOx4 and does not seem to be in any 
distress at this time. The Resp is even and unlabored. The patient when asked if she is in 
pain, she  said" no pain". She is on bed rest with bedside commode to use. RN aware of 
swelling on right lower extremities. Patient is aware that she needs partial assistance 
getting on bedside commode. The bed in low and locked position with call light within reach, 
siderails up x2. Will continue to monitor.

## 2020-08-28 NOTE — NUR
NURSE NOTES:

RN unable to give cefazolin, RN called pharmacy and made aware that medication is not on the 
floor. Per Amara from pharmacy, bingll sent it up. Awaiting medication

## 2020-08-28 NOTE — NUR
CASE MANAGEMENT:INITIAL REVIEW



73 YR OLD FEMALE BIB SON FROM HOME



CC;EDEMA



SI;CELLULITIS. RIGHT LEG SWELLING.

97.5   79   18   141/68   97% ON RA

WBC 16.3   BUN 64   CR 1.9            ALB 3.3

UA+ PROTEIN, BLOOD, LEUKOCYTE ESTERASE, RBC, WBC

COVID RAPID ~ NEGATIVE

VENOUS DUPLEX ~ Negative for evidence of lower extremity deep venous thrombosis bilaterally

 

IS;ROCEPHIN IV ONCE

NORCO PO ONCE

NITRO-BID TOP ONCE



***ADMITTED TO MED SURG 8/27/20 @ 1311

****MED SURG STATUS



DCP;FROM HOME



PLAN;

ABX

DVT PPX





****INTERQUAL CRITERIA MET****

## 2020-08-28 NOTE — NUR
NURSE NOTES:

The resident is alert and oriented x4 and cooperative with her care and does not seem to be 
in any distress at this time. The Resp is even and unlabored and the bilateral lung sounds 
are all clear on auscultation.The IV line is noted in the Right AC 20g that is patent and 
asymptomatic and was flushed with NS well tolerated. The bed in low and locked position with 
call light within easy reach, siderails up x2. Will continue to monitor as indicated

## 2020-08-29 VITALS — SYSTOLIC BLOOD PRESSURE: 107 MMHG | DIASTOLIC BLOOD PRESSURE: 62 MMHG

## 2020-08-29 VITALS — SYSTOLIC BLOOD PRESSURE: 141 MMHG | DIASTOLIC BLOOD PRESSURE: 71 MMHG

## 2020-08-29 VITALS — SYSTOLIC BLOOD PRESSURE: 147 MMHG | DIASTOLIC BLOOD PRESSURE: 71 MMHG

## 2020-08-29 VITALS — DIASTOLIC BLOOD PRESSURE: 68 MMHG | SYSTOLIC BLOOD PRESSURE: 147 MMHG

## 2020-08-29 VITALS — DIASTOLIC BLOOD PRESSURE: 75 MMHG | SYSTOLIC BLOOD PRESSURE: 123 MMHG

## 2020-08-29 VITALS — DIASTOLIC BLOOD PRESSURE: 61 MMHG | SYSTOLIC BLOOD PRESSURE: 109 MMHG

## 2020-08-29 LAB
ADD MANUAL DIFF: YES
ALBUMIN SERPL-MCNC: 2.5 G/DL (ref 3.4–5)
ALBUMIN/GLOB SERPL: 0.5 {RATIO} (ref 1–2.7)
ALP SERPL-CCNC: 89 U/L (ref 46–116)
ALT SERPL-CCNC: 16 U/L (ref 12–78)
ANION GAP SERPL CALC-SCNC: 9 MMOL/L (ref 5–15)
AST SERPL-CCNC: 19 U/L (ref 15–37)
BILIRUB SERPL-MCNC: 0.3 MG/DL (ref 0.2–1)
BUN SERPL-MCNC: 62 MG/DL (ref 7–18)
CALCIUM SERPL-MCNC: 9.7 MG/DL (ref 8.5–10.1)
CHLORIDE SERPL-SCNC: 101 MMOL/L (ref 98–107)
CO2 SERPL-SCNC: 28 MMOL/L (ref 21–32)
CREAT SERPL-MCNC: 2.2 MG/DL (ref 0.55–1.3)
ERYTHROCYTE [DISTWIDTH] IN BLOOD BY AUTOMATED COUNT: 13.1 % (ref 11.6–14.8)
GLOBULIN SER-MCNC: 4.6 G/DL
HCT VFR BLD CALC: 34.5 % (ref 37–47)
HGB BLD-MCNC: 11.3 G/DL (ref 12–16)
MCV RBC AUTO: 95 FL (ref 80–99)
PLATELET # BLD: 189 K/UL (ref 150–450)
POTASSIUM SERPL-SCNC: 3.6 MMOL/L (ref 3.5–5.1)
RBC # BLD AUTO: 3.65 M/UL (ref 4.2–5.4)
SODIUM SERPL-SCNC: 138 MMOL/L (ref 136–145)
WBC # BLD AUTO: 18.1 K/UL (ref 4.8–10.8)

## 2020-08-29 RX ADMIN — HEPARIN SODIUM SCH UNITS: 5000 INJECTION INTRAVENOUS; SUBCUTANEOUS at 21:16

## 2020-08-29 RX ADMIN — MORPHINE SULFATE PRN MG: 2 INJECTION, SOLUTION INTRAMUSCULAR; INTRAVENOUS at 08:02

## 2020-08-29 RX ADMIN — MORPHINE SULFATE PRN MG: 2 INJECTION, SOLUTION INTRAMUSCULAR; INTRAVENOUS at 22:39

## 2020-08-29 RX ADMIN — TRAMADOL HYDROCHLORIDE PRN MG: 50 TABLET, FILM COATED ORAL at 04:38

## 2020-08-29 RX ADMIN — CEFAZOLIN SODIUM SCH MLS/HR: 500 POWDER, FOR SOLUTION INTRAMUSCULAR; INTRAVENOUS at 17:26

## 2020-08-29 RX ADMIN — HEPARIN SODIUM SCH UNITS: 5000 INJECTION INTRAVENOUS; SUBCUTANEOUS at 08:45

## 2020-08-29 RX ADMIN — CEFAZOLIN SODIUM SCH MLS/HR: 500 POWDER, FOR SOLUTION INTRAMUSCULAR; INTRAVENOUS at 04:37

## 2020-08-29 RX ADMIN — DOXYCYCLINE SCH MG: 100 CAPSULE ORAL at 08:44

## 2020-08-29 RX ADMIN — DOXYCYCLINE SCH MG: 100 CAPSULE ORAL at 21:09

## 2020-08-29 NOTE — NEPHROLOGY PROGRESS NOTE
Assessment/Plan


Assessment/Plan:


A/P





1) RLE Cellulitis/Edema- vastly improved and pain almost resolved


       - DC home today on Doxy





2) CKD 4- pauci immune GN, biopsy proved


      - s/p Rituxan. Continue prednisone and atovaquone prophylaxis





3) Leukocytosis- due to high chronic prednisone dosing for vasculitis





Subjective


Date patient seen:  Aug 29, 2020


Time patient seen:  11:21


ROS Limited/Unobtainable:  No


Allergies:  


Coded Allergies:  


     No Known Allergies (Unverified , 2/14/16)


Subjective


Patient feels much better and requesting to go home





Objective





Last 24 Hour Vital Signs








  Date Time  Temp Pulse Resp B/P (MAP) Pulse Ox O2 Delivery O2 Flow Rate FiO2


 


8/29/20 09:00      Room Air  


 


8/29/20 08:00 97.9 109 20 123/75 (91) 95   


 


8/29/20 04:00 97.7 87 18 147/68 (94) 98   


 


8/29/20 00:00 97.7 68 20 141/71 (94) 97   


 


8/28/20 21:00      Room Air  


 


8/28/20 20:00 98.1 62 17 144/65 (91) 98   


 


8/28/20 12:00 98.2 87 18 112/63 (79) 94   

















Intake and Output  


 


 8/28/20 8/29/20





 18:59 06:59


 


Intake Total 720 ml 


 


Output Total 1400 ml 


 


Balance -680 ml 


 


  


 


Intake Oral 720 ml 


 


Output Urine Total 1400 ml 


 


# Voids 2 








Laboratory Tests


8/29/20 07:05: 


White Blood Count 18.1H, Red Blood Count 3.65L, Hemoglobin 11.3L, Hematocrit 

34.5L, Mean Corpuscular Volume 95, Mean Corpuscular Hemoglobin 31.0, Mean 

Corpuscular Hemoglobin Concent 32.8, Red Cell Distribution Width 13.1, Platelet 

Count 189, Mean Platelet Volume 6.2L, Neutrophils (%) (Auto) , Lymphocytes (%) (

Auto) , Monocytes (%) (Auto) , Eosinophils (%) (Auto) , Basophils (%) (Auto) , 

Differential Total Cells Counted 100, Neutrophils % (Manual) 91H, Lymphocytes % 

(Manual) 6L, Monocytes % (Manual) 3, Eosinophils % (Manual) 0, Basophils % (

Manual) 0, Band Neutrophils 0, Platelet Estimate Adequate, Platelet Morphology 

Normal, Hypochromasia 1+, Anisocytosis 1+, Sodium Level 138, Potassium Level 3.6

, Chloride Level 101, Carbon Dioxide Level 28, Anion Gap 9, Blood Urea Nitrogen 

62H, Creatinine 2.2H, Estimat Glomerular Filtration Rate 21.7, Glucose Level 95

, Calcium Level 9.7, Total Bilirubin 0.3, Aspartate Amino Transf (AST/SGOT) 19, 

Alanine Aminotransferase (ALT/SGPT) 16, Alkaline Phosphatase 89, Total Protein 

7.1, Albumin 2.5L, Globulin 4.6, Albumin/Globulin Ratio 0.5L


Height (Feet):  5


Height (Inches):  2.00


Weight (Pounds):  102


General Appearance:  no apparent distress


EENT:  normal ENT inspection


Neck:  normal alignment, supple


Cardiovascular:  normal rate, regular rhythm


Respiratory/Chest:  lungs clear, normal breath sounds


Abdomen:  non tender, soft


Edema:  no edema noted Arm (L), no edema noted Arm (R), no edema noted Leg (L), 

no edema noted Leg (R), no edema noted Pedal (L), no edema noted Pedal (R), no 

edema noted Generalized











Brennan Nguyen MD Aug 29, 2020 11:23

## 2020-08-29 NOTE — INFECTIOUS DISEASES PROG NOTE
Assessment/Plan





Abx:


Ceftriaxone x1 8/27


PO Doxycycline 8/27-





Assessment:


COVID neg x1 (8/27 rapid COVID PCR neg)





RLE cellulitis; improving


  -v. duplex: no DVT





Afebrile


Leukocytosis (on prednisone)





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





RA


hx of pulmonary MAC


emphysema


osteoporosis


hx of pyelonephritis 9/2019


hx of  multiple episodes of pneumonia


HTN


 





Plan:


-Continue PO Doxycycline #3 and IV Ancef #2


-Recommend pt stays in house for another 1-2 days of IV abx given worsening 

leukocytosis today, ongoing RLE erythema and pain on exam


-f/u cx 


-Monitor CBC/CMP, temperatures


-leg elevation





Thank you for this consultation. Will continue to follow along with you.


Discussed with RN





Subjective


Allergies:  


Coded Allergies:  


     No Known Allergies (Unverified , 2/14/16)





AF


NAD on RA


Decreased swelling of RLE though still w/ TTP on exam





Objective





Last 24 Hour Vital Signs








  Date Time  Temp Pulse Resp B/P (MAP) Pulse Ox O2 Delivery O2 Flow Rate FiO2


 


8/29/20 04:00 97.7 87 18 147/68 (94) 98   


 


8/29/20 00:00 97.7 68 20 141/71 (94) 97   


 


8/28/20 21:00      Room Air  


 


8/28/20 20:00 98.1 62 17 144/65 (91) 98   


 


8/28/20 12:00 98.2 87 18 112/63 (79) 94   


 


8/28/20 09:00      Room Air  








Height (Feet):  5


Height (Inches):  2.00


Weight (Pounds):  102





Gen: NAD


CV: RRR


Pulm: CTAB


Abd: Soft, NTND


Ext: RLE w/ diffuse mild erythema and TTP, less swelling than prior





Microbiology








 Date/Time


Source Procedure


Growth Status


 


 


 8/27/20 10:35


Blood Blood Culture - Preliminary


NO GROWTH AFTER 24 HOURS Resulted


 


 8/27/20 10:35


Blood Blood Culture - Preliminary


NO GROWTH AFTER 24 HOURS Resulted


 


 8/27/20 10:45


Nasopharynx SARS-CoV-2 RdRp Gene Assay - Final Complete











Laboratory Tests








Test


  8/29/20


07:05


 


White Blood Count Pending  


 


Red Blood Count Pending  


 


Hemoglobin Pending  


 


Hematocrit Pending  


 


Mean Corpuscular Volume Pending  


 


Mean Corpuscular Hemoglobin Pending  


 


Mean Corpuscular Hemoglobin


Concent Pending  


 


 


Red Cell Distribution Width Pending  


 


Platelet Count Pending  


 


Mean Platelet Volume Pending  


 


Neutrophils (%) (Auto) Pending  


 


Lymphocytes (%) (Auto) Pending  


 


Monocytes (%) (Auto) Pending  


 


Eosinophils (%) (Auto) Pending  


 


Basophils (%) (Auto) Pending  


 


Sodium Level Pending  


 


Potassium Level Pending  


 


Chloride Level Pending  


 


Carbon Dioxide Level Pending  


 


Blood Urea Nitrogen Pending  


 


Creatinine Pending  


 


Estimat Glomerular Filtration


Rate Pending  


 


 


Glucose Level Pending  


 


Calcium Level Pending  


 


Total Bilirubin Pending  


 


Aspartate Amino Transf


(AST/SGOT) Pending  


 


 


Alanine Aminotransferase


(ALT/SGPT) Pending  


 


 


Alkaline Phosphatase Pending  


 


Total Protein Pending  


 


Albumin Pending  


 


Globulin Pending  











Current Medications








 Medications


  (Trade)  Dose


 Ordered  Sig/Naman


 Route


 PRN Reason  Start Time


 Stop Time Status Last Admin


Dose Admin


 


 Acetaminophen


  (Tylenol)  650 mg  Q4H  PRN


 ORAL


 Mild Pain (Pain Scale 1-3)  8/27/20 14:00


 9/26/20 13:59   


 


 


 Cefazolin Sodium


 500 mg/Dextrose  55 ml @ 


 110 mls/hr  Q12HR@0500,1700


 IV


   8/28/20 17:00


 9/4/20 16:59  8/29/20 04:37


 


 


 Dextrose


  (Dextrose 50%)  25 ml  Q30M  PRN


 IV


 Hypoglycemia  8/27/20 14:00


 11/25/20 13:59   


 


 


 Dextrose


  (Dextrose 50%)  50 ml  Q30M  PRN


 IV


 Hypoglycemia  8/27/20 14:00


 11/25/20 13:59   


 


 


 Doxycycline


 Monohydrate


  (Doxycycline


 Monohydrate)  100 mg  Q12HR


 ORAL


   8/27/20 21:00


 9/3/20 20:59  8/28/20 20:37


 


 


 Famotidine


  (Pepcid)  40 mg  DAILY


 ORAL


   8/28/20 09:00


 11/26/20 08:59  8/28/20 08:13


 


 


 Heparin Sodium


  (Porcine)


  (Heparin 5000


 units/ml)  5,000 units  EVERY 12  HOURS


 SUBQ


   8/27/20 21:00


 10/11/20 20:59  8/28/20 20:38


 


 


 Morphine Sulfate


  (Morphine


 Sulfate)  1 mg  Q3H  PRN


 IVP


 Severe Pain (Pain Scale 7-10)  8/27/20 15:00


 9/3/20 14:59  8/29/20 08:02


 


 


 Prednisone


  (predniSONE)  30 mg  DAILY


 ORAL


   8/28/20 09:00


 9/27/20 08:59  8/28/20 08:13


 


 


 Tramadol HCl


  (Ultram)  50 mg  TID  PRN


 ORAL


 Moderate Breakthru Pain (5-7)  8/27/20 14:00


 9/3/20 13:59  8/29/20 04:38


 

















Audrey Rojo M.D. Aug 29, 2020 08:14

## 2020-08-29 NOTE — NUR
NURSE NOTES:

Called primary MD, Dr Amin and made aware about doctor Darrel's recommendations. Dr Amin 
says "keep the patient until after clearance from the ID doctor."

## 2020-08-29 NOTE — NUR
NURSE NOTES:

The patient complained of pain in the leg and was given Tramadol 50 mg Po well tolerated. 
She is presently sleeping with Resp even and unlabored and the bilateral lung sounds clear 
on auscultation. will continue monitor

## 2020-08-29 NOTE — NUR
NURSE NOTES:

Called Dr. Amin and made aware about Dr. Rojo's recommendations. Dr. Amin says "patient 
is on steroids, which is why the WBC is still elevated," "Have Dr. Rojo to call me."

## 2020-08-29 NOTE — DISCHARGE INSTRUCTIONS
Discharge Instructions


Discharge Instructions


Services at Discharge:  home health services


Activity:  light activity


Follow Up Orders


Follow up Renal Dr Nguyen 1 week





For Congestive Heart Failure


Reminder


Report to your physician any weight gain of 5 pounds or more in one week.











Brennan Nguyen MD Aug 29, 2020 11:25

## 2020-08-29 NOTE — NUR
NURSE NOTES:

Received pt awake,A&Ox4, and verbal. Pt has no sob,fever,cough, and pain at the moment. Iv 
is intact and asymptomatic. Bed is in the lowest position,locked, and call light within 
reach. We will keep monitoring the pt.

## 2020-08-29 NOTE — NUR
NURSE NOTES:

made rounds, pt is in the bed and crying for pain on her right leg. rates her pain level to 
be 10/10. administered morphine as ordered. made pt comfortable in bed. call light is within 
reach, will follow plan of care.

## 2020-08-29 NOTE — NUR
NURSE NOTES:

Dr. Rojo returns call and ordered to cancel the discharge because of elevated WBC. Dr. Rojo 
said patient will stay few more days for more antibiotics. order is noted and communicated.

## 2020-08-30 VITALS — SYSTOLIC BLOOD PRESSURE: 125 MMHG | DIASTOLIC BLOOD PRESSURE: 74 MMHG

## 2020-08-30 VITALS — DIASTOLIC BLOOD PRESSURE: 62 MMHG | SYSTOLIC BLOOD PRESSURE: 115 MMHG

## 2020-08-30 VITALS — SYSTOLIC BLOOD PRESSURE: 118 MMHG | DIASTOLIC BLOOD PRESSURE: 61 MMHG

## 2020-08-30 VITALS — DIASTOLIC BLOOD PRESSURE: 67 MMHG | SYSTOLIC BLOOD PRESSURE: 118 MMHG

## 2020-08-30 LAB
ADD MANUAL DIFF: YES
ANION GAP SERPL CALC-SCNC: 7 MMOL/L (ref 5–15)
BUN SERPL-MCNC: 60 MG/DL (ref 7–18)
CALCIUM SERPL-MCNC: 9.3 MG/DL (ref 8.5–10.1)
CHLORIDE SERPL-SCNC: 105 MMOL/L (ref 98–107)
CO2 SERPL-SCNC: 28 MMOL/L (ref 21–32)
CREAT SERPL-MCNC: 2 MG/DL (ref 0.55–1.3)
ERYTHROCYTE [DISTWIDTH] IN BLOOD BY AUTOMATED COUNT: 12.7 % (ref 11.6–14.8)
HCT VFR BLD CALC: 34.3 % (ref 37–47)
HGB BLD-MCNC: 11.5 G/DL (ref 12–16)
MCV RBC AUTO: 94 FL (ref 80–99)
PLATELET # BLD: 202 K/UL (ref 150–450)
POTASSIUM SERPL-SCNC: 3.9 MMOL/L (ref 3.5–5.1)
RBC # BLD AUTO: 3.66 M/UL (ref 4.2–5.4)
SODIUM SERPL-SCNC: 140 MMOL/L (ref 136–145)
WBC # BLD AUTO: 13.4 K/UL (ref 4.8–10.8)

## 2020-08-30 RX ADMIN — CEFAZOLIN SODIUM SCH MLS/HR: 500 POWDER, FOR SOLUTION INTRAMUSCULAR; INTRAVENOUS at 05:09

## 2020-08-30 RX ADMIN — TRAMADOL HYDROCHLORIDE PRN MG: 50 TABLET, FILM COATED ORAL at 00:32

## 2020-08-30 RX ADMIN — HEPARIN SODIUM SCH UNITS: 5000 INJECTION INTRAVENOUS; SUBCUTANEOUS at 09:13

## 2020-08-30 RX ADMIN — DOXYCYCLINE SCH MG: 100 CAPSULE ORAL at 09:12

## 2020-08-30 NOTE — NUR
NURSE HAND-OFF: 



Important Events on Shift:na

Patient Status: stable

Diet: Regular



Pending Orders: 

Pending Results/Labs:cbc,bmp

Pending MD notification:[]



Latest Vital Signs: Temperature 96.8 , Pulse 67 , B/P 115 /62 , Respiratory Rate 20 , O2 SAT 
98 , Room Air, O2 Flow Rate .  

Vital Sign Comment: []



Latest Coombs Fall Score: 70  

Fall Risk: High Risk 

Safety Measures: Call light Within Reach, Bed Alarm Zone 1, Side Rails Side Rails x2, Bed 
position Low and Locked.

Fall Precautions: 

Yellow Socks

Yellow Gown

Door Sign

Patient Fall Education



Report given to LANA Greene.

## 2020-08-30 NOTE — NEPHROLOGY PROGRESS NOTE
Assessment/Plan


Assessment/Plan:


A/P





1) RLE Cellulitis/Edema- vastly improved and pain almost resolved


       - DC home today 


       - ID to complete Abx





2) CKD 4- pauci immune GN, biopsy proved


      - s/p Rituxan. Continue prednisone and atovaquone prophylaxis


      - Cr stable at 2





3) Leukocytosis- due to high chronic prednisone dosing for vasculitis


       - WBC 13K


       - ID to follow as out pt





Subjective


Date patient seen:  Aug 30, 2020


Time patient seen:  11:34


ROS Limited/Unobtainable:  No


Allergies:  


Coded Allergies:  


     No Known Allergies (Unverified , 2/14/16)


Subjective


Patient feels much better. Will try to DC once more





Objective





Last 24 Hour Vital Signs








  Date Time  Temp Pulse Resp B/P (MAP) Pulse Ox O2 Delivery O2 Flow Rate FiO2


 


8/30/20 09:00      Room Air  


 


8/30/20 08:00 97.7 71 20 118/67 (84) 99   


 


8/30/20 04:00 96.8 67 20 115/62 (79) 98   


 


8/30/20 00:00 96.8 63 19 118/61 (80) 97   


 


8/29/20 20:22      Room Air  


 


8/29/20 20:00 97.2 71 19 147/71 (96) 98   


 


8/29/20 16:00 97.9 68 18 107/62 (77) 97   


 


8/29/20 12:00 97.7 76 18 109/61 (77) 96   

















Intake and Output  


 


 8/29/20 8/30/20





 19:00 07:00


 


Intake Total 720 ml 


 


Output Total 600 ml 


 


Balance 120 ml 


 


  


 


Intake Oral 720 ml 


 


Output Urine Total 600 ml 


 


# Voids 2 








Laboratory Tests


8/30/20 06:35: 


White Blood Count 13.4H, Red Blood Count 3.66L, Hemoglobin 11.5L, Hematocrit 

34.3L, Mean Corpuscular Volume 94, Mean Corpuscular Hemoglobin 31.4H, Mean 

Corpuscular Hemoglobin Concent 33.5, Red Cell Distribution Width 12.7, Platelet 

Count 202, Mean Platelet Volume 7.2, Neutrophils (%) (Auto) , Lymphocytes (%) (

Auto) , Monocytes (%) (Auto) , Eosinophils (%) (Auto) , Basophils (%) (Auto) , 

Differential Total Cells Counted 100, Neutrophils % (Manual) 90H, Lymphocytes % 

(Manual) 8L, Monocytes % (Manual) 2, Eosinophils % (Manual) 0, Basophils % (

Manual) 0, Band Neutrophils 0, Platelet Estimate Adequate, Platelet Morphology 

Normal, Red Blood Cell Morphology Normal, Sodium Level 140, Potassium Level 3.9

, Chloride Level 105, Carbon Dioxide Level 28, Anion Gap 7, Blood Urea Nitrogen 

60H, Creatinine 2.0H, Estimat Glomerular Filtration Rate 24.3, Glucose Level 92

, Calcium Level 9.3


Height (Feet):  5


Height (Inches):  2.00


Weight (Pounds):  102


General Appearance:  no apparent distress, alert


EENT:  normal ENT inspection


Neck:  normal alignment, supple


Cardiovascular:  normal rate, regular rhythm


Respiratory/Chest:  lungs clear, normal breath sounds


Abdomen:  non tender, soft


Edema:  no edema noted Arm (L), no edema noted Arm (R), no edema noted Leg (L), 

no edema noted Leg (R), no edema noted Pedal (L), no edema noted Pedal (R), no 

edema noted Generalized











Brennan Nguyen MD Aug 30, 2020 11:40

## 2020-08-30 NOTE — NUR
NURSE NOTES:

Patient is discharged home without any signs of distress. Patient is alert and awake. A&O X 
4. verbally responsive. Respiration is even and unlabored on room air. Denies any pain at 
this time. Patient has belongings; inventory paper signed. Patient did not bring home 
medications. Skin is clean and intact. Patient is provided with after-care instructions, 
provided medication teaching, and to follow-up with any MD's appointment, patient verbalized 
understanding of after-care instructions. IV site and wrist band removed. Patient is 
discharged home in company of her son via a private vehicle.

## 2020-08-30 NOTE — NUR
NURSE NOTES:

pt is in the bed eating breakfast, tolerates meal well. no acute distress noted at this 
time. call light is within reach.

## 2020-09-01 NOTE — DISCHARGE SUMMARY
Discharge Summary


Discharge Summary


_


DATE OF ADMISSION: 8/27/2020





DATE OF DISCHARGE: 8/30/2020








DISCHARGED BY: Dr. Nguyen





REASON FOR ADMISSION: 


74 years old female with past medical history of chronic kidney disease stage IV

, secondary to pauci- immune glomerulonephritis, rheumatoid arthritis, 

hypertension, presented to the hospital with worsening right lower extremity 

pain,  not controlled with Tylenol.  


Pain  increased significantly over the past month.  


Patient reported redness of the right lower extremity.  


Laboratory work-up revealed leukocytosis WBC 16.3, hemoglobin 12.77.  Platelet 

count 204.  


Creatinine 1.9 . 


Venous duplex bilateral lower extremity revealed no evidence of acute DVT.


Rapid COVID-19 was negative.


 


Patient had been recently diagnosed with pauci- immune glomerulonephritis and 

treated with prednisone and Rituxan.


She continued to be on the prednisone with   gradual tapering.  


In emergency department patient received empiric antibiotic and subsequently 

admitted for further management.





CONSULTANTS:


ID specialist Dr. Díaz


 


 


[facility] COURSE: 


Patient admitted to medical surgical floor.


Antibiotic provided as per ID specialist recommendation.


Blood culture were negative.


Leukocytosis trending down.  No fevers.


ID specialist recommended to continue oral antibiotic at home to complete the 

course.


Renal parameters were closely monitored.  


Prednisone 30 mg a day continued.  


Creatinine remained at the same range.  


Nephrotoxic's were avoided.  


Pain management was addressed as needed.  


DVT and GI prophylaxis provided.  


Prior imaging of the right lower extremity   all negative.  


Blood pressure was closely monitored , remained stable without antihypertensive 

.


Supportive care provided.  


Patient clinically stabilized and was ready for discharge home to complete 

antibiotic course. 





FINAL DIAGNOSES: 


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


Right lower extremity cellulitis


Right lower extremity pain , secondary to cellulitis


Leukocytosis, ikely secondary to long-term prednisone therapy-  improved





DISCHARGE MEDICATIONS:


See Medication Reconciliation list.





DISCHARGE INSTRUCTIONS:


Patient was discharged home.  Follow-up with her primary care provider and 

nephrologist





I have been assigned to dictate discharge summary for this account. 


I was not involved in the patient's management.











Minal Barnes NP Sep 1, 2020 08:24

## 2020-12-17 NOTE — NUR
NURSE NOTES: Nurse report given by LANA Browne. Patient's sleeping in bed but easily awake. 
Denies pain, no s/s of distress or SOB. Bed low and locked, call light within reach, side 
rails x 2, safety precaution is armed. IV is patent, saline locked and asymptomatic. AO x 3. 
Will continue to monitor. 17-Dec-2020 13:32

## 2020-12-22 NOTE — HISTORY AND PHYSICAL REPORT
[facility]: Post-Discharge Note  SITUATION                                                      Admission:    Admission Date: 12/18/20   Reason for Admission: Necrotizing Pancreatitis  Discharge:   Discharge Date: 12/21/20  Discharge Diagnosis: Necrotizing Pancreatitis    BACKGROUND                                                      Radha De Souza was admitted on 12/18/2020 for concerns of low grade sepsis . The following problems were addressed during her hospitalization:     #Leukocytosis   #Necrotizing pancreatitis s/p multiple necrosectomies, c/b multiple intraabdominal abscesses s/p cytogastrostomy placement  #Recent VRE and non-tuberculous mycobacterial bacteremia, Meropenem-resistant pseudomonas infection in pancreatic fluid culture  Patient presented with complaints of change in cytogastrostomy output from green to yellow with leukocytosis concerning for ongoing infection. Patient remained afebrile and hemodynamically stable. She was noted to have softer blood pressures than her baseline blood pressures. Patient was evaluated by GI and an ERCP was performed to evaluate for ongoing intrabdominal infections. ERCP did not reveal a clear source of infection. An empiric stent exchanged was performed and two new stents were placed in the CBD. The biliary tree was also swept during the procedure. Given no obvious source of infection, GI did not feel it was necessary to start empiric antibiotics.      -Patient scheduled to follow up virtual clinic visit with Dr. Pacheco next month. -Will need repeat ERCP in 2-3 months for biliary stent exchange/upsize  -Resume Tube Feeds       #Pneumatosis coli, stable   Patient was found to have pneumatosis coli on CT. The findings were discussed with colorectal surgery who deemed that there was no need for acute intervention given patient had a normal lactate and presence of pneumobilia.      #GJ dependence   #Dehydration  #Hyponatremia, likely hypovolemic  DATE OF ADMISSION:  03/02/2019

DATE AND TIME SEEN:  On 03/03/2019 at 8 a.m.



CONSULTANTS:

1. Geovany Mustafa M.D.

2. Jasson Domingo M.D.



CHIEF COMPLAINT:  Fever, shortness of breath, and pneumonia.



BRIEF HISTORY:  This is a 73-year-old female, who lives at home,

complaining of fever and shortness of breath for three days getting worse,

had a cold prior to that.  The patient came to [place] last night,

diagnosed with pneumonia and shortness of breath and admitted to telemetry

for further care.  Currently, O2 NC, calm, slight short of breath, in bed.

No complaint.



REVIEW OF SYSTEMS:  No chest pain.  Slight short of breath.  No nausea,

vomiting, or diarrhea.



PAST MEDICAL HISTORY:  Includes rheumatoid arthritis and

neuropathy.



PAST SURGICAL HISTORY:  None.



MEDICATIONS:  Include cefepime, vancomycin, heparin, Zofran, morphine,

Tylenol, and albuterol.



ALLERGIES:  Denies.



SOCIAL HISTORY:  No smoking.  No alcohol.  No intravenous drug

abuse.



FAMILY HISTORY:  Noncontributory.



PHYSICAL EXAMINATION:

GENERAL:  Calm in bed, oriented x3, slight short of breath.

VITAL SIGNS:  Temperature is 98 degrees, pulse 82, respirations 20, and

blood pressure 97/67.

CARDIOVASCULAR:  No murmur.

LUNGS:  Poor air exchange.

ABDOMEN:  Bowel sounds distant.

EXTREMITIES:  No cyanosis or edema.

NEUROLOGIC:  The patient moves all extremities, slightly weak.



LABORATORY AND DIAGNOSTIC DATA:  Labs at this time show white count 2.8,

hemoglobin and hematocrit 9.6/29, and platelets 173.  BMP shows sodium

135, BUN 20, otherwise albumin 2.1.  Urinalysis shows 1+ leukocyte

esterase.



ASSESSMENT:

1. Fever.

2. Shortness of breath.

3. Rheumatoid arthritis.

4. UTI.

5. Pneumonia.

6. Leukopenia.

7. Anemia.

8. Malnutrition.

9. Neuropathy.



PLAN:

1. O2 and pulmonary treatment.

2. Antibiotics per Infectious Disease.

3. Pain control.

4. Dietary followup.

5. CBC and BMP in the morning.









  ______________________________________________

  Flaco Stephens D.O.





DR:  OTONIEL

D:  03/03/2019 08:49

T:  03/04/2019 02:31

JOB#:  [id number]

CC:    #Hypokalemia 2/2 GI losses   Patient appeared hypovolemic on admission and there was significant drainage from her RP drain. She was given adequate fluid resuscitation.      -Continue home tube feeds           ASSESSMENT      Discharge Assessment  Patient reports symptoms are: Improved  Does the patient have all of their medications?: Yes  Does patient know what their new medications are for?: Yes  Does patient have a follow-up appointment scheduled?: No(patient will call to get scheduled)  Does patient have any other questions or concerns?: No    Post-op  Did the patient have surgery or a procedure: No  Fever: No  Chills: No  Eating & Drinking: eating and drinking without complaints/concerns  PO Intake: regular diet  Bowel Function: normal  Urinary Status: voiding without complaint/concerns        PLAN                                                      Outpatient Plan:  Follow-up Appointments     Adult [facility] Follow-up and recommended labs and tests      Follow up with Dr. Pacheco , within 1 month  for hospital follow- up.      Appointments on [place] and/or [facility] (with [facility] or [facility]   provider or service). Call [phone] if you haven't heard regarding   these appointments within 7 days of discharge.               No future appointments.        Gisel Castaneda, CMA

## 2022-01-01 NOTE — NUR
CASE MANAGEMENT:REVIEW



SI;ANEMIA.  RENAL INSUFFICIENCY.   LUNG EMPHYSEMA.

99.6   86   18   103/55   92% ON RA

H/H 9.3/27.6   



IS;IRON SUCROSE/NS IV

PLAQUENIL PO QD

PROTONIX PO Q12 HRS

DUO NEB HHN Q4 HRS



*******MED SURG STATU******



DCP; HOME WITH HOME HEALTH Statement Selected

## 2022-11-09 NOTE — NUR
"Speech Language Pathology  Daily Treatment     Patient Name: Lorene Haro  Age:  48 y.o., Sex:  female  Medical Record #: [id number]  Today's Date: 11/9/2022     Precautions  Precautions: (P) Fall Risk, Swallow Precautions ( See Comments)  Comments: Swallow precautions    Assessment    Per the FEES 11/7 \"The pt presents with a moderate oropharyngeal dysphagia likely acute on chronic related to pneumonia and incoordination of respiratory and swallow systems as well as history of KATHIA and OHS.  Swallow safety is impaired and efficiency is mild.  Diet modification is indicated to mitigate the risk for aspiration.  Recommend minced and moist diet with mildly thick liquids with use of an effortful swallow followed by a second swallow.\"       Patient was seen today for skilled dysphagia tx.  She was seated upright in a chair.  She was able to thicken the liquids after mild cues.  She is concerned about the cost as she uses food stamps she reports, I provided her a free resource [email or web address] and offered to help her fill out the application, however she refused due to not knowing her new address where she will be moving soon. Patient was educated in laryngeal elevation/effortful swallow exercises/strategies and provided a handout as well as some thickener to get her started at home.  She was able to verbalize and demonstrate use of effortful swallow, however she will require post-acute dysphagia tx.   Patient would benefit from continued SLP intervention to address dysphagia and maximize swallow function.       Recommendations  Minced and moist diet with mildly thick liquids (MM5/MT2)  2.  Swallowing Instructions & Precautions:   Supervision: Assist with meal tray set up, Distant supervision - check on patient 2-3 times per meal--tray will be delivered to nurses station so that CNA/RN can ensure patient is out of bed and up in chair before she eats  Positioning: Meals sitting upright in a chair, as " NURSE NOTES:

pt in bed sleeping comfortable, son at bedside, safety precautions in place, no s/s of acute 
distress or pain noted. will continue hourly rounding to monitor pt. "tolerated  Medication: Crush with applesauce or puree, as appropriate  Strategies: Small bites/sips, Slow rate of intake, No straws, Multiple swallows (x 2) per bite/sip , Effortful swallow  Oral Care: Q4h  3.  Reflux precautions at all times--upright for 30 minutes following PO  4.  SLP will follow for treatment       Plan    Continue current treatment plan.    Discharge Recommendations: (P) Recommend home health for continued speech therapy services    Subjective    RN cleared for dysphagia tx.      Objective       11/09/22 0943   Precautions   Precautions Fall Risk;Swallow Precautions ( See Comments)   Vitals   O2 (LPM) 3   O2 Delivery Device Silicone Nasal Cannula   Pain 0 - 10 Group   Therapist Pain Assessment Post Activity Pain Same as Prior to Activity   Dysphagia    Diet / Liquid Recommendation Mildly Thick (2) - (Nectar Thick)   Nutritional Liquid Intake Rating Scale Thickened beverages (mildly thick unless otherwise specified)   Nutritional Food Intake Rating Scale Total oral diet with multiple consistencies but requiring special preparation or compensations   Recommended Route of Medication Administration   Medication Administration  Crush all Medications in Puree   Patient / Family Goals   Patient / Family Goal #1 \"I've been having trouble swallowing\"   Goal #1 Outcome Progressing slower than expected   Short Term Goals   Short Term Goal # 1 11/7 modified: Pt will be able to consume MM5/MT2 diet with min cues to use swallow precautions with no overt S/Sx of aspiration noted   Goal Outcome # 1 Progressing as expected   Short Term Goal # 2 Pt will complete instrumental assessment of swallowing w/ SLP to assess swallow function and determine safety for PO.   Goal Outcome # 2  Goal met   Short Term Goal # 3 11/7: Pt will complete 10/10 reps of base of tongue retraction and laryngeal elevation exercises with min cues and \"good\" accuracy as judged by SLP.   Goal Outcome  # 3 Progressing slower than expected "   Education Group   Education Provided Dysphagia   Dysphagia Patient Response Patient;Acceptance;Explanation;Reinforcement Needed   Anticipated Discharge Needs   Discharge Recommendations Recommend home health for continued speech therapy services   Therapy Recommendations Upon DC Dysphagia Training   Interdisciplinary Plan of Care Collaboration   IDT Collaboration with  Nursing;Physician   Patient Position at End of Therapy Seated;Call Light within Reach;Tray Table within Reach;Phone within Reach   Collaboration Comments Rn updated

## 2023-03-29 NOTE — EMERGENCY ROOM REPORT
History of Present Illness


General


Chief Complaint:  To Be Triaged


Source:  Patient





Present Illness


HPI


74-year-old  female with past medical history of CKD stage IV, RA, HTN, 

neuropathy presents with chief complaint of right lower extremity swelling and 

pain, acute on chronic. 


Pt was seen at [facility] and received 3 infusions for CKD and was placed on 

daily prednisone 30 mg. 


Denies trauma, falls, abrasion, laceration, SOB, CP, cough, hemoptysis, n/v/d, 

or other complaints. 


Son states pt's home health nurse came yesterday and found the RLE weeping 

therefore applied a wound dressing. 


The patient's symptoms were gradual onset, severity was moderate, duration 

since several days.  


Quality: swelling





Past medical history:  RA, CKD, HTN 


Past surgical history:  Denies





Smoking:  Denies


Alcohol use:  Denies


Drug use:  Denies





Review of systems:


CONST: No fevers or chills, No night sweats


PULMONARY: No productive cough,  No shortness of breath 


CARDIAC: No chest pain, No palpitations 


GI: No vomiting, No diarrhea , No melena_or_BRBPR 


: No dysuria, No hematuria, No discharge 


NEURO: No new_focal_weakness_or_numbness, No confusion, No vision changes


14 point Review of Systems is otherwise negative except per HPI





Physical Exam:


GENERAL: Awake_alert_ nontoxic, no acute distress Spo2 98% on RA -normal


EYES: Extraocular muscles are intact. Conjunctivae clear. Lids without swelling


ENT: External nose and ear normal_in_appearance. Oropharynx clear. Head_

atraumatic, Moist_oral_mucosa


NECK:  No JVD. No meningismus. No thyromegaly.  Supple. Trachea midline


RESP: Normal respiratory effort. Symmetric rise. No stridor. Clear_to_

auscultation_No_rales_No_wheezes


CARDIAC: Regular rate and regular rhytm. 


RLE 


+2/4 pitting edema to the proximal tibia. Weeping. Erythema. No palpable 

crepitus. no pain out of proportion with flexion/ext of Knee, ankle or foot. 


ABDOMEN: Soft. Nondistended.  Nontender_No_rebound_or_guarding.


MSK:  Normal muscle tone, without rigidity.  Extremities without asymmetric 

deformity or swelling. RA toe contractures.  


SKIN: Warm and dry.  No visible cyanosis or pallor


NEUROLOGIC: Alert, oriented x3.  Motor_and_sensation_grossly_intact. No truncal 

ataxia. Gait_normal


Psych: Normal mood and affect, normal judgment and insight











- COORDINATION OF CARE


Case was discussed with: Patient , Patient's Family


Any labs and imaging that were ordered were interpreted as part of the medical 

decision making:








Medical Decision Making/Plan:





Differential diagnosis includes renal failure, congestive heart failure, 

arthritis, venous stasis without evidence of: Soft tissue infection such as 

cellulitis or abscess, compartment syndrome, septic arthritis, arterial 

occlusion, deep venous thrombosis, among others.





Initial VSS are afebrile. WNL. 


Exam shows RLE swelling > LLE. 


Duplex US shows no acute DVT.


Labs show leukocytosis of 16. No acidosis. BNP is 713 likely chronic CHF. Trop 

negative. Cr is elevated at baseline 1.9.





ED intervention included rocephin for cellulitis, pain control and local wound 

care to RLE. 


Nitro given for preload/afterload reduction. Cant give lasix 2/2 Cr 1.9. No 

Vasotec 2/2 soft BP. 





Distally the patient has capillary refill <2 seconds and strong pulses.  There 

is no pallor or pain out of proportion to exam.  There is mild swelling with 

negative Homans sign 


The  associated joints have full range of motion without any significant pain 

or restriction in mobility. There is no crepitus or pain out of proportion to 

exam and the patient is afebrile and nontoxic.  There is no overlying 

significant redness, induration, tenderness, pus, or evidence of drainable 

fluid collection.  No evidence of septic arthritis, necrotizing fasciitis, or 

soft tissue infection such as abscess or cellulitis.


No trauma, no injury , compartments are soft, the patient is able to bear 

weight and has no neurologic deficits.  No evidence of fracture, dislocation or 

compartment syndrome at this time. 


 


I spoke with Dr. Nguyen, and reviewed the patients presentation, workup, 

results, and treatment.  


They will admit the patient for further care and evaluation, and assume care of 

the patient at this time.


Allergies:  


Coded Allergies:  


     No Known Allergies (Unverified , 2/14/16)





COVID-19 Screening


Contact w/high risk pt:  No


Experienced COVID-19 symptoms?:  No





Nursing Documentation-PMH


Hx Cardiac Problems:  No - RA


Hx Hypertension:  No


Hx Pacemaker:  No


Hx Asthma:  No


Hx COPD:  No - PNEUMONIA


Hx Diabetes:  No


Hx Cancer:  No


Hx Gastrointestinal Problems:  No - Pyelonephritis


Hx Dialysis:  No - "Kidney problem"


Hx Neurological Problems:  No


Hx Cerebrovascular Accident:  No


Hx Seizures:  No


Hx Headaches:  Yes





Physical Exam


Sp02 EP Interpretation:  reviewed, normal





Medical Decision Making


Diagnostic Impression:  


 Primary Impression:  


 Right leg swelling


 Additional Impressions:  


 CKD (chronic kidney disease)


 Emphysema lung


 Cellulitis


 CHF (congestive heart failure)





EKG Diagnostic Results


YESI Marc


12-lead EKG (interpreted by me) 


Time: 1036


Indication: Rhythm analysis


Tracing visualized and Interpreted by me. Rhythm:  Normal sinus rhythm 


Rate:  71 bpm


QTc:  382


Morphology: No_significant_ST_elevations_or_depressions, No STEMI


Impression:  Normal_sinus_rhythm_without_significant_abnormality





Rhythm Strip Diag. Results


Rhythm Strip Time:  12:47


EP Interpretation:  yes


Rate:  62


Rhythm:  NSR, no PVC's, no ectopy


Reevaluation Time:  12:47


Status:  improved


Disposition:  ADMITTED AS INPATIENT


Admit Decision Time:  11:30


Condition:  Stable











Alexandrea Kimbrough D.O. Aug 27, 2020 10:26
Maggie Borges)  Pediatric Pulmonary Medicine; Pediatrics  [address], [address]  [place] [address]  Phone: [phone]  Fax: [phone]  Follow Up Time:

## 2023-04-12 NOTE — NUR
NURSE NOTES:

Received a call from Doctor Rojo (ID), MD stated that patient's WBC level is still high. 
Zach Rojo recommends patient to stay in the hospital for few more days and receive more 
antibiotics. Dr Rojo requested to notify primary MD, Doctor Amin. Detail Level: Zone

## 2023-06-28 NOTE — NUR
HAND-OFF: 

Report given to LANA Iniguez. Plan of care endorsed. Partial Purse String (Simple) Text: Given the location of the defect and the characteristics of the surrounding skin a simple purse string closure was deemed most appropriate.  Undermining was performed circumferentially around the surgical defect.  A purse string suture was then placed and tightened. Wound tension of the circular defect prevented complete closure of the wound.

## 2025-04-08 NOTE — CARDIOLOGY REPORT
-- DO NOT REPLY / DO NOT REPLY ALL --  -- This inbox is not monitored. If this was sent to the wrong provider or department, reroute message to P ECO Reroute pool. --  -- Message is from Engagement Center Operations (ECO) --    General Patient Message: yady from [facility] is calling regarding orders for PT and OT. It is not PDGM is not approved and non of the notes match with the diagnosis. They state that the patient will need to be seen by the provider again and home health cannot see patient for fall and at risk for fall, DVT and syncope. They states it doesn't state in any of the notes. Please advise.   Caller Information       Contact Date/Time Type Contact Phone/Fax    04/07/2025 03:20 PM CDT Phone (Incoming) [place] [phone]     [facility] care    04/08/2025 10:59 AM CDT Phone (Incoming) yady from [facility] (Other) [phone]            Alternative phone number: [phone] ask for intake department     Can a detailed message be left? Yes - Voicemail   Patient has been advised the message will be addressed within 2-3 business days.                 --------------- APPROVED REPORT --------------





EKG Measurement

Heart [id number]

SD 110P51

[id number]

[id number]

[id number]





Sinus rhythm with short SD

Otherwise normal ECG